# Patient Record
Sex: MALE | Race: WHITE | NOT HISPANIC OR LATINO | Employment: FULL TIME | URBAN - METROPOLITAN AREA
[De-identification: names, ages, dates, MRNs, and addresses within clinical notes are randomized per-mention and may not be internally consistent; named-entity substitution may affect disease eponyms.]

---

## 2019-05-22 ENCOUNTER — PATIENT MESSAGE (OUTPATIENT)
Dept: INTERNAL MEDICINE | Facility: CLINIC | Age: 59
End: 2019-05-22

## 2019-05-28 ENCOUNTER — HOSPITAL ENCOUNTER (INPATIENT)
Facility: OTHER | Age: 59
LOS: 6 days | Discharge: HOME | DRG: 603 | End: 2019-06-03
Attending: EMERGENCY MEDICINE | Admitting: HOSPITALIST
Payer: COMMERCIAL

## 2019-05-28 DIAGNOSIS — L03.90 CELLULITIS: ICD-10-CM

## 2019-05-28 DIAGNOSIS — R00.0 TACHYCARDIA: ICD-10-CM

## 2019-05-28 DIAGNOSIS — L03.312 CELLULITIS OF BACK EXCEPT BUTTOCK: Primary | ICD-10-CM

## 2019-05-28 LAB
ALBUMIN SERPL BCP-MCNC: 2.4 G/DL (ref 3.5–5.2)
ALP SERPL-CCNC: 222 U/L (ref 55–135)
ALT SERPL W/O P-5'-P-CCNC: 25 U/L (ref 10–44)
AMPHET+METHAMPHET UR QL: NEGATIVE
ANION GAP SERPL CALC-SCNC: 14 MMOL/L (ref 8–16)
AST SERPL-CCNC: 13 U/L (ref 10–40)
BACTERIA #/AREA URNS HPF: NORMAL /HPF
BARBITURATES UR QL SCN>200 NG/ML: NEGATIVE
BASOPHILS # BLD AUTO: ABNORMAL K/UL (ref 0–0.2)
BASOPHILS NFR BLD: 0 % (ref 0–1.9)
BENZODIAZ UR QL SCN>200 NG/ML: NEGATIVE
BILIRUB SERPL-MCNC: 0.6 MG/DL (ref 0.1–1)
BILIRUB UR QL STRIP: NEGATIVE
BUN SERPL-MCNC: 20 MG/DL (ref 6–20)
BZE UR QL SCN: NEGATIVE
CALCIUM SERPL-MCNC: 9.5 MG/DL (ref 8.7–10.5)
CANNABINOIDS UR QL SCN: NEGATIVE
CHLORIDE SERPL-SCNC: 96 MMOL/L (ref 95–110)
CLARITY UR: CLEAR
CO2 SERPL-SCNC: 20 MMOL/L (ref 23–29)
COLOR UR: YELLOW
CREAT SERPL-MCNC: 0.9 MG/DL (ref 0.5–1.4)
CREAT UR-MCNC: 26.3 MG/DL (ref 23–375)
DIFFERENTIAL METHOD: ABNORMAL
EOSINOPHIL # BLD AUTO: ABNORMAL K/UL (ref 0–0.5)
EOSINOPHIL NFR BLD: 0 % (ref 0–8)
ERYTHROCYTE [DISTWIDTH] IN BLOOD BY AUTOMATED COUNT: 11.9 % (ref 11.5–14.5)
EST. GFR  (AFRICAN AMERICAN): >60 ML/MIN/1.73 M^2
EST. GFR  (NON AFRICAN AMERICAN): >60 ML/MIN/1.73 M^2
ESTIMATED AVG GLUCOSE: 301 MG/DL (ref 68–131)
ETHANOL UR-MCNC: <10 MG/DL
GLUCOSE SERPL-MCNC: 381 MG/DL (ref 70–110)
GLUCOSE UR QL STRIP: ABNORMAL
HBA1C MFR BLD HPLC: 12.1 % (ref 4–5.6)
HCT VFR BLD AUTO: 37.4 % (ref 40–54)
HGB BLD-MCNC: 13.4 G/DL (ref 14–18)
HGB UR QL STRIP: ABNORMAL
KETONES UR QL STRIP: ABNORMAL
LACTATE SERPL-SCNC: 1.3 MMOL/L (ref 0.5–2.2)
LACTATE SERPL-SCNC: 1.4 MMOL/L (ref 0.5–2.2)
LEUKOCYTE ESTERASE UR QL STRIP: NEGATIVE
LYMPHOCYTES # BLD AUTO: ABNORMAL K/UL (ref 1–4.8)
LYMPHOCYTES NFR BLD: 5 % (ref 18–48)
MCH RBC QN AUTO: 32.2 PG (ref 27–31)
MCHC RBC AUTO-ENTMCNC: 35.8 G/DL (ref 32–36)
MCV RBC AUTO: 90 FL (ref 82–98)
METHADONE UR QL SCN>300 NG/ML: NEGATIVE
MICROSCOPIC COMMENT: NORMAL
MONOCYTES # BLD AUTO: ABNORMAL K/UL (ref 0.3–1)
MONOCYTES NFR BLD: 13 % (ref 4–15)
NEUTROPHILS # BLD AUTO: ABNORMAL K/UL (ref 1.8–7.7)
NEUTROPHILS NFR BLD: 81 % (ref 38–73)
NEUTS BAND NFR BLD MANUAL: 1 %
NITRITE UR QL STRIP: NEGATIVE
OPIATES UR QL SCN: NEGATIVE
PCP UR QL SCN>25 NG/ML: NEGATIVE
PH UR STRIP: 6 [PH] (ref 5–8)
PLATELET # BLD AUTO: 476 K/UL (ref 150–350)
PLATELET BLD QL SMEAR: ABNORMAL
PMV BLD AUTO: 9.4 FL (ref 9.2–12.9)
POCT GLUCOSE: 266 MG/DL (ref 70–110)
POCT GLUCOSE: 323 MG/DL (ref 70–110)
POCT GLUCOSE: 338 MG/DL (ref 70–110)
POCT GLUCOSE: 342 MG/DL (ref 70–110)
POCT GLUCOSE: 349 MG/DL (ref 70–110)
POTASSIUM SERPL-SCNC: 4.1 MMOL/L (ref 3.5–5.1)
PROT SERPL-MCNC: 7.2 G/DL (ref 6–8.4)
PROT UR QL STRIP: NEGATIVE
RBC # BLD AUTO: 4.16 M/UL (ref 4.6–6.2)
RBC #/AREA URNS HPF: 0 /HPF (ref 0–4)
SODIUM SERPL-SCNC: 130 MMOL/L (ref 136–145)
SP GR UR STRIP: <=1.005 (ref 1–1.03)
TOXICOLOGY INFORMATION: NORMAL
URN SPEC COLLECT METH UR: ABNORMAL
UROBILINOGEN UR STRIP-ACNC: NEGATIVE EU/DL
WBC # BLD AUTO: 19.13 K/UL (ref 3.9–12.7)
WBC #/AREA URNS HPF: 1 /HPF (ref 0–5)
YEAST URNS QL MICRO: NORMAL

## 2019-05-28 PROCEDURE — 25000003 PHARM REV CODE 250: Performed by: HOSPITALIST

## 2019-05-28 PROCEDURE — 85027 COMPLETE CBC AUTOMATED: CPT

## 2019-05-28 PROCEDURE — 87070 CULTURE OTHR SPECIMN AEROBIC: CPT

## 2019-05-28 PROCEDURE — 87077 CULTURE AEROBIC IDENTIFY: CPT

## 2019-05-28 PROCEDURE — 80307 DRUG TEST PRSMV CHEM ANLYZR: CPT

## 2019-05-28 PROCEDURE — 99285 EMERGENCY DEPT VISIT HI MDM: CPT | Mod: 25

## 2019-05-28 PROCEDURE — 25000003 PHARM REV CODE 250: Performed by: EMERGENCY MEDICINE

## 2019-05-28 PROCEDURE — 99223 PR INITIAL HOSPITAL CARE,LEVL III: ICD-10-PCS | Mod: ,,, | Performed by: HOSPITALIST

## 2019-05-28 PROCEDURE — 63600175 PHARM REV CODE 636 W HCPCS: Performed by: EMERGENCY MEDICINE

## 2019-05-28 PROCEDURE — 87186 SC STD MICRODIL/AGAR DIL: CPT

## 2019-05-28 PROCEDURE — 36415 COLL VENOUS BLD VENIPUNCTURE: CPT

## 2019-05-28 PROCEDURE — 93041 RHYTHM ECG TRACING: CPT

## 2019-05-28 PROCEDURE — 87075 CULTR BACTERIA EXCEPT BLOOD: CPT

## 2019-05-28 PROCEDURE — 99223 1ST HOSP IP/OBS HIGH 75: CPT | Mod: ,,, | Performed by: HOSPITALIST

## 2019-05-28 PROCEDURE — 83036 HEMOGLOBIN GLYCOSYLATED A1C: CPT

## 2019-05-28 PROCEDURE — 94660 CPAP INITIATION&MGMT: CPT

## 2019-05-28 PROCEDURE — 63600175 PHARM REV CODE 636 W HCPCS: Performed by: HOSPITALIST

## 2019-05-28 PROCEDURE — 94761 N-INVAS EAR/PLS OXIMETRY MLT: CPT

## 2019-05-28 PROCEDURE — 85007 BL SMEAR W/DIFF WBC COUNT: CPT

## 2019-05-28 PROCEDURE — 87040 BLOOD CULTURE FOR BACTERIA: CPT

## 2019-05-28 PROCEDURE — 80053 COMPREHEN METABOLIC PANEL: CPT

## 2019-05-28 PROCEDURE — 81000 URINALYSIS NONAUTO W/SCOPE: CPT

## 2019-05-28 PROCEDURE — 83605 ASSAY OF LACTIC ACID: CPT

## 2019-05-28 PROCEDURE — 96361 HYDRATE IV INFUSION ADD-ON: CPT

## 2019-05-28 PROCEDURE — 25500020 PHARM REV CODE 255: Performed by: EMERGENCY MEDICINE

## 2019-05-28 PROCEDURE — 11000001 HC ACUTE MED/SURG PRIVATE ROOM

## 2019-05-28 PROCEDURE — 96372 THER/PROPH/DIAG INJ SC/IM: CPT | Mod: 59

## 2019-05-28 PROCEDURE — 27000190 HC CPAP FULL FACE MASK W/VALVE

## 2019-05-28 PROCEDURE — 96360 HYDRATION IV INFUSION INIT: CPT

## 2019-05-28 PROCEDURE — 99900035 HC TECH TIME PER 15 MIN (STAT)

## 2019-05-28 PROCEDURE — S5571 INSULIN DISPOS PEN 3 ML: HCPCS | Performed by: HOSPITALIST

## 2019-05-28 RX ORDER — ENOXAPARIN SODIUM 100 MG/ML
40 INJECTION SUBCUTANEOUS EVERY 24 HOURS
Status: DISCONTINUED | OUTPATIENT
Start: 2019-05-28 | End: 2019-06-03 | Stop reason: HOSPADM

## 2019-05-28 RX ORDER — INSULIN ASPART 100 [IU]/ML
1-10 INJECTION, SOLUTION INTRAVENOUS; SUBCUTANEOUS
Status: DISCONTINUED | OUTPATIENT
Start: 2019-05-28 | End: 2019-06-03 | Stop reason: HOSPADM

## 2019-05-28 RX ORDER — ONDANSETRON 8 MG/1
8 TABLET, ORALLY DISINTEGRATING ORAL EVERY 8 HOURS PRN
Status: DISCONTINUED | OUTPATIENT
Start: 2019-05-28 | End: 2019-06-03 | Stop reason: HOSPADM

## 2019-05-28 RX ORDER — VANCOMYCIN HYDROCHLORIDE
15
Status: DISCONTINUED | OUTPATIENT
Start: 2019-05-28 | End: 2019-05-28

## 2019-05-28 RX ORDER — VALSARTAN 80 MG/1
80 TABLET ORAL DAILY
Status: DISCONTINUED | OUTPATIENT
Start: 2019-05-28 | End: 2019-05-31

## 2019-05-28 RX ORDER — IBUPROFEN 200 MG
24 TABLET ORAL
Status: DISCONTINUED | OUTPATIENT
Start: 2019-05-28 | End: 2019-06-03 | Stop reason: HOSPADM

## 2019-05-28 RX ORDER — GLUCAGON 1 MG
1 KIT INJECTION
Status: DISCONTINUED | OUTPATIENT
Start: 2019-05-28 | End: 2019-06-03 | Stop reason: HOSPADM

## 2019-05-28 RX ORDER — IBUPROFEN 200 MG
16 TABLET ORAL
Status: DISCONTINUED | OUTPATIENT
Start: 2019-05-28 | End: 2019-06-03 | Stop reason: HOSPADM

## 2019-05-28 RX ORDER — SODIUM CHLORIDE 9 MG/ML
INJECTION, SOLUTION INTRAVENOUS CONTINUOUS
Status: DISCONTINUED | OUTPATIENT
Start: 2019-05-28 | End: 2019-05-30

## 2019-05-28 RX ORDER — ACETAMINOPHEN 325 MG/1
650 TABLET ORAL EVERY 8 HOURS PRN
Status: DISCONTINUED | OUTPATIENT
Start: 2019-05-28 | End: 2019-06-03 | Stop reason: HOSPADM

## 2019-05-28 RX ORDER — SODIUM CHLORIDE 0.9 % (FLUSH) 0.9 %
10 SYRINGE (ML) INJECTION
Status: DISCONTINUED | OUTPATIENT
Start: 2019-05-28 | End: 2019-06-03 | Stop reason: HOSPADM

## 2019-05-28 RX ORDER — ACETAMINOPHEN 325 MG/1
650 TABLET ORAL EVERY 4 HOURS PRN
Status: DISCONTINUED | OUTPATIENT
Start: 2019-05-28 | End: 2019-06-03 | Stop reason: HOSPADM

## 2019-05-28 RX ORDER — LOSARTAN POTASSIUM 25 MG/1
25 TABLET ORAL DAILY
Status: DISCONTINUED | OUTPATIENT
Start: 2019-05-28 | End: 2019-05-28

## 2019-05-28 RX ORDER — HYDRALAZINE HYDROCHLORIDE 20 MG/ML
15 INJECTION INTRAMUSCULAR; INTRAVENOUS EVERY 4 HOURS PRN
Status: DISCONTINUED | OUTPATIENT
Start: 2019-05-28 | End: 2019-06-03 | Stop reason: HOSPADM

## 2019-05-28 RX ADMIN — VANCOMYCIN HYDROCHLORIDE 500 MG: 500 INJECTION, POWDER, LYOPHILIZED, FOR SOLUTION INTRAVENOUS at 10:05

## 2019-05-28 RX ADMIN — VALSARTAN 80 MG: 80 TABLET, FILM COATED ORAL at 09:05

## 2019-05-28 RX ADMIN — PIPERACILLIN AND TAZOBACTAM 4.5 G: 4; .5 INJECTION, POWDER, LYOPHILIZED, FOR SOLUTION INTRAVENOUS; PARENTERAL at 11:05

## 2019-05-28 RX ADMIN — VANCOMYCIN HYDROCHLORIDE 1750 MG: 100 INJECTION, POWDER, LYOPHILIZED, FOR SOLUTION INTRAVENOUS at 05:05

## 2019-05-28 RX ADMIN — SODIUM CHLORIDE 2871 ML: 0.9 INJECTION, SOLUTION INTRAVENOUS at 06:05

## 2019-05-28 RX ADMIN — INSULIN ASPART 8 UNITS: 100 INJECTION, SOLUTION INTRAVENOUS; SUBCUTANEOUS at 10:05

## 2019-05-28 RX ADMIN — INSULIN ASPART 6 UNITS: 100 INJECTION, SOLUTION INTRAVENOUS; SUBCUTANEOUS at 05:05

## 2019-05-28 RX ADMIN — ENOXAPARIN SODIUM 40 MG: 100 INJECTION SUBCUTANEOUS at 05:05

## 2019-05-28 RX ADMIN — INSULIN ASPART 4 UNITS: 100 INJECTION, SOLUTION INTRAVENOUS; SUBCUTANEOUS at 09:05

## 2019-05-28 RX ADMIN — IOHEXOL 100 ML: 350 INJECTION, SOLUTION INTRAVENOUS at 06:05

## 2019-05-28 RX ADMIN — SODIUM CHLORIDE: 0.9 INJECTION, SOLUTION INTRAVENOUS at 09:05

## 2019-05-28 RX ADMIN — PIPERACILLIN AND TAZOBACTAM 4.5 G: 4; .5 INJECTION, POWDER, LYOPHILIZED, FOR SOLUTION INTRAVENOUS; PARENTERAL at 09:05

## 2019-05-28 RX ADMIN — INSULIN DETEMIR 10 UNITS: 100 INJECTION, SOLUTION SUBCUTANEOUS at 09:05

## 2019-05-28 RX ADMIN — VANCOMYCIN HYDROCHLORIDE 2000 MG: 100 INJECTION, POWDER, LYOPHILIZED, FOR SOLUTION INTRAVENOUS at 06:05

## 2019-05-28 NOTE — CONSULTS
59yoWM with left upper back infection which started 5 days ago and spreading.  No h/o similar problems.  Red, fluctuant, draining process over the left upper back and lower neck.  Will need drainage in the OR tomorrow (on a reg diet).

## 2019-05-28 NOTE — ED PROVIDER NOTES
Encounter Date: 5/28/2019    SCRIBE #1 NOTE: ICass, am scribing for, and in the presence of, Dr. Park.   SCRIBE #2 NOTE: IVicki, am scribing for, and in the presence of, Dr. Spivey. the EKG reading.     History     Chief Complaint   Patient presents with    Abscess     to left upper back x5 day pta, redness with induration noted      Time seen by provider: 5:34 AM    This is a 59 y.o. male with hx of HTN and DM who presents with complaint of abscess to his left upper back. He initially felt the abscess six days ago, but is unsure how long he has had it. He reports drainage after someone accidentally hit his back with their hand five days ago. He reports racing heart rate. He denies fever, chills, or myalgias.    The history is provided by the patient.     Review of patient's allergies indicates:  No Known Allergies  Past Medical History:   Diagnosis Date    Diabetes mellitus     Elevated transaminase measurement     HLD (hyperlipidemia)     mixed    HTN (hypertension)     Obesity     Other abnormal glucose     Sleep apnea      History reviewed. No pertinent surgical history.  Family History   Problem Relation Age of Onset    COPD Mother     Hyperlipidemia Father     Hypertension Father     Heart disease Father     Dementia Father     Diabetes Father      Social History     Tobacco Use    Smoking status: Never Smoker    Smokeless tobacco: Never Used   Substance Use Topics    Alcohol use: Yes     Frequency: Monthly or less     Drinks per session: 3 or 4     Binge frequency: Less than monthly     Comment: social    Drug use: No     Review of Systems   Constitutional: Negative for chills and fever.   HENT: Negative for sore throat.    Respiratory: Negative for shortness of breath.    Cardiovascular: Negative for chest pain.        Positive for racing heart.   Gastrointestinal: Negative for nausea.   Genitourinary: Negative for dysuria.   Musculoskeletal: Negative for back  pain and myalgias.   Skin: Negative for rash.        Positive for abscess and drainage.   Neurological: Negative for weakness and numbness.   Hematological: Does not bruise/bleed easily.       Physical Exam     Initial Vitals [05/28/19 0526]   BP Pulse Resp Temp SpO2   (!) 190/89 (!) 124 20 99.3 °F (37.4 °C) 97 %      MAP       --         Physical Exam    Nursing note and vitals reviewed.  Constitutional: He appears well-developed and well-nourished.   HENT:   Head: Normocephalic and atraumatic.   Eyes: EOM are normal. Pupils are equal, round, and reactive to light.   Neck: Normal range of motion. Neck supple.   Cardiovascular: Regular rhythm and normal heart sounds. Tachycardia present.  Exam reveals no gallop and no friction rub.    No murmur heard.  Pulmonary/Chest: Breath sounds normal. No respiratory distress. He has no wheezes. He has no rhonchi. He has no rales.   Musculoskeletal: Normal range of motion. He exhibits no edema or tenderness.   Neurological: He is alert and oriented to person, place, and time.   Skin: Skin is warm.   7 inch to 8 inch area of erythema with purple discoloration and purulent drainage to the posterior aspect of the left shoulder and neck with fluctuance.   Psychiatric: He has a normal mood and affect.         ED Course   Procedures  Labs Reviewed   CBC W/ AUTO DIFFERENTIAL - Abnormal; Notable for the following components:       Result Value    WBC 19.13 (*)     RBC 4.16 (*)     Hemoglobin 13.4 (*)     Hematocrit 37.4 (*)     Mean Corpuscular Hemoglobin 32.2 (*)     Platelets 476 (*)     Gran% 81.0 (*)     Lymph% 5.0 (*)     All other components within normal limits   COMPREHENSIVE METABOLIC PANEL - Abnormal; Notable for the following components:    Sodium 130 (*)     CO2 20 (*)     Glucose 381 (*)     Albumin 2.4 (*)     Alkaline Phosphatase 222 (*)     All other components within normal limits   URINALYSIS, REFLEX TO URINE CULTURE - Abnormal; Notable for the following components:     Specific Gravity, UA <=1.005 (*)     Glucose, UA 3+ (*)     Ketones, UA 1+ (*)     Occult Blood UA Trace (*)     All other components within normal limits    Narrative:     Preferred Collection Type->Urine, Clean Catch   POCT GLUCOSE - Abnormal; Notable for the following components:    POCT Glucose 338 (*)     All other components within normal limits   POCT GLUCOSE - Abnormal; Notable for the following components:    POCT Glucose 342 (*)     All other components within normal limits   CULTURE, BLOOD   CULTURE, BLOOD   CULTURE, AEROBIC  (SPECIFY SOURCE)   CULTURE, ANAEROBIC   LACTIC ACID, PLASMA   POCT GLUCOSE MONITORING CONTINUOUS     EKG Readings: (Independently Interpreted)   Sinus tachycardia at a rate of 103. Normal intervals. Narrow QRS. LAD. No acute ST/T wave abnormalities. QR wave in lead III. Independently interpreted by Dr. Spivey.     ECG Results    None       Imaging Results          CT Chest With Contrast (Final result)  Result time 05/28/19 07:08:57    Final result by Shaquille Osullivan MD (05/28/19 07:08:57)                 Impression:      5.9 x 17.5 x 4.9 cm (cephalad extent of which, however, is not completely visualized) soft tissue/subcutaneous infiltration with edema fairly focal LEFT upper back.  Cellulitis suspect.  Early abscess not excluded.  No well-defined abscess collection at this time.      Electronically signed by: Shaquille Osullivan MD  Date:    05/28/2019  Time:    07:08             Narrative:    EXAMINATION:  CT CHEST WITH CONTRAST    CLINICAL HISTORY:  Sepsis;large abscess and cellulitis to left upper back. eval extent of abscess;    TECHNIQUE:  Low dose axial images, sagittal and coronal reformations were obtained from the thoracic inlet to the lung bases following the IV administration of 100 mL of Omnipaque 350.    COMPARISON:  None    FINDINGS:  -Lungs: No nodules or infiltrates.    -Pleura: No thickening or fluid.    -Mediastinum/Beverly:Negative    -Axilla: No  adenopathy.    -Thyroid: Normal lower gland.    -Heart/Aorta: No pericardial effusion. Aorta normal caliber.    -Bones/Chest Wall: Negative chest wall.  There is diffuse infiltration of subcutaneous soft tissues with skin thickening identified level of the LEFT back, extending for approximately 5.9 x 17.5 by 4.9 cm.  The cephalad extent of this is not incompletely visualized as it extends into the LEFT neck region.  Finding most suggestive of cellulitis without evidence for a discrete focal collection.  This appears more consistent with confluent inflammatory/infectious etiology.    -Upper Abdomen:                                 Medical Decision Making:   Clinical Tests:   Lab Tests: Ordered  Medical Tests: Ordered    Additional MDM:   Comments: 58 y/o diabetic male presents with a large area of redness and induration to the posterior aspect of his left shoulder that extends to the posterior neck x 7 days.  The area has oozing of purulent drainage.  It is unclear the extension of the area of induration based on exam. Triage VS significant for tachycardia but no fever.  Will initiate sepsis order set and give vancomycin.  Will also obtain imaging to visualize the extent of cellulitis/abscess prior to attempting any I and D.      6:00am  Patient's care was transferred to Dr. Spivey while awaiting all of the results of his w/u..          Scribe Attestation:   Scribe #1: I performed the above scribed service and the documentation accurately describes the services I performed. I attest to the accuracy of the note.  Scribe #2: I performed the above scribed service and the documentation accurately describes the services I performed. I attest to the accuracy of the note.    Attending Attestation:           Physician Attestation for Scribe:  Physician Attestation Statement for Scribe #1: I, Dr. Park, reviewed documentation, as scribed by Cass Hamilton in my presence, and it is both accurate and complete.   Physician  Attestation Statement for Scribe #2: I, Dr. Spivey, reviewed documentation, as scribed by Vicki Delgadillo in my presence, and it is both accurate and complete. I also acknowledge and confirm the content of the note done by Smooth #1.                 Clinical Impression:     1. Cellulitis    2. Tachycardia    3. Cellulitis of back except buttock                                 Sharon Park MD  05/28/19 1022

## 2019-05-28 NOTE — PROVIDER PROGRESS NOTES - EMERGENCY DEPT.
Encounter Date: 5/28/2019    ED Physician Progress Notes        Physician Note:   I was asked by Dr. Park to follow up on the results of the lab work as well as CT scan for the patient with extensive cellulitis to the upper back.  Blood work [place] 86[address].  Glucose is 380 with a bicarb of 20.  Anion gap is normal at 14.  Kidney function is normal. CT scan shows an extensive cellulitis approximately 6 and half by 17 by 6-1/2 cm.  No focus of large fluid collection.  Patient is otherwise stable at this time.  Spoke with Hospital Medicine who will hot admit the patient for further evaluation and management.    This is the extent to the patients complaints today here in the emergency department.

## 2019-05-28 NOTE — H&P
[facility] Medicine  History & Physical    Patient Name: Henry Zacarias  MRN: [id number]  Admission Date: 5/28/2019  Attending Physician: Felton Bush MD  Primary Care Provider: Yasmani Alonso MD         Patient information was obtained from patient and ER records.     Subjective:     Principal Problem:Cellulitis of back except buttock    Chief Complaint:   Chief Complaint   Patient presents with    Abscess     to left upper back x5 day pta, redness with induration noted         HPI: Mr. Zacarias is a 59 year old man with history of hypertension, diabetes, hyperlipidemia and obstructive sleep apnea who came in for evaluation of a painful draining infection on his left shoulder.  He states he started noticing some tenderness on his left shoulder about 6 days ago.  Since then, he has developed a large painful area of erythema at the postero-basilar area of his neck and and left shoulder.  It is very tender to touch and has been draining green/brown fluid for the last two days.  He denies any fever, chills, nausea, vomiting and diarrhea.  He states he had no abrasion, cut or insect bite to the area that he is aware of.  He has never had skin infections like this before.  He does note that he has been off all medications for the last 6 years.      Past Medical History:   Diagnosis Date    Diabetes mellitus     Elevated transaminase measurement     HLD (hyperlipidemia)     mixed    HTN (hypertension)     Obesity     Other abnormal glucose     Sleep apnea        History reviewed. No pertinent surgical history.    Review of patient's allergies indicates:  No Known Allergies    No current facility-administered medications on file prior to encounter.      Current Outpatient Medications on File Prior to Encounter   Medication Sig    atorvastatin (LIPITOR) 40 MG tablet Take 1 tablet (40 mg total) by mouth once daily.    blood sugar diagnostic (ACCU-CHEK SMARTVIEW) Strp 60 each by  "Misc.(Non-Drug; Combo Route) route 2 (two) times daily.    insulin needles, disposable, 32 x 1/5 " Ndle 1 Device by Misc.(Non-Drug; Combo Route) route once daily.    lancets (ACCU-CHEK FASTCLIX) Misc 60 each by Misc.(Non-Drug; Combo Route) route 2 (two) times daily.    liraglutide (VICTOZA 3-ZOYA) 0.6 mg/0.1 mL (18 mg/3 mL) PnIj Inject 1.8 mg into the skin once daily.    metformin (GLUCOPHAGE) 500 MG tablet Take 1 tablet (500 mg total) by mouth 2 (two) times daily with meals.    valsartan (DIOVAN) 160 mg capsule Take 1 capsule (160 mg total) by mouth once daily.     Family History     Problem Relation (Age of Onset)    COPD Mother    Dementia Father    Diabetes Father    Heart disease Father    Hyperlipidemia Father    Hypertension Father        Tobacco Use    Smoking status: Never Smoker    Smokeless tobacco: Never Used   Substance and Sexual Activity    Alcohol use: Yes     Frequency: Monthly or less     Drinks per session: 3 or 4     Binge frequency: Less than monthly     Comment: social    Drug use: No    Sexual activity: Not Currently     Partners: Female     Review of Systems   Constitutional: Negative for activity change and appetite change.   HENT: Negative for congestion, dental problem, ear discharge, ear pain and facial swelling.    Eyes: Negative for discharge and itching.   Respiratory: Negative for apnea and chest tightness.    Cardiovascular: Negative for chest pain and leg swelling.   Gastrointestinal: Negative for abdominal distention and abdominal pain.   Endocrine: Negative for cold intolerance and heat intolerance.   Genitourinary: Negative for difficulty urinating and dysuria.   Musculoskeletal: Positive for back pain. Negative for arthralgias, neck pain and neck stiffness.   Skin: Positive for color change and wound.   Neurological: Negative for dizziness and facial asymmetry.   Hematological: Negative for adenopathy. Does not bruise/bleed easily.   Psychiatric/Behavioral: Negative for " "agitation and behavioral problems.     Objective:     Vital Signs (Most Recent):  Temp: 98.7 °F (37.1 °C) (05/28/19 1543)  Pulse: 106 (05/28/19 1543)  Resp: 19 (05/28/19 1543)  BP: (!) 159/72 (05/28/19 1543)  SpO2: (!) 93 % (05/28/19 1543) Vital Signs (24h Range):  Temp:  [96.4 °F (35.8 °C)-99.9 °F (37.7 °C)] 98.7 °F (37.1 °C)  Pulse:  [] 106  Resp:  [11-20] 19  SpO2:  [93 %-99 %] 93 %  BP: (131-190)/(70-89) 159/72     Weight: 95.7 kg (211 lb)  Body mass index is 30.28 kg/m².    Physical Exam   Constitutional: He is oriented to person, place, and time. He appears well-developed and well-nourished.   HENT:   Head: Normocephalic and atraumatic.   Eyes: Pupils are equal, round, and reactive to light. EOM are normal.   Neck: Normal range of motion. Neck supple.   Cardiovascular: Normal rate, regular rhythm and normal heart sounds.   Pulmonary/Chest: Effort normal and breath sounds normal. No respiratory distress.   Abdominal: Soft. Bowel sounds are normal. He exhibits no distension. There is no tenderness.   Musculoskeletal: Normal range of motion. He exhibits no edema.   Neurological: He is oriented to person, place, and time. No cranial nerve deficit. Coordination normal.   Skin: Skin is warm and dry.   ~7x7" area on left shoulder at intersection of neck and back that is deep purple with multiple small fluctuant areas and margarita puss draining from these areas.  The area is hot and tender to touch.   Psychiatric: He has a normal mood and affect. His behavior is normal.   Vitals reviewed.        CRANIAL NERVES     CN III, IV, VI   Pupils are equal, round, and reactive to light.  Extraocular motions are normal.        Significant Labs: All pertinent labs within the past 24 hours have been reviewed.    Significant Imaging: I have reviewed and interpreted all pertinent imaging results/findings within the past 24 hours.    Assessment/Plan:     * Cellulitis of back except buttock  -Mr. Zacarias is admitted to inpatient " status  -On admit he is tachycardic with leukocytosis but afebrile with normal lactic acid.  -Blood and wound cultures have been obtained  -CT of chest does not show a large fluid collection, but there are frankly fluctuant areas and puss is draining.  Will consult general surgery to evaluate for incision and drainage.  -Will treat with vancomycin and zosyn for now.      Cellulitis  -Treatment as above      DM (diabetes mellitus)  -Off treatment x 6 years  -Will check A1c  -Start diabetic diet  -Will give detemir 10 units at night and sliding scale insulin qac and qhs      HTN (hypertension)  -Off treatment x 6 years  -BP is elevated  -Start valsartan daily  -Provide hydralazine PRN      Obesity  -Noted      Sleep apnea  -Order cpap with pressure setting 12 for when sleeping        VTE Risk Mitigation (From admission, onward)        Ordered     enoxaparin injection 40 mg  Daily      05/28/19 0944     IP VTE HIGH RISK PATIENT  Once      05/28/19 0944             Felton Bush MD  Department of Hospital Medicine   [facility]

## 2019-05-28 NOTE — PLAN OF CARE
CM met with pt for initial discharge planning assessment.    Pt is independent, works, drives himself.    Pt confirmed his new pcp is Dr. Aimee Coombs, at [facility]. He is supposed to see her Thurs, CM will call to reschedule apt.    Pt states he has not been taking his meds due to not seeing an MD in a long time. Pt states he will now be consistent with care and meds.    Pt to have surgery in the am, 5/29/19, CM to follow for plans and arrangemtns.         05/28/19 6518   Discharge Assessment   Assessment Type Discharge Planning Assessment   Confirmed/corrected address and phone number on facesheet? Yes   Assessment information obtained from? Patient;Medical Record   Expected Length of Stay (days) 3   Communicated expected length of stay with patient/caregiver yes   Prior to hospitilization cognitive status: Alert/Oriented   Prior to hospitalization functional status: Independent   Current cognitive status: Alert/Oriented   Current Functional Status: Independent   Lives With alone   Able to Return to Prior Arrangements yes   Is patient able to care for self after discharge? Yes   Patient's perception of discharge disposition home or selfcare   Readmission Within the Last 30 Days no previous admission in last 30 days   Patient currently being followed by outpatient case management? No   Equipment Currently Used at Home none   Do you have any problems affording any of your prescribed medications? No   Is the patient taking medications as prescribed? no   If no, which medications is patient not taking? metformin, diovan   Does the patient have transportation home? Yes   Transportation Anticipated family or friend will provide;other (see comments)  (may call [employer])   Does the patient receive services at the [facility]? No   Discharge Plan A Home   Discharge Plan B Home   DME Needed Upon Discharge  none   Patient/Family in Agreement with Plan yes

## 2019-05-28 NOTE — ED NOTES
Ordered IV NS and Vancomycin completed at this time. Line flushed with NS and saline locked at this time.

## 2019-05-28 NOTE — HPI
Mr. Zacarias is a 59 year old man with history of hypertension, diabetes, hyperlipidemia and obstructive sleep apnea who came in for evaluation of a painful draining infection on his left shoulder.  He states he started noticing some tenderness on his left shoulder about 6 days ago.  Since then, he has developed a large painful area of erythema at the postero-basilar area of his neck and and left shoulder.  It is very tender to touch and has been draining green/brown fluid for the last two days.  He denies any fever, chills, nausea, vomiting and diarrhea.  He states he had no abrasion, cut or insect bite to the area that he is aware of.  He has never had skin infections like this before.  He does note that he has been off all medications for the last 6 years.

## 2019-05-28 NOTE — CONSULTS
Consult to Wound Care for abscess site to left shoulder and back. Dr. Correa will take patient to the OR tomorrow to drain. Nursing has been doing wound care by placing gauze over area and covering with a large Tegaderm. Wound Care to follow after surgery.    Urszula Lance RN, Wound and Ostomy

## 2019-05-28 NOTE — ED NOTES
Pt returned from ordered CT scan via wheelchair with radiology tech. Pt ambulated unassisted from wheelchair into ED stretcher w/ no distress noted. Pt remains in hospital gown,  currently aaox4, speaking in clear full sentences appropriately. Skin warm and dry. Respirations even and non labored. Pt palced on cardiac monitor, continuous pulse oximetry and automatic blood pressure cuff cycling w/ alarms set. Bed placed in low locked position, side rails up x 2, call light is within reach of patient, alarms set and turned on for monitor and pulse ox. Urinal at bedside within patient reach, pt aware need for ordered urine sample. IV remains patent and NS fluids continue to infuse per order. Ordered IV Vancomycin continues to infuse at ordered set rate currently. Pt denies active pain or needs at this time. Will continue to monitor patient closely.     Patient identifiers verified and correct for Henry Zacarias.    LOC: The patient is awake, alert and aware of environment with an appropriate affect, the patient is oriented x 3 and speaking appropriately.  APPEARANCE: Patient resting comfortably and in no acute distress, patient is clean and well groomed, patient's clothing is properly fastened.  SKIN: The skin is warm and dry, color consistent with ethnicity, patient has normal skin turgor and moist mucus membranes, skin intact, no breakdown or bruising noted. Noted 7in to 8in area of erythema w/ purple discoloration, + purulent drainage to posterior aspect to left shoulder and neck. Pt denies pain to site.  MUSCULOSKELETAL: Patient moving all extremities spontaneously, no obvious swelling or deformities noted.  RESPIRATORY: Airway is open and patent, respirations are spontaneous, patient has a normal effort and rate, no accessory muscle use noted, bilateral breath sounds clear in all lobes.  CARDIAC: Patient has a heart rate of 110 on continuous cardiac monitor, no periphreal edema noted, capillary refill < 3 seconds.  No chest pains, SOB, dizziness, weakness of fatigue currently.   ABDOMEN: Soft and non tender to palpation, no distention noted.  NEUROLOGIC: PERRL, 3 mm bilaterally, eyes open spontaneously, behavior appropriate to situation, follows commands, facial expression symmetrical, bilateral hand grasp equal and even, purposeful motor response noted, normal sensation in all extremities when touched with a finger.  FALL RISK BAND APPLIED TO PATIENT.

## 2019-05-28 NOTE — ED NOTES
Note sent to pharmacy to please verify ordered mediations. Awaiting verification to administer ordered meds.

## 2019-05-28 NOTE — ED NOTES
I assume care of this patient at this time. Report received from Peyton CURRY RN. Pt remains in ordered CT scan currently.

## 2019-05-28 NOTE — ED NOTES
RN Barbie aware of administered medications prior to transport to floor and pt en route to admit room. Pt sitting up in stretcher, ambulated to wheelchair unassisted w/ steady gait witnessed by MARLENY Sofia, speaking in clear full sentences. IV remain patent, secured and saline locked w/ dry dressing. Respirations even and un-labored, skin warm and dry. Dressing remains dry and in tact to left upper back. Pt denies active pain or needs at this time. All belongings remain with patient prior to transport.

## 2019-05-28 NOTE — ASSESSMENT & PLAN NOTE
-Mr. Zacarias is admitted to inpatient status  -On admit he is tachycardic with leukocytosis but afebrile with normal lactic acid.  -Blood and wound cultures have been obtained  -CT of chest does not show a large fluid collection, but there are frankly fluctuant areas and puss is draining.  Will consult general surgery to evaluate for incision and drainage.  -Will treat with vancomycin and zosyn for now.

## 2019-05-28 NOTE — PROGRESS NOTES
Pharmacokinetic Initial Assessment: IV Vancomycin    Assessment/Plan:    Initiate intravenous vancomycin with loading dose of 2500 mg once followed by a maintenance dose of vancomycin 1750 mg IV every 12 hours  Desired empiric serum trough concentration is 10 to 20 mcg/mL.  Draw vancomycin trough level 30 min prior to fourth dose on 5/29/19 at approximately 1800  Pharmacy will continue to follow and monitor vancomycin.      Please contact pharmacy at [phone] with any questions regarding this assessment.      Thank you for the consult,   Lisa Salcido     Patient brief summary:  Henry Zacarias is a 59 y.o. male initiated on antimicrobial therapy with IV Vancomycin for treatment of suspected skin & soft tissue   Vancomycin IV 2000 mg dose given in ED, an additional 500 mg Vancomycin IV ordered to be given on floor to complete loading dose of 2500 mg. Maintenance dose of 1750 mg q12h starting 12 hours after 2000 mg dose in ED. Trough scheduled 30 minutes prior to fourth dose on 5/29/19 at 1800.      Drug Allergies:   Review of patient's allergies indicates:  No Known Allergies    Actual Body Weight:   95.7 kg    Renal Function:   Estimated Creatinine Clearance: 105.2 mL/min (based on SCr of 0.9 mg/dL).,     Dialysis Method (if applicable):  n/a    CBC (last 72 hours):  Recent Labs   Lab Result Units 05/28/19  0553   WBC K/uL 19.13*   Hemoglobin g/dL 13.4*   Hematocrit % 37.4*   Platelets K/uL 476*   Gran% % 81.0*   Lymph% % 5.0*   Mono% % 13.0   Eosinophil% % 0.0   Basophil% % 0.0   Differential Method  Manual       Metabolic Panel (last 72 hours):  Recent Labs   Lab Result Units 05/28/19  0553 05/28/19  0820   Sodium mmol/L 130*  --    Potassium mmol/L 4.1  --    Chloride mmol/L 96  --    CO2 mmol/L 20*  --    Glucose mg/dL 381*  --    Glucose, UA   --  3+*   BUN, Bld mg/dL 20  --    Creatinine mg/dL 0.9  --    Albumin g/dL 2.4*  --    Total Bilirubin mg/dL 0.6  --    Alkaline Phosphatase U/L 222*  --    AST U/L 13   --    ALT U/L 25  --        Drug levels (last 3 results):  No results for input(s): VANCOMYCINRA, VANCOMYCINPE, VANCOMYCINTR in the last 72 hours.    Microbiologic Results:  Microbiology Results (last 7 days)     Procedure Component Value Units Date/Time    Blood culture x two cultures. Draw prior to antibiotics. [[id number]] Collected:  05/28/19 0545    Order Status:  Sent Specimen:  Blood from Peripheral, Antecubital, Left Updated:  05/28/19 0822    Blood culture x two cultures. Draw prior to antibiotics. [[id number]] Collected:  05/28/19 0635    Order Status:  Sent Specimen:  Blood from Peripheral, Hand, Right Updated:  05/28/19 0822    Aerobic culture [[id number]] Collected:  05/28/19 0641    Order Status:  Sent Specimen:  Abscess Updated:  05/28/19 0822    Culture, Anaerobic [[id number]] Collected:  05/28/19 0641    Order Status:  Sent Specimen:  Abscess from Back Updated:  05/28/19 0822    Aerobic culture [[id number]] Collected:  05/28/19 0641    Order Status:  Canceled Specimen:  Abscess from Back Updated:  05/28/19 0642

## 2019-05-28 NOTE — ASSESSMENT & PLAN NOTE
-Off treatment x 6 years  -Will check A1c  -Start diabetic diet  -Will give detemir 10 units at night and sliding scale insulin qac and qhs

## 2019-05-28 NOTE — SUBJECTIVE & OBJECTIVE
"Past Medical History:   Diagnosis Date    Diabetes mellitus     Elevated transaminase measurement     HLD (hyperlipidemia)     mixed    HTN (hypertension)     Obesity     Other abnormal glucose     Sleep apnea        History reviewed. No pertinent surgical history.    Review of patient's allergies indicates:  No Known Allergies    No current facility-administered medications on file prior to encounter.      Current Outpatient Medications on File Prior to Encounter   Medication Sig    atorvastatin (LIPITOR) 40 MG tablet Take 1 tablet (40 mg total) by mouth once daily.    blood sugar diagnostic (ACCU-CHEK SMARTVIEW) Strp 60 each by Misc.(Non-Drug; Combo Route) route 2 (two) times daily.    insulin needles, disposable, 32 x 1/5 " Ndle 1 Device by Misc.(Non-Drug; Combo Route) route once daily.    lancets (ACCU-CHEK FASTCLIX) Misc 60 each by Misc.(Non-Drug; Combo Route) route 2 (two) times daily.    liraglutide (VICTOZA 3-ZOYA) 0.6 mg/0.1 mL (18 mg/3 mL) PnIj Inject 1.8 mg into the skin once daily.    metformin (GLUCOPHAGE) 500 MG tablet Take 1 tablet (500 mg total) by mouth 2 (two) times daily with meals.    valsartan (DIOVAN) 160 mg capsule Take 1 capsule (160 mg total) by mouth once daily.     Family History     Problem Relation (Age of Onset)    COPD Mother    Dementia Father    Diabetes Father    Heart disease Father    Hyperlipidemia Father    Hypertension Father        Tobacco Use    Smoking status: Never Smoker    Smokeless tobacco: Never Used   Substance and Sexual Activity    Alcohol use: Yes     Frequency: Monthly or less     Drinks per session: 3 or 4     Binge frequency: Less than monthly     Comment: social    Drug use: No    Sexual activity: Not Currently     Partners: Female     Review of Systems   Constitutional: Negative for activity change and appetite change.   HENT: Negative for congestion, dental problem, ear discharge, ear pain and facial swelling.    Eyes: Negative for discharge " "and itching.   Respiratory: Negative for apnea and chest tightness.    Cardiovascular: Negative for chest pain and leg swelling.   Gastrointestinal: Negative for abdominal distention and abdominal pain.   Endocrine: Negative for cold intolerance and heat intolerance.   Genitourinary: Negative for difficulty urinating and dysuria.   Musculoskeletal: Positive for back pain. Negative for arthralgias, neck pain and neck stiffness.   Skin: Positive for color change and wound.   Neurological: Negative for dizziness and facial asymmetry.   Hematological: Negative for adenopathy. Does not bruise/bleed easily.   Psychiatric/Behavioral: Negative for agitation and behavioral problems.     Objective:     Vital Signs (Most Recent):  Temp: 98.7 °F (37.1 °C) (05/28/19 1543)  Pulse: 106 (05/28/19 1543)  Resp: 19 (05/28/19 1543)  BP: (!) 159/72 (05/28/19 1543)  SpO2: (!) 93 % (05/28/19 1543) Vital Signs (24h Range):  Temp:  [96.4 °F (35.8 °C)-99.9 °F (37.7 °C)] 98.7 °F (37.1 °C)  Pulse:  [] 106  Resp:  [11-20] 19  SpO2:  [93 %-99 %] 93 %  BP: (131-190)/(70-89) 159/72     Weight: 95.7 kg (211 lb)  Body mass index is 30.28 kg/m².    Physical Exam   Constitutional: He is oriented to person, place, and time. He appears well-developed and well-nourished.   HENT:   Head: Normocephalic and atraumatic.   Eyes: Pupils are equal, round, and reactive to light. EOM are normal.   Neck: Normal range of motion. Neck supple.   Cardiovascular: Normal rate, regular rhythm and normal heart sounds.   Pulmonary/Chest: Effort normal and breath sounds normal. No respiratory distress.   Abdominal: Soft. Bowel sounds are normal. He exhibits no distension. There is no tenderness.   Musculoskeletal: Normal range of motion. He exhibits no edema.   Neurological: He is oriented to person, place, and time. No cranial nerve deficit. Coordination normal.   Skin: Skin is warm and dry.   ~7x7" area on left shoulder at intersection of neck and back that is deep " purple with multiple small fluctuant areas and margarita puss draining from these areas.  The area is hot and tender to touch.   Psychiatric: He has a normal mood and affect. His behavior is normal.   Vitals reviewed.        CRANIAL NERVES     CN III, IV, VI   Pupils are equal, round, and reactive to light.  Extraocular motions are normal.        Significant Labs: All pertinent labs within the past 24 hours have been reviewed.    Significant Imaging: I have reviewed and interpreted all pertinent imaging results/findings within the past 24 hours.

## 2019-05-28 NOTE — PLAN OF CARE
Problem: Adult Inpatient Plan of Care  Goal: Plan of Care Review  Outcome: Ongoing (interventions implemented as appropriate)  Plan of care reviewed with patient, VSS. IVF infusing. Iv abx given.  No complaints of pain this shift.  Patient ambulates independently, repositions self.  Patient NPO at midnight for surgery.  Purposeful rounding completed.  Bed lowered and locked, call bell within reach.  No needs at this time.  Will continue to monitor.

## 2019-05-29 ENCOUNTER — ANESTHESIA (OUTPATIENT)
Dept: SURGERY | Facility: OTHER | Age: 59
DRG: 603 | End: 2019-05-29
Payer: COMMERCIAL

## 2019-05-29 ENCOUNTER — ANESTHESIA EVENT (OUTPATIENT)
Dept: SURGERY | Facility: OTHER | Age: 59
DRG: 603 | End: 2019-05-29
Payer: COMMERCIAL

## 2019-05-29 LAB
ALBUMIN SERPL BCP-MCNC: 2.3 G/DL (ref 3.5–5.2)
ALP SERPL-CCNC: 264 U/L (ref 55–135)
ALT SERPL W/O P-5'-P-CCNC: 28 U/L (ref 10–44)
ANION GAP SERPL CALC-SCNC: 11 MMOL/L (ref 8–16)
AST SERPL-CCNC: 20 U/L (ref 10–40)
BASOPHILS # BLD AUTO: 0.03 K/UL (ref 0–0.2)
BASOPHILS NFR BLD: 0.2 % (ref 0–1.9)
BILIRUB SERPL-MCNC: 0.5 MG/DL (ref 0.1–1)
BUN SERPL-MCNC: 15 MG/DL (ref 6–20)
CALCIUM SERPL-MCNC: 9.4 MG/DL (ref 8.7–10.5)
CHLORIDE SERPL-SCNC: 101 MMOL/L (ref 95–110)
CO2 SERPL-SCNC: 23 MMOL/L (ref 23–29)
CREAT SERPL-MCNC: 1 MG/DL (ref 0.5–1.4)
DIFFERENTIAL METHOD: ABNORMAL
EOSINOPHIL # BLD AUTO: 0.2 K/UL (ref 0–0.5)
EOSINOPHIL NFR BLD: 0.8 % (ref 0–8)
ERYTHROCYTE [DISTWIDTH] IN BLOOD BY AUTOMATED COUNT: 12.1 % (ref 11.5–14.5)
EST. GFR  (AFRICAN AMERICAN): >60 ML/MIN/1.73 M^2
EST. GFR  (NON AFRICAN AMERICAN): >60 ML/MIN/1.73 M^2
GLUCOSE SERPL-MCNC: 301 MG/DL (ref 70–110)
GRAM STN SPEC: NORMAL
GRAM STN SPEC: NORMAL
HCT VFR BLD AUTO: 36.5 % (ref 40–54)
HGB BLD-MCNC: 12.8 G/DL (ref 14–18)
LYMPHOCYTES # BLD AUTO: 1.3 K/UL (ref 1–4.8)
LYMPHOCYTES NFR BLD: 7.3 % (ref 18–48)
MAGNESIUM SERPL-MCNC: 1.8 MG/DL (ref 1.6–2.6)
MCH RBC QN AUTO: 32.1 PG (ref 27–31)
MCHC RBC AUTO-ENTMCNC: 35.1 G/DL (ref 32–36)
MCV RBC AUTO: 92 FL (ref 82–98)
MONOCYTES # BLD AUTO: 1.9 K/UL (ref 0.3–1)
MONOCYTES NFR BLD: 10.6 % (ref 4–15)
NEUTROPHILS # BLD AUTO: 14.4 K/UL (ref 1.8–7.7)
NEUTROPHILS NFR BLD: 80.4 % (ref 38–73)
PLATELET # BLD AUTO: 493 K/UL (ref 150–350)
PMV BLD AUTO: 9.6 FL (ref 9.2–12.9)
POCT GLUCOSE: 258 MG/DL (ref 70–110)
POCT GLUCOSE: 317 MG/DL (ref 70–110)
POCT GLUCOSE: 322 MG/DL (ref 70–110)
POCT GLUCOSE: 326 MG/DL (ref 70–110)
POTASSIUM SERPL-SCNC: 4.2 MMOL/L (ref 3.5–5.1)
PROT SERPL-MCNC: 6.9 G/DL (ref 6–8.4)
RBC # BLD AUTO: 3.99 M/UL (ref 4.6–6.2)
SODIUM SERPL-SCNC: 135 MMOL/L (ref 136–145)
VANCOMYCIN TROUGH SERPL-MCNC: 9.6 UG/ML (ref 10–22)
WBC # BLD AUTO: 17.95 K/UL (ref 3.9–12.7)

## 2019-05-29 PROCEDURE — 87077 CULTURE AEROBIC IDENTIFY: CPT

## 2019-05-29 PROCEDURE — 37000009 HC ANESTHESIA EA ADD 15 MINS: Performed by: SPECIALIST

## 2019-05-29 PROCEDURE — 99233 SBSQ HOSP IP/OBS HIGH 50: CPT | Mod: ,,, | Performed by: HOSPITALIST

## 2019-05-29 PROCEDURE — 83735 ASSAY OF MAGNESIUM: CPT

## 2019-05-29 PROCEDURE — 80202 ASSAY OF VANCOMYCIN: CPT

## 2019-05-29 PROCEDURE — 99233 PR SUBSEQUENT HOSPITAL CARE,LEVL III: ICD-10-PCS | Mod: ,,, | Performed by: HOSPITALIST

## 2019-05-29 PROCEDURE — 87075 CULTR BACTERIA EXCEPT BLOOD: CPT

## 2019-05-29 PROCEDURE — 25000003 PHARM REV CODE 250: Performed by: ANESTHESIOLOGY

## 2019-05-29 PROCEDURE — 25000003 PHARM REV CODE 250: Performed by: HOSPITALIST

## 2019-05-29 PROCEDURE — 71000033 HC RECOVERY, INTIAL HOUR: Performed by: SPECIALIST

## 2019-05-29 PROCEDURE — 37000008 HC ANESTHESIA 1ST 15 MINUTES: Performed by: SPECIALIST

## 2019-05-29 PROCEDURE — 87102 FUNGUS ISOLATION CULTURE: CPT

## 2019-05-29 PROCEDURE — 87186 SC STD MICRODIL/AGAR DIL: CPT

## 2019-05-29 PROCEDURE — 63600175 PHARM REV CODE 636 W HCPCS: Performed by: HOSPITALIST

## 2019-05-29 PROCEDURE — 94761 N-INVAS EAR/PLS OXIMETRY MLT: CPT

## 2019-05-29 PROCEDURE — 87206 SMEAR FLUORESCENT/ACID STAI: CPT

## 2019-05-29 PROCEDURE — 85025 COMPLETE CBC W/AUTO DIFF WBC: CPT

## 2019-05-29 PROCEDURE — 63600175 PHARM REV CODE 636 W HCPCS: Performed by: NURSE ANESTHETIST, CERTIFIED REGISTERED

## 2019-05-29 PROCEDURE — 94660 CPAP INITIATION&MGMT: CPT

## 2019-05-29 PROCEDURE — 87070 CULTURE OTHR SPECIMN AEROBIC: CPT

## 2019-05-29 PROCEDURE — 25000003 PHARM REV CODE 250: Performed by: SPECIALIST

## 2019-05-29 PROCEDURE — 36415 COLL VENOUS BLD VENIPUNCTURE: CPT

## 2019-05-29 PROCEDURE — 87205 SMEAR GRAM STAIN: CPT

## 2019-05-29 PROCEDURE — 36000704 HC OR TIME LEV I 1ST 15 MIN: Performed by: SPECIALIST

## 2019-05-29 PROCEDURE — 87015 SPECIMEN INFECT AGNT CONCNTJ: CPT

## 2019-05-29 PROCEDURE — 99900035 HC TECH TIME PER 15 MIN (STAT)

## 2019-05-29 PROCEDURE — 36000705 HC OR TIME LEV I EA ADD 15 MIN: Performed by: SPECIALIST

## 2019-05-29 PROCEDURE — 11000001 HC ACUTE MED/SURG PRIVATE ROOM

## 2019-05-29 PROCEDURE — 63600175 PHARM REV CODE 636 W HCPCS: Performed by: SPECIALIST

## 2019-05-29 PROCEDURE — 87116 MYCOBACTERIA CULTURE: CPT

## 2019-05-29 PROCEDURE — 25000003 PHARM REV CODE 250: Performed by: NURSE ANESTHETIST, CERTIFIED REGISTERED

## 2019-05-29 PROCEDURE — 80053 COMPREHEN METABOLIC PANEL: CPT

## 2019-05-29 RX ORDER — ONDANSETRON 2 MG/ML
4 INJECTION INTRAMUSCULAR; INTRAVENOUS DAILY PRN
Status: DISCONTINUED | OUTPATIENT
Start: 2019-05-29 | End: 2019-05-29

## 2019-05-29 RX ORDER — SUCCINYLCHOLINE CHLORIDE 20 MG/ML
INJECTION INTRAMUSCULAR; INTRAVENOUS
Status: DISCONTINUED | OUTPATIENT
Start: 2019-05-29 | End: 2019-05-29

## 2019-05-29 RX ORDER — OXYCODONE HYDROCHLORIDE 5 MG/1
5 TABLET ORAL
Status: DISCONTINUED | OUTPATIENT
Start: 2019-05-29 | End: 2019-05-29

## 2019-05-29 RX ORDER — SODIUM CHLORIDE 0.9 % (FLUSH) 0.9 %
3 SYRINGE (ML) INJECTION
Status: DISCONTINUED | OUTPATIENT
Start: 2019-05-29 | End: 2019-06-03 | Stop reason: HOSPADM

## 2019-05-29 RX ORDER — PHENYLEPHRINE HYDROCHLORIDE 10 MG/ML
INJECTION INTRAVENOUS
Status: DISCONTINUED | OUTPATIENT
Start: 2019-05-29 | End: 2019-05-29

## 2019-05-29 RX ORDER — SODIUM CHLORIDE, SODIUM LACTATE, POTASSIUM CHLORIDE, CALCIUM CHLORIDE 600; 310; 30; 20 MG/100ML; MG/100ML; MG/100ML; MG/100ML
INJECTION, SOLUTION INTRAVENOUS CONTINUOUS PRN
Status: DISCONTINUED | OUTPATIENT
Start: 2019-05-29 | End: 2019-05-29

## 2019-05-29 RX ORDER — FENTANYL CITRATE 50 UG/ML
INJECTION, SOLUTION INTRAMUSCULAR; INTRAVENOUS
Status: DISCONTINUED | OUTPATIENT
Start: 2019-05-29 | End: 2019-05-29

## 2019-05-29 RX ORDER — MEPERIDINE HYDROCHLORIDE 25 MG/ML
12.5 INJECTION INTRAMUSCULAR; INTRAVENOUS; SUBCUTANEOUS ONCE AS NEEDED
Status: DISCONTINUED | OUTPATIENT
Start: 2019-05-29 | End: 2019-05-29

## 2019-05-29 RX ORDER — LIDOCAINE HCL/PF 100 MG/5ML
SYRINGE (ML) INTRAVENOUS
Status: DISCONTINUED | OUTPATIENT
Start: 2019-05-29 | End: 2019-05-29

## 2019-05-29 RX ORDER — ROCURONIUM BROMIDE 10 MG/ML
INJECTION, SOLUTION INTRAVENOUS
Status: DISCONTINUED | OUTPATIENT
Start: 2019-05-29 | End: 2019-05-29

## 2019-05-29 RX ORDER — ONDANSETRON 2 MG/ML
INJECTION INTRAMUSCULAR; INTRAVENOUS
Status: DISCONTINUED | OUTPATIENT
Start: 2019-05-29 | End: 2019-05-29

## 2019-05-29 RX ORDER — INSULIN ASPART 100 [IU]/ML
3 INJECTION, SOLUTION INTRAVENOUS; SUBCUTANEOUS
Status: DISCONTINUED | OUTPATIENT
Start: 2019-05-29 | End: 2019-05-30

## 2019-05-29 RX ORDER — HYDROMORPHONE HYDROCHLORIDE 2 MG/ML
0.4 INJECTION, SOLUTION INTRAMUSCULAR; INTRAVENOUS; SUBCUTANEOUS EVERY 5 MIN PRN
Status: DISCONTINUED | OUTPATIENT
Start: 2019-05-29 | End: 2019-05-29

## 2019-05-29 RX ORDER — PROPOFOL 10 MG/ML
VIAL (ML) INTRAVENOUS
Status: DISCONTINUED | OUTPATIENT
Start: 2019-05-29 | End: 2019-05-29

## 2019-05-29 RX ADMIN — PROPOFOL 200 MG: 10 INJECTION, EMULSION INTRAVENOUS at 07:05

## 2019-05-29 RX ADMIN — PIPERACILLIN AND TAZOBACTAM 4.5 G: 4; .5 INJECTION, POWDER, LYOPHILIZED, FOR SOLUTION INTRAVENOUS; PARENTERAL at 07:05

## 2019-05-29 RX ADMIN — INSULIN ASPART 6 UNITS: 100 INJECTION, SOLUTION INTRAVENOUS; SUBCUTANEOUS at 05:05

## 2019-05-29 RX ADMIN — FENTANYL CITRATE 100 MCG: 50 INJECTION, SOLUTION INTRAMUSCULAR; INTRAVENOUS at 07:05

## 2019-05-29 RX ADMIN — PHENYLEPHRINE HYDROCHLORIDE 200 MCG: 10 INJECTION INTRAVENOUS at 07:05

## 2019-05-29 RX ADMIN — OXYCODONE HYDROCHLORIDE 5 MG: 5 TABLET ORAL at 08:05

## 2019-05-29 RX ADMIN — PHENYLEPHRINE HYDROCHLORIDE 300 MCG: 10 INJECTION INTRAVENOUS at 07:05

## 2019-05-29 RX ADMIN — ROCURONIUM BROMIDE 10 MG: 10 INJECTION, SOLUTION INTRAVENOUS at 07:05

## 2019-05-29 RX ADMIN — FENTANYL CITRATE 25 MCG: 50 INJECTION, SOLUTION INTRAMUSCULAR; INTRAVENOUS at 08:05

## 2019-05-29 RX ADMIN — INSULIN ASPART 8 UNITS: 100 INJECTION, SOLUTION INTRAVENOUS; SUBCUTANEOUS at 10:05

## 2019-05-29 RX ADMIN — PIPERACILLIN AND TAZOBACTAM 4.5 G: 4; .5 INJECTION, POWDER, LYOPHILIZED, FOR SOLUTION INTRAVENOUS; PARENTERAL at 03:05

## 2019-05-29 RX ADMIN — FENTANYL CITRATE 25 MCG: 50 INJECTION, SOLUTION INTRAMUSCULAR; INTRAVENOUS at 07:05

## 2019-05-29 RX ADMIN — INSULIN ASPART 4 UNITS: 100 INJECTION, SOLUTION INTRAVENOUS; SUBCUTANEOUS at 09:05

## 2019-05-29 RX ADMIN — FENTANYL CITRATE 50 MCG: 50 INJECTION, SOLUTION INTRAMUSCULAR; INTRAVENOUS at 08:05

## 2019-05-29 RX ADMIN — PHENYLEPHRINE HYDROCHLORIDE 150 MCG: 10 INJECTION INTRAVENOUS at 07:05

## 2019-05-29 RX ADMIN — PHENYLEPHRINE HYDROCHLORIDE 100 MCG: 10 INJECTION INTRAVENOUS at 07:05

## 2019-05-29 RX ADMIN — ONDANSETRON 4 MG: 2 INJECTION INTRAMUSCULAR; INTRAVENOUS at 07:05

## 2019-05-29 RX ADMIN — ENOXAPARIN SODIUM 40 MG: 100 INJECTION SUBCUTANEOUS at 05:05

## 2019-05-29 RX ADMIN — INSULIN ASPART 3 UNITS: 100 INJECTION, SOLUTION INTRAVENOUS; SUBCUTANEOUS at 05:05

## 2019-05-29 RX ADMIN — SUCCINYLCHOLINE CHLORIDE 160 MG: 20 INJECTION, SOLUTION INTRAMUSCULAR; INTRAVENOUS at 07:05

## 2019-05-29 RX ADMIN — VANCOMYCIN HYDROCHLORIDE 1750 MG: 100 INJECTION, POWDER, LYOPHILIZED, FOR SOLUTION INTRAVENOUS at 10:05

## 2019-05-29 RX ADMIN — SODIUM CHLORIDE, SODIUM LACTATE, POTASSIUM CHLORIDE, AND CALCIUM CHLORIDE: 600; 310; 30; 20 INJECTION, SOLUTION INTRAVENOUS at 06:05

## 2019-05-29 RX ADMIN — OXYCODONE HYDROCHLORIDE 5 MG: 5 TABLET ORAL at 12:05

## 2019-05-29 RX ADMIN — VANCOMYCIN HYDROCHLORIDE 1750 MG: 100 INJECTION, POWDER, LYOPHILIZED, FOR SOLUTION INTRAVENOUS at 09:05

## 2019-05-29 RX ADMIN — SODIUM CHLORIDE: 0.9 INJECTION, SOLUTION INTRAVENOUS at 07:05

## 2019-05-29 RX ADMIN — PIPERACILLIN AND TAZOBACTAM 4.5 G: 4; .5 INJECTION, POWDER, LYOPHILIZED, FOR SOLUTION INTRAVENOUS; PARENTERAL at 11:05

## 2019-05-29 RX ADMIN — VALSARTAN 80 MG: 80 TABLET, FILM COATED ORAL at 09:05

## 2019-05-29 RX ADMIN — INSULIN ASPART 3 UNITS: 100 INJECTION, SOLUTION INTRAVENOUS; SUBCUTANEOUS at 10:05

## 2019-05-29 RX ADMIN — LIDOCAINE HYDROCHLORIDE 75 MG: 20 INJECTION, SOLUTION INTRAVENOUS at 07:05

## 2019-05-29 NOTE — PLAN OF CARE
Problem: Adult Inpatient Plan of Care  Goal: Plan of Care Review  Outcome: Ongoing (interventions implemented as appropriate)  Pt sleeping comfortably on CPAP, will continue to monitor.

## 2019-05-29 NOTE — PROGRESS NOTES
[facility] Hospital Medicine  Progress Note    Patient Name: Henry Zacarias  MRN: [id number]  Patient Class: IP- Inpatient   Admission Date: 5/28/2019  Length of Stay: 1 days  Attending Physician: Felton Bush MD  Primary Care Provider: Aimee Coombs MD        Subjective:     Principal Problem:Cellulitis of back except buttock    HPI:  Mr. Zacarias is a 59 year old man with history of hypertension, diabetes, hyperlipidemia and obstructive sleep apnea who came in for evaluation of a painful draining infection on his left shoulder.  He states he started noticing some tenderness on his left shoulder about 6 days ago.  Since then, he has developed a large painful area of erythema at the postero-basilar area of his neck and and left shoulder.  It is very tender to touch and has been draining green/brown fluid for the last two days.  He denies any fever, chills, nausea, vomiting and diarrhea.  He states he had no abrasion, cut or insect bite to the area that he is aware of.  He has never had skin infections like this before.  He does note that he has been off all medications for the last 6 years.      Hospital Course:  No notes on file    Interval History: No acute events overnight.  Taken to OR this morning for incision and drainage of hs shoulder abscess.      Review of Systems   Constitutional: Negative for activity change and appetite change.   HENT: Negative for congestion and dental problem.    Eyes: Negative for discharge and itching.   Respiratory: Negative for apnea and chest tightness.    Cardiovascular: Negative for chest pain and leg swelling.   Gastrointestinal: Negative for abdominal distention and abdominal pain.   Endocrine: Negative for cold intolerance and heat intolerance.   Genitourinary: Negative for difficulty urinating and dysuria.   Musculoskeletal: Negative for arthralgias and back pain.   Allergic/Immunologic: Negative for environmental allergies and food allergies.    Neurological: Negative for dizziness and facial asymmetry.   Hematological: Negative for adenopathy. Does not bruise/bleed easily.   Psychiatric/Behavioral: Negative for agitation and behavioral problems.     Objective:     Vital Signs (Most Recent):  Temp: 98.1 °F (36.7 °C) (05/29/19 1146)  Pulse: (!) 114 (05/29/19 1146)  Resp: 19 (05/29/19 1146)  BP: (!) 147/70 (05/29/19 1146)  SpO2: (!) 94 % (05/29/19 1146) Vital Signs (24h Range):  Temp:  [98.1 °F (36.7 °C)-100 °F (37.8 °C)] 98.1 °F (36.7 °C)  Pulse:  [] 114  Resp:  [16-19] 19  SpO2:  [93 %-98 %] 94 %  BP: ()/(59-80) 147/70     Weight: 95.7 kg (211 lb)  Body mass index is 30.28 kg/m².    Intake/Output Summary (Last 24 hours) at 5/29/2019 1406  Last data filed at 5/29/2019 1000  Gross per 24 hour   Intake 4076.25 ml   Output 250 ml   Net 3826.25 ml      Physical Exam   Constitutional: He is oriented to person, place, and time. He appears well-developed and well-nourished.   HENT:   Head: Normocephalic and atraumatic.   Eyes: Pupils are equal, round, and reactive to light. EOM are normal.   Neck: Normal range of motion. Neck supple.   Cardiovascular: Normal rate, regular rhythm and normal heart sounds.   Pulmonary/Chest: Effort normal and breath sounds normal. No respiratory distress.   Abdominal: Soft. Bowel sounds are normal. He exhibits no distension. There is no tenderness.   Musculoskeletal: Normal range of motion. He exhibits no edema.   Weakness with abduction at right shoulder which is chronic   Neurological: He is oriented to person, place, and time. No cranial nerve deficit. Coordination normal.   Skin: Skin is warm and dry.   Large bandage over left shoulder/back abscess area   Psychiatric: He has a normal mood and affect. His behavior is normal.   Vitals reviewed.      Significant Labs: All pertinent labs within the past 24 hours have been reviewed.    Significant Imaging: I have reviewed and interpreted all pertinent imaging  results/findings within the past 24 hours.    Assessment/Plan:      * Cellulitis of back except buttock  -Mr. Zacarias was admitted to inpatient status  -On admit he was tachycardic with leukocytosis but afebrile with normal lactic acid.  -Blood and wound cultures have been obtained and are negative so far  -CT of chest does not show a large fluid collection, but there are frankly fluctuant areas and puss is draining.  Will consult general surgery to evaluate for incision and drainage.  -Taken to OR by Dr. Correa for I/D of abscess.  Puss drained but no large focal abscess identified  -Will continue with vancomycin and zosyn for now.      Cellulitis  -Treatment as above      DM (diabetes mellitus)  -Off treatment x 6 years  -A1c is 12  -Continue diabetic diet  -Increase detemir to 20 units daily.  -Add aspart insulin 3 units TID w meals.  -Continue sliding scale insulin qac and qhs  -Order diabetes education.      HTN (hypertension)  -Off treatment x 6 years  -continue valsartan daily  -Provide hydralazine PRN      Obesity  -Noted      Sleep apnea  -Continue cpap with pressure setting 10 for when sleeping        VTE Risk Mitigation (From admission, onward)        Ordered     enoxaparin injection 40 mg  Daily      05/28/19 0944     IP VTE HIGH RISK PATIENT  Once      05/28/19 0977              Felton Bush MD  Department of Hospital Medicine   [facility]

## 2019-05-29 NOTE — PROGRESS NOTES
Pt off unit to surgery. Transported via stretcher with IV fluids and Abx currently infusing. No distress noted as pt left unit.

## 2019-05-29 NOTE — PLAN OF CARE
Problem: Adult Inpatient Plan of Care  Goal: Plan of Care Review  Outcome: Ongoing (interventions implemented as appropriate)  Plan of care reviewed with patient, VSS. IV abx given.  Patient ambulates independently, repositions self.  Patient had I&D this morning, tolerated well.  PRN medications given for moderate pain.  Large ABD pads in place over wound area.  Was oozing quite significantly - reinforced bandages.  Purposeful rounding completed.  Bed lowered and locked position, call bell within reach.  No needs at this time.  Will continue to monitor.

## 2019-05-29 NOTE — TRANSFER OF CARE
"Anesthesia Transfer of Care Note    Patient: Henry Zacarias    Procedure(s) Performed: Procedure(s) (LRB):  INCISION AND DRAINAGE, BACK UPPER (Left)    Patient location: PACU    Anesthesia Type: general    Transport from OR: Transported from OR on 2-3 L/min O2 by NC with adequate spontaneous ventilation    Post pain: adequate analgesia    Post assessment: no apparent anesthetic complications and tolerated procedure well    Post vital signs: stable    Level of consciousness: awake, alert and oriented    Nausea/Vomiting: no nausea/vomiting    Complications: none    Transfer of care protocol was followed      Last vitals:   Visit Vitals  BP (!) 168/80 (BP Location: Right arm, Patient Position: Lying)   Pulse 91   Temp 37.2 °C (98.9 °F) (Oral)   Resp 18   Ht 5' 10" (1.778 m)   Wt 95.7 kg (211 lb)   SpO2 95%   BMI 30.28 kg/m²     "

## 2019-05-29 NOTE — ANESTHESIA POSTPROCEDURE EVALUATION
Anesthesia Post Evaluation    Patient: Henry Zacarias    Procedure(s) Performed: Procedure(s) (LRB):  INCISION AND DRAINAGE, BACK UPPER (Left)    Final Anesthesia Type: general  Patient location during evaluation: PACU  Patient participation: Yes- Able to Participate  Level of consciousness: awake and alert and oriented  Post-procedure vital signs: reviewed and stable  Pain management: adequate  Airway patency: patent  PONV status at discharge: No PONV  Anesthetic complications: no      Cardiovascular status: blood pressure returned to baseline and hemodynamically stable  Respiratory status: unassisted, spontaneous ventilation and room air  Hydration status: euvolemic  Follow-up not needed.          Vitals Value Taken Time   /77 5/29/2019  9:07 AM   Temp 37.2 °C (99 °F) 5/29/2019  9:05 AM   Pulse 100 5/29/2019 10:00 AM   Resp 16 5/29/2019  9:05 AM   SpO2 97 % 5/29/2019  9:10 AM   Vitals shown include unvalidated device data.      Event Time     Out of Recovery 05/29/2019 09:11:40          Pain/Maryjo Score: Pain Rating Prior to Med Admin: 4 (5/29/2019  8:25 AM)  Pain Rating Post Med Admin: 4 (5/29/2019  8:40 AM)  Maryjo Score: 10 (5/29/2019  8:40 AM)

## 2019-05-29 NOTE — OP NOTE
[facility]  Surgery Department  Operative Note    SUMMARY     Patient: Henry Zacarias    Medical Record: [id number]    Date of Procedure: 5/29/2019     Surgeon: Surgeon(s) and Role:     * Sam Correa MD - Primary    Assisting Surgeon: None    Pre-Operative Diagnosis: Cellulitis of back [L03.312]    Post-Operative Diagnosis: Post-Op Diagnosis Codes:     * Cellulitis of back [L03.312]    Procedure: Procedure(s) (LRB):  INCISION AND DRAINAGE, BACK UPPER (Left)    Procedure in Detail:  The patient was taken to the operating room and placed on the table in the right lateral position after smooth induction with general anesthesia. There was a large area of induration and erythema on the right upper back measuring about 25 cm in length. An incision was made over what appeared to be the fluctuant area. This was carried down into the subcutaneous tissue.  There was purulent fluid expressed from the subcutaneous tissue.  There was no single large pocket of pus present. Digital disruption of the infected tissue was undertaken.  The wound was then Pulsavac with normal saline until clear fluid was returned.  Hemostasis was obtained using electric cautery. The wound was then packed with 2 0 Kerlix soaked in half strength Dakin solution. Sterile dressing was applied. Blood loss was 100 cc.  Patient tolerated procedure and left the operating room in stable condition.

## 2019-05-29 NOTE — SUBJECTIVE & OBJECTIVE
Interval History: No acute events overnight.  Taken to OR this morning for incision and drainage of hs shoulder abscess.      Review of Systems   Constitutional: Negative for activity change and appetite change.   HENT: Negative for congestion and dental problem.    Eyes: Negative for discharge and itching.   Respiratory: Negative for apnea and chest tightness.    Cardiovascular: Negative for chest pain and leg swelling.   Gastrointestinal: Negative for abdominal distention and abdominal pain.   Endocrine: Negative for cold intolerance and heat intolerance.   Genitourinary: Negative for difficulty urinating and dysuria.   Musculoskeletal: Negative for arthralgias and back pain.   Allergic/Immunologic: Negative for environmental allergies and food allergies.   Neurological: Negative for dizziness and facial asymmetry.   Hematological: Negative for adenopathy. Does not bruise/bleed easily.   Psychiatric/Behavioral: Negative for agitation and behavioral problems.     Objective:     Vital Signs (Most Recent):  Temp: 98.1 °F (36.7 °C) (05/29/19 1146)  Pulse: (!) 114 (05/29/19 1146)  Resp: 19 (05/29/19 1146)  BP: (!) 147/70 (05/29/19 1146)  SpO2: (!) 94 % (05/29/19 1146) Vital Signs (24h Range):  Temp:  [98.1 °F (36.7 °C)-100 °F (37.8 °C)] 98.1 °F (36.7 °C)  Pulse:  [] 114  Resp:  [16-19] 19  SpO2:  [93 %-98 %] 94 %  BP: ()/(59-80) 147/70     Weight: 95.7 kg (211 lb)  Body mass index is 30.28 kg/m².    Intake/Output Summary (Last 24 hours) at 5/29/2019 1406  Last data filed at 5/29/2019 1000  Gross per 24 hour   Intake 4076.25 ml   Output 250 ml   Net 3826.25 ml      Physical Exam   Constitutional: He is oriented to person, place, and time. He appears well-developed and well-nourished.   HENT:   Head: Normocephalic and atraumatic.   Eyes: Pupils are equal, round, and reactive to light. EOM are normal.   Neck: Normal range of motion. Neck supple.   Cardiovascular: Normal rate, regular rhythm and normal heart  sounds.   Pulmonary/Chest: Effort normal and breath sounds normal. No respiratory distress.   Abdominal: Soft. Bowel sounds are normal. He exhibits no distension. There is no tenderness.   Musculoskeletal: Normal range of motion. He exhibits no edema.   Weakness with abduction at right shoulder which is chronic   Neurological: He is oriented to person, place, and time. No cranial nerve deficit. Coordination normal.   Skin: Skin is warm and dry.   Large bandage over left shoulder/back abscess area   Psychiatric: He has a normal mood and affect. His behavior is normal.   Vitals reviewed.      Significant Labs: All pertinent labs within the past 24 hours have been reviewed.    Significant Imaging: I have reviewed and interpreted all pertinent imaging results/findings within the past 24 hours.

## 2019-05-29 NOTE — ASSESSMENT & PLAN NOTE
-Off treatment x 6 years  -A1c is 12  -Continue diabetic diet  -Increase detemir to 20 units daily.  -Add aspart insulin 3 units TID w meals.  -Continue sliding scale insulin qac and qhs  -Order diabetes education.

## 2019-05-29 NOTE — ANESTHESIA PREPROCEDURE EVALUATION
05/29/2019  Henry Zacarias is a 59 y.o., male.    Anesthesia Evaluation    I have reviewed the Patient Summary Reports.    I have reviewed the Nursing Notes.   I have reviewed the Medications.     Review of Systems  Anesthesia Hx:  No problems with previous Anesthesia  Denies Family Hx of Anesthesia complications.   Denies Personal Hx of Anesthesia complications.   Social:  Non-Smoker    Hematology/Oncology:  Hematology Normal   Oncology Normal     EENT/Dental:EENT/Dental Normal   Cardiovascular:   Exercise tolerance: good Hypertension, poorly controlled    Pulmonary:   Sleep Apnea    Renal/:  Renal/ Normal     Musculoskeletal:  Musculoskeletal Normal    Neurological:  Neurology Normal    Endocrine:   Diabetes, poorly controlled    Dermatological:  Skin Normal    Psych:  Psychiatric Normal           Physical Exam  General:  Obesity    Airway/Jaw/Neck:  Airway Findings: Mouth Opening: Normal Tongue: Normal  General Airway Assessment: Adult  Mallampati: I  TM Distance: Normal, at least 6 cm  Jaw/Neck Findings:  Neck ROM: Normal ROM      Dental:  Dental Findings: In tact             Anesthesia Plan  Type of Anesthesia, risks & benefits discussed:  Anesthesia Type:  general  Patient's Preference:   Intra-op Monitoring Plan:   Intra-op Monitoring Plan Comments:   Post Op Pain Control Plan:   Post Op Pain Control Plan Comments:   Induction:   IV  Beta Blocker:         Informed Consent: Patient understands risks and agrees with Anesthesia plan.  Questions answered. Anesthesia consent signed with patient.  ASA Score: 3     Day of Surgery Review of History & Physical:    H&P update referred to the surgeon.     Anesthesia Plan Notes: Patient has not been to MD in 6 years despite prior diagnoses of HTN and DM        Ready For Surgery From Anesthesia Perspective.

## 2019-05-29 NOTE — PLAN OF CARE
Problem: Adult Inpatient Plan of Care  Goal: Plan of Care Review  Outcome: Ongoing (interventions implemented as appropriate)     05/29/19 0564   Plan of Care Review   Plan of Care Reviewed With patient   Progress improving   POC reviewed with pt. Pt remains free of falls and injury this shift. Continues to receive NS @ 125ml/hr with IV abx. Tolerating well, no s/s of adverse reactions noted. Pt independent of ADL's. NPO after midnight. Denies any pains or discomfort this shift. Call light in reach, bed in lowest position. Will continue to monitor.

## 2019-05-30 PROBLEM — Z09 ENCOUNTER FOR RECHECK OF ABSCESS FOLLOWING INCISION AND DRAINAGE: Status: ACTIVE | Noted: 2019-05-30

## 2019-05-30 LAB
ALBUMIN SERPL BCP-MCNC: 2 G/DL (ref 3.5–5.2)
ALP SERPL-CCNC: 196 U/L (ref 55–135)
ALT SERPL W/O P-5'-P-CCNC: 26 U/L (ref 10–44)
ANION GAP SERPL CALC-SCNC: 10 MMOL/L (ref 8–16)
AST SERPL-CCNC: 16 U/L (ref 10–40)
BACTERIA SPEC AEROBE CULT: NORMAL
BASOPHILS # BLD AUTO: 0.03 K/UL (ref 0–0.2)
BASOPHILS NFR BLD: 0.2 % (ref 0–1.9)
BILIRUB SERPL-MCNC: 0.4 MG/DL (ref 0.1–1)
BUN SERPL-MCNC: 12 MG/DL (ref 6–20)
CALCIUM SERPL-MCNC: 9.1 MG/DL (ref 8.7–10.5)
CHLORIDE SERPL-SCNC: 102 MMOL/L (ref 95–110)
CO2 SERPL-SCNC: 22 MMOL/L (ref 23–29)
CREAT SERPL-MCNC: 0.8 MG/DL (ref 0.5–1.4)
DIFFERENTIAL METHOD: ABNORMAL
EOSINOPHIL # BLD AUTO: 0.2 K/UL (ref 0–0.5)
EOSINOPHIL NFR BLD: 1.7 % (ref 0–8)
ERYTHROCYTE [DISTWIDTH] IN BLOOD BY AUTOMATED COUNT: 12.2 % (ref 11.5–14.5)
EST. GFR  (AFRICAN AMERICAN): >60 ML/MIN/1.73 M^2
EST. GFR  (NON AFRICAN AMERICAN): >60 ML/MIN/1.73 M^2
GLUCOSE SERPL-MCNC: 280 MG/DL (ref 70–110)
HCT VFR BLD AUTO: 34.2 % (ref 40–54)
HGB BLD-MCNC: 12 G/DL (ref 14–18)
LYMPHOCYTES # BLD AUTO: 1.2 K/UL (ref 1–4.8)
LYMPHOCYTES NFR BLD: 8.8 % (ref 18–48)
MAGNESIUM SERPL-MCNC: 1.6 MG/DL (ref 1.6–2.6)
MCH RBC QN AUTO: 32 PG (ref 27–31)
MCHC RBC AUTO-ENTMCNC: 35.1 G/DL (ref 32–36)
MCV RBC AUTO: 91 FL (ref 82–98)
MONOCYTES # BLD AUTO: 1 K/UL (ref 0.3–1)
MONOCYTES NFR BLD: 7.5 % (ref 4–15)
NEUTROPHILS # BLD AUTO: 11.2 K/UL (ref 1.8–7.7)
NEUTROPHILS NFR BLD: 81.2 % (ref 38–73)
PLATELET # BLD AUTO: 459 K/UL (ref 150–350)
PMV BLD AUTO: 9.3 FL (ref 9.2–12.9)
POCT GLUCOSE: 163 MG/DL (ref 70–110)
POCT GLUCOSE: 188 MG/DL (ref 70–110)
POCT GLUCOSE: 265 MG/DL (ref 70–110)
POCT GLUCOSE: 310 MG/DL (ref 70–110)
POTASSIUM SERPL-SCNC: 3.5 MMOL/L (ref 3.5–5.1)
PROT SERPL-MCNC: 6.4 G/DL (ref 6–8.4)
RBC # BLD AUTO: 3.75 M/UL (ref 4.6–6.2)
SODIUM SERPL-SCNC: 134 MMOL/L (ref 136–145)
WBC # BLD AUTO: 13.82 K/UL (ref 3.9–12.7)

## 2019-05-30 PROCEDURE — 25000003 PHARM REV CODE 250: Performed by: SPECIALIST

## 2019-05-30 PROCEDURE — 99233 PR SUBSEQUENT HOSPITAL CARE,LEVL III: ICD-10-PCS | Mod: ,,, | Performed by: HOSPITALIST

## 2019-05-30 PROCEDURE — 80053 COMPREHEN METABOLIC PANEL: CPT

## 2019-05-30 PROCEDURE — 83735 ASSAY OF MAGNESIUM: CPT

## 2019-05-30 PROCEDURE — 11000001 HC ACUTE MED/SURG PRIVATE ROOM

## 2019-05-30 PROCEDURE — 85025 COMPLETE CBC W/AUTO DIFF WBC: CPT

## 2019-05-30 PROCEDURE — 99900035 HC TECH TIME PER 15 MIN (STAT)

## 2019-05-30 PROCEDURE — 36415 COLL VENOUS BLD VENIPUNCTURE: CPT

## 2019-05-30 PROCEDURE — 99233 SBSQ HOSP IP/OBS HIGH 50: CPT | Mod: ,,, | Performed by: HOSPITALIST

## 2019-05-30 PROCEDURE — 94761 N-INVAS EAR/PLS OXIMETRY MLT: CPT

## 2019-05-30 PROCEDURE — 63600175 PHARM REV CODE 636 W HCPCS: Performed by: HOSPITALIST

## 2019-05-30 PROCEDURE — S5571 INSULIN DISPOS PEN 3 ML: HCPCS | Performed by: HOSPITALIST

## 2019-05-30 PROCEDURE — 94660 CPAP INITIATION&MGMT: CPT

## 2019-05-30 PROCEDURE — 25000003 PHARM REV CODE 250: Performed by: HOSPITALIST

## 2019-05-30 PROCEDURE — 63600175 PHARM REV CODE 636 W HCPCS: Performed by: SPECIALIST

## 2019-05-30 RX ORDER — METOPROLOL TARTRATE 25 MG/1
25 TABLET, FILM COATED ORAL 2 TIMES DAILY
Status: DISCONTINUED | OUTPATIENT
Start: 2019-05-30 | End: 2019-06-01

## 2019-05-30 RX ORDER — INSULIN ASPART 100 [IU]/ML
5 INJECTION, SOLUTION INTRAVENOUS; SUBCUTANEOUS
Status: DISCONTINUED | OUTPATIENT
Start: 2019-05-30 | End: 2019-06-01

## 2019-05-30 RX ADMIN — ENOXAPARIN SODIUM 40 MG: 100 INJECTION SUBCUTANEOUS at 05:05

## 2019-05-30 RX ADMIN — METOPROLOL TARTRATE 25 MG: 25 TABLET ORAL at 09:05

## 2019-05-30 RX ADMIN — PIPERACILLIN AND TAZOBACTAM 4.5 G: 4; .5 INJECTION, POWDER, LYOPHILIZED, FOR SOLUTION INTRAVENOUS; PARENTERAL at 11:05

## 2019-05-30 RX ADMIN — INSULIN ASPART 2 UNITS: 100 INJECTION, SOLUTION INTRAVENOUS; SUBCUTANEOUS at 05:05

## 2019-05-30 RX ADMIN — VALSARTAN 80 MG: 80 TABLET, FILM COATED ORAL at 08:05

## 2019-05-30 RX ADMIN — INSULIN ASPART 8 UNITS: 100 INJECTION, SOLUTION INTRAVENOUS; SUBCUTANEOUS at 12:05

## 2019-05-30 RX ADMIN — PIPERACILLIN AND TAZOBACTAM 4.5 G: 4; .5 INJECTION, POWDER, LYOPHILIZED, FOR SOLUTION INTRAVENOUS; PARENTERAL at 02:05

## 2019-05-30 RX ADMIN — INSULIN ASPART 5 UNITS: 100 INJECTION, SOLUTION INTRAVENOUS; SUBCUTANEOUS at 05:05

## 2019-05-30 RX ADMIN — INSULIN DETEMIR 10 UNITS: 100 INJECTION, SOLUTION SUBCUTANEOUS at 09:05

## 2019-05-30 RX ADMIN — VANCOMYCIN HYDROCHLORIDE 1750 MG: 100 INJECTION, POWDER, LYOPHILIZED, FOR SOLUTION INTRAVENOUS at 09:05

## 2019-05-30 RX ADMIN — INSULIN ASPART 6 UNITS: 100 INJECTION, SOLUTION INTRAVENOUS; SUBCUTANEOUS at 08:05

## 2019-05-30 RX ADMIN — CEFAZOLIN 6 G: 10 INJECTION, POWDER, FOR SOLUTION INTRAVENOUS; PARENTERAL at 02:05

## 2019-05-30 RX ADMIN — INSULIN ASPART 5 UNITS: 100 INJECTION, SOLUTION INTRAVENOUS; SUBCUTANEOUS at 12:05

## 2019-05-30 RX ADMIN — INSULIN ASPART 3 UNITS: 100 INJECTION, SOLUTION INTRAVENOUS; SUBCUTANEOUS at 08:05

## 2019-05-30 RX ADMIN — METOPROLOL TARTRATE 25 MG: 25 TABLET ORAL at 08:05

## 2019-05-30 NOTE — PROGRESS NOTES
[facility] Hospital Medicine  Progress Note    Patient Name: Henry Zacarias  MRN: [id number]  Patient Class: IP- Inpatient   Admission Date: 5/28/2019  Length of Stay: 2 days  Attending Physician: Felton Bush MD  Primary Care Provider: Aimee Coombs MD        Subjective:     Principal Problem:Cellulitis of back except buttock    HPI:  Mr. Zacarias is a 59 year old man with history of hypertension, diabetes, hyperlipidemia and obstructive sleep apnea who came in for evaluation of a painful draining infection on his left shoulder.  He states he started noticing some tenderness on his left shoulder about 6 days ago.  Since then, he has developed a large painful area of erythema at the postero-basilar area of his neck and and left shoulder.  It is very tender to touch and has been draining green/brown fluid for the last two days.  He denies any fever, chills, nausea, vomiting and diarrhea.  He states he had no abrasion, cut or insect bite to the area that he is aware of.  He has never had skin infections like this before.  He does note that he has been off all medications for the last 6 years.      Hospital Course:  No notes on file    Interval History: No acute events overnight.  Feels much better.  Sugars still very elevated.    Review of Systems   Constitutional: Negative for activity change and appetite change.   HENT: Negative for congestion and dental problem.    Eyes: Negative for discharge and itching.   Respiratory: Negative for apnea and chest tightness.    Cardiovascular: Negative for chest pain and leg swelling.   Gastrointestinal: Negative for abdominal distention and abdominal pain.   Endocrine: Negative for cold intolerance and heat intolerance.   Genitourinary: Negative for difficulty urinating and dysuria.   Musculoskeletal: Negative for arthralgias and back pain.   Allergic/Immunologic: Negative for environmental allergies and food allergies.   Neurological: Negative for dizziness and  facial asymmetry.   Hematological: Negative for adenopathy. Does not bruise/bleed easily.   Psychiatric/Behavioral: Negative for agitation and behavioral problems.     Objective:     Vital Signs (Most Recent):  Temp: 97.6 °F (36.4 °C) (05/30/19 1156)  Pulse: 82 (05/30/19 1156)  Resp: 20 (05/30/19 1156)  BP: (!) 151/77 (05/30/19 1156)  SpO2: 97 % (05/30/19 1156) Vital Signs (24h Range):  Temp:  [97 °F (36.1 °C)-98.1 °F (36.7 °C)] 97.6 °F (36.4 °C)  Pulse:  [] 82  Resp:  [18-20] 20  SpO2:  [93 %-97 %] 97 %  BP: (132-185)/(71-88) 151/77     Weight: 95.7 kg (211 lb)  Body mass index is 30.28 kg/m².    Intake/Output Summary (Last 24 hours) at 5/30/2019 1253  Last data filed at 5/30/2019 0247  Gross per 24 hour   Intake 1090 ml   Output 1050 ml   Net 40 ml      Physical Exam   Constitutional: He is oriented to person, place, and time. He appears well-developed and well-nourished.   HENT:   Head: Normocephalic and atraumatic.   Eyes: Pupils are equal, round, and reactive to light. EOM are normal.   Neck: Normal range of motion. Neck supple.   Cardiovascular: Normal rate, regular rhythm and normal heart sounds.   Pulmonary/Chest: Effort normal and breath sounds normal. No respiratory distress.   Abdominal: Soft. Bowel sounds are normal. He exhibits no distension. There is no tenderness.   Musculoskeletal: Normal range of motion. He exhibits no edema.   Weakness with abduction at right shoulder which is chronic   Neurological: He is oriented to person, place, and time. No cranial nerve deficit. Coordination normal.   Skin: Skin is warm and dry.   Large bandage over left shoulder/back abscess area with a good amount of strikethrough   Psychiatric: He has a normal mood and affect. His behavior is normal.   Vitals reviewed.      Significant Labs: All pertinent labs within the past 24 hours have been reviewed.    Significant Imaging: I have reviewed and interpreted all pertinent imaging results/findings within the past 24  hours.    Assessment/Plan:      * Cellulitis of back except buttock  -Mr. Zacarias was admitted to inpatient status  -On admit he was tachycardic with leukocytosis but afebrile with normal lactic acid.  -Blood and wound cultures have been obtained.  Blood cultures negative.  Wound culture growing MSSA  -CT of chest does not show a large fluid collection, but there are frankly fluctuant areas and puss is draining.  Will consult general surgery to evaluate for incision and drainage.  -Taken to OR by Dr. Correa for I/D of abscess.  Puss drained but no large focal abscess identified  -Will stop vanc and zosyn and change to continuous infusion of ancef for now.  -Afebrile and wbc improving.  -Possibly discharge home in next day or two.  Will require outpatient wound care.    Cellulitis  -Treatment as above      DM (diabetes mellitus)  -Off treatment x 6 years  -A1c is 12.  Sugars still very elevated.  -Continue diabetic diet.    -Increase detemir to 30 units daily.  -Increase aspart insulin 5 units TID w meals.  -Continue sliding scale insulin qac and qhs  -Await diabetes education.      HTN (hypertension)  -Off treatment x 6 years  -BP still elevated.  -continue valsartan daily  -Add metoprolol  -Provide hydralazine PRN      Obesity  -Noted      Sleep apnea  -Continue cpap with pressure setting 10 for when sleeping        VTE Risk Mitigation (From admission, onward)        Ordered     enoxaparin injection 40 mg  Daily      05/28/19 0944     IP VTE HIGH RISK PATIENT  Once      05/28/19 0944              Felton Bush MD  Department of Hospital Medicine   [facility]

## 2019-05-30 NOTE — ASSESSMENT & PLAN NOTE
-Mr. Zacarias was admitted to inpatient status  -On admit he was tachycardic with leukocytosis but afebrile with normal lactic acid.  -Blood and wound cultures have been obtained.  Blood cultures negative.  Wound culture growing MSSA  -CT of chest does not show a large fluid collection, but there are frankly fluctuant areas and puss is draining.  Will consult general surgery to evaluate for incision and drainage.  -Taken to OR by Dr. Correa for I/D of abscess.  Puss drained but no large focal abscess identified  -Will stop vanc and zosyn and change to continuous infusion of ancef for now.  -Afebrile and wbc improving.  -Possibly discharge home in next day or two.  Will require outpatient wound care.

## 2019-05-30 NOTE — PROGRESS NOTES
Pharmacokinetic Assessment Follow Up: IV Vancomycin    Vancomycin serum concentration assessment(s):    The measurement is below the desired definitive target range of 10 to 15 mcg/mL.    Vancomycin Regimen Plan:    Continue regimen to Vancomycin 1750 mg IV every 12 hour with next serum trough concentration measured at 2200 prior to 3rd dose on 5/30/19     Pharmacy will continue to follow and monitor vancomycin.    Please contact pharmacy at extension [phone] for questions regarding this assessment.    Thank you for the consult,   Amari Self     Patient brief summary:  Henry Zacarias is a 59 y.o. male initiated on antimicrobial therapy with IV Vancomycin for treatment of suspected skin & soft tissue    The patient received a loading dose, followed by the current treatment regimen: vancomycin 1750 mg IV every 12 hours    Drug Allergies:   Review of patient's allergies indicates:  No Known Allergies    Actual Body Weight:   95.7kg    Renal Function:   Estimated Creatinine Clearance: 94.6 mL/min (based on SCr of 1 mg/dL).,     Dialysis Method (if applicable):  N/a     CBC (last 72 hours):  Recent Labs   Lab Result Units 05/28/19  0553 05/29/19  0517   WBC K/uL 19.13* 17.95*   Hemoglobin g/dL 13.4* 12.8*   Hemoglobin A1C % 12.1*  --    Hematocrit % 37.4* 36.5*   Platelets K/uL 476* 493*   Gran% % 81.0* 80.4*   Lymph% % 5.0* 7.3*   Mono% % 13.0 10.6   Eosinophil% % 0.0 0.8   Basophil% % 0.0 0.2   Differential Method  Manual Automated       Metabolic Panel (last 72 hours):  Recent Labs   Lab Result Units 05/28/19  0553 05/28/19  0820 05/28/19  0844 05/29/19  0517   Sodium mmol/L 130*  --   --  135*   Potassium mmol/L 4.1  --   --  4.2   Chloride mmol/L 96  --   --  101   CO2 mmol/L 20*  --   --  23   Glucose mg/dL 381*  --   --  301*   Glucose, UA   --  3+*  --   --    BUN, Bld mg/dL 20  --   --  15   Creatinine mg/dL 0.9  --   --  1.0   Creatinine, Random Ur mg/dL  --   --  26.3  --    Albumin g/dL 2.4*  --   --   2.3*   Total Bilirubin mg/dL 0.6  --   --  0.5   Alkaline Phosphatase U/L 222*  --   --  264*   AST U/L 13  --   --  20   ALT U/L 25  --   --  28   Magnesium mg/dL  --   --   --  1.8       Vancomycin Administrations:  vancomycin given in the last 96 hours                     vancomycin (VANCOCIN) 1,750 mg in dextrose 5 % 500 mL IVPB (mg) 1,750 mg New Bag 05/29/19 2243     1,750 mg New Bag  0943     1,750 mg New Bag 05/28/19 1735    vancomycin 500 mg in dextrose 5 % 100 mL IVPB (ready to mix system) (mg) 500 mg New Bag 05/28/19 1029    vancomycin 2 g in dextrose 5 % 500 mL IVPB (mg) 2,000 mg New Bag 05/28/19 0607                      Drug levels (last 3 results):  Recent Labs   Lab Result Units 05/29/19  2115   Vancomycin-Trough ug/mL 9.6*       Microbiologic Results:  Microbiology Results (last 7 days)       Procedure Component Value Units Date/Time    Gram stain [[id number]] Collected:  05/29/19 0758    Order Status:  Completed Specimen:  Abscess from Back Updated:  05/29/19 1817     Gram Stain Result Moderate WBC's      Many Gram positive cocci    Narrative:       LEFT UPPER BACK    Aerobic culture [[id number]] Collected:  05/28/19 0641    Order Status:  Completed Specimen:  Abscess Updated:  05/29/19 1533     Aerobic Bacterial Culture --     STAPHYLOCOCCUS AUREUS  Many  Susceptibility pending      Aerobic culture [[id number]] Collected:  05/29/19 0758    Order Status:  Sent Specimen:  Abscess from Back Updated:  05/29/19 1450    Culture, Anaerobic [[id number]] Collected:  05/29/19 0758    Order Status:  Sent Specimen:  Abscess from Back Updated:  05/29/19 1450    AFB Culture & Smear [[id number]] Collected:  05/29/19 0758    Order Status:  Sent Specimen:  Abscess from Back Updated:  05/29/19 1450    Fungus culture [[id number]] Collected:  05/29/19 0758    Order Status:  Sent Specimen:  Abscess from Back Updated:  05/29/19 1450    Blood culture x two cultures. Draw prior to antibiotics. [[id number]] Collected:  05/28/19  0635    Order Status:  Completed Specimen:  Blood from Peripheral, Hand, Right Updated:  05/29/19 1012     Blood Culture, Routine No Growth to date     Blood Culture, Routine No Growth to date    Narrative:       Aerobic and anaerobic    Blood culture x two cultures. Draw prior to antibiotics. [[id number]] Collected:  05/28/19 0545    Order Status:  Completed Specimen:  Blood from Peripheral, Antecubital, Left Updated:  05/29/19 1012     Blood Culture, Routine No Growth to date     Blood Culture, Routine No Growth to date    Narrative:       Aerobic and anaerobic    Culture, Anaerobic [[id number]] Collected:  05/28/19 0641    Order Status:  Completed Specimen:  Abscess from Back Updated:  05/29/19 0904     Anaerobic Culture Culture in progress    Aerobic culture [[id number]] Collected:  05/28/19 0641    Order Status:  Canceled Specimen:  Abscess from Back Updated:  05/28/19 0642

## 2019-05-30 NOTE — ASSESSMENT & PLAN NOTE
-Off treatment x 6 years  -A1c is 12.  Sugars still very elevated.  -Continue diabetic diet.    -Increase detemir to 30 units daily.  -Increase aspart insulin 5 units TID w meals.  -Continue sliding scale insulin qac and qhs  -Await diabetes education.

## 2019-05-30 NOTE — SUBJECTIVE & OBJECTIVE
Interval History: No acute events overnight.  Feels much better.  Sugars still very elevated.    Review of Systems   Constitutional: Negative for activity change and appetite change.   HENT: Negative for congestion and dental problem.    Eyes: Negative for discharge and itching.   Respiratory: Negative for apnea and chest tightness.    Cardiovascular: Negative for chest pain and leg swelling.   Gastrointestinal: Negative for abdominal distention and abdominal pain.   Endocrine: Negative for cold intolerance and heat intolerance.   Genitourinary: Negative for difficulty urinating and dysuria.   Musculoskeletal: Negative for arthralgias and back pain.   Allergic/Immunologic: Negative for environmental allergies and food allergies.   Neurological: Negative for dizziness and facial asymmetry.   Hematological: Negative for adenopathy. Does not bruise/bleed easily.   Psychiatric/Behavioral: Negative for agitation and behavioral problems.     Objective:     Vital Signs (Most Recent):  Temp: 97.6 °F (36.4 °C) (05/30/19 1156)  Pulse: 82 (05/30/19 1156)  Resp: 20 (05/30/19 1156)  BP: (!) 151/77 (05/30/19 1156)  SpO2: 97 % (05/30/19 1156) Vital Signs (24h Range):  Temp:  [97 °F (36.1 °C)-98.1 °F (36.7 °C)] 97.6 °F (36.4 °C)  Pulse:  [] 82  Resp:  [18-20] 20  SpO2:  [93 %-97 %] 97 %  BP: (132-185)/(71-88) 151/77     Weight: 95.7 kg (211 lb)  Body mass index is 30.28 kg/m².    Intake/Output Summary (Last 24 hours) at 5/30/2019 1253  Last data filed at 5/30/2019 0247  Gross per 24 hour   Intake 1090 ml   Output 1050 ml   Net 40 ml      Physical Exam   Constitutional: He is oriented to person, place, and time. He appears well-developed and well-nourished.   HENT:   Head: Normocephalic and atraumatic.   Eyes: Pupils are equal, round, and reactive to light. EOM are normal.   Neck: Normal range of motion. Neck supple.   Cardiovascular: Normal rate, regular rhythm and normal heart sounds.   Pulmonary/Chest: Effort normal and  breath sounds normal. No respiratory distress.   Abdominal: Soft. Bowel sounds are normal. He exhibits no distension. There is no tenderness.   Musculoskeletal: Normal range of motion. He exhibits no edema.   Weakness with abduction at right shoulder which is chronic   Neurological: He is oriented to person, place, and time. No cranial nerve deficit. Coordination normal.   Skin: Skin is warm and dry.   Large bandage over left shoulder/back abscess area with a good amount of strikethrough   Psychiatric: He has a normal mood and affect. His behavior is normal.   Vitals reviewed.      Significant Labs: All pertinent labs within the past 24 hours have been reviewed.    Significant Imaging: I have reviewed and interpreted all pertinent imaging results/findings within the past 24 hours.

## 2019-05-30 NOTE — PROGRESS NOTES
Attempted to see patient for wound care.  Patient reports Dr. Correa instructed him to shower later today and wound would be packed after shower.  Plan to follow up later today.

## 2019-05-30 NOTE — PROGRESS NOTES
"Patient showered with packing in wound.  Packing removed and wound irrigated with wound cleanser.  Wound packed with 2" Kerlix moistened with 1/4 strength dakins solution. Cover dressing applied. Wound is 1.4 x 7 x 2.5 cm.  Upper back:    Upper back:            "

## 2019-05-30 NOTE — ASSESSMENT & PLAN NOTE
-Off treatment x 6 years  -BP still elevated.  -continue valsartan daily  -Add metoprolol  -Provide hydralazine PRN

## 2019-05-30 NOTE — PLAN OF CARE
Problem: Noninvasive Ventilation Acute  Goal: Effective Unassisted Ventilation and Oxygenation  Outcome: Ongoing (interventions implemented as appropriate)  Patient on RA.  Cpap qhs settings as documented. Pt. in no distress, will continue to monitor.

## 2019-05-30 NOTE — PLAN OF CARE
Problem: Adult Inpatient Plan of Care  Goal: Plan of Care Review  Outcome: Ongoing (interventions implemented as appropriate)  Plan of Care reviewed with patient and questions answered. Pt up ad yulia in room. Voiding adequately clear yellow urine. Pt free from falls or injury. Pt up to shower today. DSg changed per wound care nurse. Denies any c/o discomfort. Continues on IV Antibiotics as ordered. Tolerating well. Blood sugars monitored and sliding scale insulin given as ordered. Safety maintained. Bed in lowest position and locked. Non-skid socks on. Purposeful rounding performed. Call light in reach.

## 2019-05-30 NOTE — CONSULTS
Pt seen by diabetes education. Pt aware of current elevated A1C, A1C goal and importance of getting BG under better control, both to prevent complications and improve healing.     Log sheets provided and encouraged to SMBG 4x/day and bring logs to all future appointments. Reviewed goal BG as well.     Pt has used injectable DM meds in past, ed on types of insulin that will be used to control BG and how they work together to mimic normal pancreas function. Reviewed insulin injection technique, site rotation, storage and needle disposal. Reviewed s/s of low BG and how to prevent and treat.     Pt will be establishing w/ new PCP next week, will also follow up w/ outpatient DM ed.     All questions answered at this time, contact information left with patient.

## 2019-05-31 PROBLEM — Z09 ENCOUNTER FOR RECHECK OF ABSCESS FOLLOWING INCISION AND DRAINAGE: Status: RESOLVED | Noted: 2019-05-30 | Resolved: 2019-05-31

## 2019-05-31 LAB
ALBUMIN SERPL BCP-MCNC: 2.1 G/DL (ref 3.5–5.2)
ALP SERPL-CCNC: 197 U/L (ref 55–135)
ALT SERPL W/O P-5'-P-CCNC: 27 U/L (ref 10–44)
ANION GAP SERPL CALC-SCNC: 10 MMOL/L (ref 8–16)
AST SERPL-CCNC: 18 U/L (ref 10–40)
BACTERIA SPEC AEROBE CULT: NORMAL
BASOPHILS # BLD AUTO: 0.04 K/UL (ref 0–0.2)
BASOPHILS NFR BLD: 0.3 % (ref 0–1.9)
BILIRUB SERPL-MCNC: 0.2 MG/DL (ref 0.1–1)
BUN SERPL-MCNC: 13 MG/DL (ref 6–20)
CALCIUM SERPL-MCNC: 9.4 MG/DL (ref 8.7–10.5)
CHLORIDE SERPL-SCNC: 104 MMOL/L (ref 95–110)
CO2 SERPL-SCNC: 24 MMOL/L (ref 23–29)
CREAT SERPL-MCNC: 0.8 MG/DL (ref 0.5–1.4)
DIFFERENTIAL METHOD: ABNORMAL
EOSINOPHIL # BLD AUTO: 0.3 K/UL (ref 0–0.5)
EOSINOPHIL NFR BLD: 2.9 % (ref 0–8)
ERYTHROCYTE [DISTWIDTH] IN BLOOD BY AUTOMATED COUNT: 12.2 % (ref 11.5–14.5)
EST. GFR  (AFRICAN AMERICAN): >60 ML/MIN/1.73 M^2
EST. GFR  (NON AFRICAN AMERICAN): >60 ML/MIN/1.73 M^2
GLUCOSE SERPL-MCNC: 159 MG/DL (ref 70–110)
HCT VFR BLD AUTO: 33.7 % (ref 40–54)
HGB BLD-MCNC: 12.1 G/DL (ref 14–18)
LYMPHOCYTES # BLD AUTO: 1.8 K/UL (ref 1–4.8)
LYMPHOCYTES NFR BLD: 15.5 % (ref 18–48)
MAGNESIUM SERPL-MCNC: 1.7 MG/DL (ref 1.6–2.6)
MCH RBC QN AUTO: 33 PG (ref 27–31)
MCHC RBC AUTO-ENTMCNC: 35.9 G/DL (ref 32–36)
MCV RBC AUTO: 92 FL (ref 82–98)
MONOCYTES # BLD AUTO: 1.1 K/UL (ref 0.3–1)
MONOCYTES NFR BLD: 9.6 % (ref 4–15)
NEUTROPHILS # BLD AUTO: 8.3 K/UL (ref 1.8–7.7)
NEUTROPHILS NFR BLD: 70.7 % (ref 38–73)
PLATELET # BLD AUTO: 493 K/UL (ref 150–350)
PMV BLD AUTO: 9.2 FL (ref 9.2–12.9)
POCT GLUCOSE: 163 MG/DL (ref 70–110)
POCT GLUCOSE: 177 MG/DL (ref 70–110)
POCT GLUCOSE: 237 MG/DL (ref 70–110)
POCT GLUCOSE: 239 MG/DL (ref 70–110)
POTASSIUM SERPL-SCNC: 4.2 MMOL/L (ref 3.5–5.1)
PROT SERPL-MCNC: 6.4 G/DL (ref 6–8.4)
RBC # BLD AUTO: 3.67 M/UL (ref 4.6–6.2)
SODIUM SERPL-SCNC: 138 MMOL/L (ref 136–145)
WBC # BLD AUTO: 11.77 K/UL (ref 3.9–12.7)

## 2019-05-31 PROCEDURE — 36415 COLL VENOUS BLD VENIPUNCTURE: CPT

## 2019-05-31 PROCEDURE — 82803 BLOOD GASES ANY COMBINATION: CPT

## 2019-05-31 PROCEDURE — 25000003 PHARM REV CODE 250: Performed by: HOSPITALIST

## 2019-05-31 PROCEDURE — 83735 ASSAY OF MAGNESIUM: CPT

## 2019-05-31 PROCEDURE — 85025 COMPLETE CBC W/AUTO DIFF WBC: CPT

## 2019-05-31 PROCEDURE — 25000003 PHARM REV CODE 250: Performed by: SPECIALIST

## 2019-05-31 PROCEDURE — 11000001 HC ACUTE MED/SURG PRIVATE ROOM

## 2019-05-31 PROCEDURE — 99900035 HC TECH TIME PER 15 MIN (STAT)

## 2019-05-31 PROCEDURE — 63600175 PHARM REV CODE 636 W HCPCS: Performed by: HOSPITALIST

## 2019-05-31 PROCEDURE — 63600175 PHARM REV CODE 636 W HCPCS: Performed by: SPECIALIST

## 2019-05-31 PROCEDURE — 94660 CPAP INITIATION&MGMT: CPT

## 2019-05-31 PROCEDURE — 80053 COMPREHEN METABOLIC PANEL: CPT

## 2019-05-31 PROCEDURE — 99233 PR SUBSEQUENT HOSPITAL CARE,LEVL III: ICD-10-PCS | Mod: ,,, | Performed by: HOSPITALIST

## 2019-05-31 PROCEDURE — S5571 INSULIN DISPOS PEN 3 ML: HCPCS | Performed by: HOSPITALIST

## 2019-05-31 PROCEDURE — 94761 N-INVAS EAR/PLS OXIMETRY MLT: CPT

## 2019-05-31 PROCEDURE — 99233 SBSQ HOSP IP/OBS HIGH 50: CPT | Mod: ,,, | Performed by: HOSPITALIST

## 2019-05-31 RX ADMIN — METOPROLOL TARTRATE 25 MG: 25 TABLET ORAL at 08:05

## 2019-05-31 RX ADMIN — ENOXAPARIN SODIUM 40 MG: 100 INJECTION SUBCUTANEOUS at 05:05

## 2019-05-31 RX ADMIN — INSULIN ASPART 5 UNITS: 100 INJECTION, SOLUTION INTRAVENOUS; SUBCUTANEOUS at 08:05

## 2019-05-31 RX ADMIN — INSULIN ASPART 5 UNITS: 100 INJECTION, SOLUTION INTRAVENOUS; SUBCUTANEOUS at 11:05

## 2019-05-31 RX ADMIN — CEFAZOLIN 6 G: 10 INJECTION, POWDER, FOR SOLUTION INTRAVENOUS; PARENTERAL at 03:05

## 2019-05-31 RX ADMIN — INSULIN ASPART 5 UNITS: 100 INJECTION, SOLUTION INTRAVENOUS; SUBCUTANEOUS at 05:05

## 2019-05-31 RX ADMIN — VALSARTAN 20 MG: 40 TABLET ORAL at 10:05

## 2019-05-31 RX ADMIN — INSULIN DETEMIR 5 UNITS: 100 INJECTION, SOLUTION SUBCUTANEOUS at 10:05

## 2019-05-31 RX ADMIN — INSULIN ASPART 4 UNITS: 100 INJECTION, SOLUTION INTRAVENOUS; SUBCUTANEOUS at 05:05

## 2019-05-31 RX ADMIN — VALSARTAN 80 MG: 80 TABLET, FILM COATED ORAL at 08:05

## 2019-05-31 NOTE — ASSESSMENT & PLAN NOTE
-Mr. Zacarias was admitted to inpatient status  -On admit he was tachycardic with leukocytosis but afebrile with normal lactic acid.    -He had a large swollen area that was purple and draining puss.  It almost seems like a tumor that has central infection.  -Blood and wound cultures have been obtained.  Blood cultures negative.  Wound culture growing MSSA  -CT of chest did not show a large fluid collection, but there were frankly fluctuant areas and puss is draining.  General surgery was consulted for incision and drainage.  -Taken to OR by Dr. Correa for I/D of abscess.  Puss drained but no large focal abscess identified.  -Based on cultures antibiotics were tailored to IV ancef on 5/30.  Continues to be afebrile and wbc improving, but the area still has profound swelling and erythema.    -Possibly discharge home in next day or two.  Will require outpatient wound care.  -This does not appear like a typical abscess.  Wound care nurse is in agreement.  It almost looks like a tumor, that has central areas of infection.  Have called and left message with Dr. Correa to discuss.  It might be worthwhile to biopsy and send a small section of this tissue to pathology.

## 2019-05-31 NOTE — NURSING
Dressing removed prior to patient shower.  Dressing was saturated with purulent drainage. Wound care provided after shower. Site cleansed with wound cleanser, patted dry.  Wound packed with dakins soaked gauze, covered incision with foam silver impregnated dressing. Covered site with non adherent bordered foam gauze.  Patient tolerated well. Will continue to monitor for excess drainage.

## 2019-05-31 NOTE — ASSESSMENT & PLAN NOTE
-Off treatment x 6 years  -A1c is 12.  Sugars still elevated.  -Continue diabetic diet.    -Increase detemir to 35 units daily.  -Continue aspart insulin 5 units TID w meals.  -Continue sliding scale insulin qa and qhs  -He has received diabetes education.

## 2019-05-31 NOTE — PROGRESS NOTES
[facility] Hospital Medicine  Progress Note    Patient Name: Henry Zacarias  MRN: [id number]  Patient Class: IP- Inpatient   Admission Date: 5/28/2019  Length of Stay: 3 days  Attending Physician: Felton Bush MD  Primary Care Provider: Aimee Coombs MD        Subjective:     Principal Problem:Cellulitis of back except buttock    HPI:  Mr. Zacarias is a 59 year old man with history of hypertension, diabetes, hyperlipidemia and obstructive sleep apnea who came in for evaluation of a painful draining infection on his left shoulder.  He states he started noticing some tenderness on his left shoulder about 6 days ago.  Since then, he has developed a large painful area of erythema at the postero-basilar area of his neck and and left shoulder.  It is very tender to touch and has been draining green/brown fluid for the last two days.  He denies any fever, chills, nausea, vomiting and diarrhea.  He states he had no abrasion, cut or insect bite to the area that he is aware of.  He has never had skin infections like this before.  He does note that he has been off all medications for the last 6 years.      Hospital Course:  No notes on file    Interval History: No acute events overnight.  Feels much better.  Sugars still improved but not ideally controlled. Still with drainage from his wound     Review of Systems   Constitutional: Negative for activity change and appetite change.   HENT: Negative for congestion and dental problem.    Eyes: Negative for discharge and itching.   Respiratory: Negative for apnea and chest tightness.    Cardiovascular: Negative for chest pain and leg swelling.   Gastrointestinal: Negative for abdominal distention and abdominal pain.   Endocrine: Negative for cold intolerance and heat intolerance.   Genitourinary: Negative for difficulty urinating and dysuria.   Musculoskeletal: Negative for arthralgias and back pain.   Allergic/Immunologic: Negative for environmental allergies and  food allergies.   Neurological: Negative for dizziness and facial asymmetry.   Hematological: Negative for adenopathy. Does not bruise/bleed easily.   Psychiatric/Behavioral: Negative for agitation and behavioral problems.     Objective:     Vital Signs (Most Recent):  Temp: 96.8 °F (36 °C) (05/31/19 1124)  Pulse: 77 (05/31/19 1124)  Resp: (!) 24 (05/31/19 1124)  BP: (!) 149/70 (05/31/19 1124)  SpO2: 97 % (05/31/19 1124) Vital Signs (24h Range):  Temp:  [96.8 °F (36 °C)-98.3 °F (36.8 °C)] 96.8 °F (36 °C)  Pulse:  [77-92] 77  Resp:  [16-24] 24  SpO2:  [92 %-99 %] 97 %  BP: (144-159)/(70-87) 149/70     Weight: 97.5 kg (214 lb 15.2 oz)  Body mass index is 30.84 kg/m².    Intake/Output Summary (Last 24 hours) at 5/31/2019 1301  Last data filed at 5/31/2019 0752  Gross per 24 hour   Intake 950 ml   Output 2400 ml   Net -1450 ml      Physical Exam   Constitutional: He is oriented to person, place, and time. He appears well-developed and well-nourished.   HENT:   Head: Normocephalic and atraumatic.   Eyes: Pupils are equal, round, and reactive to light. EOM are normal.   Neck: Normal range of motion. Neck supple.   Cardiovascular: Normal rate, regular rhythm and normal heart sounds.   Pulmonary/Chest: Effort normal and breath sounds normal. No respiratory distress.   Abdominal: Soft. Bowel sounds are normal. He exhibits no distension. There is no tenderness.   Musculoskeletal: Normal range of motion. He exhibits no edema.   Weakness with abduction at right shoulder which is chronic   Neurological: He is oriented to person, place, and time. No cranial nerve deficit. Coordination normal.   Skin: Skin is warm and dry.   Large left shoulder/back abscess with well demarcated border and severe purple hue to the interior affected area.  Drainage continues.  The swollen area almost has a tumor like appearance.   Psychiatric: He has a normal mood and affect. His behavior is normal.   Vitals reviewed.      Significant Labs: All  pertinent labs within the past 24 hours have been reviewed.    Significant Imaging: I have reviewed and interpreted all pertinent imaging results/findings within the past 24 hours.    Assessment/Plan:      * Cellulitis of back except buttock  -Mr. Zacarias was admitted to inpatient status  -On admit he was tachycardic with leukocytosis but afebrile with normal lactic acid.    -He had a large swollen area that was purple and draining puss.  It almost seems like a tumor that has central infection.  -Blood and wound cultures have been obtained.  Blood cultures negative.  Wound culture growing MSSA  -CT of chest did not show a large fluid collection, but there were frankly fluctuant areas and puss is draining.  General surgery was consulted for incision and drainage.  -Taken to OR by Dr. Correa for I/D of abscess.  Puss drained but no large focal abscess identified.  -Based on cultures antibiotics were tailored to IV ancef on 5/30.  Continues to be afebrile and wbc improving, but the area still has profound swelling and erythema.    -Possibly discharge home in next day or two.  Will require outpatient wound care.  -This does not appear like a typical abscess.  Wound care nurse is in agreement.  It almost looks like a tumor, that has central areas of infection.  Have called and left message with Dr. Correa to discuss.  It might be worthwhile to biopsy and send a small section of this tissue to pathology.    Cellulitis  -Treatment as above      DM (diabetes mellitus)  -Off treatment x 6 years  -A1c is 12.  Sugars still elevated.  -Continue diabetic diet.    -Increase detemir to 35 units daily.  -Continue aspart insulin 5 units TID w meals.  -Continue sliding scale insulin qac and qhs  -He has received diabetes education.      HTN (hypertension)  -Off treatment x 6 years  -BP improved but still not at goal.  -Increase dose of valsartan   -Continue metoprolol  -Provide hydralazine PRN      Obesity  -Noted      Sleep  apnea  -Continue cpap with pressure setting 10 for when sleeping        VTE Risk Mitigation (From admission, onward)        Ordered     enoxaparin injection 40 mg  Daily      05/28/19 0944     IP VTE HIGH RISK PATIENT  Once      05/28/19 0944              Felton Bush MD  Department of Hospital Medicine   [facility]

## 2019-05-31 NOTE — PHYSICIAN QUERY
PT Name: Henry Zacarias  MR #: [id number]     Physician Query Form - Documentation Clarification      CDS/: Juany Arellano               Contact information: [email or web address]    This form is a permanent document in the medical record.     Query Date: May 31, 2019    By submitting this query, we are merely seeking further clarification of documentation. Please utilize your independent clinical judgment when addressing the question(s) below.    The Medical record reflects the following:    Supporting Clinical Findings Location in Medical Record   DM (diabetes mellitus)  Off treatment x 6 years  A1c is 12.  Sugars still very elevated.  Continue diabetic diet.    Increase detemir to 30 units daily.  Increase aspart insulin 5 units TID w meals.  Continue sliding scale insulin [facility] PN 05/30   HgbA1C= 12.1  Estimated Avg Glucose= 301    Glucose= 381 - 301 - 280 - 159   Labs 05/28 0553      Labs 05/28 - 05/31     POCT Glucose= 349 - 163     Labs, Point of Care 05/28 - 05/31                                                                            Doctor, Please specify diagnosis or diagnoses associated with above clinical findings.    Provider Use Only    Please further specify the above diagnosis of Diabetes mellitus:    [ x ] Diabetes mellitus with hyperglycemia    [  ] Diabetes mellitus without complications    [  ] Other diagnosis, please specify:  _________________                                                                                                           [  ]  Clinically Undetermined

## 2019-05-31 NOTE — PLAN OF CARE
Problem: Diabetes Comorbidity  Goal: Blood Glucose Level Within Desired Range  Outcome: Ongoing (interventions implemented as appropriate)  CBG elevated at 188, hypertensive BPs 140's / 70's  abcess draining moderate amt serosanguinous fluid, no c/o pain, rested throughout night  Uop 1175, Blood glucose 159 EOS

## 2019-05-31 NOTE — ASSESSMENT & PLAN NOTE
-Off treatment x 6 years  -BP improved but still not at goal.  -Increase dose of valsartan   -Continue metoprolol  -Provide hydralazine PRN

## 2019-05-31 NOTE — SUBJECTIVE & OBJECTIVE
Interval History: No acute events overnight.  Feels much better.  Sugars still improved but not ideally controlled. Still with drainage from his wound     Review of Systems   Constitutional: Negative for activity change and appetite change.   HENT: Negative for congestion and dental problem.    Eyes: Negative for discharge and itching.   Respiratory: Negative for apnea and chest tightness.    Cardiovascular: Negative for chest pain and leg swelling.   Gastrointestinal: Negative for abdominal distention and abdominal pain.   Endocrine: Negative for cold intolerance and heat intolerance.   Genitourinary: Negative for difficulty urinating and dysuria.   Musculoskeletal: Negative for arthralgias and back pain.   Allergic/Immunologic: Negative for environmental allergies and food allergies.   Neurological: Negative for dizziness and facial asymmetry.   Hematological: Negative for adenopathy. Does not bruise/bleed easily.   Psychiatric/Behavioral: Negative for agitation and behavioral problems.     Objective:     Vital Signs (Most Recent):  Temp: 96.8 °F (36 °C) (05/31/19 1124)  Pulse: 77 (05/31/19 1124)  Resp: (!) 24 (05/31/19 1124)  BP: (!) 149/70 (05/31/19 1124)  SpO2: 97 % (05/31/19 1124) Vital Signs (24h Range):  Temp:  [96.8 °F (36 °C)-98.3 °F (36.8 °C)] 96.8 °F (36 °C)  Pulse:  [77-92] 77  Resp:  [16-24] 24  SpO2:  [92 %-99 %] 97 %  BP: (144-159)/(70-87) 149/70     Weight: 97.5 kg (214 lb 15.2 oz)  Body mass index is 30.84 kg/m².    Intake/Output Summary (Last 24 hours) at 5/31/2019 1301  Last data filed at 5/31/2019 0752  Gross per 24 hour   Intake 950 ml   Output 2400 ml   Net -1450 ml      Physical Exam   Constitutional: He is oriented to person, place, and time. He appears well-developed and well-nourished.   HENT:   Head: Normocephalic and atraumatic.   Eyes: Pupils are equal, round, and reactive to light. EOM are normal.   Neck: Normal range of motion. Neck supple.   Cardiovascular: Normal rate, regular  rhythm and normal heart sounds.   Pulmonary/Chest: Effort normal and breath sounds normal. No respiratory distress.   Abdominal: Soft. Bowel sounds are normal. He exhibits no distension. There is no tenderness.   Musculoskeletal: Normal range of motion. He exhibits no edema.   Weakness with abduction at right shoulder which is chronic   Neurological: He is oriented to person, place, and time. No cranial nerve deficit. Coordination normal.   Skin: Skin is warm and dry.   Large left shoulder/back abscess with well demarcated border and severe purple hue to the interior affected area.  Drainage continues.  The swollen area almost has a tumor like appearance.   Psychiatric: He has a normal mood and affect. His behavior is normal.   Vitals reviewed.      Significant Labs: All pertinent labs within the past 24 hours have been reviewed.    Significant Imaging: I have reviewed and interpreted all pertinent imaging results/findings within the past 24 hours.

## 2019-05-31 NOTE — PLAN OF CARE
Problem: Adult Inpatient Plan of Care  Goal: Plan of Care Review  Outcome: Ongoing (interventions implemented as appropriate)  Received patient on room air, patient on CPAP machine. Will continue to monitor.

## 2019-05-31 NOTE — PHYSICIAN QUERY
PT Name: Henry Zacarias  MR #: [id number]    Physician Query Form - Cause and Effect Relationship Clarification      CDS/: Juany Arellano               Contact information: [email or web address]    This form is a permanent document in the medical record.     Query Date: May 31, 2019    By submitting this query, we are merely seeking further clarification of documentation. Please utilize your independent clinical judgment when addressing the question(s) below.    The Medical record contains the following:  Supporting Clinical Findings   Location in record   Cellulitis of back except buttock           Wound culture growing MSSA                                                            Will stop vanc and zosyn and change to continuous infusion of ancef for now                                                                                                              Hospital Medicine PN 05/30   Staphylococcus aureus  Moderate                                                                                                                                                                                        Aerobic Culture, Back; Abscess 05/29         Provider, please clarify if there is any correlation between __Cellulitis of back___ and __MSSA___.           Are the conditions:      [ x ] Due to or associated with each other   [  ] Unrelated to each other   [  ] Other (Please Specify): _________________________   [  ] Clinically Undetermined

## 2019-06-01 PROBLEM — Z09 ENCOUNTER FOR RECHECK OF ABSCESS FOLLOWING INCISION AND DRAINAGE: Status: ACTIVE | Noted: 2019-06-01

## 2019-06-01 PROBLEM — Z09 ENCOUNTER FOR RECHECK OF ABSCESS FOLLOWING INCISION AND DRAINAGE: Status: RESOLVED | Noted: 2019-06-01 | Resolved: 2019-06-01

## 2019-06-01 LAB
ALBUMIN SERPL BCP-MCNC: 2.3 G/DL (ref 3.5–5.2)
ALP SERPL-CCNC: 193 U/L (ref 55–135)
ALT SERPL W/O P-5'-P-CCNC: 22 U/L (ref 10–44)
ANION GAP SERPL CALC-SCNC: 9 MMOL/L (ref 8–16)
AST SERPL-CCNC: 20 U/L (ref 10–40)
BASOPHILS # BLD AUTO: 0.04 K/UL (ref 0–0.2)
BASOPHILS NFR BLD: 0.4 % (ref 0–1.9)
BILIRUB SERPL-MCNC: 0.2 MG/DL (ref 0.1–1)
BUN SERPL-MCNC: 16 MG/DL (ref 6–20)
CALCIUM SERPL-MCNC: 9.3 MG/DL (ref 8.7–10.5)
CHLORIDE SERPL-SCNC: 104 MMOL/L (ref 95–110)
CO2 SERPL-SCNC: 24 MMOL/L (ref 23–29)
CREAT SERPL-MCNC: 0.8 MG/DL (ref 0.5–1.4)
DIFFERENTIAL METHOD: ABNORMAL
EOSINOPHIL # BLD AUTO: 0.3 K/UL (ref 0–0.5)
EOSINOPHIL NFR BLD: 3.7 % (ref 0–8)
ERYTHROCYTE [DISTWIDTH] IN BLOOD BY AUTOMATED COUNT: 12.2 % (ref 11.5–14.5)
EST. GFR  (AFRICAN AMERICAN): >60 ML/MIN/1.73 M^2
EST. GFR  (NON AFRICAN AMERICAN): >60 ML/MIN/1.73 M^2
GLUCOSE SERPL-MCNC: 161 MG/DL (ref 70–110)
HCT VFR BLD AUTO: 36.8 % (ref 40–54)
HGB BLD-MCNC: 12.8 G/DL (ref 14–18)
LYMPHOCYTES # BLD AUTO: 1.7 K/UL (ref 1–4.8)
LYMPHOCYTES NFR BLD: 18.6 % (ref 18–48)
MAGNESIUM SERPL-MCNC: 1.6 MG/DL (ref 1.6–2.6)
MCH RBC QN AUTO: 32 PG (ref 27–31)
MCHC RBC AUTO-ENTMCNC: 34.8 G/DL (ref 32–36)
MCV RBC AUTO: 92 FL (ref 82–98)
MONOCYTES # BLD AUTO: 0.8 K/UL (ref 0.3–1)
MONOCYTES NFR BLD: 8.6 % (ref 4–15)
NEUTROPHILS # BLD AUTO: 6.1 K/UL (ref 1.8–7.7)
NEUTROPHILS NFR BLD: 67 % (ref 38–73)
PLATELET # BLD AUTO: 557 K/UL (ref 150–350)
PMV BLD AUTO: 9.1 FL (ref 9.2–12.9)
POCT GLUCOSE: 119 MG/DL (ref 70–110)
POCT GLUCOSE: 192 MG/DL (ref 70–110)
POCT GLUCOSE: 205 MG/DL (ref 70–110)
POCT GLUCOSE: 251 MG/DL (ref 70–110)
POTASSIUM SERPL-SCNC: 4.1 MMOL/L (ref 3.5–5.1)
PROT SERPL-MCNC: 6.6 G/DL (ref 6–8.4)
RBC # BLD AUTO: 4 M/UL (ref 4.6–6.2)
SODIUM SERPL-SCNC: 137 MMOL/L (ref 136–145)
WBC # BLD AUTO: 9.04 K/UL (ref 3.9–12.7)

## 2019-06-01 PROCEDURE — 85025 COMPLETE CBC W/AUTO DIFF WBC: CPT

## 2019-06-01 PROCEDURE — 63600175 PHARM REV CODE 636 W HCPCS: Performed by: SPECIALIST

## 2019-06-01 PROCEDURE — 25000003 PHARM REV CODE 250: Performed by: SPECIALIST

## 2019-06-01 PROCEDURE — 63600175 PHARM REV CODE 636 W HCPCS: Performed by: HOSPITALIST

## 2019-06-01 PROCEDURE — 11000001 HC ACUTE MED/SURG PRIVATE ROOM

## 2019-06-01 PROCEDURE — 36415 COLL VENOUS BLD VENIPUNCTURE: CPT

## 2019-06-01 PROCEDURE — S5571 INSULIN DISPOS PEN 3 ML: HCPCS | Performed by: HOSPITALIST

## 2019-06-01 PROCEDURE — 94761 N-INVAS EAR/PLS OXIMETRY MLT: CPT

## 2019-06-01 PROCEDURE — 99232 SBSQ HOSP IP/OBS MODERATE 35: CPT | Mod: ,,, | Performed by: HOSPITALIST

## 2019-06-01 PROCEDURE — 25000003 PHARM REV CODE 250: Performed by: HOSPITALIST

## 2019-06-01 PROCEDURE — 94660 CPAP INITIATION&MGMT: CPT

## 2019-06-01 PROCEDURE — 80053 COMPREHEN METABOLIC PANEL: CPT

## 2019-06-01 PROCEDURE — 99900035 HC TECH TIME PER 15 MIN (STAT)

## 2019-06-01 PROCEDURE — 99232 PR SUBSEQUENT HOSPITAL CARE,LEVL II: ICD-10-PCS | Mod: ,,, | Performed by: HOSPITALIST

## 2019-06-01 PROCEDURE — 83735 ASSAY OF MAGNESIUM: CPT

## 2019-06-01 RX ORDER — INSULIN ASPART 100 [IU]/ML
7 INJECTION, SOLUTION INTRAVENOUS; SUBCUTANEOUS
Status: DISCONTINUED | OUTPATIENT
Start: 2019-06-01 | End: 2019-06-02

## 2019-06-01 RX ORDER — METOPROLOL TARTRATE 50 MG/1
50 TABLET ORAL 2 TIMES DAILY
Status: DISCONTINUED | OUTPATIENT
Start: 2019-06-01 | End: 2019-06-03 | Stop reason: HOSPADM

## 2019-06-01 RX ORDER — METOPROLOL TARTRATE 25 MG/1
25 TABLET, FILM COATED ORAL ONCE
Status: COMPLETED | OUTPATIENT
Start: 2019-06-01 | End: 2019-06-01

## 2019-06-01 RX ADMIN — METOPROLOL TARTRATE 25 MG: 25 TABLET ORAL at 11:06

## 2019-06-01 RX ADMIN — METOPROLOL TARTRATE 25 MG: 25 TABLET ORAL at 08:06

## 2019-06-01 RX ADMIN — INSULIN ASPART 5 UNITS: 100 INJECTION, SOLUTION INTRAVENOUS; SUBCUTANEOUS at 08:06

## 2019-06-01 RX ADMIN — INSULIN ASPART 4 UNITS: 100 INJECTION, SOLUTION INTRAVENOUS; SUBCUTANEOUS at 05:06

## 2019-06-01 RX ADMIN — INSULIN DETEMIR 5 UNITS: 100 INJECTION, SOLUTION SUBCUTANEOUS at 11:06

## 2019-06-01 RX ADMIN — ACETAMINOPHEN 650 MG: 325 TABLET, FILM COATED ORAL at 02:06

## 2019-06-01 RX ADMIN — ENOXAPARIN SODIUM 40 MG: 100 INJECTION SUBCUTANEOUS at 05:06

## 2019-06-01 RX ADMIN — VALSARTAN 100 MG: 40 TABLET, FILM COATED ORAL at 08:06

## 2019-06-01 RX ADMIN — VALSARTAN 100 MG: 40 TABLET, FILM COATED ORAL at 09:06

## 2019-06-01 RX ADMIN — INSULIN ASPART 7 UNITS: 100 INJECTION, SOLUTION INTRAVENOUS; SUBCUTANEOUS at 12:06

## 2019-06-01 RX ADMIN — METOPROLOL TARTRATE 50 MG: 50 TABLET ORAL at 09:06

## 2019-06-01 RX ADMIN — CEFAZOLIN 6 G: 10 INJECTION, POWDER, FOR SOLUTION INTRAVENOUS; PARENTERAL at 02:06

## 2019-06-01 RX ADMIN — INSULIN ASPART 7 UNITS: 100 INJECTION, SOLUTION INTRAVENOUS; SUBCUTANEOUS at 05:06

## 2019-06-01 RX ADMIN — INSULIN ASPART 2 UNITS: 100 INJECTION, SOLUTION INTRAVENOUS; SUBCUTANEOUS at 08:06

## 2019-06-01 NOTE — SUBJECTIVE & OBJECTIVE
Interval History: No acute events overnight.  Sugars remain rather elevated and Bp elevated at times as well.  Feels much better.  Still with drainage from his wound     Review of Systems   Constitutional: Negative for activity change and appetite change.   HENT: Negative for congestion and dental problem.    Eyes: Negative for discharge and itching.   Respiratory: Negative for apnea and chest tightness.    Cardiovascular: Negative for chest pain and leg swelling.   Gastrointestinal: Negative for abdominal distention and abdominal pain.   Endocrine: Negative for cold intolerance and heat intolerance.   Genitourinary: Negative for difficulty urinating and dysuria.   Musculoskeletal: Negative for arthralgias and back pain.   Allergic/Immunologic: Negative for environmental allergies and food allergies.   Neurological: Negative for dizziness and facial asymmetry.   Hematological: Negative for adenopathy. Does not bruise/bleed easily.   Psychiatric/Behavioral: Negative for agitation and behavioral problems.     Objective:     Vital Signs (Most Recent):  Temp: 97.8 °F (36.6 °C) (06/01/19 1130)  Pulse: 75 (06/01/19 1130)  Resp: 20 (06/01/19 1130)  BP: (!) 141/76 (06/01/19 1130)  SpO2: 97 % (06/01/19 1130) Vital Signs (24h Range):  Temp:  [96 °F (35.6 °C)-98 °F (36.7 °C)] 97.8 °F (36.6 °C)  Pulse:  [75-85] 75  Resp:  [18-22] 20  SpO2:  [94 %-99 %] 97 %  BP: (141-170)/(76-85) 141/76     Weight: 96.2 kg (212 lb 1.3 oz)  Body mass index is 30.43 kg/m².    Intake/Output Summary (Last 24 hours) at 6/1/2019 1419  Last data filed at 6/1/2019 0734  Gross per 24 hour   Intake 980 ml   Output 2300 ml   Net -1320 ml      Physical Exam   Constitutional: He is oriented to person, place, and time. He appears well-developed and well-nourished.   HENT:   Head: Normocephalic and atraumatic.   Eyes: Pupils are equal, round, and reactive to light. EOM are normal.   Neck: Normal range of motion. Neck supple.   Cardiovascular: Normal rate,  regular rhythm and normal heart sounds.   Pulmonary/Chest: Effort normal and breath sounds normal. No respiratory distress.   Abdominal: Soft. Bowel sounds are normal. He exhibits no distension. There is no tenderness.   Musculoskeletal: Normal range of motion. He exhibits no edema.   Weakness with abduction at right shoulder which is chronic   Neurological: He is oriented to person, place, and time. No cranial nerve deficit. Coordination normal.   Skin: Skin is warm and dry.   Large left shoulder/back abscess with well demarcated border and severe purple hue to the interior affected area.  Drainage seems increased today.  Swelling is definitely improving.  There are 2-3 small fluctuant areas within this.   Psychiatric: He has a normal mood and affect. His behavior is normal.   Vitals reviewed.      Significant Labs: All pertinent labs within the past 24 hours have been reviewed.    Significant Imaging: I have reviewed and interpreted all pertinent imaging results/findings within the past 24 hours.

## 2019-06-01 NOTE — PLAN OF CARE
Problem: Adult Inpatient Plan of Care  Goal: Plan of Care Review  Outcome: Ongoing (interventions implemented as appropriate)  No change in respiratory status.

## 2019-06-01 NOTE — ASSESSMENT & PLAN NOTE
-Off treatment x 6 years  -BP improved but still not at goal.  -Increased dose of valsartan on 5/32  -Will increase metoprolol today.  -Provide hydralazine PRN

## 2019-06-01 NOTE — ASSESSMENT & PLAN NOTE
-Off treatment x 6 years  -A1c is 12.  Sugars still rather elevated.  -Continue diabetic diet.    -Increase detemir to 40 units daily.  -Increase aspart to insulin 7 units TID w meals.  -Continue sliding scale insulin qac and qhs  -He has received diabetes education.

## 2019-06-01 NOTE — ASSESSMENT & PLAN NOTE
-Mr. Zacarias was admitted to inpatient status  -On admit he was tachycardic with leukocytosis but afebrile with normal lactic acid.    -He had a large swollen area that was purple and draining puss.  It almost seems like a tumor that has central infection.  -Blood and wound cultures have been obtained.  Blood cultures negative.  Wound culture growing MSSA  -CT of chest did not show a large fluid collection, but there were frankly fluctuant areas and puss is draining.  General surgery was consulted for incision and drainage.  -Taken to OR by Dr. Correa for I/D of abscess.  Puss drained but no large focal abscess identified.  -Based on cultures antibiotics were tailored to IV ancef on 5/30.  Continues to be afebrile and wbc now normal.  However, the area still has profound swelling and erythema.  Swelling is a bit improved today.  -Likely needs a few more days IV antibiotics and will require outpatient wound care.

## 2019-06-01 NOTE — PLAN OF CARE
Problem: Adult Inpatient Plan of Care  Goal: Plan of Care Review  Outcome: Ongoing (interventions implemented as appropriate)  Vital signs stable, no complaints of pain. Pt repositions frequently and independently, urinal at bedside. IV antibiotic infusing. Wound care performed per order. Will continue to monitor.

## 2019-06-01 NOTE — PROGRESS NOTES
No complaints.  Afeb  VSS  Wound slowly improving.  Ian drainage.  WBC 9.0  Hct 36.8  Shower BID

## 2019-06-02 LAB
ALBUMIN SERPL BCP-MCNC: 2.4 G/DL (ref 3.5–5.2)
ALP SERPL-CCNC: 175 U/L (ref 55–135)
ALT SERPL W/O P-5'-P-CCNC: 21 U/L (ref 10–44)
ANION GAP SERPL CALC-SCNC: 9 MMOL/L (ref 8–16)
AST SERPL-CCNC: 22 U/L (ref 10–40)
BACTERIA BLD CULT: NORMAL
BACTERIA BLD CULT: NORMAL
BASOPHILS # BLD AUTO: 0.04 K/UL (ref 0–0.2)
BASOPHILS NFR BLD: 0.4 % (ref 0–1.9)
BILIRUB SERPL-MCNC: 0.2 MG/DL (ref 0.1–1)
BUN SERPL-MCNC: 17 MG/DL (ref 6–20)
CALCIUM SERPL-MCNC: 9.4 MG/DL (ref 8.7–10.5)
CHLORIDE SERPL-SCNC: 105 MMOL/L (ref 95–110)
CO2 SERPL-SCNC: 23 MMOL/L (ref 23–29)
CREAT SERPL-MCNC: 0.8 MG/DL (ref 0.5–1.4)
DIFFERENTIAL METHOD: ABNORMAL
EOSINOPHIL # BLD AUTO: 0.4 K/UL (ref 0–0.5)
EOSINOPHIL NFR BLD: 3.7 % (ref 0–8)
ERYTHROCYTE [DISTWIDTH] IN BLOOD BY AUTOMATED COUNT: 12.3 % (ref 11.5–14.5)
EST. GFR  (AFRICAN AMERICAN): >60 ML/MIN/1.73 M^2
EST. GFR  (NON AFRICAN AMERICAN): >60 ML/MIN/1.73 M^2
GLUCOSE SERPL-MCNC: 146 MG/DL (ref 70–110)
HCT VFR BLD AUTO: 35.4 % (ref 40–54)
HGB BLD-MCNC: 12.1 G/DL (ref 14–18)
LYMPHOCYTES # BLD AUTO: 2.1 K/UL (ref 1–4.8)
LYMPHOCYTES NFR BLD: 19.6 % (ref 18–48)
MAGNESIUM SERPL-MCNC: 1.9 MG/DL (ref 1.6–2.6)
MCH RBC QN AUTO: 31.5 PG (ref 27–31)
MCHC RBC AUTO-ENTMCNC: 34.2 G/DL (ref 32–36)
MCV RBC AUTO: 92 FL (ref 82–98)
MONOCYTES # BLD AUTO: 0.8 K/UL (ref 0.3–1)
MONOCYTES NFR BLD: 7.4 % (ref 4–15)
NEUTROPHILS # BLD AUTO: 7.2 K/UL (ref 1.8–7.7)
NEUTROPHILS NFR BLD: 66.7 % (ref 38–73)
PLATELET # BLD AUTO: 586 K/UL (ref 150–350)
PMV BLD AUTO: 9.1 FL (ref 9.2–12.9)
POCT GLUCOSE: 104 MG/DL (ref 70–110)
POCT GLUCOSE: 141 MG/DL (ref 70–110)
POCT GLUCOSE: 142 MG/DL (ref 70–110)
POCT GLUCOSE: 248 MG/DL (ref 70–110)
POTASSIUM SERPL-SCNC: 4.4 MMOL/L (ref 3.5–5.1)
PROT SERPL-MCNC: 6.5 G/DL (ref 6–8.4)
RBC # BLD AUTO: 3.84 M/UL (ref 4.6–6.2)
SODIUM SERPL-SCNC: 137 MMOL/L (ref 136–145)
WBC # BLD AUTO: 10.79 K/UL (ref 3.9–12.7)

## 2019-06-02 PROCEDURE — 63600175 PHARM REV CODE 636 W HCPCS: Performed by: SPECIALIST

## 2019-06-02 PROCEDURE — 25000003 PHARM REV CODE 250: Performed by: HOSPITALIST

## 2019-06-02 PROCEDURE — 80053 COMPREHEN METABOLIC PANEL: CPT

## 2019-06-02 PROCEDURE — 99232 PR SUBSEQUENT HOSPITAL CARE,LEVL II: ICD-10-PCS | Mod: ,,, | Performed by: HOSPITALIST

## 2019-06-02 PROCEDURE — 85025 COMPLETE CBC W/AUTO DIFF WBC: CPT

## 2019-06-02 PROCEDURE — 11000001 HC ACUTE MED/SURG PRIVATE ROOM

## 2019-06-02 PROCEDURE — 83735 ASSAY OF MAGNESIUM: CPT

## 2019-06-02 PROCEDURE — 94761 N-INVAS EAR/PLS OXIMETRY MLT: CPT

## 2019-06-02 PROCEDURE — 63600175 PHARM REV CODE 636 W HCPCS: Performed by: HOSPITALIST

## 2019-06-02 PROCEDURE — 99900035 HC TECH TIME PER 15 MIN (STAT)

## 2019-06-02 PROCEDURE — 94660 CPAP INITIATION&MGMT: CPT

## 2019-06-02 PROCEDURE — 36415 COLL VENOUS BLD VENIPUNCTURE: CPT

## 2019-06-02 PROCEDURE — 99232 SBSQ HOSP IP/OBS MODERATE 35: CPT | Mod: ,,, | Performed by: HOSPITALIST

## 2019-06-02 RX ORDER — INSULIN ASPART 100 [IU]/ML
15 INJECTION, SOLUTION INTRAVENOUS; SUBCUTANEOUS
Status: DISCONTINUED | OUTPATIENT
Start: 2019-06-02 | End: 2019-06-03 | Stop reason: HOSPADM

## 2019-06-02 RX ORDER — INSULIN ASPART 100 [IU]/ML
10 INJECTION, SOLUTION INTRAVENOUS; SUBCUTANEOUS
Status: DISCONTINUED | OUTPATIENT
Start: 2019-06-02 | End: 2019-06-02

## 2019-06-02 RX ADMIN — METOPROLOL TARTRATE 50 MG: 50 TABLET ORAL at 08:06

## 2019-06-02 RX ADMIN — INSULIN ASPART 4 UNITS: 100 INJECTION, SOLUTION INTRAVENOUS; SUBCUTANEOUS at 11:06

## 2019-06-02 RX ADMIN — ENOXAPARIN SODIUM 40 MG: 100 INJECTION SUBCUTANEOUS at 04:06

## 2019-06-02 RX ADMIN — VALSARTAN 100 MG: 40 TABLET, FILM COATED ORAL at 08:06

## 2019-06-02 RX ADMIN — CEFAZOLIN 6 G: 10 INJECTION, POWDER, FOR SOLUTION INTRAVENOUS; PARENTERAL at 03:06

## 2019-06-02 RX ADMIN — INSULIN ASPART 15 UNITS: 100 INJECTION, SOLUTION INTRAVENOUS; SUBCUTANEOUS at 04:06

## 2019-06-02 RX ADMIN — INSULIN ASPART 10 UNITS: 100 INJECTION, SOLUTION INTRAVENOUS; SUBCUTANEOUS at 11:06

## 2019-06-02 NOTE — PLAN OF CARE
Problem: Adult Inpatient Plan of Care  Goal: Plan of Care Review  Outcome: Ongoing (interventions implemented as appropriate)  Patient in no noted acute distress.Wound care completed to left shoulder.ABTs in progress cont.No c/o pain even with wound care .safety maintained and call light in reach,hourly rounding done bed in low locked position.V/s stable.will cont to assess

## 2019-06-02 NOTE — PLAN OF CARE
Problem: Adult Inpatient Plan of Care  Goal: Plan of Care Review  Pt currently on CPAP at night . Pt in no distress at this time. Sats  96 %. Will continue to monitor.

## 2019-06-02 NOTE — ASSESSMENT & PLAN NOTE
-Off treatment x 6 years  -BP improved   -Increased dose of valsartan on 5/32  -Increased metoprolol 6/1.  -Provide hydralazine PRN

## 2019-06-02 NOTE — ASSESSMENT & PLAN NOTE
-Mr. Zacarias was admitted to inpatient status  -On admit he was tachycardic with leukocytosis but afebrile with normal lactic acid.    -He had a large swollen area that was purple and draining puss.  It almost seems like a tumor that has central infection.  -Blood and wound cultures have been obtained.  Blood cultures negative.  Wound culture growing MSSA  -CT of chest did not show a large fluid collection, but there were frankly fluctuant areas and puss is draining.  General surgery was consulted for incision and drainage.  -Taken to OR by Dr. Correa for I/D of abscess.  Puss drained but no large focal abscess identified.  -Based on cultures antibiotics were tailored to IV ancef on 5/30.  Continues to be afebrile and wbc impoved but a bit elevated today.  Swelling and erythema continue to improve.  -Likely needs a few more days IV antibiotics and will require outpatient wound care.

## 2019-06-02 NOTE — SUBJECTIVE & OBJECTIVE
Interval History: No acute events overnight.  Sugars are improving.  Need to make sure pre-prandial insulin is administered before meals start.  Feels much better.  Still with drainage from his wound.  Eager to go home.     Review of Systems   Constitutional: Negative for activity change and appetite change.   HENT: Negative for congestion and dental problem.    Eyes: Negative for discharge and itching.   Respiratory: Negative for apnea and chest tightness.    Cardiovascular: Negative for chest pain and leg swelling.   Gastrointestinal: Negative for abdominal distention and abdominal pain.   Endocrine: Negative for cold intolerance and heat intolerance.   Genitourinary: Negative for difficulty urinating and dysuria.   Musculoskeletal: Negative for arthralgias and back pain.   Allergic/Immunologic: Negative for environmental allergies and food allergies.   Neurological: Negative for dizziness and facial asymmetry.   Hematological: Negative for adenopathy. Does not bruise/bleed easily.   Psychiatric/Behavioral: Negative for agitation and behavioral problems.     Objective:     Vital Signs (Most Recent):  Temp: 97.7 °F (36.5 °C) (06/02/19 1127)  Pulse: 70 (06/02/19 1127)  Resp: 18 (06/02/19 1127)  BP: 132/70 (06/02/19 1127)  SpO2: 98 % (06/02/19 1127) Vital Signs (24h Range):  Temp:  [97.6 °F (36.4 °C)-98.3 °F (36.8 °C)] 97.7 °F (36.5 °C)  Pulse:  [70-78] 70  Resp:  [18-22] 18  SpO2:  [96 %-98 %] 98 %  BP: (129-158)/(63-89) 132/70     Weight: 96.2 kg (212 lb 1.3 oz)  Body mass index is 30.43 kg/m².    Intake/Output Summary (Last 24 hours) at 6/2/2019 1412  Last data filed at 6/2/2019 0459  Gross per 24 hour   Intake --   Output 500 ml   Net -500 ml      Physical Exam   Constitutional: He is oriented to person, place, and time. He appears well-developed and well-nourished.   HENT:   Head: Normocephalic and atraumatic.   Eyes: Pupils are equal, round, and reactive to light. EOM are normal.   Neck: Normal range of motion.  Neck supple.   Cardiovascular: Normal rate, regular rhythm and normal heart sounds.   Pulmonary/Chest: Effort normal and breath sounds normal. No respiratory distress.   Abdominal: Soft. Bowel sounds are normal. He exhibits no distension. There is no tenderness.   Musculoskeletal: Normal range of motion. He exhibits no edema.   Weakness with abduction at right shoulder which is chronic   Neurological: He is oriented to person, place, and time. No cranial nerve deficit. Coordination normal.   Skin: Skin is warm and dry.   Large left shoulder/back abscess with well demarcated border.  Swelling is clearly improving.  Still with some drainage but appears to be improving.  Still with marked purple hue to the interior affected area but this too is improving.  Swelling is definitely improving.     Psychiatric: He has a normal mood and affect. His behavior is normal.   Vitals reviewed.      Significant Labs: All pertinent labs within the past 24 hours have been reviewed.    Significant Imaging: I have reviewed and interpreted all pertinent imaging results/findings within the past 24 hours.

## 2019-06-02 NOTE — ASSESSMENT & PLAN NOTE
-Off treatment x 6 years  -A1c is 12.  Sugars are improving, but spiked up today when pre-prandial insulin was not administered prior to starting his meal.    -Continue diabetic diet.    -Increase detemir to 45 units daily.  -Increase aspart to insulin 15 units TID w meals.  -Continue sliding scale insulin qac and qhs  -He has received diabetes education.

## 2019-06-02 NOTE — PLAN OF CARE
Problem: Noninvasive Ventilation Acute  Goal: Effective Unassisted Ventilation and Oxygenation  Outcome: Ongoing (interventions implemented as appropriate)  Patient up on RA sat's 98%,CPAP on standby.

## 2019-06-02 NOTE — PROGRESS NOTES
[facility] Hospital Medicine  Progress Note    Patient Name: Henry Zacarias  MRN: [id number]  Patient Class: IP- Inpatient   Admission Date: 5/28/2019  Length of Stay: 5 days  Attending Physician: Felton Bush MD  Primary Care Provider: Aimee Coombs MD        Subjective:     Principal Problem:Cellulitis of back except buttock    HPI:  Mr. Zacarias is a 59 year old man with history of hypertension, diabetes, hyperlipidemia and obstructive sleep apnea who came in for evaluation of a painful draining infection on his left shoulder.  He states he started noticing some tenderness on his left shoulder about 6 days ago.  Since then, he has developed a large painful area of erythema at the postero-basilar area of his neck and and left shoulder.  It is very tender to touch and has been draining green/brown fluid for the last two days.  He denies any fever, chills, nausea, vomiting and diarrhea.  He states he had no abrasion, cut or insect bite to the area that he is aware of.  He has never had skin infections like this before.  He does note that he has been off all medications for the last 6 years.      Hospital Course:  No notes on file    Interval History: No acute events overnight.  Sugars are improving.  Need to make sure pre-prandial insulin is administered before meals start.  Feels much better.  Still with drainage from his wound.  Eager to go home.     Review of Systems   Constitutional: Negative for activity change and appetite change.   HENT: Negative for congestion and dental problem.    Eyes: Negative for discharge and itching.   Respiratory: Negative for apnea and chest tightness.    Cardiovascular: Negative for chest pain and leg swelling.   Gastrointestinal: Negative for abdominal distention and abdominal pain.   Endocrine: Negative for cold intolerance and heat intolerance.   Genitourinary: Negative for difficulty urinating and dysuria.   Musculoskeletal: Negative for arthralgias and back  pain.   Allergic/Immunologic: Negative for environmental allergies and food allergies.   Neurological: Negative for dizziness and facial asymmetry.   Hematological: Negative for adenopathy. Does not bruise/bleed easily.   Psychiatric/Behavioral: Negative for agitation and behavioral problems.     Objective:     Vital Signs (Most Recent):  Temp: 97.7 °F (36.5 °C) (06/02/19 1127)  Pulse: 70 (06/02/19 1127)  Resp: 18 (06/02/19 1127)  BP: 132/70 (06/02/19 1127)  SpO2: 98 % (06/02/19 1127) Vital Signs (24h Range):  Temp:  [97.6 °F (36.4 °C)-98.3 °F (36.8 °C)] 97.7 °F (36.5 °C)  Pulse:  [70-78] 70  Resp:  [18-22] 18  SpO2:  [96 %-98 %] 98 %  BP: (129-158)/(63-89) 132/70     Weight: 96.2 kg (212 lb 1.3 oz)  Body mass index is 30.43 kg/m².    Intake/Output Summary (Last 24 hours) at 6/2/2019 1412  Last data filed at 6/2/2019 0459  Gross per 24 hour   Intake --   Output 500 ml   Net -500 ml      Physical Exam   Constitutional: He is oriented to person, place, and time. He appears well-developed and well-nourished.   HENT:   Head: Normocephalic and atraumatic.   Eyes: Pupils are equal, round, and reactive to light. EOM are normal.   Neck: Normal range of motion. Neck supple.   Cardiovascular: Normal rate, regular rhythm and normal heart sounds.   Pulmonary/Chest: Effort normal and breath sounds normal. No respiratory distress.   Abdominal: Soft. Bowel sounds are normal. He exhibits no distension. There is no tenderness.   Musculoskeletal: Normal range of motion. He exhibits no edema.   Weakness with abduction at right shoulder which is chronic   Neurological: He is oriented to person, place, and time. No cranial nerve deficit. Coordination normal.   Skin: Skin is warm and dry.   Large left shoulder/back abscess with well demarcated border.  Swelling is clearly improving.  Still with some drainage but appears to be improving.  Still with marked purple hue to the interior affected area but this too is improving.  Swelling is  definitely improving.     Psychiatric: He has a normal mood and affect. His behavior is normal.   Vitals reviewed.      Significant Labs: All pertinent labs within the past 24 hours have been reviewed.    Significant Imaging: I have reviewed and interpreted all pertinent imaging results/findings within the past 24 hours.    Assessment/Plan:      * Cellulitis of back except buttock  -Mr. Zacarias was admitted to inpatient status  -On admit he was tachycardic with leukocytosis but afebrile with normal lactic acid.    -He had a large swollen area that was purple and draining puss.  It almost seems like a tumor that has central infection.  -Blood and wound cultures have been obtained.  Blood cultures negative.  Wound culture growing MSSA  -CT of chest did not show a large fluid collection, but there were frankly fluctuant areas and puss is draining.  General surgery was consulted for incision and drainage.  -Taken to OR by Dr. Corera for I/D of abscess.  Puss drained but no large focal abscess identified.  -Based on cultures antibiotics were tailored to IV ancef on 5/30.  Continues to be afebrile and wbc impoved but a bit elevated today.  Swelling and erythema continue to improve.  -Likely needs a few more days IV antibiotics and will require outpatient wound care.    Cellulitis  -Treatment as above      DM (diabetes mellitus)  -Off treatment x 6 years  -A1c is 12.  Sugars are improving, but spiked up today when pre-prandial insulin was not administered prior to starting his meal.    -Continue diabetic diet.    -Increase detemir to 45 units daily.  -Increase aspart to insulin 15 units TID w meals.  -Continue sliding scale insulin qac and qhs  -He has received diabetes education.      HTN (hypertension)  -Off treatment x 6 years  -BP improved   -Increased dose of valsartan on 5/32  -Increased metoprolol 6/1.  -Provide hydralazine PRN      Obesity  -Noted      Sleep apnea  -Continue cpap with pressure setting 10 for when  sleeping        VTE Risk Mitigation (From admission, onward)        Ordered     enoxaparin injection 40 mg  Daily      05/28/19 0944     IP VTE HIGH RISK PATIENT  Once      05/28/19 0944              Felton Bush MD  Department of Hospital Medicine   [facility]

## 2019-06-02 NOTE — PLAN OF CARE
Problem: Adult Inpatient Plan of Care  Goal: Plan of Care Review  Outcome: Ongoing (interventions implemented as appropriate)  Pt in bed. VS stable. No pain or acute distress noted. Wound care performed. CPAP in use. IV infusing. Call light in reach, bed in lowest position and locked. Safety rounds completed. Will continue to monitor.

## 2019-06-03 VITALS
OXYGEN SATURATION: 97 % | SYSTOLIC BLOOD PRESSURE: 134 MMHG | HEIGHT: 70 IN | DIASTOLIC BLOOD PRESSURE: 72 MMHG | RESPIRATION RATE: 18 BRPM | TEMPERATURE: 97 F | WEIGHT: 210.31 LBS | BODY MASS INDEX: 30.11 KG/M2 | HEART RATE: 72 BPM

## 2019-06-03 DIAGNOSIS — E11.8 TYPE 2 DIABETES MELLITUS WITH COMPLICATION, UNSPECIFIED WHETHER LONG TERM INSULIN USE: Primary | ICD-10-CM

## 2019-06-03 PROBLEM — Z09 ENCOUNTER FOR RECHECK OF ABSCESS FOLLOWING INCISION AND DRAINAGE: Status: RESOLVED | Noted: 2019-06-03 | Resolved: 2019-06-03

## 2019-06-03 PROBLEM — Z09 ENCOUNTER FOR RECHECK OF ABSCESS FOLLOWING INCISION AND DRAINAGE: Status: ACTIVE | Noted: 2019-06-03

## 2019-06-03 LAB
ALBUMIN SERPL BCP-MCNC: 2.5 G/DL (ref 3.5–5.2)
ALP SERPL-CCNC: 162 U/L (ref 55–135)
ALT SERPL W/O P-5'-P-CCNC: 19 U/L (ref 10–44)
ANION GAP SERPL CALC-SCNC: 9 MMOL/L (ref 8–16)
AST SERPL-CCNC: 17 U/L (ref 10–40)
BACTERIA SPEC ANAEROBE CULT: NORMAL
BACTERIA SPEC ANAEROBE CULT: NORMAL
BASOPHILS # BLD AUTO: 0.05 K/UL (ref 0–0.2)
BASOPHILS NFR BLD: 0.5 % (ref 0–1.9)
BILIRUB SERPL-MCNC: 0.4 MG/DL (ref 0.1–1)
BUN SERPL-MCNC: 17 MG/DL (ref 6–20)
CALCIUM SERPL-MCNC: 9.4 MG/DL (ref 8.7–10.5)
CHLORIDE SERPL-SCNC: 105 MMOL/L (ref 95–110)
CO2 SERPL-SCNC: 25 MMOL/L (ref 23–29)
CREAT SERPL-MCNC: 0.8 MG/DL (ref 0.5–1.4)
DIFFERENTIAL METHOD: ABNORMAL
EOSINOPHIL # BLD AUTO: 0.3 K/UL (ref 0–0.5)
EOSINOPHIL NFR BLD: 2.9 % (ref 0–8)
ERYTHROCYTE [DISTWIDTH] IN BLOOD BY AUTOMATED COUNT: 12.3 % (ref 11.5–14.5)
EST. GFR  (AFRICAN AMERICAN): >60 ML/MIN/1.73 M^2
EST. GFR  (NON AFRICAN AMERICAN): >60 ML/MIN/1.73 M^2
GLUCOSE SERPL-MCNC: 122 MG/DL (ref 70–110)
HCT VFR BLD AUTO: 37.4 % (ref 40–54)
HGB BLD-MCNC: 12.9 G/DL (ref 14–18)
LYMPHOCYTES # BLD AUTO: 2 K/UL (ref 1–4.8)
LYMPHOCYTES NFR BLD: 18.8 % (ref 18–48)
MAGNESIUM SERPL-MCNC: 1.8 MG/DL (ref 1.6–2.6)
MCH RBC QN AUTO: 31.7 PG (ref 27–31)
MCHC RBC AUTO-ENTMCNC: 34.5 G/DL (ref 32–36)
MCV RBC AUTO: 92 FL (ref 82–98)
MONOCYTES # BLD AUTO: 0.8 K/UL (ref 0.3–1)
MONOCYTES NFR BLD: 7 % (ref 4–15)
NEUTROPHILS # BLD AUTO: 7.3 K/UL (ref 1.8–7.7)
NEUTROPHILS NFR BLD: 68.1 % (ref 38–73)
PLATELET # BLD AUTO: 612 K/UL (ref 150–350)
PMV BLD AUTO: 9.2 FL (ref 9.2–12.9)
POCT GLUCOSE: 140 MG/DL (ref 70–110)
POCT GLUCOSE: 151 MG/DL (ref 70–110)
POTASSIUM SERPL-SCNC: 4.2 MMOL/L (ref 3.5–5.1)
PROT SERPL-MCNC: 6.7 G/DL (ref 6–8.4)
RBC # BLD AUTO: 4.07 M/UL (ref 4.6–6.2)
SODIUM SERPL-SCNC: 139 MMOL/L (ref 136–145)
WBC # BLD AUTO: 10.73 K/UL (ref 3.9–12.7)

## 2019-06-03 PROCEDURE — 99239 HOSP IP/OBS DSCHRG MGMT >30: CPT | Mod: ,,, | Performed by: HOSPITALIST

## 2019-06-03 PROCEDURE — 80053 COMPREHEN METABOLIC PANEL: CPT

## 2019-06-03 PROCEDURE — 99239 PR HOSPITAL DISCHARGE DAY,>30 MIN: ICD-10-PCS | Mod: ,,, | Performed by: HOSPITALIST

## 2019-06-03 PROCEDURE — 94660 CPAP INITIATION&MGMT: CPT

## 2019-06-03 PROCEDURE — 36415 COLL VENOUS BLD VENIPUNCTURE: CPT

## 2019-06-03 PROCEDURE — 85025 COMPLETE CBC W/AUTO DIFF WBC: CPT

## 2019-06-03 PROCEDURE — 83735 ASSAY OF MAGNESIUM: CPT

## 2019-06-03 PROCEDURE — 99900035 HC TECH TIME PER 15 MIN (STAT)

## 2019-06-03 PROCEDURE — 25000003 PHARM REV CODE 250: Performed by: HOSPITALIST

## 2019-06-03 PROCEDURE — 94761 N-INVAS EAR/PLS OXIMETRY MLT: CPT

## 2019-06-03 RX ORDER — DEXTROSE 4 G
TABLET,CHEWABLE ORAL
Qty: 1 EACH | Refills: 0 | Status: SHIPPED | OUTPATIENT
Start: 2019-06-03

## 2019-06-03 RX ORDER — VALSARTAN 160 MG/1
160 TABLET ORAL DAILY
Qty: 90 TABLET | Refills: 1 | Status: SHIPPED | OUTPATIENT
Start: 2019-06-03 | End: 2019-07-01 | Stop reason: SDUPTHER

## 2019-06-03 RX ORDER — PEN NEEDLE, DIABETIC 30 GX3/16"
1 NEEDLE, DISPOSABLE MISCELLANEOUS 4 TIMES DAILY
Qty: 100 EACH | Refills: 1 | Status: SHIPPED | OUTPATIENT
Start: 2019-06-03 | End: 2019-06-24 | Stop reason: SDUPTHER

## 2019-06-03 RX ORDER — LANCETS
EACH MISCELLANEOUS 4 TIMES DAILY
Qty: 100 EACH | Refills: 1 | Status: SHIPPED | OUTPATIENT
Start: 2019-06-03 | End: 2019-06-24 | Stop reason: SDUPTHER

## 2019-06-03 RX ORDER — METOPROLOL TARTRATE 50 MG/1
50 TABLET ORAL 2 TIMES DAILY
Qty: 60 TABLET | Refills: 1 | Status: SHIPPED | OUTPATIENT
Start: 2019-06-03 | End: 2019-06-05 | Stop reason: SDUPTHER

## 2019-06-03 RX ORDER — CEPHALEXIN 500 MG/1
1000 CAPSULE ORAL 3 TIMES DAILY
Qty: 48 CAPSULE | Refills: 0 | Status: SHIPPED | OUTPATIENT
Start: 2019-06-03 | End: 2019-06-11

## 2019-06-03 RX ORDER — INSULIN ASPART 100 [IU]/ML
15 INJECTION, SOLUTION INTRAVENOUS; SUBCUTANEOUS 3 TIMES DAILY
Qty: 15 ML | Refills: 1 | Status: SHIPPED | OUTPATIENT
Start: 2019-06-03 | End: 2019-06-24 | Stop reason: SDUPTHER

## 2019-06-03 RX ADMIN — INSULIN ASPART 15 UNITS: 100 INJECTION, SOLUTION INTRAVENOUS; SUBCUTANEOUS at 12:06

## 2019-06-03 RX ADMIN — METOPROLOL TARTRATE 50 MG: 50 TABLET ORAL at 08:06

## 2019-06-03 RX ADMIN — VALSARTAN 100 MG: 40 TABLET, FILM COATED ORAL at 08:06

## 2019-06-03 RX ADMIN — INSULIN ASPART 15 UNITS: 100 INJECTION, SOLUTION INTRAVENOUS; SUBCUTANEOUS at 08:06

## 2019-06-03 RX ADMIN — INSULIN ASPART 2 UNITS: 100 INJECTION, SOLUTION INTRAVENOUS; SUBCUTANEOUS at 08:06

## 2019-06-03 NOTE — DISCHARGE SUMMARY
[facility] Hospital Medicine  Discharge Summary      Patient Name: Henry Zacarias  MRN: [id number]  Admission Date: 5/28/2019  Hospital Length of Stay: 6 days  Discharge Date and Time:  06/03/2019 12:39 PM  Attending Physician: Nacho Bush MD   Discharging Provider: Nacho Bush MD  Primary Care Provider: Aimee Coombs MD      HPI:   Mr. Zacarias is a 59 year old man with history of hypertension, diabetes, hyperlipidemia and obstructive sleep apnea who came in for evaluation of a painful draining infection on his left shoulder.  He states he started noticing some tenderness on his left shoulder about 6 days ago.  Since then, he has developed a large painful area of erythema at the postero-basilar area of his neck and and left shoulder.  It is very tender to touch and has been draining green/brown fluid for the last two days.  He denies any fever, chills, nausea, vomiting and diarrhea.  He states he had no abrasion, cut or insect bite to the area that he is aware of.  He has never had skin infections like this before.  He does note that he has been off all medications for the last 6 years.      Procedure(s) (LRB):  INCISION AND DRAINAGE, BACK UPPER (Left)      Consults:   Consults (From admission, onward)        Status Ordering Provider     Inpatient consult to Diabetes educator  Once     Provider:  (Not yet assigned)    Completed NACHO BUSH     Inpatient consult to General Surgery  Once     Provider:  Sam Correa MD    Completed NACHO BUSH     Inpatient consult to Social Work  Once     Provider:  (Not yet assigned)    Acknowledged NACHO BUSH        Hospital Course By Problem:   * Cellulitis of back except buttock  -Mr. Zacarias was admitted to inpatient status  -On admit he was tachycardic with leukocytosis but afebrile with normal lactic acid.    -He had a large swollen area that was purple and draining puss.  It almost seems like a tumor that has central infection.  -Blood  and wound cultures have been obtained.  Blood cultures negative.  Wound culture growing MSSA  -CT of chest did not show a large fluid collection, but there were frankly fluctuant areas and puss is draining.  General surgery was consulted for incision and drainage.  -Taken to OR by Dr. Correa for I/D of abscess.  Puss drained but no large focal abscess identified.  -Based on cultures antibiotics were tailored to IV ancef on 5/30.  Continues to be afebrile and wbc remains normal.  Swelling and erythema continue to improve.  -Discussed with Dr. Correa and he feels it has improved sufficiently to change to oral antibiotics and outpatient wound care.  -He is instructed to shower twice daily and go to wound care three times weekly.  -He is instructed to follow up with Dr. Correa in clinic in two days.    Cellulitis  -Treatment as above      DM (diabetes mellitus)  -Off treatment x 6 years  -A1c is 12.  Sugars are improving, but spiked up today when pre-prandial insulin was not administered prior to starting his meal.    -Continue diabetic diet.    -Sugars much improved today.  -Continue detemir 45 units daily.  -Continue aspart insulin 15 units TID w meals.  -He has received diabetes education and is referred for ongoing outpatient diabetes education  -He will follow up with pcp this week.      HTN (hypertension)  -Off treatment x 6 years  -BP much improved   -Continue valsartan and metoprolol  -Follow up with pcp this week for additional blood pressure check    Sleep apnea  -Continue cpap at night.      Final Active Diagnoses:    Diagnosis Date Noted POA    PRINCIPAL PROBLEM:  Cellulitis of back except buttock [L03.312] 05/28/2019 Yes    Cellulitis [L03.90] 05/28/2019 Yes    DM (diabetes mellitus) [E11.9] 05/01/2013 Yes    HTN (hypertension) [I10]  Yes    Obesity [E66.9]  Yes    Sleep apnea [G47.30]  Yes      Problems Resolved During this Admission:    Diagnosis Date Noted Date Resolved POA     Encounter for recheck of abscess following incision and drainage [Z09] 06/03/2019 06/03/2019 Yes    Encounter for recheck of abscess following incision and drainage [Z09] 06/01/2019 06/01/2019 Yes    Encounter for recheck of abscess following incision and drainage [Z09] 05/30/2019 05/31/2019 Yes    HLD (hyperlipidemia) [E78.5]  05/28/2019 Yes       Discharged Condition: fair    Disposition: Home or Self Care    Follow Up:  Follow-up Information     Aimee Coombs MD In 1 week.    Specialty:  Internal Medicine  Contact information:  [address]  [address]  [place] [address]  [phone]             Wound care.           Sam Correa MD In 2 days.    Specialty:  General Surgery  Contact information:  [address] [address]  [phone]                 Patient Instructions:      Ambulatory Referral to Wound Clinic   Referral Priority: Routine Referral Type: Consultation   Referral Reason: Specialty Services Required   Requested Specialty: Wound Care   Number of Visits Requested: 1     Ambulatory consult to Diabetic Education   Referral Priority: Routine Referral Type: Consultation   Referral Reason: Specialty Services Required   Requested Specialty: Diabetes   Number of Visits Requested: 1 Expiration Date: 06/03/20     Ambulatory Referral to Wound Clinic   Referral Priority: Routine Referral Type: Consultation   Referral Reason: Specialty Services Required   Requested Specialty: Wound Care   Number of Visits Requested: 1     Diet diabetic     Diet Cardiac     Notify your health care provider if you experience any of the following:  increased confusion or weakness     Notify your health care provider if you experience any of the following:  persistent dizziness, light-headedness, or visual disturbances     Notify your health care provider if you experience any of the following:  worsening rash     Notify your health care provider if you experience any of the following:  severe  "persistent headache     Notify your health care provider if you experience any of the following:  difficulty breathing or increased cough     Notify your health care provider if you experience any of the following:  severe uncontrolled pain     Notify your health care provider if you experience any of the following:  persistent nausea and vomiting or diarrhea     Notify your health care provider if you experience any of the following:  temperature >100.4     Activity as tolerated       Significant Diagnostic Studies: Labs:   BMP:   Recent Labs   Lab 06/02/19  0414 06/03/19  0456   * 122*    139   K 4.4 4.2    105   CO2 23 25   BUN 17 17   CREATININE 0.8 0.8   CALCIUM 9.4 9.4   MG 1.9 1.8   , CMP   Recent Labs   Lab 06/02/19  0414 06/03/19  0456    139   K 4.4 4.2    105   CO2 23 25   * 122*   BUN 17 17   CREATININE 0.8 0.8   CALCIUM 9.4 9.4   PROT 6.5 6.7   ALBUMIN 2.4* 2.5*   BILITOT 0.2 0.4   ALKPHOS 175* 162*   AST 22 17   ALT 21 19   ANIONGAP 9 9   ESTGFRAFRICA >60 >60   EGFRNONAA >60 >60   , CBC   Recent Labs   Lab 06/02/19  0414 06/03/19  0456   WBC 10.79 10.73   HGB 12.1* 12.9*   HCT 35.4* 37.4*   * 612*    and A1C:   Recent Labs   Lab 05/28/19  0553   HGBA1C 12.1*       Pending Diagnostic Studies:     None         Medications:  Reconciled Home Medications:      Medication List      START taking these medications    BD ULTRA-FINE JEANNETTE PEN NEEDLE 32 gauge x 5/32" Ndle  Generic drug:  pen needle, diabetic  Test 4 (four) times daily.     cephALEXin 500 MG capsule  Commonly known as:  KEFLEX  Take 2 capsules (1,000 mg total) by mouth 3 (three) times daily. for 8 days     CONTOUR NEXT EZ METER Misc  Generic drug:  blood-glucose meter  Use as instructed     LEVEMIR FLEXTOUCH U-100 INSULN 100 unit/mL (3 mL) Inpn pen  Generic drug:  insulin detemir U-100  Inject 45 Units into the skin once daily.  Start taking on:  6/4/2019     metoprolol tartrate 50 MG tablet  Commonly " "known as:  LOPRESSOR  Take 1 tablet (50 mg total) by mouth 2 (two) times daily.     NovoLOG Flexpen U-100 Insulin 100 unit/mL (3 mL) Inpn pen  Generic drug:  insulin aspart U-100  Inject 15 Units into the skin 3 (three) times daily.     valsartan 160 MG tablet  Commonly known as:  DIOVAN  Take 1 tablet (160 mg total) by mouth once daily.  Replaces:  valsartan 160 mg capsule        CHANGE how you take these medications    MICROLET LANCET Misc  Generic drug:  lancets  Test 4 (four) times daily.  What changed:    · how much to take  · when to take this        STOP taking these medications    atorvastatin 40 MG tablet  Commonly known as:  LIPITOR     liraglutide 0.6 mg/0.1 mL (18 mg/3 mL) subq PNIJ 0.6 mg/0.1 mL (18 mg/3 mL) Pnij  Commonly known as:  VICTOZA     metFORMIN 500 MG tablet  Commonly known as:  GLUCOPHAGE     pen needle, diabetic 32 gauge x 1/5" Ndle  Commonly known as:  NOVOTWIST     valsartan 160 mg capsule  Commonly known as:  DIOVAN  Replaced by:  valsartan 160 MG tablet        ASK your doctor about these medications    CONTOUR NEXT TEST STRIPS Strp  Generic drug:  blood sugar diagnostic  Use to test blood sugar 4 times daily            Indwelling Lines/Drains at time of discharge:   Lines/Drains/Airways          None          Time spent on the discharge of patient: 35 minutes  Patient was seen and examined on the date of discharge and determined to be suitable for discharge.         Felton Bush MD  Department of Hospital Medicine  [facility]  "

## 2019-06-03 NOTE — ASSESSMENT & PLAN NOTE
-Mr. Zacarias was admitted to inpatient status  -On admit he was tachycardic with leukocytosis but afebrile with normal lactic acid.    -He had a large swollen area that was purple and draining puss.  It almost seems like a tumor that has central infection.  -Blood and wound cultures have been obtained.  Blood cultures negative.  Wound culture growing MSSA  -CT of chest did not show a large fluid collection, but there were frankly fluctuant areas and puss is draining.  General surgery was consulted for incision and drainage.  -Taken to OR by Dr. Correa for I/D of abscess.  Puss drained but no large focal abscess identified.  -Based on cultures antibiotics were tailored to IV ancef on 5/30.  Continues to be afebrile and wbc remains normal.  Swelling and erythema continue to improve.  -Discussed with Dr. Correa and he feels it has improved sufficiently to change to oral antibiotics and outpatient wound care.  -He is instructed to shower twice daily and go to wound care three times weekly.  -He is instructed to follow up with Dr. Correa in clinic in two days.

## 2019-06-03 NOTE — PROGRESS NOTES
Discussed patient in discharge rounds; anticipate out-patient wound care 3 x week.  Patient has no one to assist him at home with wound care and site is in difficult area to dress. 20% wound bed with slough.  Undermining resolved, though overall size has not improved significantly.  Wound is 1.4 x 2 x 7 cm.  Wound upper back between shoulder and neck:      Wound cleansed and packed with hydro-fiber with silver, with foam to cover.

## 2019-06-03 NOTE — PLAN OF CARE
Problem: Adult Inpatient Plan of Care  Goal: Plan of Care Review  Outcome: Outcome(s) achieved Date Met: 06/03/19  Patient discharge instructions reviewed verbalized understanding.IV removed ,all belonging with patient.patient ambulated off unit no c/o pain or noted difficulty .Wound care nurse and  set up out patient place to get wound done.

## 2019-06-03 NOTE — PLAN OF CARE
Problem: Adult Inpatient Plan of Care  Goal: Plan of Care Review  Outcome: Ongoing (interventions implemented as appropriate)  No respiratory distress noted, pt resting on CPAP machine. Will continue to monitor.

## 2019-06-03 NOTE — ASSESSMENT & PLAN NOTE
-Off treatment x 6 years  -A1c is 12.  Sugars are improving, but spiked up today when pre-prandial insulin was not administered prior to starting his meal.    -Continue diabetic diet.    -Sugars much improved today.  -Continue detemir 45 units daily.  -Continue aspart insulin 15 units TID w meals.  -He has received diabetes education and is referred for ongoing outpatient diabetes education  -He will follow up with pcp this week.

## 2019-06-03 NOTE — ASSESSMENT & PLAN NOTE
-Off treatment x 6 years  -BP much improved   -Continue valsartan and metoprolol  -Follow up with pcp this week for additional blood pressure check

## 2019-06-03 NOTE — PLAN OF CARE
CMRN met with pt for final discharge planning assessment.    Pt provided wound care supplies by wound care.  Pt instructed to take supplies with him to his PCP apt 6/5/19.      Pt informed not to leave until arrangements complete.     06/03/19 1204   Final Note   Assessment Type Final Discharge Note   Anticipated Discharge Disposition Home   What phone number can be called within the next 1-3 days to see how you are doing after discharge? [phone]   Hospital Follow Up  Appt(s) scheduled? Yes   Discharge plans and expectations educations in teach back method with documentation complete? Yes   Right [facility] Referral Info   Post Acute Recommendation Other  (out patient wound care)       Pt states he will have transportation home.

## 2019-06-03 NOTE — PLAN OF CARE
Problem: Adult Inpatient Plan of Care  Goal: Plan of Care Review  Outcome: Ongoing (interventions implemented as appropriate)  Pt remains on RA. Cpap at bedside, night shift reports pt is compliant with cpap of 9.

## 2019-06-04 PROBLEM — D64.9 NORMOCYTIC ANEMIA: Status: ACTIVE | Noted: 2019-06-04

## 2019-06-04 PROBLEM — M25.511 RIGHT SHOULDER PAIN: Status: ACTIVE | Noted: 2019-06-04

## 2019-06-04 NOTE — PROGRESS NOTES
Subjective:       Patient ID: Henry Zacarias is a 59 y.o. male who  has a past medical history of DM2 (diabetes mellitus, type 2), Essential hypertension, HLD (hyperlipidemia), Obesity (BMI 30.0-34.9), and Sleep apnea.    Chief Complaint: Wound Check (Left Side) and Diabetes     History was obtained from the patient and supplemented through chart review  Reviewed admission for abscess  Saw  in 2013 for DM.    Works as a     Diabetes   He has type 2 diabetes mellitus. No MedicAlert identification noted. The initial diagnosis of diabetes was made 6 years ago. Hypoglycemia symptoms include sleepiness. Pertinent negatives for hypoglycemia include no confusion, dizziness, headaches, hunger, mood changes, nervousness/anxiousness, pallor, seizures, speech difficulty, sweats or tremors. Pertinent negatives for diabetes include no blurred vision, no chest pain, no fatigue, no foot paresthesias, no foot ulcerations, no polydipsia, no polyphagia, no polyuria, no visual change, no weakness and no weight loss. Hypoglycemia complications include hospitalization. Pertinent negatives for hypoglycemia complications include no blackouts, no nocturnal hypoglycemia, no required assistance and no required glucagon injection. Symptoms are worsening. Diabetic complications include impotence. Pertinent negatives for diabetic complications include no autonomic neuropathy, CVA, heart disease, nephropathy, peripheral neuropathy, PVD or retinopathy. Risk factors for coronary artery disease include dyslipidemia, hypertension, sedentary lifestyle, stress and diabetes mellitus. Current diabetic treatment includes diet, insulin injections and oral agent (triple therapy). He is compliant with treatment none of the time. He is currently taking insulin pre-breakfast, pre-lunch, pre-dinner and at bedtime. Insulin injections are given by patient. Rotation sites for injection include the abdominal wall and arms. His weight is decreasing  steadily. He is following a generally unhealthy diet. When asked about meal planning, he reported none. He has had a previous visit with a dietitian. He never participates in exercise. He monitors blood glucose at home 3-4 x per day. He monitors urine at home <1 x per month. There is no compliance with monitoring of blood glucose. His home blood glucose trend is fluctuating minimally. He does not see a podiatrist.Eye exam is current.     Cellulitis:  On his back/L shoulder that was painful with green, brown discharge.  No fever, pain.  I&D 2019 without abscess but with pus.  MSSA, sensitive to Bactrim, resistant to clindamycin.  BCx NG.  Was discharged with Keflex 1000 t.i.d. for 8 days; compliant.  No abd pain, diarrhea.  Last changed bandage 2 days ago (hard to change on his own 2/2 location).  Has a small amount of blood.  No pus.  Plans to see General surgery right after our appt.  Does not have appt with Wound Care.    HTN:    Pt's BP is well controlled on valsartan 160 (has DM), Lopressor 50 b.i.d. Tolerating meds well. Pt denies CP, SOB.      FHx: yes  Tobacco: no  ETOH:   Exercise: not much  KEVIN:  On CPAP    DM2 is uncontrolled.  Diagnosed in .  Was not on any treatment for 6 years.  Currently pt is taking Levemir 45 daily, NovoLog 15 t.i.d. a.c..  Used to take Metformin.    Admits to eating poorly at times due to catered food at work.  Not much exercise at the moment due to back cellulitis and reduced ROM of R shoulder.    Denies low sugars or symptoms of hypoglycemia.   AM fasting B, 144  BG Before lunch: 148  BG Before dinner: 131  BG At bedtime: 95    On an ACE/ARB.   Retinal exams: NUTD; will make appt with OSH Optometry due to evaluation for cataracts  Foot exams:  DIONE  Is following with diabetes education.  Hemoglobin A1C   Date Value Ref Range Status   2019 12.1 (H) 4.0 - 5.6 % Final     Comment:     ADA Screening Guidelines:  5.7-6.4%  Consistent with prediabetes  >or=6.5%   Consistent with diabetes  High levels of fetal hemoglobin interfere with the HbA1C  assay. Heterozygous hemoglobin variants (HbS, HgC, etc)do  not significantly interfere with this assay.   However, presence of multiple variants may affect accuracy.     08/07/2013 6.4 (H) 4.0 - 6.2 % Final   05/29/2013 9.4 (H) 4.0 - 6.2 % Final     HLD:  Used to take Lipitor 40.  Currently not on statin.  Lab Results   Component Value Date    LDLCALC 83.0 08/07/2013     The ASCVD Risk score (Cristinared VICTOR Jr., et al., 2013) failed to calculate for the following reasons:    Cannot find a previous HDL lab    Cannot find a previous total cholesterol lab    Normocytic anemia:   No results found for: IRON, TIBC, FERRITIN, SATURATEDIRO  No results found for: [id number]    Right shoulder pain:  He is R handed.  Woke up one morning with difficulty reaching overhead for his phone.  Hard to initiate right arm abduction.  No inciting event, trauma.  No neck pain.  Some numbness when it's cold.    KEVIN:  On CPAP q.h.s. BMI 29.    Review of Systems   Constitutional: Negative for fatigue, fever and weight loss.   HENT: Negative for postnasal drip and rhinorrhea.    Eyes: Negative for blurred vision, discharge and redness.   Respiratory: Negative for shortness of breath and wheezing.    Cardiovascular: Negative for chest pain and palpitations.   Gastrointestinal: Negative for abdominal pain and diarrhea.   Endocrine: Negative for polydipsia, polyphagia and polyuria.   Genitourinary: Positive for impotence. Negative for dysuria and hematuria.   Musculoskeletal: Positive for arthralgias. Negative for gait problem and neck pain.   Skin: Positive for wound. Negative for pallor.   Neurological: Negative for dizziness, tremors, seizures, speech difficulty, weakness and headaches.   Hematological: Negative for adenopathy.   Psychiatric/Behavioral: Negative for confusion. The patient is not nervous/anxious.        Past Medical History:   Diagnosis Date    DM2  (diabetes mellitus, type 2)     Essential hypertension     HLD (hyperlipidemia)     mixed    Obesity (BMI 30.0-34.9)     Sleep apnea      Past Surgical History:   Procedure Laterality Date    INCISION AND DRAINAGE, BACK UPPER Left 5/29/2019    Performed by [facility]    TONSILLECTOMY       Family History   Problem Relation Age of Onset    COPD Mother     Hyperlipidemia Father     Hypertension Father     Heart disease Father     Dementia Father     Diabetes Father     Colon cancer Neg Hx     Prostate cancer Neg Hx      Social History     Socioeconomic History    Marital status: Single     Spouse name: Not on file    Number of children: Not on file    Years of education: Not on file    Highest education level: Not on file   Occupational History    Occupation: manager for ins comp     Employer: [employer]   Social Needs    Financial resource strain: Not hard at all    Food insecurity:     Worry: Never true     Inability: Never true    Transportation needs:     Medical: No     Non-medical: No   Tobacco Use    Smoking status: Never Smoker    Smokeless tobacco: Never Used   Substance and Sexual Activity    Alcohol use: Yes     Frequency: Monthly or less     Drinks per session: 3 or 4     Binge frequency: Less than monthly     Comment: social    Drug use: No    Sexual activity: Not Currently     Partners: Female   Lifestyle    Physical activity:     Days per week: 0 days     Minutes per session: 0 min    Stress: Rather much   Relationships    Social connections:     Talks on phone: Once a week     Gets together: Once a week     Attends [employer] service: Not on file     Active member of club or organization: No     Attends meetings of clubs or organizations: Never     Relationship status: Never    Other Topics Concern    Not on file   Social History Narrative    Not on file     Objective:      Vitals:    06/05/19 0844   BP: 133/83   Pulse: 73  "  Temp: 97.7 °F (36.5 °C)   Weight: 94.8 kg (208 lb 15.9 oz)   Height: 5' 10" (1.778 m)      Physical Exam   Constitutional: He appears well-developed and well-nourished. No distress.   HENT:   Head: Normocephalic and atraumatic.   Nose: Nose normal.   Mouth/Throat: Oropharynx is clear and moist. No oropharyngeal exudate.   Eyes: Pupils are equal, round, and reactive to light. EOM are normal. Right eye exhibits no discharge. Left eye exhibits no discharge. No scleral icterus.   Neck: Neck supple. No tracheal deviation present. No thyromegaly present.   Cardiovascular: Normal rate, regular rhythm, normal heart sounds and intact distal pulses.   No murmur heard.  Pulmonary/Chest: Effort normal and breath sounds normal. No respiratory distress. He has no wheezes.   Abdominal: Soft. Bowel sounds are normal. He exhibits no distension. There is no tenderness.   Musculoskeletal: He exhibits no edema or deformity.   Unable to abduct RUE at shoulder   Lymphadenopathy:     He has no cervical adenopathy.   Neurological: He is alert. No cranial nerve deficit. Gait normal.   Skin: Skin is warm and dry. Capillary refill takes less than 2 seconds. He is not diaphoretic. No erythema.   L upper back bandaged   Psychiatric: He has a normal mood and affect. His behavior is normal.         Lab Results   Component Value Date    WBC 10.73 06/03/2019    HGB 12.9 (L) 06/03/2019    HCT 37.4 (L) 06/03/2019     (H) 06/03/2019    CHOL 176 08/07/2013    TRIG 263 (H) 08/07/2013    HDL 40 08/07/2013    ALT 19 06/03/2019    AST 17 06/03/2019     06/03/2019    K 4.2 06/03/2019     06/03/2019    CREATININE 0.8 06/03/2019    BUN 17 06/03/2019    CO2 25 06/03/2019    TSH 2.465 04/12/2013    HGBA1C 12.1 (H) 05/28/2019       The ASCVD Risk score (Cristinared VICTOR Jr., et al., 2013) failed to calculate for the following reasons:    Cannot find a previous HDL lab    Cannot find a previous total cholesterol lab    (Imaging have been independently " reviewed)  CT chest with left upper back edema consistent with cellulitis    Assessment:       1. Cellulitis of back except buttock    2. Essential hypertension    3. Type 2 diabetes mellitus without complication, with long-term current use of insulin    4. Hyperlipidemia, unspecified hyperlipidemia type    5. Normocytic anemia    6. Right shoulder pain, unspecified chronicity    7. KEVIN (obstructive sleep apnea)    8. Need for hepatitis C screening test          Plan:       Henry was seen today for wound check and diabetes.    Diagnoses and all orders for this visit:    Cellulitis of back except buttock  Comments:  No systemic s/s. Compliant with Keflex. Will see Gen Surg today. Ordered Sx, wound care referrals just in case. Discussed DM control  Orders:  -     Ambulatory Referral to Wound Clinic  -     Ambulatory referral to General Surgery    Essential hypertension  Comments:  At goal.  Continue valsartan 160 (DM), Lopressor 50 b.i.d.  Orders:  -     metoprolol tartrate (LOPRESSOR) 50 MG tablet; Take 1 tablet (50 mg total) by mouth 2 (two) times daily.    Type 2 diabetes mellitus without complication, with long-term current use of insulin  Comments:  BG much improved.  Continue Levemir 45 daily, NovoLog 15 t.i.d. AC.  Add metformin 500 b.i.d.. F/u with DM edu. Repeat A1C 3 mo.  Orders:  -     Microalbumin/creatinine urine ratio; Future  -     Ambulatory referral to Podiatry  -     Hemoglobin A1c; Future  -     Microalbumin/creatinine urine ratio; Future  -     metFORMIN (GLUCOPHAGE) 500 MG tablet; Take 1 tablet (500 mg total) by mouth 2 (two) times daily with meals.    Hyperlipidemia, unspecified hyperlipidemia type  Comments:  Currently not on statin.  Repeat FLP.  Orders:  -     Lipid panel; Future    Normocytic anemia  Comments:  Check iron panel.  Suspect AOCD from cellulitis  Orders:  -     Ferritin; Future  -     Iron and TIBC; Future  -     Vitamin B12; Future    Right shoulder pain, unspecified  chronicity  Comments:  DDX rotator cuff, frozen shoulder.  No trauma.  Refer to Sports Medicine, Dr. Nagel, PT  Orders:  -     Ambulatory Referral to Physical/Occupational Therapy  -     Ambulatory Referral to Sports Medicine    KEVIN (obstructive sleep apnea)  Comments:  CPAP QHS.    Need for hepatitis C screening test  -     Hepatitis C antibody; Future    Other orders  -     Cancel: CBC auto differential; Future  -     Cancel: Comprehensive metabolic panel; Future  -     Cancel: Hemoglobin A1c; Future  -     Cancel: Diabetic Eye Screening Photo; Future  -     Cancel: Ambulatory Referral to Diabetes Education  -     Cancel: Ambulatory Referral to Orthopedics         Side effects of medication(s) were discussed in detail and patient voiced understanding.  Patient will call back for any issues or complications.     RTC in 3 month(s) or sooner PRN for diabetes with A1c prior.

## 2019-06-04 NOTE — PLAN OF CARE
CM spoke with Chelsea, at [facility], [phone], who informed CM that the order was in progress for wound care supplies. Chelsea added the order to rush status and next day air delivery.    CM to inform pt.of conversation.

## 2019-06-05 ENCOUNTER — TELEPHONE (OUTPATIENT)
Dept: SPORTS MEDICINE | Facility: CLINIC | Age: 59
End: 2019-06-05

## 2019-06-05 ENCOUNTER — OFFICE VISIT (OUTPATIENT)
Dept: INTERNAL MEDICINE | Facility: CLINIC | Age: 59
End: 2019-06-05
Payer: COMMERCIAL

## 2019-06-05 VITALS
SYSTOLIC BLOOD PRESSURE: 133 MMHG | WEIGHT: 209 LBS | DIASTOLIC BLOOD PRESSURE: 83 MMHG | TEMPERATURE: 98 F | BODY MASS INDEX: 29.92 KG/M2 | HEIGHT: 70 IN | HEART RATE: 73 BPM

## 2019-06-05 DIAGNOSIS — Z79.4 TYPE 2 DIABETES MELLITUS WITHOUT COMPLICATION, WITH LONG-TERM CURRENT USE OF INSULIN: ICD-10-CM

## 2019-06-05 DIAGNOSIS — D64.9 NORMOCYTIC ANEMIA: ICD-10-CM

## 2019-06-05 DIAGNOSIS — L03.312 CELLULITIS OF BACK EXCEPT BUTTOCK: Primary | ICD-10-CM

## 2019-06-05 DIAGNOSIS — I10 ESSENTIAL HYPERTENSION: ICD-10-CM

## 2019-06-05 DIAGNOSIS — E11.9 TYPE 2 DIABETES MELLITUS WITHOUT COMPLICATION, WITH LONG-TERM CURRENT USE OF INSULIN: ICD-10-CM

## 2019-06-05 DIAGNOSIS — G47.33 OSA (OBSTRUCTIVE SLEEP APNEA): ICD-10-CM

## 2019-06-05 DIAGNOSIS — E78.5 HYPERLIPIDEMIA, UNSPECIFIED HYPERLIPIDEMIA TYPE: ICD-10-CM

## 2019-06-05 DIAGNOSIS — M25.511 RIGHT SHOULDER PAIN, UNSPECIFIED CHRONICITY: Primary | ICD-10-CM

## 2019-06-05 DIAGNOSIS — Z11.59 NEED FOR HEPATITIS C SCREENING TEST: ICD-10-CM

## 2019-06-05 DIAGNOSIS — M25.511 RIGHT SHOULDER PAIN, UNSPECIFIED CHRONICITY: ICD-10-CM

## 2019-06-05 PROCEDURE — 3079F PR MOST RECENT DIASTOLIC BLOOD PRESSURE 80-89 MM HG: ICD-10-PCS | Mod: CPTII,S$GLB,, | Performed by: INTERNAL MEDICINE

## 2019-06-05 PROCEDURE — 3008F BODY MASS INDEX DOCD: CPT | Mod: CPTII,S$GLB,, | Performed by: INTERNAL MEDICINE

## 2019-06-05 PROCEDURE — 3008F PR BODY MASS INDEX (BMI) DOCUMENTED: ICD-10-PCS | Mod: CPTII,S$GLB,, | Performed by: INTERNAL MEDICINE

## 2019-06-05 PROCEDURE — 99205 OFFICE O/P NEW HI 60 MIN: CPT | Mod: S$GLB,,, | Performed by: INTERNAL MEDICINE

## 2019-06-05 PROCEDURE — 3046F HEMOGLOBIN A1C LEVEL >9.0%: CPT | Mod: CPTII,S$GLB,, | Performed by: INTERNAL MEDICINE

## 2019-06-05 PROCEDURE — 3046F PR MOST RECENT HEMOGLOBIN A1C LEVEL > 9.0%: ICD-10-PCS | Mod: CPTII,S$GLB,, | Performed by: INTERNAL MEDICINE

## 2019-06-05 PROCEDURE — 99999 PR PBB SHADOW E&M-EST. PATIENT-LVL V: CPT | Mod: PBBFAC,,, | Performed by: INTERNAL MEDICINE

## 2019-06-05 PROCEDURE — 3075F PR MOST RECENT SYSTOLIC BLOOD PRESS GE 130-139MM HG: ICD-10-PCS | Mod: CPTII,S$GLB,, | Performed by: INTERNAL MEDICINE

## 2019-06-05 PROCEDURE — 3075F SYST BP GE 130 - 139MM HG: CPT | Mod: CPTII,S$GLB,, | Performed by: INTERNAL MEDICINE

## 2019-06-05 PROCEDURE — 99205 PR OFFICE/OUTPT VISIT, NEW, LEVL V, 60-74 MIN: ICD-10-PCS | Mod: S$GLB,,, | Performed by: INTERNAL MEDICINE

## 2019-06-05 PROCEDURE — 99999 PR PBB SHADOW E&M-EST. PATIENT-LVL V: ICD-10-PCS | Mod: PBBFAC,,, | Performed by: INTERNAL MEDICINE

## 2019-06-05 PROCEDURE — 3079F DIAST BP 80-89 MM HG: CPT | Mod: CPTII,S$GLB,, | Performed by: INTERNAL MEDICINE

## 2019-06-05 RX ORDER — METOPROLOL TARTRATE 50 MG/1
50 TABLET ORAL 2 TIMES DAILY
Qty: 180 TABLET | Refills: 0 | Status: SHIPPED | OUTPATIENT
Start: 2019-06-05 | End: 2019-06-24 | Stop reason: SDUPTHER

## 2019-06-05 RX ORDER — METFORMIN HYDROCHLORIDE 500 MG/1
500 TABLET ORAL 2 TIMES DAILY WITH MEALS
Qty: 180 TABLET | Refills: 3 | Status: SHIPPED | OUTPATIENT
Start: 2019-06-05 | End: 2020-05-20

## 2019-06-05 NOTE — TELEPHONE ENCOUNTER
Spoke with patient to let him know to arrive at 8:00 for x-rays prior to appointment with Dr. Hernandez on 6/10.

## 2019-06-08 ENCOUNTER — OFFICE VISIT (OUTPATIENT)
Dept: URGENT CARE | Facility: CLINIC | Age: 59
End: 2019-06-08
Payer: COMMERCIAL

## 2019-06-08 VITALS
TEMPERATURE: 98 F | RESPIRATION RATE: 18 BRPM | SYSTOLIC BLOOD PRESSURE: 122 MMHG | HEIGHT: 70 IN | WEIGHT: 208 LBS | DIASTOLIC BLOOD PRESSURE: 78 MMHG | OXYGEN SATURATION: 97 % | HEART RATE: 73 BPM | BODY MASS INDEX: 29.78 KG/M2

## 2019-06-08 DIAGNOSIS — Z51.89 VISIT FOR WOUND CHECK: Primary | ICD-10-CM

## 2019-06-08 PROCEDURE — 99214 OFFICE O/P EST MOD 30 MIN: CPT | Mod: S$GLB,,, | Performed by: NURSE PRACTITIONER

## 2019-06-08 PROCEDURE — 3078F PR MOST RECENT DIASTOLIC BLOOD PRESSURE < 80 MM HG: ICD-10-PCS | Mod: CPTII,S$GLB,, | Performed by: NURSE PRACTITIONER

## 2019-06-08 PROCEDURE — 3074F SYST BP LT 130 MM HG: CPT | Mod: CPTII,S$GLB,, | Performed by: NURSE PRACTITIONER

## 2019-06-08 PROCEDURE — 3008F PR BODY MASS INDEX (BMI) DOCUMENTED: ICD-10-PCS | Mod: CPTII,S$GLB,, | Performed by: NURSE PRACTITIONER

## 2019-06-08 PROCEDURE — 99214 PR OFFICE/OUTPT VISIT, EST, LEVL IV, 30-39 MIN: ICD-10-PCS | Mod: S$GLB,,, | Performed by: NURSE PRACTITIONER

## 2019-06-08 PROCEDURE — 3008F BODY MASS INDEX DOCD: CPT | Mod: CPTII,S$GLB,, | Performed by: NURSE PRACTITIONER

## 2019-06-08 PROCEDURE — 3078F DIAST BP <80 MM HG: CPT | Mod: CPTII,S$GLB,, | Performed by: NURSE PRACTITIONER

## 2019-06-08 PROCEDURE — 3074F PR MOST RECENT SYSTOLIC BLOOD PRESSURE < 130 MM HG: ICD-10-PCS | Mod: CPTII,S$GLB,, | Performed by: NURSE PRACTITIONER

## 2019-06-08 NOTE — PROGRESS NOTES
"Subjective:       Patient ID: Henry Zacarias is a 59 y.o. male.    Vitals:  height is 5' 10" (1.778 m) and weight is 94.3 kg (208 lb). His temperature is 98.3 °F (36.8 °C). His blood pressure is 122/78 and his pulse is 73. His respiration is 18 and oxygen saturation is 97%.     Chief Complaint: Wound Check      This is a 59-year-old male presents today with need of wound care.  Patient was admitted on 5-28 for cellulitis of his left upper back for 7 days. He has been having close follow ups to make sure it is improving with dressing changes daily. Patient is a diabetic. Denies fever or chills.     Dressing Change   This is a new problem. The current episode started in the past 7 days. The problem has been unchanged. Pertinent negatives include no arthralgias, chest pain, chills, congestion, coughing, fatigue, fever, headaches, joint swelling, myalgias, nausea, rash, sore throat, vertigo or vomiting. Nothing aggravates the symptoms. He has tried nothing for the symptoms.       Constitution: Negative for chills, fatigue and fever.   HENT: Negative for congestion and sore throat.    Neck: Negative for painful lymph nodes.   Cardiovascular: Negative for chest pain and leg swelling.   Eyes: Negative for double vision and blurred vision.   Respiratory: Negative for cough and shortness of breath.    Gastrointestinal: Negative for nausea, vomiting and diarrhea.   Genitourinary: Negative for dysuria, frequency and urgency.   Musculoskeletal: Negative for joint pain, joint swelling, muscle cramps and muscle ache.   Skin: Positive for erythema. Negative for color change, pale and rash.   Allergic/Immunologic: Negative for seasonal allergies.   Neurological: Negative for dizziness, history of vertigo, light-headedness, passing out and headaches.   Hematologic/Lymphatic: Negative for swollen lymph nodes, easy bruising/bleeding and history of blood clots. Does not bruise/bleed easily.   Psychiatric/Behavioral: Negative for " nervous/anxious, sleep disturbance and depression. The patient is not nervous/anxious.        Objective:      Physical Exam   Constitutional: He is oriented to person, place, and time. He appears well-developed and well-nourished.   HENT:   Head: Normocephalic and atraumatic. Head is without abrasion, without contusion and without laceration.   Right Ear: External ear normal.   Left Ear: External ear normal.   Nose: Nose normal.   Mouth/Throat: Oropharynx is clear and moist.   Eyes: Pupils are equal, round, and reactive to light. Conjunctivae, EOM and lids are normal.   Neck: Trachea normal, full passive range of motion without pain and phonation normal. Neck supple.   Cardiovascular: Normal rate, regular rhythm and normal heart sounds.   Pulmonary/Chest: Effort normal and breath sounds normal. No stridor. No respiratory distress.   Musculoskeletal: Normal range of motion.   Neurological: He is alert and oriented to person, place, and time.   Skin: Skin is warm, dry and intact. Capillary refill takes less than 2 seconds. Bruising noted. No abrasion, no burn, no ecchymosis, no laceration, no lesion and no rash noted. There is erythema.        Gaping wound noted, no purulent drainage noted to gauze.   Wound with yellow streaks noted to base of wound  nontender to touch, no warmth  See attached photo  Cleansed with sterile saline  Dressed with non adhere gauze, ABD pad and tape.    Psychiatric: He has a normal mood and affect. His speech is normal and behavior is normal. Judgment and thought content normal. Cognition and memory are normal.   Nursing note and vitals reviewed.          Patient states he will return again tomorrow for assistance with wound care.   Assessment:       1. Visit for wound check        Plan:         Visit for wound check            Patient Instructions     RETURN AGAIN TOMORROW TO HAVE CLEANSED AND DRESSED AS RECOMMENDED BY YOUR DOCTOR      You must understand that you've received an Urgent  Care treatment only and that you may be released before all your medical problems are known or treated. You, the patient, will arrange for follow up care as instructed.  If your condition worsens we recommend that you receive another evaluation at the emergency room immediately or contact your primary medical clinics after hours call service to discuss your concerns.  Please return here or go to the Emergency Department for any concerns or worsening of condition.      Wound Care    You have a break in the skin. This wound may be because of an injury. Or it may be the result of surgery. Closing the wound helps stop bleeding, protects the wound from infection, and speeds healing. The type of closure that is used depends on the size and location of the wound. Choices include stitches (sutures), strips of surgical tape, skin glue, or staples.  Home care  Your healthcare provider may prescribe medicines for pain. Or he or she may suggest an over-the-counter (OTC) pain reliever, such as ibuprofen. If you have chronic kidney disease, talk with your provider before taking any OTC medicines. Also talk with your provider if you've had a stomach ulcer or gastrointestinal bleeding. In certain cases, antibiotics may be prescribed to help prevent infection. If antibiotics are prescribed, take them exactly as directed for as long as directed. Do not stop taking your antibiotics until they are all gone, even if you feel better.  General care  · Follow the healthcare providers instructions on how to care for the wound.  · Wash your hands with soap and warm water before and after caring for the wound. This helps prevent infection.  · If a bandage was applied, change it once a day or as directed. If at any time the bandage becomes wet or dirty, replace it with a new one.  · Unless told otherwise, avoid soaking the wound in water. Take showers or sponge baths instead of tub baths. Don't scrub or pick at the wound.  · Don't go  swimming.  · If you have a bandage and it gets wet, use a clean cloth to gently pat the wound dry. Then replace the bandage with a dry one.  · Don't scratch, rub, or pick at the area.  · Watch for the signs of infection listed below. Any wound can get infected, even if you are taking antibiotics. Seek care right away if you see any possible signs of infection.  Care for specific closures  · Sutures. You may want to clean the wound daily after the first 2 to 3 days. To do this, remove the bandage and gently wash the area with soap and warm water. After cleaning, apply a thin layer of antibiotic ointment if recommended. Then apply a new bandage. Sutures on the outside of the skin usually need to be removed by your healthcare provider.  · Surgical tape. Keep the area dry. If it gets wet, blot it dry with a towel. Surgical tape closures usually fall off within 7 to 10 days. If they have not fallen off after 10 days, you can remove them yourself. To remove the tape, use mineral oil or petroleum jelly on a cotton ball to gently rub the adhesive.  · Skin glue. You may shower or bathe as usual, but do not use soaps, lotions, or ointments on the wound area. Do not scrub the wound. After bathing, pat the wound dry with a soft towel. Do not apply liquids like peroxide, ointments, or creams to the wound while the strips or film is in place. Don't scratch, rub, or pick at the strips or film. Don't put tape directly over the strips or film. Skin adhesive film will fall off naturally in 5 to 10 days. If it does not peel off in 10 days, gently rub petroleum jelly or an ointment onto the film.  · Staples. Take showers or sponge baths. Don't take tub baths. Don't use lotions on the wound area. The area may be cleaned with soap and water 2 to 3 days after the wound was stapled. Don't scrub the wound. Pat it dry with a clean soft cloth or towel. You can use antibiotic ointment if your provider tells you to. Staples will need to be  removed by your healthcare provider in 10 to 14 days.  Follow-up care  Follow up with your healthcare provider, or as directed. If you have sutures or staples, return for their removal as directed.  When to seek medical advice  Call your healthcare provider right away if you have signs of infection:  · Fever of 100.4°F (38°C) or higher, or as directed by your healthcare provider  · Increasing pain in the wound  · Increasing redness or swelling  · Pus or bad-smelling drainage from the wound  Also call your provider right away if any of these occur:  · Wound bleeds more than a small amount or wont stop bleeding  · Wound edges come apart  · Numbness or weakness in the wound area that doesnt go away  Date Last Reviewed: 1/7/2016  © 4008-5198 [employer]. [address] [place] [address], [place] [address]. All rights reserved. This information is not intended as a substitute for professional medical care. Always follow your healthcare professional's instructions.        Wound Care  Taking proper care of your wound will help it heal. Your healthcare provider may show you how to clean and dress the wound. He or she will also explain how to tell if the wound is healing normally. If you are unsure of how to take care of the wound, be sure to clarify what dressing to use and how often you should change the bandages. Here are the basic steps.     A wound that's not healing normally may be dark in color or have white streaks.   Wash your hands  Tips for washing your hands include:  · Use liquid soap and lather for 2 minutes. Scrub between your fingers and under your nails.  · Rinse with warm water, keeping your fingers pointing down.  · Use a paper towel to dry your hands and to turn off the faucet.  Remove the used dressing  Here are suggestions for removing the dressing:  · If dressing changes cause you pain, be sure to take your pain medicine as prescribed by your healthcare provider 30 minutes before dressing  changes.  · Set up your supplies.  · Put on disposable gloves if youre dressing a wound for someone else or your wound is infected.  · Loosen the tape by pulling gently toward the wound.  · Gently take off the old dressing. If the dressing is stuck to the wound, moisten it with saline (if available) or clean water.  · If you have a drain or tube in the wound, be careful not to pull on it.  · Remove the dressing 1 layer at a time and put it in a plastic bag.  · Remove your gloves.  Inspect and dress the wound  Check the wound carefully:  · Each time you change the dressing, inspect the wound carefully to be sure its healing normally by making sure your wound appears to be pink and moist, and is free of infection.    · Wash your hands again. Put on a new pair of gloves.  · Clean and dress the wound as directed by your healthcare provider or nurse. Do not put anything in the wound that is not prescribed or directed by your healthcare provider. If you have a drain or tube, be careful not to pull on it. Make sure to secure the drain or tube as well.  · Put all supplies in a plastic bag. Seal the bag and put it in the trash.  · Be sure to wash your hands again.  Call your healthcare provider  Call your healthcare provider if you see any of the following signs of a problem:  · Bleeding that soaks the dressing  · Pink fluid weeping from the wound  · Increased drainage or drainage that is yellow, yellow-green, or foul-smelling  · Increased swelling or pain, or redness or swelling in the skin around the wound  · A change in the color of the wound, or if streaks develop in a direction away from the wound  · The area between any stitches opens up  · An increase in the size of the wound  · A fever of 100.4°F (38°C) or higher, or as directed by your healthcare provider  · Chills, increased fatigue, or a loss of appetite      Date Last Reviewed: 7/30/2015  © 1278-2400 [employer]. [address] [place] [address]  [place] [address]. All rights reserved. This information is not intended as a substitute for professional medical care. Always follow your healthcare professional's instructions.

## 2019-06-08 NOTE — PATIENT INSTRUCTIONS
RETURN AGAIN TOMORROW TO HAVE CLEANSED AND DRESSED AS RECOMMENDED BY YOUR DOCTOR      You must understand that you've received an Urgent Care treatment only and that you may be released before all your medical problems are known or treated. You, the patient, will arrange for follow up care as instructed.  If your condition worsens we recommend that you receive another evaluation at the emergency room immediately or contact your primary medical clinics after hours call service to discuss your concerns.  Please return here or go to the Emergency Department for any concerns or worsening of condition.      Wound Care    You have a break in the skin. This wound may be because of an injury. Or it may be the result of surgery. Closing the wound helps stop bleeding, protects the wound from infection, and speeds healing. The type of closure that is used depends on the size and location of the wound. Choices include stitches (sutures), strips of surgical tape, skin glue, or staples.  Home care  Your healthcare provider may prescribe medicines for pain. Or he or she may suggest an over-the-counter (OTC) pain reliever, such as ibuprofen. If you have chronic kidney disease, talk with your provider before taking any OTC medicines. Also talk with your provider if you've had a stomach ulcer or gastrointestinal bleeding. In certain cases, antibiotics may be prescribed to help prevent infection. If antibiotics are prescribed, take them exactly as directed for as long as directed. Do not stop taking your antibiotics until they are all gone, even if you feel better.  General care  · Follow the healthcare providers instructions on how to care for the wound.  · Wash your hands with soap and warm water before and after caring for the wound. This helps prevent infection.  · If a bandage was applied, change it once a day or as directed. If at any time the bandage becomes wet or dirty, replace it with a new one.  · Unless told otherwise,  avoid soaking the wound in water. Take showers or sponge baths instead of tub baths. Don't scrub or pick at the wound.  · Don't go swimming.  · If you have a bandage and it gets wet, use a clean cloth to gently pat the wound dry. Then replace the bandage with a dry one.  · Don't scratch, rub, or pick at the area.  · Watch for the signs of infection listed below. Any wound can get infected, even if you are taking antibiotics. Seek care right away if you see any possible signs of infection.  Care for specific closures  · Sutures. You may want to clean the wound daily after the first 2 to 3 days. To do this, remove the bandage and gently wash the area with soap and warm water. After cleaning, apply a thin layer of antibiotic ointment if recommended. Then apply a new bandage. Sutures on the outside of the skin usually need to be removed by your healthcare provider.  · Surgical tape. Keep the area dry. If it gets wet, blot it dry with a towel. Surgical tape closures usually fall off within 7 to 10 days. If they have not fallen off after 10 days, you can remove them yourself. To remove the tape, use mineral oil or petroleum jelly on a cotton ball to gently rub the adhesive.  · Skin glue. You may shower or bathe as usual, but do not use soaps, lotions, or ointments on the wound area. Do not scrub the wound. After bathing, pat the wound dry with a soft towel. Do not apply liquids like peroxide, ointments, or creams to the wound while the strips or film is in place. Don't scratch, rub, or pick at the strips or film. Don't put tape directly over the strips or film. Skin adhesive film will fall off naturally in 5 to 10 days. If it does not peel off in 10 days, gently rub petroleum jelly or an ointment onto the film.  · Staples. Take showers or sponge baths. Don't take tub baths. Don't use lotions on the wound area. The area may be cleaned with soap and water 2 to 3 days after the wound was stapled. Don't scrub the wound. Pat  it dry with a clean soft cloth or towel. You can use antibiotic ointment if your provider tells you to. Staples will need to be removed by your healthcare provider in 10 to 14 days.  Follow-up care  Follow up with your healthcare provider, or as directed. If you have sutures or staples, return for their removal as directed.  When to seek medical advice  Call your healthcare provider right away if you have signs of infection:  · Fever of 100.4°F (38°C) or higher, or as directed by your healthcare provider  · Increasing pain in the wound  · Increasing redness or swelling  · Pus or bad-smelling drainage from the wound  Also call your provider right away if any of these occur:  · Wound bleeds more than a small amount or wont stop bleeding  · Wound edges come apart  · Numbness or weakness in the wound area that doesnt go away  Date Last Reviewed: 1/7/2016  © 6170-5860 [employer]. [address] [place] [address]. All rights reserved. This information is not intended as a substitute for professional medical care. Always follow your healthcare professional's instructions.        Wound Care  Taking proper care of your wound will help it heal. Your healthcare provider may show you how to clean and dress the wound. He or she will also explain how to tell if the wound is healing normally. If you are unsure of how to take care of the wound, be sure to clarify what dressing to use and how often you should change the bandages. Here are the basic steps.     A wound that's not healing normally may be dark in color or have white streaks.   Wash your hands  Tips for washing your hands include:  · Use liquid soap and lather for 2 minutes. Scrub between your fingers and under your nails.  · Rinse with warm water, keeping your fingers pointing down.  · Use a paper towel to dry your hands and to turn off the faucet.  Remove the used dressing  Here are suggestions for removing the dressing:  · If dressing changes  cause you pain, be sure to take your pain medicine as prescribed by your healthcare provider 30 minutes before dressing changes.  · Set up your supplies.  · Put on disposable gloves if youre dressing a wound for someone else or your wound is infected.  · Loosen the tape by pulling gently toward the wound.  · Gently take off the old dressing. If the dressing is stuck to the wound, moisten it with saline (if available) or clean water.  · If you have a drain or tube in the wound, be careful not to pull on it.  · Remove the dressing 1 layer at a time and put it in a plastic bag.  · Remove your gloves.  Inspect and dress the wound  Check the wound carefully:  · Each time you change the dressing, inspect the wound carefully to be sure its healing normally by making sure your wound appears to be pink and moist, and is free of infection.    · Wash your hands again. Put on a new pair of gloves.  · Clean and dress the wound as directed by your healthcare provider or nurse. Do not put anything in the wound that is not prescribed or directed by your healthcare provider. If you have a drain or tube, be careful not to pull on it. Make sure to secure the drain or tube as well.  · Put all supplies in a plastic bag. Seal the bag and put it in the trash.  · Be sure to wash your hands again.  Call your healthcare provider  Call your healthcare provider if you see any of the following signs of a problem:  · Bleeding that soaks the dressing  · Pink fluid weeping from the wound  · Increased drainage or drainage that is yellow, yellow-green, or foul-smelling  · Increased swelling or pain, or redness or swelling in the skin around the wound  · A change in the color of the wound, or if streaks develop in a direction away from the wound  · The area between any stitches opens up  · An increase in the size of the wound  · A fever of 100.4°F (38°C) or higher, or as directed by your healthcare provider  · Chills, increased fatigue, or a loss  of appetite      Date Last Reviewed: 7/30/2015  © 0185-4411 [employer]. [address] [place] [address], [place] [address]. All rights reserved. This information is not intended as a substitute for professional medical care. Always follow your healthcare professional's instructions.

## 2019-06-09 ENCOUNTER — OFFICE VISIT (OUTPATIENT)
Dept: URGENT CARE | Facility: CLINIC | Age: 59
End: 2019-06-09
Payer: COMMERCIAL

## 2019-06-09 DIAGNOSIS — Z51.89 VISIT FOR WOUND CHECK: Primary | ICD-10-CM

## 2019-06-09 PROCEDURE — 99499 UNLISTED E&M SERVICE: CPT | Mod: S$GLB,,, | Performed by: NURSE PRACTITIONER

## 2019-06-09 PROCEDURE — 99499 NO LOS: ICD-10-PCS | Mod: S$GLB,,, | Performed by: NURSE PRACTITIONER

## 2019-06-09 NOTE — PROGRESS NOTES
Subjective:       Patient ID: Henry Zacarias is a 59 y.o. male.    Vitals:  vitals were not taken for this visit.   Declined vitals-would like wound care performed.   Chief Complaint: Follow-up    Pt here for wound care on the left shoulder region.       This is a 59-year-old male presents today with need of wound care.  Patient was admitted on 5-28 for cellulitis of his left upper back for 7 days. He has been having close follow ups to make sure it is improving with dressing changes daily. Patient is a diabetic. Denies fever or chills.       Skin: Positive for erythema.         Objective:      Physical Exam   Constitutional: He is oriented to person, place, and time. He appears well-developed and well-nourished.   HENT:   Head: Normocephalic and atraumatic. Head is without abrasion, without contusion and without laceration.   Right Ear: External ear normal.   Left Ear: External ear normal.   Nose: Nose normal.   Mouth/Throat: Oropharynx is clear and moist.   Eyes: Pupils are equal, round, and reactive to light. Conjunctivae, EOM and lids are normal.   Neck: Trachea normal, full passive range of motion without pain and phonation normal. Neck supple.   Cardiovascular: Normal rate, regular rhythm and normal heart sounds.   Pulmonary/Chest: Effort normal and breath sounds normal. No stridor. No respiratory distress.   Musculoskeletal: Normal range of motion.   Neurological: He is alert and oriented to person, place, and time.   Skin: Skin is warm, dry and intact. Capillary refill takes less than 2 seconds. Bruising noted. No abrasion, no burn, no ecchymosis, no laceration, no lesion and no rash noted. There is erythema.        Gaping wound noted, no purulent drainage noted to gauze.   Wound with yellow streaks noted to base of wound  nontender to touch, no warmth  See attached photo  Cleansed with sterile saline  Dressed with non adhere gauze, ABD pad and tape.    Psychiatric: He has a normal mood and affect. His speech  is normal and behavior is normal. Judgment and thought content normal. Cognition and memory are normal.   Nursing note and vitals reviewed.          Assessment:       1. Visit for wound check        Plan:         Visit for wound check

## 2019-06-09 NOTE — PROGRESS NOTES
This is a 59-year-old male presents today with need of wound care.  Patient was admitted on 5-28 for cellulitis of his left upper back for 7 days. He has been having close follow ups to make sure it is improving with dressing changes daily. Patient is a diabetic. Denies fever or chills.

## 2019-06-09 NOTE — PATIENT INSTRUCTIONS
Wound Care  Taking proper care of your wound will help it heal. Your healthcare provider may show you how to clean and dress the wound. He or she will also explain how to tell if the wound is healing normally. If you are unsure of how to take care of the wound, be sure to clarify what dressing to use and how often you should change the bandages. Here are the basic steps.     A wound that's not healing normally may be dark in color or have white streaks.   Wash your hands  Tips for washing your hands include:  · Use liquid soap and lather for 2 minutes. Scrub between your fingers and under your nails.  · Rinse with warm water, keeping your fingers pointing down.  · Use a paper towel to dry your hands and to turn off the faucet.  Remove the used dressing  Here are suggestions for removing the dressing:  · If dressing changes cause you pain, be sure to take your pain medicine as prescribed by your healthcare provider 30 minutes before dressing changes.  · Set up your supplies.  · Put on disposable gloves if youre dressing a wound for someone else or your wound is infected.  · Loosen the tape by pulling gently toward the wound.  · Gently take off the old dressing. If the dressing is stuck to the wound, moisten it with saline (if available) or clean water.  · If you have a drain or tube in the wound, be careful not to pull on it.  · Remove the dressing 1 layer at a time and put it in a plastic bag.  · Remove your gloves.  Inspect and dress the wound  Check the wound carefully:  · Each time you change the dressing, inspect the wound carefully to be sure its healing normally by making sure your wound appears to be pink and moist, and is free of infection.    · Wash your hands again. Put on a new pair of gloves.  · Clean and dress the wound as directed by your healthcare provider or nurse. Do not put anything in the wound that is not prescribed or directed by your healthcare provider. If you have a drain or tube, be  careful not to pull on it. Make sure to secure the drain or tube as well.  · Put all supplies in a plastic bag. Seal the bag and put it in the trash.  · Be sure to wash your hands again.  Call your healthcare provider  Call your healthcare provider if you see any of the following signs of a problem:  · Bleeding that soaks the dressing  · Pink fluid weeping from the wound  · Increased drainage or drainage that is yellow, yellow-green, or foul-smelling  · Increased swelling or pain, or redness or swelling in the skin around the wound  · A change in the color of the wound, or if streaks develop in a direction away from the wound  · The area between any stitches opens up  · An increase in the size of the wound  · A fever of 100.4°F (38°C) or higher, or as directed by your healthcare provider  · Chills, increased fatigue, or a loss of appetite      Date Last Reviewed: 7/30/2015  © 6446-4437 [employer]. [address] [place] [address], [place] [address]. All rights reserved. This information is not intended as a substitute for professional medical care. Always follow your healthcare professional's instructions.

## 2019-06-10 ENCOUNTER — OFFICE VISIT (OUTPATIENT)
Dept: PODIATRY | Facility: CLINIC | Age: 59
End: 2019-06-10
Payer: COMMERCIAL

## 2019-06-10 ENCOUNTER — OFFICE VISIT (OUTPATIENT)
Dept: ORTHOPEDICS | Facility: CLINIC | Age: 59
End: 2019-06-10
Payer: COMMERCIAL

## 2019-06-10 ENCOUNTER — LAB VISIT (OUTPATIENT)
Dept: LAB | Facility: HOSPITAL | Age: 59
End: 2019-06-10
Attending: INTERNAL MEDICINE
Payer: COMMERCIAL

## 2019-06-10 VITALS
BODY MASS INDEX: 29.78 KG/M2 | WEIGHT: 208 LBS | HEIGHT: 70 IN | BODY MASS INDEX: 29.78 KG/M2 | WEIGHT: 208 LBS | HEIGHT: 70 IN | SYSTOLIC BLOOD PRESSURE: 122 MMHG | DIASTOLIC BLOOD PRESSURE: 80 MMHG

## 2019-06-10 DIAGNOSIS — M67.911 DYSFUNCTION OF RIGHT ROTATOR CUFF: ICD-10-CM

## 2019-06-10 DIAGNOSIS — Z79.4 TYPE 2 DIABETES MELLITUS WITHOUT COMPLICATION, WITH LONG-TERM CURRENT USE OF INSULIN: ICD-10-CM

## 2019-06-10 DIAGNOSIS — E11.9 ENCOUNTER FOR DIABETIC FOOT EXAM: Primary | ICD-10-CM

## 2019-06-10 DIAGNOSIS — M25.511 ACUTE PAIN OF RIGHT SHOULDER: Primary | ICD-10-CM

## 2019-06-10 DIAGNOSIS — E11.8 TYPE 2 DIABETES MELLITUS WITH COMPLICATION, WITH LONG-TERM CURRENT USE OF INSULIN: ICD-10-CM

## 2019-06-10 DIAGNOSIS — Z11.59 NEED FOR HEPATITIS C SCREENING TEST: ICD-10-CM

## 2019-06-10 DIAGNOSIS — E11.9 TYPE 2 DIABETES MELLITUS WITHOUT COMPLICATION, WITH LONG-TERM CURRENT USE OF INSULIN: ICD-10-CM

## 2019-06-10 DIAGNOSIS — E78.5 HYPERLIPIDEMIA, UNSPECIFIED HYPERLIPIDEMIA TYPE: ICD-10-CM

## 2019-06-10 DIAGNOSIS — Z79.4 TYPE 2 DIABETES MELLITUS WITH COMPLICATION, WITH LONG-TERM CURRENT USE OF INSULIN: ICD-10-CM

## 2019-06-10 DIAGNOSIS — D64.9 NORMOCYTIC ANEMIA: ICD-10-CM

## 2019-06-10 DIAGNOSIS — E78.2 MIXED HYPERLIPIDEMIA: ICD-10-CM

## 2019-06-10 PROBLEM — D63.8 ANEMIA OF CHRONIC DISEASE: Status: ACTIVE | Noted: 2019-06-04

## 2019-06-10 LAB
ALBUMIN/CREAT UR: 14 UG/MG (ref 0–30)
CHOLEST SERPL-MCNC: 229 MG/DL (ref 120–199)
CHOLEST/HDLC SERPL: 6.2 {RATIO} (ref 2–5)
CREAT UR-MCNC: 121 MG/DL (ref 23–375)
ESTIMATED AVG GLUCOSE: 260 MG/DL (ref 68–131)
FERRITIN SERPL-MCNC: 920 NG/ML (ref 20–300)
HBA1C MFR BLD HPLC: 10.7 % (ref 4–5.6)
HCV AB SERPL QL IA: NEGATIVE
HDLC SERPL-MCNC: 37 MG/DL (ref 40–75)
HDLC SERPL: 16.2 % (ref 20–50)
IRON SERPL-MCNC: 105 UG/DL (ref 45–160)
LDLC SERPL CALC-MCNC: 145.2 MG/DL (ref 63–159)
MICROALBUMIN UR DL<=1MG/L-MCNC: 17 UG/ML
NONHDLC SERPL-MCNC: 192 MG/DL
SATURATED IRON: 26 % (ref 20–50)
TOTAL IRON BINDING CAPACITY: 408 UG/DL (ref 250–450)
TRANSFERRIN SERPL-MCNC: 276 MG/DL (ref 200–375)
TRIGL SERPL-MCNC: 234 MG/DL (ref 30–150)
VIT B12 SERPL-MCNC: 468 PG/ML (ref 210–950)

## 2019-06-10 PROCEDURE — 3074F SYST BP LT 130 MM HG: CPT | Mod: CPTII,S$GLB,, | Performed by: PODIATRIST

## 2019-06-10 PROCEDURE — 3078F PR MOST RECENT DIASTOLIC BLOOD PRESSURE < 80 MM HG: ICD-10-PCS | Mod: CPTII,S$GLB,, | Performed by: PODIATRIST

## 2019-06-10 PROCEDURE — 86803 HEPATITIS C AB TEST: CPT

## 2019-06-10 PROCEDURE — 3078F DIAST BP <80 MM HG: CPT | Mod: CPTII,S$GLB,, | Performed by: PODIATRIST

## 2019-06-10 PROCEDURE — 82043 UR ALBUMIN QUANTITATIVE: CPT

## 2019-06-10 PROCEDURE — 3008F BODY MASS INDEX DOCD: CPT | Mod: CPTII,S$GLB,, | Performed by: PODIATRIST

## 2019-06-10 PROCEDURE — 93922 UPR/L XTREMITY ART 2 LEVELS: CPT | Mod: S$GLB,,, | Performed by: PODIATRIST

## 2019-06-10 PROCEDURE — 3074F PR MOST RECENT SYSTOLIC BLOOD PRESSURE < 130 MM HG: ICD-10-PCS | Mod: CPTII,S$GLB,, | Performed by: PODIATRIST

## 2019-06-10 PROCEDURE — 3079F DIAST BP 80-89 MM HG: CPT | Mod: CPTII,S$GLB,, | Performed by: NEUROMUSCULOSKELETAL MEDICINE & OMM

## 2019-06-10 PROCEDURE — 99203 OFFICE O/P NEW LOW 30 MIN: CPT | Mod: 25,S$GLB,, | Performed by: PODIATRIST

## 2019-06-10 PROCEDURE — 3046F PR MOST RECENT HEMOGLOBIN A1C LEVEL > 9.0%: ICD-10-PCS | Mod: CPTII,S$GLB,, | Performed by: PODIATRIST

## 2019-06-10 PROCEDURE — 80061 LIPID PANEL: CPT

## 2019-06-10 PROCEDURE — 3074F PR MOST RECENT SYSTOLIC BLOOD PRESSURE < 130 MM HG: ICD-10-PCS | Mod: CPTII,S$GLB,, | Performed by: NEUROMUSCULOSKELETAL MEDICINE & OMM

## 2019-06-10 PROCEDURE — 99999 PR PBB SHADOW E&M-EST. PATIENT-LVL III: CPT | Mod: PBBFAC,,, | Performed by: NEUROMUSCULOSKELETAL MEDICINE & OMM

## 2019-06-10 PROCEDURE — 99204 OFFICE O/P NEW MOD 45 MIN: CPT | Mod: S$GLB,,, | Performed by: NEUROMUSCULOSKELETAL MEDICINE & OMM

## 2019-06-10 PROCEDURE — 3079F PR MOST RECENT DIASTOLIC BLOOD PRESSURE 80-89 MM HG: ICD-10-PCS | Mod: CPTII,S$GLB,, | Performed by: NEUROMUSCULOSKELETAL MEDICINE & OMM

## 2019-06-10 PROCEDURE — 99204 PR OFFICE/OUTPT VISIT, NEW, LEVL IV, 45-59 MIN: ICD-10-PCS | Mod: S$GLB,,, | Performed by: NEUROMUSCULOSKELETAL MEDICINE & OMM

## 2019-06-10 PROCEDURE — 3074F SYST BP LT 130 MM HG: CPT | Mod: CPTII,S$GLB,, | Performed by: NEUROMUSCULOSKELETAL MEDICINE & OMM

## 2019-06-10 PROCEDURE — 83540 ASSAY OF IRON: CPT

## 2019-06-10 PROCEDURE — 3008F PR BODY MASS INDEX (BMI) DOCUMENTED: ICD-10-PCS | Mod: CPTII,S$GLB,, | Performed by: NEUROMUSCULOSKELETAL MEDICINE & OMM

## 2019-06-10 PROCEDURE — 93922 PR NON-INVAS PHYSIOLOGIC STD EXTREMITY ART 1-2 LEVEL: ICD-10-PCS | Mod: S$GLB,,, | Performed by: PODIATRIST

## 2019-06-10 PROCEDURE — 99203 PR OFFICE/OUTPT VISIT, NEW, LEVL III, 30-44 MIN: ICD-10-PCS | Mod: 25,S$GLB,, | Performed by: PODIATRIST

## 2019-06-10 PROCEDURE — 3046F HEMOGLOBIN A1C LEVEL >9.0%: CPT | Mod: CPTII,S$GLB,, | Performed by: PODIATRIST

## 2019-06-10 PROCEDURE — 83036 HEMOGLOBIN GLYCOSYLATED A1C: CPT

## 2019-06-10 PROCEDURE — 3008F PR BODY MASS INDEX (BMI) DOCUMENTED: ICD-10-PCS | Mod: CPTII,S$GLB,, | Performed by: PODIATRIST

## 2019-06-10 PROCEDURE — 36415 COLL VENOUS BLD VENIPUNCTURE: CPT | Mod: PN

## 2019-06-10 PROCEDURE — 99999 PR PBB SHADOW E&M-EST. PATIENT-LVL III: ICD-10-PCS | Mod: PBBFAC,,, | Performed by: PODIATRIST

## 2019-06-10 PROCEDURE — 82728 ASSAY OF FERRITIN: CPT

## 2019-06-10 PROCEDURE — 3008F BODY MASS INDEX DOCD: CPT | Mod: CPTII,S$GLB,, | Performed by: NEUROMUSCULOSKELETAL MEDICINE & OMM

## 2019-06-10 PROCEDURE — 82607 VITAMIN B-12: CPT

## 2019-06-10 PROCEDURE — 99999 PR PBB SHADOW E&M-EST. PATIENT-LVL III: ICD-10-PCS | Mod: PBBFAC,,, | Performed by: NEUROMUSCULOSKELETAL MEDICINE & OMM

## 2019-06-10 PROCEDURE — 99999 PR PBB SHADOW E&M-EST. PATIENT-LVL III: CPT | Mod: PBBFAC,,, | Performed by: PODIATRIST

## 2019-06-10 RX ORDER — ATORVASTATIN CALCIUM 40 MG/1
40 TABLET, FILM COATED ORAL DAILY
Qty: 90 TABLET | Refills: 3 | Status: SHIPPED | OUTPATIENT
Start: 2019-06-10 | End: 2020-05-25

## 2019-06-10 RX ORDER — NAPROXEN SODIUM 220 MG/1
81 TABLET, FILM COATED ORAL DAILY
Qty: 90 TABLET | Refills: 3 | Status: SHIPPED | OUTPATIENT
Start: 2019-06-10 | End: 2020-05-25

## 2019-06-10 NOTE — PATIENT INSTRUCTIONS
How to Check Your Feet    Below are tips to help you look for foot problems. Try to check your feet at the same time each day, such as when you get out of bed in the morning.    · Check the top of each foot. The tops of toes, back of the heel, and outer edge of the foot can get a lot of rubbing from poor-fitting shoes.    · Check the bottom of each foot. Daily wear and tear often leads to problems at pressure spots.    · Check the toes and nails. Fungal infections often occur between toes. Toenail problems can also be a sign of fungal infections or lead to breaks in the skin.    · Check your shoes, too. Loose objects inside a shoe can injure the foot. Use your hand to feel inside your shoes for things like scot, loose stitching, or rough areas that could irritate your skin.        Diabetic Foot Care    Diabetes can lead to a number of different foot complications. Fortunately, most of these complications can be prevented with a little extra foot care. If diabetes is not well controlled, the high blood sugar can cause damage to blood vessels and result in poor circulation to the foot. When the skin does not get enough blood flow, it becomes prone to pressure sores and ulcers, which heal slowly.  High blood sugar can also damage nerves, interfering with the ability to feel pain and pressure. When you cant feel your foot normally, it is easy to injure your skin, bones and joints without knowing it. For these reasons diabetes increases the risk of fungal infections, bunions and ulcers. Deep ulcers can lead to bone infection. Gangrene is the most serious foot complication of diabetes. It usually occurs on the tips of the toes as blacked areas of skin. The black area is dead tissue. In severe cases, gangrene spreads to involve the entire toe, other toes and the entire foot. Foot or toe amputation may be required. Good foot care and blood sugar control can prevent this.    Home Care  1. Wear comfortable, proper fitting  shoes.  2. Wash your feet daily with warm water and mild soap.  3. After drying, apply a moisturizing cream or lotion.  4. Check your feet daily for skin breaks, blisters, swelling, or redness. Look between your toes also.  5. Wear cotton socks and change them every day.  6. Trim toe nails carefully and do not cut your cuticles.  7. Strive to keep your blood sugar under control with a combination of medicines, diet and activity.  8. If you smoke and have diabetes, it is very important that you stop. Smoking reduces blood flow to your foot.  9. Avoid activities that increase your risk of foot injury:  · Do not walk barefoot.  · Do not use heating pads or hot water bottles on your feet.  · Do not put your foot in a hot tub without first checking the temperature with your hand.  10) Schedule yearly foot exams.    Follow Up  with your doctor or as advised by our staff. Report any cut, puncture, scrape, other injury, blister, ingrown toenail or ulcer on your foot.    Get Prompt Medical Attention  if any of the following occur:  -- Open ulcer with pus draining from the wound  -- Increasing foot or leg pain  -- New areas of redness or swelling or tender areas of the foot    © 0155-2421 [employer]. [address] [place] [address]. All rights reserved. This information is not intended as a substitute for professional medical care. Always follow your healthcare professional's instructions.        Your A1c:  Hemoglobin A1C   Date Value Ref Range Status   05/28/2019 12.1 (H) 4.0 - 5.6 % Final     Comment:     ADA Screening Guidelines:  5.7-6.4%  Consistent with prediabetes  >or=6.5%  Consistent with diabetes  High levels of fetal hemoglobin interfere with the HbA1C  assay. Heterozygous hemoglobin variants (HbS, HgC, etc)do  not significantly interfere with this assay.   However, presence of multiple variants may affect accuracy.     08/07/2013 6.4 (H) 4.0 - 6.2 % Final   05/29/2013 9.4 (H) 4.0 - 6.2 %  Final

## 2019-06-10 NOTE — PROGRESS NOTES
Subjective:     Henry Zacarias    Chief Complaint   Patient presents with    Right Shoulder - Pain       HPI    Henry is a 60 y/o right hand dominant male coming in today for right shoulder pain that began 2-3 week(s) ago, referred by Dr. Coombs. Pt. describes the pain as a 1/10 achy pain that does not radiate. With certain movements he has sharp pain. There was not a fall/injury/ or trauma associated with the onset of symptoms. Pt was wearing a backpack and carrying an umbrella. When he went to put the umbrella in the car he turned and felt pain in his shoulder. Has noted pain in his shoulder intermittently since. The pain is better with rest and worse with raising arm overhead, reaching forward to shake hands, laying on right side. Pt. Denies any other musculoskeletal complaints at this time. Notes cellulitis of his left upper back that he is currently being treated for.     Joint instability? no  Mechanical locking/clicking? no  Affecting ADL's? yes  Affecting sleep? no    Occupation: insurance evaluator for fire departments, a lot of driving    Review of Systems   Constitutional: Negative for chills and fever.   HENT: Negative for hearing loss and tinnitus.    Eyes: Negative for blurred vision and photophobia.   Respiratory: Negative for cough and shortness of breath.    Cardiovascular: Negative for chest pain and leg swelling.   Gastrointestinal: Negative for abdominal pain, heartburn, nausea and vomiting.   Genitourinary: Negative for dysuria and hematuria.   Musculoskeletal: Positive for joint pain. Negative for back pain, falls, myalgias and neck pain.   Skin: Negative for rash.   Neurological: Negative for dizziness, tingling, focal weakness, weakness and headaches.   Endo/Heme/Allergies: Negative for environmental allergies. Does not bruise/bleed easily.   Psychiatric/Behavioral: Negative for depression. The patient is not nervous/anxious.        PAST MEDICAL HISTORY:   Past Medical History:   Diagnosis  "Date    DM2 (diabetes mellitus, type 2)     Essential hypertension     HLD (hyperlipidemia)     mixed    Obesity (BMI 30.0-34.9)     Sleep apnea      PAST SURGICAL HISTORY:   Past Surgical History:   Procedure Laterality Date    INCISION AND DRAINAGE, BACK UPPER Left 5/29/2019    Performed by Sam Correa MD at [facility] OR    TONSILLECTOMY         MEDICATIONS:   Current Outpatient Medications:     blood sugar diagnostic Strp, Use to test blood sugar 4 times daily, Disp: 100 each, Rfl: 1    blood-glucose meter Misc, Use as instructed, Disp: 1 each, Rfl: 0    cephALEXin (KEFLEX) 500 MG capsule, Take 2 capsules (1,000 mg total) by mouth 3 (three) times daily. for 8 days, Disp: 48 capsule, Rfl: 0    insulin aspart U-100 (NOVOLOG) 100 unit/mL (3 mL) InPn pen, Inject 15 Units into the skin 3 (three) times daily., Disp: 15 mL, Rfl: 1    insulin detemir U-100 (LEVEMIR FLEXTOUCH) 100 unit/mL (3 mL) SubQ InPn pen, Inject 45 Units into the skin once daily., Disp: 15 mL, Rfl: 1    lancets (ACCU-CHEK MULTICLIX LANCET) Misc, Test 4 (four) times daily., Disp: 100 each, Rfl: 1    metFORMIN (GLUCOPHAGE) 500 MG tablet, Take 1 tablet (500 mg total) by mouth 2 (two) times daily with meals., Disp: 180 tablet, Rfl: 3    metoprolol tartrate (LOPRESSOR) 50 MG tablet, Take 1 tablet (50 mg total) by mouth 2 (two) times daily., Disp: 180 tablet, Rfl: 0    pen needle, diabetic (BD ULTRA-FINE JEANNETTE PEN NEEDLE) 32 gauge x 5/32" Ndle, Test 4 (four) times daily., Disp: 100 each, Rfl: 1    valsartan (DIOVAN) 160 MG tablet, Take 1 tablet (160 mg total) by mouth once daily., Disp: 90 tablet, Rfl: 1  ALLERGIES: Review of patient's allergies indicates:  No Known Allergies    Reviewed office visit on 6/5/19 with Dr. Coombs: Refer to Sports Medicine, Physical/Occupational Therapy    Objective:     VITAL SIGNS: /80   Ht 5' 10" (1.778 m)   Wt 94.3 kg (208 lb)   BMI 29.84 kg/m²    General    Nursing note and vitals " reviewed.  Constitutional: He is oriented to person, place, and time. He appears well-developed and well-nourished.   HENT:   Head: Normocephalic and atraumatic.   no nasal discharge, no external ear redness or discharge   Eyes:   EOM is full and smooth  No eye redness or discharge   Neck: Neck supple. No tracheal deviation present.   Cardiovascular: Normal rate.    2+ Radial pulse bilaterally  2+ Dorsalis Pedis pulse bilaterally  No LE edema appreciated   Pulmonary/Chest: Effort normal. No respiratory distress.   Abdominal: He exhibits no distension.   No rigidity   Neurological: He is alert and oriented to person, place, and time. He exhibits normal muscle tone. Coordination normal.   See details below   Psychiatric: He has a normal mood and affect. His behavior is normal.               MUSCULOSKELETAL EXAM  Shoulder: right SHOULDER  The affected shoulder is compared to the contralateral shoulder.    Observation:    CERVICAL SPINE  Normal head carriage. Normal thoracic kyphosis.      SHOULDER  No ecchymosis, edema, or erythema throughout the shoulder girdle.  No sternal, clavicular, or acromial deformities bilaterally.  No atrophy of the pectorals, deltoids, supraspinatus, infraspinatus, or biceps bilaterally.  No asymmetry of shoulders bilaterally.    ROM (* = with pain):  CERVICAL SPINE  Full AROM in flexion, extension, sidebending, and rotation.    SHOULDER  Active flexion to 180° on left and 180° on right.  Active abduction to 180° on left and 180° on right*.  + hiking of right shoulder with upper trapezius compensatory firing  Active internal rotation to T7 on left and T7 on right.    Active external rotation to T3 on left and T3 on right*.    + scapular dyskinesia on right.    Tenderness:  No tenderness at the SC or AC joint  No tenderness over the clavicle   No tenderness over biceps tendon or bicipital groove  No tenderness over subacromial space    Strength Testing (* = with pain):  Deltoid - 5/5 on left  and 5/5 on right  Biceps - 5/5 on left and 5/5 on right  Triceps - 5/5 on left and 5/5 on right  Wrist extension - 5/5 on left and 5/5 on right  Wrist flexion - 5/5 on left and 5/5 on right   - 5/5 on left and 5/5 on right  Finger extension - 5/5 on left and 5/5 on right  Finger abduction - 5/5 on left and 5/5 on right    Special Tests:  Empty can test - positive for pain and weakness  Full can test - positive for pain and weakness  Bear hug test - negative  Belly press test - negative  Resisted internal rotation - negative  Resisted external rotation - positive for pain and weakness    Neer's test - negative  Hawkin's-Boone test - positive  Cross-arm test- negative    OEddies test - difficult to perform secondary to supraspinatus weakness    Sulcus sign - none  AP load and shift laxity - none    Speed's test - negative  Yergason's test - negative    Neurovascular Exam:  Spurlings test - negative bialterally  Lhermittes test - negative  2+ radial pulses bilaterally  Sensation intact to light touch in the distal median, radial, and ulnar nerve distributions bilaterally.  2+/4 reflexes at triceps, biceps, and brachioradialis  Capillary refill intact <2 seconds in all digits bilaterally      IMAGIN. X-ray ordered due to right shoulder pain. (AP, scapular Y, and axillary views) taken today.   2. X-ray images were reviewed personally by me and then directly with patient.  3. FINDINGS: X-ray images obtained demonstrate a slightly high riding humeral head, raising concern for RTC pathology. Mild glenohumeral and AC joint DJD changes. No acute fractures or dislocation appreciated.   4. IMPRESSION: slightly high riding humeral head, raising concern for RTC pathology. Mild glenohumeral and AC joint DJD changes noted.       Assessment:      Encounter Diagnoses   Name Primary?    Acute pain of right shoulder Yes    Dysfunction of right rotator cuff           Plan:     1. Right shoulder pain likely secondary to  degenerative RTC partial tear from physical exam findings. Right shoulder MRI ordered for further evaluation.   - Recommend OTC Naproxen BID with food and Ice up to 20 minutes at a time prn for pain control  - Maintain right shoulder ROM with passive ROM exercises until next visit  - X-ray images of right shoulder taken today (AP, scapular Y, and axillary views) showed slightly high riding humeral head, raising concern for RTC pathology. Mild glenohumeral and AC joint DJD changes noted.  Images were personally reviewed with patient.    2. Follow-up in 1 week for reevaluation and review of MRI results    3. Patient agreeable to today's plan and all questions were answered

## 2019-06-10 NOTE — LETTER
Maureen 10, 2019      Aimee Coombs MD  [address]  [address]  [facility] [address]           [place] - Podiatry  [address] [address]  Phone: [phone]  Fax: [phone]          Patient: Henry Zacarias   MR Number: [id number]   YOB: 1960   Date of Visit: 6/10/2019       Dear Dr. Aimee Coombs:    Thank you for referring Henry Zacarias to me for evaluation. Attached you will find relevant portions of my assessment and plan of care.    If you have questions, please do not hesitate to call me. I look forward to following Henry Zacarias along with you.    Sincerely,    Shazia Davis DPM    Enclosure  CC:  No Recipients    If you would like to receive this communication electronically, please contact [email or web address] or [phone] to request more information on My [facility] access.    For providers and/or their staff who would like to refer a patient to [facility], please contact us through our one-stop-shop provider referral line, [facility], at [phone].    If you feel you have received this communication in error or would no longer like to receive these types of communications, please e-mail [email or web address]

## 2019-06-10 NOTE — PROGRESS NOTES
"Chief Complaint   Patient presents with    Diabetic Foot Exam     A1C 5/28/19 12.1 PCP last seen on 6/5/19              HPI:   The patient is a 59 y.o.  male  who presents for a diabetic foot exam.     Patient reports occasional presence of abnormal sensation to the feet .    He reports "numbness" to his feet when he's lying down.  Otherwise, no tingling or burning sensation or pain to the feet when standing or walking.  No symptoms of claudication evident.   History of diabetic foot ulcers: none   History of foot surgery: none.     Shoes worn today:  Bass dress shoes.         Primary care doctor is: Aimee Coombs MD  Chief Complaint   Patient presents with    Diabetic Foot Exam     A1C 5/28/19 12.1 PCP last seen on 6/5/19          Patient Active Problem List   Diagnosis    KEVIN (obstructive sleep apnea)    Cellulitis of back except buttock    Obesity (BMI 30.0-34.9)    HLD (hyperlipidemia)    Essential hypertension    DM2 (diabetes mellitus, type 2)    Right shoulder pain    Normocytic anemia           Current Outpatient Medications on File Prior to Visit   Medication Sig Dispense Refill    blood sugar diagnostic Strp Use to test blood sugar 4 times daily 100 each 1    blood-glucose meter Misc Use as instructed 1 each 0    cephALEXin (KEFLEX) 500 MG capsule Take 2 capsules (1,000 mg total) by mouth 3 (three) times daily. for 8 days 48 capsule 0    insulin aspart U-100 (NOVOLOG) 100 unit/mL (3 mL) InPn pen Inject 15 Units into the skin 3 (three) times daily. 15 mL 1    insulin detemir U-100 (LEVEMIR FLEXTOUCH) 100 unit/mL (3 mL) SubQ InPn pen Inject 45 Units into the skin once daily. 15 mL 1    lancets (ACCU-CHEK MULTICLIX LANCET) Misc Test 4 (four) times daily. 100 each 1    metFORMIN (GLUCOPHAGE) 500 MG tablet Take 1 tablet (500 mg total) by mouth 2 (two) times daily with meals. 180 tablet 3    metoprolol tartrate (LOPRESSOR) 50 MG tablet Take 1 tablet (50 mg total) by mouth 2 (two) times daily. " "180 tablet 0    pen needle, diabetic (BD ULTRA-FINE JEANNETTE PEN NEEDLE) 32 gauge x 5/32" Ndle Test 4 (four) times daily. 100 each 1    valsartan (DIOVAN) 160 MG tablet Take 1 tablet (160 mg total) by mouth once daily. 90 tablet 1     No current facility-administered medications on file prior to visit.            Review of patient's allergies indicates:  No Known Allergies        Social History     Socioeconomic History    Marital status: Single     Spouse name: Not on file    Number of children: Not on file    Years of education: Not on file    Highest education level: Not on file   Occupational History    Occupation: manager for ins comp     Employer: [employer]   Social Needs    Financial resource strain: Not hard at all    Food insecurity:     Worry: Never true     Inability: Never true    Transportation needs:     Medical: No     Non-medical: No   Tobacco Use    Smoking status: Never Smoker    Smokeless tobacco: Never Used   Substance and Sexual Activity    Alcohol use: Yes     Frequency: Monthly or less     Drinks per session: 3 or 4     Binge frequency: Less than monthly     Comment: social    Drug use: No    Sexual activity: Not Currently     Partners: Female   Lifestyle    Physical activity:     Days per week: 0 days     Minutes per session: 0 min    Stress: Rather much   Relationships    Social connections:     Talks on phone: Once a week     Gets together: Once a week     Attends Anabaptism service: Not on file     Active member of club or organization: No     Attends meetings of clubs or organizations: Never     Relationship status: Never    Other Topics Concern    Not on file   Social History Narrative    Not on file           ROS:  General ROS: negative  Respiratory ROS: no cough, shortness of breath, or wheezing  Cardiovascular ROS: no chest pain or dyspnea on exertion  Musculoskeletal ROS: negative  Neurological ROS:   negative for - impaired coordination/balance " "or numbness/tingling  Dermatological ROS: negative      LAST HbA1c:   Lab Results   Component Value Date    HGBA1C 12.1 (H) 05/28/2019           EXAM:   Vitals:    06/10/19 0817   Weight: 94.3 kg (208 lb)   Height: 5' 10" (1.778 m)       General: alert, no distress, cooperative    Vascular:   Dorsalis Pedis:  2/4 and triphasic on the right with doppler.  Non palpable and not dopplerable on the left.    Posterior Tibial:  present  Capillary refill time:  5 seconds  Temperature of toes cool to touch  Edema: Absent bilaterally      Neurological:     Sharp touch:  normal  Light touch: normal  Tinels Sign:  Absent  Mulders Click:   Absent  [place]leton:  intact x 10      Dermatological:   Skin: dry and cool to touch.   Slightly atrophic.   Wounds/Ulcers:  Absent  Bruising:  Absent  Erythema:  Absent  Toenails:  Elongated by 1-2mm, thickened by 1mm and Yellowish in color,  without subungual debris.   Absent paronychia      Musculoskeletal:   Metatarsophalangeal range of motion:   full range of motion  Subtalar joint range of motion: full range of motion  Ankle joint range of motion:  full range of motion  Bunions:  Absent  Hammertoes: present 2-4 bilateral                ASSESSMENT/PLAN:          Problem List Items Addressed This Visit     DM2 (diabetes mellitus, type 2)      Other Visit Diagnoses     Encounter for diabetic foot exam    -  Primary            I counseled the patient on the patient's conditions, their implications and medical management.       Shoe inspection. Diabetic Foot Education. Patient reminded of the importance of good nutrition and blood sugar control to help prevent podiatric complications of diabetes. Patient instructed on proper foot hygiene. We discussed wearing proper shoe gear, daily foot inspections, never walking without protective shoe gear, never putting sharp instruments to feet.      Annual diabetes foot exam, or sooner if concerned.                Shazia Davis DPM  NPI: [id number] "       [facility]  [address] [address]  Dept: [phone]  Dept Fax: [phone]

## 2019-06-10 NOTE — LETTER
Maureen 10, 2019      Aimee Coombs MD  [address]  [address]  [facility] [address]           [place] [facility]  [address] [address]  Phone: [phone]  Fax: [phone]          Patient: Henry Zacarias   MR Number: [id number]   YOB: 1960   Date of Visit: 6/10/2019       Dear Dr. Aimee Coombs:    Thank you for referring Henry Zacarias to me for evaluation. Attached you will find relevant portions of my assessment and plan of care.    If you have questions, please do not hesitate to call me. I look forward to following Henry Zacarias along with you.    Sincerely,    DO Antonio Dobbins  CC:  No Recipients    If you would like to receive this communication electronically, please contact [email or web address] or [phone] to request more information on [employer] access.    For providers and/or their staff who would like to refer a patient to [facility], please contact us through our one-stop-shop provider referral line, [facility], at [phone].    If you feel you have received this communication in error or would no longer like to receive these types of communications, please e-mail [email or web address]

## 2019-06-12 ENCOUNTER — TELEPHONE (OUTPATIENT)
Dept: INTERNAL MEDICINE | Facility: CLINIC | Age: 59
End: 2019-06-12

## 2019-06-12 NOTE — TELEPHONE ENCOUNTER
lvm stating My apologies since it looks like your repeat A1C, which is a marker for diabetes control, was lumped with your labs rather than in 3 months.  However, even though it was drawn early, I am pleased to see that it is improving!  Please keep up the good work and I hope that we can continue this trend.  Let's repeat your A1C in 3 months before your next visit so that we can discuss the results in clinic.

## 2019-06-12 NOTE — TELEPHONE ENCOUNTER
----- Message from Aimee Coombs MD sent at 6/11/2019  5:42 PM CDT -----  Please schedule repeat labs in 3 months a few days before our next visit.  Thanks!    My apologies since it looks like your repeat A1C, which is a marker for diabetes control, was lumped with your labs rather than in 3 months.  However, even though it was drawn early, I am pleased to see that it is improving!  Please keep up the good work and I hope that we can continue this trend.  Let's repeat your A1C in 3 months before your next visit so that we can discuss the results in clinic.

## 2019-06-17 ENCOUNTER — TELEPHONE (OUTPATIENT)
Dept: SPORTS MEDICINE | Facility: CLINIC | Age: 59
End: 2019-06-17

## 2019-06-17 ENCOUNTER — HOSPITAL ENCOUNTER (OUTPATIENT)
Dept: RADIOLOGY | Facility: HOSPITAL | Age: 59
Discharge: HOME | End: 2019-06-17
Attending: NEUROMUSCULOSKELETAL MEDICINE & OMM
Payer: COMMERCIAL

## 2019-06-17 DIAGNOSIS — M67.911 DYSFUNCTION OF RIGHT ROTATOR CUFF: ICD-10-CM

## 2019-06-17 PROCEDURE — 73221 MRI JOINT UPR EXTREM W/O DYE: CPT | Mod: 26,RT,, | Performed by: RADIOLOGY

## 2019-06-17 PROCEDURE — 73221 MRI SHOULDER WITHOUT CONTRAST RIGHT: ICD-10-PCS | Mod: 26,RT,, | Performed by: RADIOLOGY

## 2019-06-17 PROCEDURE — 73221 MRI JOINT UPR EXTREM W/O DYE: CPT | Mod: TC,RT

## 2019-06-17 NOTE — TELEPHONE ENCOUNTER
Discussed with Henry over the phone his recent MRI results for his left shoulder pain, showing significant tearing of the infraspinatus and supraspinatus. Due to the extent of RTC tearing, I recommend he have a sports medicine orthopedic surgery evaluation prior to starting PT. He has an apt. Set up tomorrow with Dr. Mccoy at 8:45 am. Pt. Agreed with this plan and all questions were answered.       Right shoulder MRI from 6/17/19 images and report personally reviewed. Imaging demonstrated a Infraspinatus greater in extent than supraspinatus tear as above with concomitant relative muscular atrophy and Infraspinatus retraction.   Associated tear of the cranial fibers of the subscapularis with mild subluxation of the biceps.  Joint effusion with fluid in the subacromial subdeltoid bursa.

## 2019-06-18 ENCOUNTER — OFFICE VISIT (OUTPATIENT)
Dept: SPORTS MEDICINE | Facility: CLINIC | Age: 59
End: 2019-06-18
Payer: COMMERCIAL

## 2019-06-18 VITALS
SYSTOLIC BLOOD PRESSURE: 138 MMHG | DIASTOLIC BLOOD PRESSURE: 81 MMHG | WEIGHT: 208 LBS | BODY MASS INDEX: 29.78 KG/M2 | HEART RATE: 66 BPM | HEIGHT: 70 IN

## 2019-06-18 DIAGNOSIS — M75.121 COMPLETE TEAR OF RIGHT ROTATOR CUFF: Primary | ICD-10-CM

## 2019-06-18 PROCEDURE — 99999 PR PBB SHADOW E&M-EST. PATIENT-LVL III: CPT | Mod: PBBFAC,,, | Performed by: ORTHOPAEDIC SURGERY

## 2019-06-18 PROCEDURE — 3008F PR BODY MASS INDEX (BMI) DOCUMENTED: ICD-10-PCS | Mod: CPTII,S$GLB,, | Performed by: ORTHOPAEDIC SURGERY

## 2019-06-18 PROCEDURE — 3008F BODY MASS INDEX DOCD: CPT | Mod: CPTII,S$GLB,, | Performed by: ORTHOPAEDIC SURGERY

## 2019-06-18 PROCEDURE — 99214 PR OFFICE/OUTPT VISIT, EST, LEVL IV, 30-39 MIN: ICD-10-PCS | Mod: S$GLB,,, | Performed by: ORTHOPAEDIC SURGERY

## 2019-06-18 PROCEDURE — 3079F PR MOST RECENT DIASTOLIC BLOOD PRESSURE 80-89 MM HG: ICD-10-PCS | Mod: CPTII,S$GLB,, | Performed by: ORTHOPAEDIC SURGERY

## 2019-06-18 PROCEDURE — 3075F PR MOST RECENT SYSTOLIC BLOOD PRESS GE 130-139MM HG: ICD-10-PCS | Mod: CPTII,S$GLB,, | Performed by: ORTHOPAEDIC SURGERY

## 2019-06-18 PROCEDURE — 3075F SYST BP GE 130 - 139MM HG: CPT | Mod: CPTII,S$GLB,, | Performed by: ORTHOPAEDIC SURGERY

## 2019-06-18 PROCEDURE — 99999 PR PBB SHADOW E&M-EST. PATIENT-LVL III: ICD-10-PCS | Mod: PBBFAC,,, | Performed by: ORTHOPAEDIC SURGERY

## 2019-06-18 PROCEDURE — 99214 OFFICE O/P EST MOD 30 MIN: CPT | Mod: S$GLB,,, | Performed by: ORTHOPAEDIC SURGERY

## 2019-06-18 PROCEDURE — 3079F DIAST BP 80-89 MM HG: CPT | Mod: CPTII,S$GLB,, | Performed by: ORTHOPAEDIC SURGERY

## 2019-06-18 NOTE — PROGRESS NOTES
CC: RIGHT shoulder pain     59 y.o. RHD Male presents as a new patient to me. History of R shoulder pain x 2-3 weeks - acute onset when he went to put his umbrella in the car -  he turned, reached back behind himself and felt pain in his shoulder. Saw Dr. Diann Hernandez last week, MRI was ordered which showed a RC tear with some fatty infiltration. He was referred to me for surgical consideration. Complaint today is positional R shoulder pain which he describes as achy. States the pain is worse with raising arm overhead, reaching forward to shake hands, laying on right side. No prior R shoulder problems. Denies neck pain or radicular symptoms.     Of note, cellulitis of his left upper trap region that he is currently being treated for. +Open wound.    Negative for tobacco.   Positive for diabetes. Last A1C; 10.7 on 6/10/2019.     Pain Score: 0-No pain    PAST MEDICAL HISTORY:   Past Medical History:   Diagnosis Date    DM2 (diabetes mellitus, type 2)     Essential hypertension     HLD (hyperlipidemia)     mixed    Obesity (BMI 30.0-34.9)     Sleep apnea        PAST SURGICAL HISTORY:  Past Surgical History:   Procedure Laterality Date    INCISION AND DRAINAGE, BACK UPPER Left 5/29/2019    [facility]    TONSILLECTOMY         FAMILY HISTORY:  Family History   Problem Relation Age of Onset    COPD Mother     Hyperlipidemia Father     Hypertension Father     Heart disease Father     Dementia Father     Diabetes Father     Colon cancer Neg Hx     Prostate cancer Neg Hx        MEDICATIONS:    Current Outpatient Medications:     aspirin 81 MG Chew, Take 1 tablet (81 mg total) by mouth once daily., Disp: 90 tablet, Rfl: 3    atorvastatin (LIPITOR) 40 MG tablet, Take 1 tablet (40 mg total) by mouth once daily., Disp: 90 tablet, Rfl: 3    blood sugar diagnostic Strp, Use to test blood sugar 4 times daily, Disp: 100 each, Rfl: 1    blood-glucose meter Misc, Use as  "instructed, Disp: 1 each, Rfl: 0    insulin aspart U-100 (NOVOLOG) 100 unit/mL (3 mL) InPn pen, Inject 15 Units into the skin 3 (three) times daily., Disp: 15 mL, Rfl: 1    insulin detemir U-100 (LEVEMIR FLEXTOUCH) 100 unit/mL (3 mL) SubQ InPn pen, Inject 45 Units into the skin once daily., Disp: 15 mL, Rfl: 1    lancets (ACCU-CHEK MULTICLIX LANCET) Misc, Test 4 (four) times daily., Disp: 100 each, Rfl: 1    metFORMIN (GLUCOPHAGE) 500 MG tablet, Take 1 tablet (500 mg total) by mouth 2 (two) times daily with meals., Disp: 180 tablet, Rfl: 3    metoprolol tartrate (LOPRESSOR) 50 MG tablet, Take 1 tablet (50 mg total) by mouth 2 (two) times daily., Disp: 180 tablet, Rfl: 0    pen needle, diabetic (BD ULTRA-FINE JEANNETTE PEN NEEDLE) 32 gauge x 5/32" Ndle, Test 4 (four) times daily., Disp: 100 each, Rfl: 1    valsartan (DIOVAN) 160 MG tablet, Take 1 tablet (160 mg total) by mouth once daily., Disp: 90 tablet, Rfl: 1    ALLERGIES:  Review of patient's allergies indicates:  No Known Allergies       REVIEW OF SYSTEMS:  Constitution: Negative. Negative for chills, fever and night sweats.    Hematologic/Lymphatic: Negative for bleeding problem. Does not bruise/bleed easily.   Skin: Negative for dry skin, itching and rash.   Musculoskeletal: Negative for falls. Positive for right shoulder pain and  muscle weakness.     All other review of symptoms were reviewed and found to be noncontributory.     PHYSICAL EXAMINATION:  Vitals:  /81   Pulse 66   Ht 5' 10" (1.778 m)   Wt 94.3 kg (208 lb)   BMI 29.84 kg/m²    General: Well-developed well-nourished 59 y.o. malein no acute distress   Cardiovascular: Regular rhythm by palpation of distal pulse, normal color and temperature, no concerning varicosities on symptomatic side   Lungs: No labored breathing or wheezing appreciated   Neuro: Alert and oriented ×3   Psychiatric: well oriented to person, place and time, demonstrates normal mood and affect   Skin: No rashes, " lesions or ulcers, normal temperature, turgor, and texture on uninvolved extremity    Ortho/SPM Exam  Examination of the right shoulder demonstrates cellulitis on the left upper trap region. +Open wound with drainage. No palpable abscess. Negative biceps and AC joint. Active FE to 170, ER at side to 15, IR to T10. +ER lag to his belly. Negative belly press. Negative lift off.  4-/5 resisted supraspinatus strength testing. 2/5 ER. Stable shoulder. No midline neck tenderness. Neg [place].     IMAGING:  Xrays including AP, Outlet and Axillary Lateral of RIGHT shoulder are ordered / images reviewed by me:   No sig DJD. Normal AHI    MRI of RIGHT shoulder reviewed by me and discussed with patient. Study shows:    Massive RC tear involving primarily the SS and IS with sig tissue retraction to the level of the glenoid with evidence of intrasubstance degeneration. Goutallier 2 of IS. Neg [place]. FI of teres minor.     ASSESSMENT:      ICD-10-CM ICD-9-CM   1. Complete tear of right rotator cuff M75.121 727.61     Acute on chronic with sig degenerative changes and clinical signs of possible irreparability in setting of ER lag  Uncontrolled DM  Active cellulitis with wound infection    PLAN:     -Findings and treatment options were discussed with the patient to include a rotator cuff repair, possible SCR versus RTSA down the road  -Complicating factors include his ongoing infection as well as his uncontrolled diabetes which will increase his risk for tissue nonhealing or retear   -He is currently working with his PCP to these things under control   -RTC in mid September to reassess things. Reasonably well maintained overhead motion currently. Simple scope debridement with biceps tenotomy, if truly irreparable, will likely be the better option in this case over an SCR, particularly given his co-morbidities and IS dysfunction.   -All questions answered      Procedures

## 2019-06-18 NOTE — LETTER
July 1, 2019      Diann Hernandez DO  [address]  [facility] [address]           [facility]  [address]  [facility] [address]  Phone: [phone]          Patient: Henry Zacarias   MR Number: [id number]   YOB: 1960   Date of Visit: 6/18/2019       Dear Dr. Diann Hernandez:    Thank you for referring Henry Zacarias to me for evaluation. Attached you will find relevant portions of my assessment and plan of care.    If you have questions, please do not hesitate to call me. I look forward to following Henry Zacarias along with you.    Sincerely,    NUSRAT Mccoy MD    Enclosure  CC:  No Recipients    If you would like to receive this communication electronically, please contact [email or web address] or [phone] to request more information on [employer] access.    For providers and/or their staff who would like to refer a patient to [facility], please contact us through our one-stop-shop provider referral line, [facility] , at [phone].    If you feel you have received this communication in error or would no longer like to receive these types of communications, please e-mail [email or web address]

## 2019-06-24 ENCOUNTER — PATIENT MESSAGE (OUTPATIENT)
Dept: INTERNAL MEDICINE | Facility: CLINIC | Age: 59
End: 2019-06-24

## 2019-06-24 ENCOUNTER — HOSPITAL ENCOUNTER (OUTPATIENT)
Dept: DIABETES | Facility: OTHER | Age: 59
Discharge: HOME | End: 2019-06-24
Attending: INTERNAL MEDICINE
Payer: COMMERCIAL

## 2019-06-24 VITALS — WEIGHT: 217.13 LBS | BODY MASS INDEX: 31.09 KG/M2 | HEIGHT: 70 IN

## 2019-06-24 DIAGNOSIS — E11.8 TYPE 2 DIABETES MELLITUS WITH COMPLICATION, WITH LONG-TERM CURRENT USE OF INSULIN: Primary | ICD-10-CM

## 2019-06-24 DIAGNOSIS — Z79.4 TYPE 2 DIABETES MELLITUS WITH COMPLICATION, WITH LONG-TERM CURRENT USE OF INSULIN: Primary | ICD-10-CM

## 2019-06-24 DIAGNOSIS — E66.9 OBESITY (BMI 30.0-34.9): ICD-10-CM

## 2019-06-24 DIAGNOSIS — I10 ESSENTIAL HYPERTENSION: ICD-10-CM

## 2019-06-24 PROCEDURE — G0108 DIAB MANAGE TRN  PER INDIV: HCPCS

## 2019-06-24 RX ORDER — METOPROLOL TARTRATE 50 MG/1
50 TABLET ORAL 2 TIMES DAILY
Qty: 180 TABLET | Refills: 0 | Status: SHIPPED | OUTPATIENT
Start: 2019-06-24 | End: 2019-09-05 | Stop reason: SDUPTHER

## 2019-06-24 RX ORDER — PEN NEEDLE, DIABETIC 30 GX3/16"
1 NEEDLE, DISPOSABLE MISCELLANEOUS 4 TIMES DAILY
Qty: 100 EACH | Refills: 1 | Status: SHIPPED | OUTPATIENT
Start: 2019-06-24 | End: 2019-08-16 | Stop reason: SDUPTHER

## 2019-06-24 RX ORDER — LANCETS
1 EACH MISCELLANEOUS
Qty: 100 EACH | Refills: 3 | Status: SHIPPED | OUTPATIENT
Start: 2019-06-24 | End: 2019-06-25

## 2019-06-24 RX ORDER — INSULIN ASPART 100 [IU]/ML
15 INJECTION, SOLUTION INTRAVENOUS; SUBCUTANEOUS
Qty: 40.5 ML | Refills: 3 | Status: SHIPPED | OUTPATIENT
Start: 2019-06-24 | End: 2019-07-01 | Stop reason: SDUPTHER

## 2019-06-25 ENCOUNTER — PATIENT MESSAGE (OUTPATIENT)
Dept: INTERNAL MEDICINE | Facility: CLINIC | Age: 59
End: 2019-06-25

## 2019-06-25 DIAGNOSIS — E11.9 TYPE 2 DIABETES MELLITUS WITHOUT COMPLICATION, WITH LONG-TERM CURRENT USE OF INSULIN: Primary | ICD-10-CM

## 2019-06-25 DIAGNOSIS — Z79.4 TYPE 2 DIABETES MELLITUS WITHOUT COMPLICATION, WITH LONG-TERM CURRENT USE OF INSULIN: Primary | ICD-10-CM

## 2019-06-25 RX ORDER — LANCETS
100 EACH MISCELLANEOUS
Qty: 200 EACH | Refills: 3 | Status: SHIPPED | OUTPATIENT
Start: 2019-06-25 | End: 2019-11-27

## 2019-06-26 NOTE — PROGRESS NOTES
Diabetes Education  Author: Sophia Abernathy RN  Date: 6/26/2019    Diabetes Care Management Summary  Diabetes Education Record Assessment/Progress: Initial         Diabetes Type  Diabetes Type : Type II    Diabetes History  Diabetes Diagnosis: 5-10 years  Current Treatment: Oral Medication, Insulin  Reviewed Problem List with Patient: Yes    Health Maintenance was reviewed today with patient. Discussed with patient importance of routine eye exams, foot exams/foot care, blood work (i.e.: A1c, microalbumin, and lipid), dental visits, yearly flu vaccine, and pneumonia vaccine as indicated by PCP. Patient verbalized understanding.     Health Maintenance Topics with due status: Not Due       Topic Last Completion Date    TETANUS VACCINE 06/07/2013    Influenza Vaccine 09/21/2017    Low Dose Statin 06/10/2019    Foot Exam 06/10/2019    Lipid Panel 06/10/2019    Hemoglobin A1c 06/10/2019     Health Maintenance Due   Topic Date Due    Colonoscopy  02/02/2012    Eye Exam  03/10/2016       Nutrition  Meal Plan 24 Hour Recall - Breakfast: egg whites, turkey sausage, small portion plain cheerios w/ 2% milk, 8 bites fruits, dannon light yogurt, water, black coffee   Meal Plan 24 Hour Recall - Lunch: half sweet potato, 4 oz chicken breast, steamed carrots, fresh fruit, 4 vanilla wafers, water   Meal Plan 24 Hour Recall - Dinner: turkey sandwich on rye w/ 2 slices tomato, fresh fruit, yogurt, water   Meal Plan 24 Hour Recall - Snack: if snacks, cheese stick, kate cracker or a few vanilla wafers     Monitoring   Self Monitoring : pt will be emailing logs: 112-140 before meals, lower in PM after dinner/bedtime   Blood Glucose Logs: No  Do you use a personal continuous glucose monitor?: No  In the last month, how often have you had a low blood sugar reaction?: more than once a week    Exercise   Frequency: Never    Current Diabetes Treatment   Current Treatment: Oral Medication, Insulin    Social History  Preferred Learning Method:  Face to Face, Reading Materials, Web Based  Primary Support: Self, Friends  Educational Level: College Graduate  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   Smoking Status: Ex Smoker(cigar smoker, ex)  Alcohol Use: Monthly(2x/ month a few beers)                                Barriers to Change  Barriers to Change: None  Learning Challenges : None    Readiness to Learn   Readiness to Learn : Eager    Cultural Influences  Cultural Influences: No    Diabetes Education Assessment/Progress  Diabetes Disease Process (diabetes disease process and treatment options): Discussion, Requires Assistance Family/SO, Individual Session(pt working really hard on lifestyle changes to increase effectiveness of medications, understands role of weight management in BG control as well)  Nutrition (Incorporating nutritional management into one's lifestyle): Discussion, Comprehends Key Points, Individual Session, Requires Assistance Family/SO(pt has been cooking more/making healthier choices - increasing veggies, choosing adequate lean protein, reviewed portion sizes of fruit to continue helping w/ weight loss + BG control, some meals 60gm+ cho, restrict to 30-45gm)  Physical Activity (incorporating physical activity into one's lifestyle): Discussion, Requires Assistance Family/SO, Individual Session(reviewed ADA recs for physical activity and safety considerations)  Medications (states correct name, dose, onset, peak, duration, side effects & timing of meds): Discussion, Comprehends Key Points, Individual Session(pt doing very well w/ addition of novolog, is taking before majority of meals )  Monitoring (monitoring blood glucose/other parameters & using results): Discussion, Comprehends Key Points, Individual Session, Written Materials Provided(**pt did fax logs next day - largely complete and doing well w/ SMBG)  Acute Complications (preventing, detecting, and treating acute complications): Discussion, Requires Assistance Family/SO,  Individual Session(reviewed s/s of low BG and how to prevent and treat and when to call clinic for insulin titration to eliminate low BG)  Chronic Complications (preventing, detecting, and treating chronic complications): Discussion, Requires Assistance Family/SO, Individual Session(reviewed A1C and when next A1C will be due, reviewed other health maintenance r/t DM)  Clinical (diabetes, other pertinent medical history, and relevant comorbidities reviewed during visit): Discussion, Requires Assistance Family/SO, Individual Session(pt has been actively losing weight! has more energy and is feeling much better - reviewed ultimate goal weight loss )  Cognitive (knowledge of self-management skills, functional health literacy): Discussion, Requires Assistance Family/SO, Individual Session  Psychosocial (emotional response to diabetes): Discussion, Requires Assistance Family/SO, Individual Session  Diabetes Distress and Support Systems: Discussion, Requires Assistance Family/SO, Individual Session  Behavioral (readiness for change, lifestyle practices, self-care behaviors): Discussion, Requires Assistance Family/SO, Individual Session(pt very motivated to continue on with keeping BG well controlled )    Goals  Patient has selected/evaluated goals during today's session: Yes, selected  Physical Activity: Set(pt will work up to 150min/week of physical activity)  Start Date: 06/24/19         Diabetes Care Plan/Intervention  Education Plan/Intervention: Individual Follow-Up DSMT    Diabetes Meal Plan  Restrictions: Restricted Carbohydrate  Carbohydrate Per Meal: 30-45g    Today's Self-Management Care Plan was developed with the patient's input and is based on barriers identified during today's assessment.    The long and short-term goals in the care plan were written with the patient/caregiver's input. The patient has agreed to work toward these goals to improve his overall diabetes control.      The patient received a copy  of today's self-management plan and verbalized understanding of the care plan, goals, and all of today's instructions.      The patient was encouraged to communicate with his physician and care team regarding his condition(s) and treatment.  I provided the patient with my contact information today and encouraged him to contact me via phone or patient portal as needed.     Education Units of Time   Time Spent: 30 min

## 2019-06-30 ENCOUNTER — PATIENT MESSAGE (OUTPATIENT)
Dept: INTERNAL MEDICINE | Facility: CLINIC | Age: 59
End: 2019-06-30

## 2019-06-30 DIAGNOSIS — I10 ESSENTIAL HYPERTENSION: Primary | ICD-10-CM

## 2019-07-01 DIAGNOSIS — Z79.4 TYPE 2 DIABETES MELLITUS WITH COMPLICATION, WITH LONG-TERM CURRENT USE OF INSULIN: ICD-10-CM

## 2019-07-01 DIAGNOSIS — E11.8 TYPE 2 DIABETES MELLITUS WITH COMPLICATION, WITH LONG-TERM CURRENT USE OF INSULIN: ICD-10-CM

## 2019-07-01 RX ORDER — VALSARTAN 160 MG/1
160 TABLET ORAL DAILY
Qty: 90 TABLET | Refills: 1 | Status: SHIPPED | OUTPATIENT
Start: 2019-07-01 | End: 2019-12-19 | Stop reason: SDUPTHER

## 2019-07-01 RX ORDER — INSULIN ASPART 100 [IU]/ML
15 INJECTION, SOLUTION INTRAVENOUS; SUBCUTANEOUS
Qty: 40.5 ML | Refills: 3 | Status: SHIPPED | OUTPATIENT
Start: 2019-07-01 | End: 2019-08-19 | Stop reason: SDUPTHER

## 2019-07-03 LAB — FUNGUS SPEC CULT: NORMAL

## 2019-07-16 ENCOUNTER — PATIENT MESSAGE (OUTPATIENT)
Dept: INTERNAL MEDICINE | Facility: CLINIC | Age: 59
End: 2019-07-16

## 2019-07-26 DIAGNOSIS — Z12.11 COLON CANCER SCREENING: ICD-10-CM

## 2019-07-29 ENCOUNTER — PATIENT MESSAGE (OUTPATIENT)
Dept: DIABETES | Facility: OTHER | Age: 59
End: 2019-07-29

## 2019-07-31 LAB
ACID FAST MOD KINY STN SPEC: NORMAL
MYCOBACTERIUM SPEC QL CULT: NORMAL

## 2019-08-16 ENCOUNTER — TELEPHONE (OUTPATIENT)
Dept: INTERNAL MEDICINE | Facility: CLINIC | Age: 59
End: 2019-08-16

## 2019-08-16 DIAGNOSIS — E11.8 TYPE 2 DIABETES MELLITUS WITH COMPLICATION, WITH LONG-TERM CURRENT USE OF INSULIN: ICD-10-CM

## 2019-08-16 DIAGNOSIS — Z79.4 TYPE 2 DIABETES MELLITUS WITH COMPLICATION, WITH LONG-TERM CURRENT USE OF INSULIN: ICD-10-CM

## 2019-08-16 RX ORDER — PEN NEEDLE, DIABETIC 31 GX5/16"
NEEDLE, DISPOSABLE MISCELLANEOUS
Qty: 100 EACH | Refills: 11 | Status: SHIPPED | OUTPATIENT
Start: 2019-08-16 | End: 2019-08-19

## 2019-08-16 RX ORDER — PEN NEEDLE, DIABETIC 31 GX5/16"
NEEDLE, DISPOSABLE MISCELLANEOUS
Qty: 100 EACH | Refills: 11 | Status: SHIPPED | OUTPATIENT
Start: 2019-08-16 | End: 2019-08-16

## 2019-08-18 ENCOUNTER — PATIENT MESSAGE (OUTPATIENT)
Dept: INTERNAL MEDICINE | Facility: CLINIC | Age: 59
End: 2019-08-18

## 2019-08-18 DIAGNOSIS — Z79.4 TYPE 2 DIABETES MELLITUS WITH COMPLICATION, WITH LONG-TERM CURRENT USE OF INSULIN: ICD-10-CM

## 2019-08-18 DIAGNOSIS — E11.8 TYPE 2 DIABETES MELLITUS WITH COMPLICATION, WITH LONG-TERM CURRENT USE OF INSULIN: ICD-10-CM

## 2019-08-19 ENCOUNTER — PATIENT MESSAGE (OUTPATIENT)
Dept: INTERNAL MEDICINE | Facility: CLINIC | Age: 59
End: 2019-08-19

## 2019-08-19 DIAGNOSIS — Z79.4 TYPE 2 DIABETES MELLITUS WITH COMPLICATION, WITH LONG-TERM CURRENT USE OF INSULIN: ICD-10-CM

## 2019-08-19 DIAGNOSIS — Z79.4 TYPE 2 DIABETES MELLITUS WITH COMPLICATION, WITH LONG-TERM CURRENT USE OF INSULIN: Primary | ICD-10-CM

## 2019-08-19 DIAGNOSIS — E11.8 TYPE 2 DIABETES MELLITUS WITH COMPLICATION, WITH LONG-TERM CURRENT USE OF INSULIN: Primary | ICD-10-CM

## 2019-08-19 DIAGNOSIS — E11.8 TYPE 2 DIABETES MELLITUS WITH COMPLICATION, WITH LONG-TERM CURRENT USE OF INSULIN: ICD-10-CM

## 2019-08-19 RX ORDER — INSULIN ASPART 100 [IU]/ML
15 INJECTION, SOLUTION INTRAVENOUS; SUBCUTANEOUS
Qty: 40.5 ML | Refills: 3 | Status: SHIPPED | OUTPATIENT
Start: 2019-08-19 | End: 2020-06-22 | Stop reason: SDUPTHER

## 2019-08-19 RX ORDER — INSULIN ASPART 100 [IU]/ML
15 INJECTION, SOLUTION INTRAVENOUS; SUBCUTANEOUS
Qty: 13.5 ML | Refills: 0 | Status: SHIPPED | OUTPATIENT
Start: 2019-08-19 | End: 2020-06-22 | Stop reason: SDUPTHER

## 2019-08-19 RX ORDER — INSULIN GLARGINE 100 [IU]/ML
45 INJECTION, SOLUTION SUBCUTANEOUS DAILY
Qty: 13.5 ML | Refills: 0 | Status: SHIPPED | OUTPATIENT
Start: 2019-08-19 | End: 2020-06-22

## 2019-08-19 RX ORDER — PEN NEEDLE, DIABETIC 31 GX5/16"
NEEDLE, DISPOSABLE MISCELLANEOUS
Qty: 100 EACH | Refills: 11 | Status: SHIPPED | OUTPATIENT
Start: 2019-08-19 | End: 2020-09-09

## 2019-08-20 ENCOUNTER — PATIENT MESSAGE (OUTPATIENT)
Dept: INTERNAL MEDICINE | Facility: CLINIC | Age: 59
End: 2019-08-20

## 2019-08-20 DIAGNOSIS — E11.8 TYPE 2 DIABETES MELLITUS WITH COMPLICATION, WITH LONG-TERM CURRENT USE OF INSULIN: Primary | ICD-10-CM

## 2019-08-20 DIAGNOSIS — Z79.4 TYPE 2 DIABETES MELLITUS WITH COMPLICATION, WITH LONG-TERM CURRENT USE OF INSULIN: Primary | ICD-10-CM

## 2019-08-26 ENCOUNTER — HOSPITAL ENCOUNTER (OUTPATIENT)
Dept: DIABETES | Facility: OTHER | Age: 59
Discharge: HOME | End: 2019-08-26
Attending: INTERNAL MEDICINE
Payer: COMMERCIAL

## 2019-08-26 VITALS — WEIGHT: 214.75 LBS | BODY MASS INDEX: 30.74 KG/M2 | HEIGHT: 70 IN

## 2019-08-26 DIAGNOSIS — E11.8 TYPE 2 DIABETES MELLITUS WITH COMPLICATION, WITH LONG-TERM CURRENT USE OF INSULIN: Primary | ICD-10-CM

## 2019-08-26 DIAGNOSIS — Z79.4 TYPE 2 DIABETES MELLITUS WITH COMPLICATION, WITH LONG-TERM CURRENT USE OF INSULIN: Primary | ICD-10-CM

## 2019-08-26 DIAGNOSIS — E66.9 OBESITY (BMI 30.0-34.9): ICD-10-CM

## 2019-08-26 PROCEDURE — G0108 DIAB MANAGE TRN  PER INDIV: HCPCS

## 2019-08-29 NOTE — PROGRESS NOTES
Diabetes Education  Author: Sophia Abernathy RN  Date: 2019    Diabetes Care Management Summary  Diabetes Education Record Assessment/Progress: Comprehensive/Group         Diabetes Type  Diabetes Type : Type II    Diabetes History  Current Treatment: Oral Medication, Insulin  Reviewed Problem List with Patient: Yes    Health Maintenance was reviewed today with patient. Discussed with patient importance of routine eye exams, foot exams/foot care, blood work (i.e.: A1c, microalbumin, and lipid), dental visits, yearly flu vaccine, and pneumonia vaccine as indicated by PCP. Patient verbalized understanding.     Health Maintenance Topics with due status: Not Due       Topic Last Completion Date    TETANUS VACCINE 2013    Influenza Vaccine 2017    Low Dose Statin 06/10/2019    Foot Exam 06/10/2019    Lipid Panel 06/10/2019    Eye Exam 2019    Hemoglobin A1c 2019     Health Maintenance Due   Topic Date Due    Colonoscopy  2012       Nutrition  What type of beverages do you drink?: water, diet soda/tea  Meal Plan 24 Hour Recall - Breakfast: 1 scrambled egg, ham, veggies, small portion cheese (sometimes 1/2c cereal), yogurt, fresh fruit, black coffee   Meal Plan 24 Hour Recall - Lunch: rotisserrie chicken, small salad - water   Meal Plan 24 Hour Recall - Dinner: rotisserie chicken, salad - water   Meal Plan 24 Hour Recall - Snack: a few cheese sticks, a few pieces fruit, half kate cracker     Monitoring   Self Monitoring : AM fastin, 128, 103, 109, 136, 136, 140, 157, 141, 143, 133, 139, 127, 121 Before Lunch: 107, 92, 95, 93, 114, 115, 129, 160, 125, 121, 101, 128 Before Dinner: 136, 130, 87, 90, 156, 129, 151, 140, 128, 115, 106, 140, 102 Bedtime: 105, 114, 80, 106, 119, 121, 160, 121, 128, 101, 98, 91, 103  Blood Glucose Logs: Yes  Do you use a personal continuous glucose monitor?: No  In the last month, how often have you had a low blood sugar reaction?: once  What are your symptoms  of low blood sugar?: hasn't felt it when in the 70's   How do you treat low blood sugar?: eats a snack     Exercise   Exercise Type: walking(working on consistency during traveling season)    Current Diabetes Treatment   Current Treatment: Oral Medication, Insulin                                                    Diabetes Education Assessment/Progress  Diabetes Disease Process (diabetes disease process and treatment options): Not Covered/Deferred  Nutrition (Incorporating nutritional management into one's lifestyle): Discussion, Comprehends Key Points, Individual Session(pt doing fabulous with diet changes and portioning and managing how often he treats himself)  Physical Activity (incorporating physical activity into one's lifestyle): Discussion, Comprehends Key Points, Individual Session(pt working on increasing  activity levels, travelling a lot and working on travel schedule activity)  Medications (states correct name, dose, onset, peak, duration, side effects & timing of meds): Discussion, Comprehends Key Points, Individual Session(doing fantastic w/ insulin dosing)  Monitoring (monitoring blood glucose/other parameters & using results): Discussion, Comprehends Key Points, Individual Session(logs 100%, perfectly completed, discussed trying to see if dexcom CGM covered - unfortunately not a benefit of his insurance for type II yet)  Acute Complications (preventing, detecting, and treating acute complications): Not Covered/Deferred  Chronic Complications (preventing, detecting, and treating chronic complications): Discussion, Requires Assistance Family/SO, Individual Session(new A1C, expect will be fabulous)  Clinical (diabetes, other pertinent medical history, and relevant comorbidities reviewed during visit): Discussion, Requires Assistance Family/SO, Individual Session(continuing to lose weight, discussed weight loss and increased activity could lessen insulin requirements - discussed when to call clinic for  adjustments)  Cognitive (knowledge of self-management skills, functional health literacy): Not Covered/Deferred  Psychosocial (emotional response to diabetes): Not Covered/Deferred  Diabetes Distress and Support Systems: Not Covered/Deferred  Behavioral (readiness for change, lifestyle practices, self-care behaviors): Discussion, Requires Assistance Family/SO, Individual Session(pt doing wonderful with mangaging diabetes!)    Goals  Patient has selected/evaluated goals during today's session: Yes, evaluated  Physical Activity: In Progress         Diabetes Care Plan/Intervention  Education Plan/Intervention: Individual Follow-Up DSMT    Diabetes Meal Plan  Restrictions: Restricted Carbohydrate  Carbohydrate Per Meal: 30-45g    Today's Self-Management Care Plan was developed with the patient's input and is based on barriers identified during today's assessment.    The long and short-term goals in the care plan were written with the patient/caregiver's input. The patient has agreed to work toward these goals to improve his overall diabetes control.      The patient received a copy of today's self-management plan and verbalized understanding of the care plan, goals, and all of today's instructions.      The patient was encouraged to communicate with his physician and care team regarding his condition(s) and treatment.  I provided the patient with my contact information today and encouraged him to contact me via phone or patient portal as needed.     Education Units of Time   Time Spent: 30 min

## 2019-09-05 PROBLEM — M75.101 RIGHT ROTATOR CUFF TEAR: Status: ACTIVE | Noted: 2019-06-04

## 2019-09-05 NOTE — PROGRESS NOTES
Subjective:       Patient ID: Henry Zacarias is a 59 y.o. male who  has a past medical history of Cellulitis of back except buttock (5/28/2019), DM2 (diabetes mellitus, type 2), Essential hypertension, HLD (hyperlipidemia), Obesity (BMI 30.0-34.9), and Sleep apnea.    Chief Complaint: Follow-up (DM)     History was obtained from the patient and supplemented through chart review  -Saw Ortho for right shoulder pain likely 2/2 degenerative rotator cuff partial tear.  Naproxen b.i.d. passive ROM exercises.  -reviewed outside records; eye exam w/o retinopathy 07/16/2019.    Works as a  for the fire department.    Diabetes   He has type 2 diabetes mellitus. No MedicAlert identification noted. The initial diagnosis of diabetes was made 6 years ago. Hypoglycemia symptoms include sleepiness. Pertinent negatives for hypoglycemia include no confusion, dizziness, headaches, hunger, mood changes, nervousness/anxiousness, pallor, seizures, speech difficulty, sweats or tremors. Pertinent negatives for diabetes include no blurred vision, no chest pain, no fatigue, no foot paresthesias, no foot ulcerations, no polydipsia, no polyphagia, no polyuria, no visual change, no weakness and no weight loss. Hypoglycemia complications include hospitalization. Pertinent negatives for hypoglycemia complications include no blackouts, no nocturnal hypoglycemia, no required assistance and no required glucagon injection. Symptoms are worsening. Diabetic complications include impotence. Pertinent negatives for diabetic complications include no autonomic neuropathy, CVA, heart disease, nephropathy, peripheral neuropathy, PVD or retinopathy. Risk factors for coronary artery disease include dyslipidemia, hypertension, sedentary lifestyle, stress and diabetes mellitus. Current diabetic treatment includes diet, insulin injections and oral agent (triple therapy). He is compliant with treatment none of the time. He is currently taking  insulin pre-breakfast, pre-lunch, pre-dinner and at bedtime. Insulin injections are given by patient. Rotation sites for injection include the abdominal wall and arms. His weight is decreasing steadily. He is following a generally unhealthy diet. When asked about meal planning, he reported none. He has had a previous visit with a dietitian. He never participates in exercise. He monitors blood glucose at home 3-4 x per day. He monitors urine at home <1 x per month. There is no compliance with monitoring of blood glucose. His home blood glucose trend is fluctuating minimally. He does not see a podiatrist.Eye exam is current.      DM2 is now controlled.  Diagnosed in  but see a doctor in 6 years.  Currently pt is taking Levemir 45 qAM, NovoLog 14 BID, 12 qHS a.c..  Metformin 500 b.i.d.    Diet:  Has a hard time when he's out of town for lunch.  Eats fruit/veggies when he's in town.  Maybe 1-2 spoons of ice cream and puts it away.  Exercise:  Not much exercise at the moment due to reduced ROM of R shoulder and doesn't think a gym membership would be beneficial at this time.  Wants to start walking in the fall.    Denies low sugars or symptoms of hypoglycemia. Lowest BG 80 and asymptomatic.  AM fasting B-124  BG Before lunch:   BG Before dinner:   BG At bedtime:     On an ACE/ARB.   Retinal exams: UTD; [facility] Optometry 2019 without retinopathy.  Foot exams:  [facility]  Is following with diabetes education.  Hemoglobin A1C   Date Value Ref Range Status   2019 5.9 (H) 4.0 - 5.6 % Final     Comment:     ADA Screening Guidelines:  5.7-6.4%  Consistent with prediabetes  >or=6.5%  Consistent with diabetes  High levels of fetal hemoglobin interfere with the HbA1C  assay. Heterozygous hemoglobin variants (HbS, HgC, etc)do  not significantly interfere with this assay.   However, presence of multiple variants may affect accuracy.     06/10/2019 10.7 (H) 4.0 - 5.6 % Final     Comment:     ADA Screening  Guidelines:  5.7-6.4%  Consistent with prediabetes  >or=6.5%  Consistent with diabetes  High levels of fetal hemoglobin interfere with the HbA1C  assay. Heterozygous hemoglobin variants (HbS, HgC, etc)do  not significantly interfere with this assay.   However, presence of multiple variants may affect accuracy.     05/28/2019 12.1 (H) 4.0 - 5.6 % Final     Comment:     ADA Screening Guidelines:  5.7-6.4%  Consistent with prediabetes  >or=6.5%  Consistent with diabetes  High levels of fetal hemoglobin interfere with the HbA1C  assay. Heterozygous hemoglobin variants (HbS, HgC, etc)do  not significantly interfere with this assay.   However, presence of multiple variants may affect accuracy.       HTN:    Pt's BP is well controlled on valsartan 160 (has DM), Lopressor 50 b.i.d. Tolerating meds well. Pt denies CP, SOB.    Exercise:  Wants to start walking.  R arm limited d/t cuff tear.    HLD:  On Lipitor 40, ASA 81  Lab Results   Component Value Date    LDLCALC 145.2 06/10/2019     The 10-year ASCVD risk score ([place]ristina VALENTE Jr., et al., 2013) is: 19.8%    Values used to calculate the score:      Age: 59 years      Sex: Male      Is Non- : No      Diabetic: Yes      Tobacco smoker: No      Systolic Blood Pressure: 124 mmHg      Is BP treated: Yes      HDL Cholesterol: 37 mg/dL      Total Cholesterol: 229 mg/dL    AOCD: likely 2/2 cellulitis.  Lab Results   Component Value Date    IRON 105 06/10/2019    TIBC 408 06/10/2019    FERRITIN 920 (H) 06/10/2019     Lab Results   Component Value Date    [id number] 06/10/2019     Right rotator cuff tear:  He is R handed.  Difficulty reaching overhead.  Hard to initiate right arm abduction.  No inciting event, trauma.  No neck pain.  Confirmed on MRI.  Following with Sports Medicine.  Plan to reassess, considering scope debridement/replacement.     KEVIN:  On CPAP q.h.s.    Review of Systems   Constitutional: Negative for activity change, fatigue, fever,  "unexpected weight change and weight loss.   HENT: Negative for hearing loss, postnasal drip, rhinorrhea and trouble swallowing.    Eyes: Negative for blurred vision, discharge, redness and visual disturbance.   Respiratory: Negative for chest tightness, shortness of breath and wheezing.    Cardiovascular: Negative for chest pain and palpitations.   Gastrointestinal: Negative for abdominal pain, blood in stool, constipation, diarrhea and vomiting.   Endocrine: Negative for polydipsia, polyphagia and polyuria.   Genitourinary: Positive for impotence. Negative for difficulty urinating, dysuria, hematuria and urgency.   Musculoskeletal: Positive for arthralgias. Negative for gait problem, joint swelling and neck pain.   Skin: Negative for pallor and wound.        Left shoulder cellulitis resolved.   Neurological: Negative for dizziness, tremors, seizures, speech difficulty, weakness and headaches.   Hematological: Negative for adenopathy.   Psychiatric/Behavioral: Negative for confusion and dysphoric mood. The patient is not nervous/anxious.        I personally reviewed Past Medical History, Past Surgical History, Social History, and Family History.    Objective:      Vitals:    09/06/19 0753   BP: 124/80   Pulse: 72   SpO2: 97%   Weight: 97.2 kg (214 lb 4.6 oz)   Height: 5' 10" (1.778 m)      Physical Exam   Constitutional: He appears well-developed and well-nourished. No distress.   HENT:   Head: Normocephalic and atraumatic.   Nose: Nose normal.   Mouth/Throat: Oropharynx is clear and moist. No oropharyngeal exudate.   Eyes: Pupils are equal, round, and reactive to light. EOM are normal. Right eye exhibits no discharge. Left eye exhibits no discharge. No scleral icterus.   Neck: Neck supple. No tracheal deviation present. No thyromegaly present.   Cardiovascular: Normal rate, regular rhythm, normal heart sounds and intact distal pulses.   No murmur heard.  Pulmonary/Chest: Effort normal and breath sounds normal. No " respiratory distress. He has no wheezes.   Abdominal: Soft. Bowel sounds are normal. He exhibits no distension. There is no tenderness.   Musculoskeletal: He exhibits no edema or deformity.   Unable to abduct RUE at shoulder   Lymphadenopathy:     He has no cervical adenopathy.   Neurological: He is alert. No cranial nerve deficit. Gait normal.   Skin: Skin is warm and dry. Capillary refill takes less than 2 seconds. He is not diaphoretic. No erythema.   L upper back/shoulder cellulitis resolved.  Incision healed.  No erythema, drainage, fluctuance.   Psychiatric: He has a normal mood and affect. His behavior is normal.         Lab Results   Component Value Date    WBC 10.73 06/03/2019    HGB 12.9 (L) 06/03/2019    HCT 37.4 (L) 06/03/2019     (H) 06/03/2019    CHOL 229 (H) 06/10/2019    TRIG 234 (H) 06/10/2019    HDL 37 (L) 06/10/2019    ALT 19 06/03/2019    AST 17 06/03/2019     06/03/2019    K 4.2 06/03/2019     06/03/2019    CREATININE 0.8 06/03/2019    BUN 17 06/03/2019    CO2 25 06/03/2019    TSH 2.465 04/12/2013    HGBA1C 5.9 (H) 08/26/2019       The 10-year ASCVD risk score ([place] VALENTE Jr., et al., 2013) is: 19.8%    Values used to calculate the score:      Age: 59 years      Sex: Male      Is Non- : No      Diabetic: Yes      Tobacco smoker: No      Systolic Blood Pressure: 124 mmHg      Is BP treated: Yes      HDL Cholesterol: 37 mg/dL      Total Cholesterol: 229 mg/dL    (Imaging have been independently reviewed)  MRI R shoulder with infraspinatus, supraspinatus tear    Assessment:       1. Type 2 diabetes mellitus without complication, with long-term current use of insulin    2. Essential hypertension    3. Mixed hyperlipidemia    4. Anemia of chronic disease    5. Tear of right rotator cuff, unspecified tear extent    6. KEVIN on CPAP    7. Flu vaccine need    8. Need for zoster vaccine    9. Colon cancer screening          Plan:       Henry was seen today for  follow-up.    Diagnoses and all orders for this visit:    Type 2 diabetes mellitus without complication, with long-term current use of insulin  Comments:  Now at goal! Continue Levemir 45 daily, NovoLog 14 BID, 12 qHS, metformin 500 b.i.d.. F/u with DM edu. Encouraged exercise as tolerated. Repeat A1C 3 mo.  Orders:  -     Hemoglobin A1c; Future  -     Microalbumin/creatinine urine ratio; Future    Essential hypertension  Comments:  At goal.  Continue valsartan 160 (DM), Lopressor 50 b.i.d.; could consider stopping BB in the future.  Orders:  -     metoprolol tartrate (LOPRESSOR) 50 MG tablet; Take 1 tablet (50 mg total) by mouth 2 (two) times daily.    Mixed hyperlipidemia  Comments:  Improved diet.  Cont Lipitor 40, ASA 81.    Anemia of chronic disease  Comments:  likely 2/2 cellulitis, which has resolved.    Tear of right rotator cuff, unspecified tear extent  Comments:  Following with Sports med; considering sx. DM now at goal.    KEVIN on CPAP  Comments:  CPAP QHS.    Flu vaccine need  Comments:  Advised to obtain vaccine at Pharmacy.    Need for zoster vaccine  Comments:  Advised to obtain vaccine at Pharmacy.    Colon cancer screening  -     Ambulatory referral to Gastroenterology         Side effects of medication(s) were discussed in detail and patient voiced understanding.  Patient will call back for any issues or complications.     RTC in 3 month(s) or sooner PRN for diabetes with A1c prior.

## 2019-09-06 ENCOUNTER — IMMUNIZATION (OUTPATIENT)
Dept: PHARMACY | Facility: CLINIC | Age: 59
End: 2019-09-06

## 2019-09-06 ENCOUNTER — IMMUNIZATION (OUTPATIENT)
Dept: PHARMACY | Facility: CLINIC | Age: 59
End: 2019-09-06
Payer: COMMERCIAL

## 2019-09-06 ENCOUNTER — OFFICE VISIT (OUTPATIENT)
Dept: INTERNAL MEDICINE | Facility: CLINIC | Age: 59
End: 2019-09-06
Payer: COMMERCIAL

## 2019-09-06 VITALS
OXYGEN SATURATION: 97 % | DIASTOLIC BLOOD PRESSURE: 80 MMHG | HEIGHT: 70 IN | BODY MASS INDEX: 30.68 KG/M2 | WEIGHT: 214.31 LBS | SYSTOLIC BLOOD PRESSURE: 124 MMHG | HEART RATE: 72 BPM

## 2019-09-06 DIAGNOSIS — Z23 FLU VACCINE NEED: ICD-10-CM

## 2019-09-06 DIAGNOSIS — D63.8 ANEMIA OF CHRONIC DISEASE: ICD-10-CM

## 2019-09-06 DIAGNOSIS — I10 ESSENTIAL HYPERTENSION: ICD-10-CM

## 2019-09-06 DIAGNOSIS — M75.101 TEAR OF RIGHT ROTATOR CUFF, UNSPECIFIED TEAR EXTENT: ICD-10-CM

## 2019-09-06 DIAGNOSIS — Z79.4 TYPE 2 DIABETES MELLITUS WITHOUT COMPLICATION, WITH LONG-TERM CURRENT USE OF INSULIN: Primary | ICD-10-CM

## 2019-09-06 DIAGNOSIS — Z23 NEED FOR ZOSTER VACCINE: ICD-10-CM

## 2019-09-06 DIAGNOSIS — E11.9 TYPE 2 DIABETES MELLITUS WITHOUT COMPLICATION, WITH LONG-TERM CURRENT USE OF INSULIN: Primary | ICD-10-CM

## 2019-09-06 DIAGNOSIS — G47.33 OSA ON CPAP: ICD-10-CM

## 2019-09-06 DIAGNOSIS — E78.2 MIXED HYPERLIPIDEMIA: ICD-10-CM

## 2019-09-06 DIAGNOSIS — Z12.11 COLON CANCER SCREENING: ICD-10-CM

## 2019-09-06 PROBLEM — L03.312 CELLULITIS OF BACK EXCEPT BUTTOCK: Status: RESOLVED | Noted: 2019-05-28 | Resolved: 2019-09-06

## 2019-09-06 PROCEDURE — 3008F BODY MASS INDEX DOCD: CPT | Mod: CPTII,S$GLB,, | Performed by: INTERNAL MEDICINE

## 2019-09-06 PROCEDURE — 3074F PR MOST RECENT SYSTOLIC BLOOD PRESSURE < 130 MM HG: ICD-10-PCS | Mod: CPTII,S$GLB,, | Performed by: INTERNAL MEDICINE

## 2019-09-06 PROCEDURE — 3079F PR MOST RECENT DIASTOLIC BLOOD PRESSURE 80-89 MM HG: ICD-10-PCS | Mod: CPTII,S$GLB,, | Performed by: INTERNAL MEDICINE

## 2019-09-06 PROCEDURE — 3008F PR BODY MASS INDEX (BMI) DOCUMENTED: ICD-10-PCS | Mod: CPTII,S$GLB,, | Performed by: INTERNAL MEDICINE

## 2019-09-06 PROCEDURE — 3044F PR MOST RECENT HEMOGLOBIN A1C LEVEL <7.0%: ICD-10-PCS | Mod: CPTII,S$GLB,, | Performed by: INTERNAL MEDICINE

## 2019-09-06 PROCEDURE — 3079F DIAST BP 80-89 MM HG: CPT | Mod: CPTII,S$GLB,, | Performed by: INTERNAL MEDICINE

## 2019-09-06 PROCEDURE — 99999 PR PBB SHADOW E&M-EST. PATIENT-LVL IV: ICD-10-PCS | Mod: PBBFAC,,, | Performed by: INTERNAL MEDICINE

## 2019-09-06 PROCEDURE — 3074F SYST BP LT 130 MM HG: CPT | Mod: CPTII,S$GLB,, | Performed by: INTERNAL MEDICINE

## 2019-09-06 PROCEDURE — 3044F HG A1C LEVEL LT 7.0%: CPT | Mod: CPTII,S$GLB,, | Performed by: INTERNAL MEDICINE

## 2019-09-06 PROCEDURE — 99215 PR OFFICE/OUTPT VISIT, EST, LEVL V, 40-54 MIN: ICD-10-PCS | Mod: S$GLB,,, | Performed by: INTERNAL MEDICINE

## 2019-09-06 PROCEDURE — 99215 OFFICE O/P EST HI 40 MIN: CPT | Mod: S$GLB,,, | Performed by: INTERNAL MEDICINE

## 2019-09-06 PROCEDURE — 99999 PR PBB SHADOW E&M-EST. PATIENT-LVL IV: CPT | Mod: PBBFAC,,, | Performed by: INTERNAL MEDICINE

## 2019-09-06 RX ORDER — METOPROLOL TARTRATE 50 MG/1
50 TABLET ORAL 2 TIMES DAILY
Qty: 180 TABLET | Refills: 1 | Status: SHIPPED | OUTPATIENT
Start: 2019-09-06 | End: 2019-12-19 | Stop reason: SDUPTHER

## 2019-09-10 ENCOUNTER — OFFICE VISIT (OUTPATIENT)
Dept: SPORTS MEDICINE | Facility: CLINIC | Age: 59
End: 2019-09-10
Payer: COMMERCIAL

## 2019-09-10 VITALS
WEIGHT: 214 LBS | SYSTOLIC BLOOD PRESSURE: 143 MMHG | HEIGHT: 70 IN | HEART RATE: 69 BPM | DIASTOLIC BLOOD PRESSURE: 86 MMHG | BODY MASS INDEX: 30.64 KG/M2

## 2019-09-10 DIAGNOSIS — M75.121 COMPLETE TEAR OF RIGHT ROTATOR CUFF: Primary | ICD-10-CM

## 2019-09-10 PROCEDURE — 99999 PR PBB SHADOW E&M-EST. PATIENT-LVL III: CPT | Mod: PBBFAC,,, | Performed by: ORTHOPAEDIC SURGERY

## 2019-09-10 PROCEDURE — 3077F PR MOST RECENT SYSTOLIC BLOOD PRESSURE >= 140 MM HG: ICD-10-PCS | Mod: CPTII,S$GLB,, | Performed by: ORTHOPAEDIC SURGERY

## 2019-09-10 PROCEDURE — 3079F PR MOST RECENT DIASTOLIC BLOOD PRESSURE 80-89 MM HG: ICD-10-PCS | Mod: CPTII,S$GLB,, | Performed by: ORTHOPAEDIC SURGERY

## 2019-09-10 PROCEDURE — 3079F DIAST BP 80-89 MM HG: CPT | Mod: CPTII,S$GLB,, | Performed by: ORTHOPAEDIC SURGERY

## 2019-09-10 PROCEDURE — 3008F PR BODY MASS INDEX (BMI) DOCUMENTED: ICD-10-PCS | Mod: CPTII,S$GLB,, | Performed by: ORTHOPAEDIC SURGERY

## 2019-09-10 PROCEDURE — 99213 OFFICE O/P EST LOW 20 MIN: CPT | Mod: S$GLB,,, | Performed by: ORTHOPAEDIC SURGERY

## 2019-09-10 PROCEDURE — 3008F BODY MASS INDEX DOCD: CPT | Mod: CPTII,S$GLB,, | Performed by: ORTHOPAEDIC SURGERY

## 2019-09-10 PROCEDURE — 99999 PR PBB SHADOW E&M-EST. PATIENT-LVL III: ICD-10-PCS | Mod: PBBFAC,,, | Performed by: ORTHOPAEDIC SURGERY

## 2019-09-10 PROCEDURE — 99213 PR OFFICE/OUTPT VISIT, EST, LEVL III, 20-29 MIN: ICD-10-PCS | Mod: S$GLB,,, | Performed by: ORTHOPAEDIC SURGERY

## 2019-09-10 PROCEDURE — 3077F SYST BP >= 140 MM HG: CPT | Mod: CPTII,S$GLB,, | Performed by: ORTHOPAEDIC SURGERY

## 2019-09-10 NOTE — PROGRESS NOTES
"CC:  Follow up right shoulder     59 y.o. RHD Male who returns today for follow up. MRI was discussed last visit. Diagnosis is acute on chronic rotator cuff tear with significant degenerative changes and clinical signs of irreparability. He was seen back in June but was only 2 wk s/p an I&D of cellulitis on his left upper trap. This has since resolved. No longer taking antibiotics. Has also gotten his A1C under control as well, A1C in August was 5.9. Complaint today is some mild positional R shoulder pain which he describes as achy. Stiffness in the AM. He had some functional limitations with the shoulder but no significant pain on a daily basis. No night pain. Denies neck pain or radicular symptoms. Here today to discuss possible treatment options.     Negative for tobacco.   Positive for diabetes. Last A1C; 5.9 in 8/2019.     Pain Score: 0-No pain    PAST MEDICAL HISTORY:   Past Medical History:   Diagnosis Date    Cellulitis of back except buttock 5/28/2019    DM2 (diabetes mellitus, type 2)     Essential hypertension     HLD (hyperlipidemia)     mixed    Obesity (BMI 30.0-34.9)     Sleep apnea        PAST SURGICAL HISTORY:  Past Surgical History:   Procedure Laterality Date    INCISION AND DRAINAGE, BACK UPPER Left 5/29/2019    [facility]    TONSILLECTOMY         FAMILY HISTORY:  Family History   Problem Relation Age of Onset    COPD Mother     Hyperlipidemia Father     Hypertension Father     Heart disease Father     Dementia Father     Diabetes Father     Colon cancer Neg Hx     Prostate cancer Neg Hx        MEDICATIONS:    Current Outpatient Medications:     aspirin 81 MG Chew, Take 1 tablet (81 mg total) by mouth once daily., Disp: 90 tablet, Rfl: 3    atorvastatin (LIPITOR) 40 MG tablet, Take 1 tablet (40 mg total) by mouth once daily., Disp: 90 tablet, Rfl: 3    BD ULTRA-FINE JEANNETTE PEN NEEDLE 32 gauge x 5/32" Ndle, TEST 4 (FOUR) TIMES DAILY., Disp: " 100 each, Rfl: 11    blood sugar diagnostic Strp, 1 each by Misc.(Non-Drug; Combo Route) route 4 (four) times daily before meals and nightly., Disp: 100 each, Rfl: 3    blood-glucose meter Misc, Use as instructed, Disp: 1 each, Rfl: 0    insulin (LANTUS SOLOSTAR U-100 INSULIN) glargine 100 units/mL (3mL) SubQ pen, Inject 45 Units into the skin once daily., Disp: 13.5 mL, Rfl: 0    insulin admin supplies (NOVOPEN ECHO) InPn, Inject 15 Units into the skin 3 (three) times daily before meals., Disp: 13.5 mL, Rfl: 0    insulin admin supplies InPn, Inject into the skin., Disp: , Rfl:     insulin aspart U-100 (NOVOLOG PENFILL U-100 INSULIN) 100 unit/mL Crtg, Inject 15 Units into the skin 3 (three) times daily with meals., Disp: 13.5 mL, Rfl: 0    insulin aspart U-100 (NOVOLOG) 100 unit/mL (3 mL) InPn pen, Inject 15 Units into the skin 3 (three) times daily with meals., Disp: 40.5 mL, Rfl: 3    insulin detemir U-100 (LEVEMIR FLEXTOUCH) 100 unit/mL (3 mL) SubQ InPn pen, Inject 45 Units into the skin once daily., Disp: 40.5 mL, Rfl: 3    lancets (MICROLET LANCET) Misc, 100 lancets by Misc.(Non-Drug; Combo Route) route 4 (four) times daily before meals and nightly., Disp: 200 each, Rfl: 3    metFORMIN (GLUCOPHAGE) 500 MG tablet, Take 1 tablet (500 mg total) by mouth 2 (two) times daily with meals., Disp: 180 tablet, Rfl: 3    metoprolol tartrate (LOPRESSOR) 50 MG tablet, Take 1 tablet (50 mg total) by mouth 2 (two) times daily., Disp: 180 tablet, Rfl: 1    valsartan (DIOVAN) 160 MG tablet, Take 1 tablet (160 mg total) by mouth once daily., Disp: 90 tablet, Rfl: 1    ALLERGIES:  Review of patient's allergies indicates:  No Known Allergies       REVIEW OF SYSTEMS:  Constitution: Negative. Negative for chills, fever and night sweats.    Hematologic/Lymphatic: Negative for bleeding problem. Does not bruise/bleed easily.   Skin: Negative for dry skin, itching and rash.   Musculoskeletal: Negative for falls. Positive for  "right shoulder pain and muscle weakness.     All other review of symptoms were reviewed and found to be noncontributory.     PHYSICAL EXAMINATION:  Vitals:  BP (!) 143/86   Pulse 69   Ht 5' 10" (1.778 m)   Wt 97.1 kg (214 lb)   BMI 30.71 kg/m²    General: Well-developed well-nourished 59 y.o. malein no acute distress   Cardiovascular: Regular rhythm by palpation of distal pulse, normal color and temperature, no concerning varicosities on symptomatic side   Lungs: No labored breathing or wheezing appreciated   Neuro: Alert and oriented ×3   Psychiatric: well oriented to person, place and time, demonstrates normal mood and affect   Skin: No rashes, lesions or ulcers, normal temperature, turgor, and texture on uninvolved extremity    Ortho/SPM Exam  Examination of the right shoulder demonstrates negative biceps and AC joint. Active FE to 160 with shoulder hiking, ER at side to 10, IR to T10. +ER lag to his belly. Negative belly press. Negative lift off.  4-/5 resisted supraspinatus strength testing. 2/5 ER. Stable shoulder. No midline neck tenderness. Neg [place].     Healed incision on the left upper trap. No signs of infection.    IMAGING:  Xrays including AP, Outlet and Axillary Lateral of RIGHT shoulder are ordered / images reviewed by me:   No sig DJD. Normal AHI    MRI of RIGHT shoulder reviewed by me and discussed with patient. Study shows:    Massive RC tear involving primarily the SS and IS with sig tissue retraction to the level of the glenoid with evidence of intrasubstance degeneration. Goutallier 2 of IS. Neg [place]. FI of teres minor.     ASSESSMENT:      ICD-10-CM ICD-9-CM   1. Complete tear of right rotator cuff M75.121 727.61     Acute on chronic with sig degenerative changes and clinical signs of possible irreparability in setting of +ER lag    PLAN:      -Findings and treatment options were discussed with the patient to include both operative and non operative management   -I have some concern " with regards to the potential healing and functional outcome of a cuff repair +/- patch augmentation/SCR primarily due to his IS dysfunction. The outcome of a RTSA would be a bit more predictable but certainly he remains on the younger side for that surgery..   -He has no significant pain on a daily basis, pain is not disruptive of sleep at night. I have recommended continued non operative management for now to include some PT and NSAIDs as needed   -If pain or functional limitation becomes more significant, we will meet again to reconsider surgical tx options.   -All questions answered      Procedures

## 2019-09-11 DIAGNOSIS — Z79.4 TYPE 2 DIABETES MELLITUS WITH COMPLICATION, WITH LONG-TERM CURRENT USE OF INSULIN: ICD-10-CM

## 2019-09-11 DIAGNOSIS — E11.8 TYPE 2 DIABETES MELLITUS WITH COMPLICATION, WITH LONG-TERM CURRENT USE OF INSULIN: ICD-10-CM

## 2019-09-11 RX ORDER — INSULIN GLARGINE 100 [IU]/ML
INJECTION, SOLUTION SUBCUTANEOUS
Qty: 3 SYRINGE | Refills: 0 | OUTPATIENT
Start: 2019-09-11

## 2019-09-13 DIAGNOSIS — Z79.4 TYPE 2 DIABETES MELLITUS WITH COMPLICATION, WITH LONG-TERM CURRENT USE OF INSULIN: ICD-10-CM

## 2019-09-13 DIAGNOSIS — E11.8 TYPE 2 DIABETES MELLITUS WITH COMPLICATION, WITH LONG-TERM CURRENT USE OF INSULIN: ICD-10-CM

## 2019-09-13 RX ORDER — INSULIN ASPART 100 [IU]/ML
INJECTION, SOLUTION INTRAVENOUS; SUBCUTANEOUS
Refills: 0 | OUTPATIENT
Start: 2019-09-13

## 2019-09-16 DIAGNOSIS — E11.8 TYPE 2 DIABETES MELLITUS WITH COMPLICATION, WITH LONG-TERM CURRENT USE OF INSULIN: ICD-10-CM

## 2019-09-16 DIAGNOSIS — Z79.4 TYPE 2 DIABETES MELLITUS WITH COMPLICATION, WITH LONG-TERM CURRENT USE OF INSULIN: ICD-10-CM

## 2019-09-16 RX ORDER — INSULIN ADMIN. SUPPLIES
INSULIN PEN (EA) SUBCUTANEOUS
Refills: 0 | OUTPATIENT
Start: 2019-09-16

## 2019-09-21 DIAGNOSIS — Z79.4 TYPE 2 DIABETES MELLITUS WITH COMPLICATION, WITH LONG-TERM CURRENT USE OF INSULIN: ICD-10-CM

## 2019-09-21 DIAGNOSIS — E11.8 TYPE 2 DIABETES MELLITUS WITH COMPLICATION, WITH LONG-TERM CURRENT USE OF INSULIN: ICD-10-CM

## 2019-09-23 RX ORDER — BLOOD SUGAR DIAGNOSTIC
STRIP MISCELLANEOUS
Qty: 100 STRIP | Refills: 11 | Status: SHIPPED | OUTPATIENT
Start: 2019-09-23 | End: 2020-10-04

## 2019-10-07 ENCOUNTER — CLINICAL SUPPORT (OUTPATIENT)
Dept: REHABILITATION | Facility: OTHER | Age: 59
End: 2019-10-07
Attending: ORTHOPAEDIC SURGERY
Payer: COMMERCIAL

## 2019-10-07 DIAGNOSIS — R53.1 DECREASED RANGE OF MOTION WITH DECREASED STRENGTH: ICD-10-CM

## 2019-10-07 DIAGNOSIS — M25.511 CHRONIC RIGHT SHOULDER PAIN: ICD-10-CM

## 2019-10-07 DIAGNOSIS — M25.60 DECREASED RANGE OF MOTION WITH DECREASED STRENGTH: ICD-10-CM

## 2019-10-07 DIAGNOSIS — G89.29 CHRONIC RIGHT SHOULDER PAIN: ICD-10-CM

## 2019-10-07 PROCEDURE — 97163 PT EVAL HIGH COMPLEX 45 MIN: CPT | Mod: PN

## 2019-10-07 PROCEDURE — 97110 THERAPEUTIC EXERCISES: CPT | Mod: PN

## 2019-10-07 NOTE — PLAN OF CARE
[facility]  Physical Therapy Initial Evaluation    Name: Henry Zacarias  Clinic Number: [id number]    Therapy Diagnosis:   Encounter Diagnoses   Name Primary?    Chronic right shoulder pain     Decreased range of motion with decreased strength       Physician: NUSRAT Mccoy MD    Physician Orders: PT Eval and Treat   Medical Diagnosis from Referral: M75.121 (ICD-10-CM) - Complete tear of right rotator cuff  Evaluation Date: 10/7/2019  Authorization Period Expiration: 12/31/2019  Plan of Care Expiration: 01/05/2020  Visit # / Visits authorized: 1/ 30    Time In: 3:00  Time Out: 3:45  Total Billable Time: 45 minutes    Precautions: Standard, Diabetes and HTN    Subjective   Date of onset: June 2019  History of current condition - Henry reports: acute onset of R shoulder pain following pushing a car door closed forcefully with the right elbow with his arm wrapped behind him. Recent MRI noted RTC tears with fat infiltration. He is not eligible for RTC repair, but his alternatives are PT or a R TSA. he lives alone and has no one to help him if he is in a sling for several weeks at a time.     C/c today is general ache in posterior shoulder with increased difficulty elevating his arm with HHCs and work related activities. Notes increased pain that wraps posteriorly to anteriorly with raising arm overhead, reaching forward to shake hands, laying on right side.    Denies pain in the neck or down the R arm. He is R hand dominant.       Medical History:   Past Medical History:   Diagnosis Date    Cellulitis of back except buttock 5/28/2019    DM2 (diabetes mellitus, type 2)     Essential hypertension     HLD (hyperlipidemia)     mixed    Obesity (BMI 30.0-34.9)     Sleep apnea        Surgical History:   Henry Zacarias  has a past surgical history that includes Incision and drainage of back (Left, 5/29/2019) and Tonsillectomy.    Medications:   Henry has a current medication list which  "includes the following prescription(s): aspirin, atorvastatin, bd ultra-fine gallito pen needle, blood-glucose meter, contour next test strips, insulin, insulin admin supplies, insulin admin supplies, insulin aspart u-100, insulin aspart u-100, insulin detemir u-100, lancets, metformin, metoprolol tartrate, and valsartan.    Allergies:   Review of patient's allergies indicates:  No Known Allergies     Imaging, MRI studies, shoulder, 06/2019: Infraspinatus greater in extent than supraspinatus tear as above with concomitant relative muscular atrophy.  Associated tear of the cranial fibers of the subscapularis with mild subluxation of the biceps.  Joint effusion with fluid in the subacromial subdeltoid bursa.    Prior Therapy: none for c/c  Social History: lives alone  Occupation: property insurance for [employer]  Prior Level of Function: independent  Current Level of Function: pain, weakness, and difficulty with all functional activities    Pain:  Current 2/10, worst 6/10, best 1/10   Location: right shoulder (posterior > anterior)   Description: Aching, Dull and stiff, weak  Aggravating Factors: reaching OH, lifting OH, lift and carry, reaching forwards or out to the side, sleeping on the R side, dressing and grooming, driving, HHCs  Easing Factors: medication prn    Pts goals: improve strength and avoid surgery (TSA)    Objective     Observation: increased R shoulder protraction and anterior tilt  Palpation: muscle atrophy noted of RTC, deltoid    Shoulder  Right   Left  Pain/Dysfunction with Movement    AROM PROM MMT AROM PROM MMT    flexion 87 175 3- 175 -- 5 Crepitus with A/PROM   extension 55 -- NT 65 -- NT    abduction 165 175 4 170 -- 5 Slows arc of motion at 90 deg   adduction Elbow 4" to midline -- NT Elbow to midline -- NT    Internal rotation T8 -- 4 T10-11 -- 5    ER at 0° abd <10 40 2 75 -- 4+      Scapular Strength:   -Upper Trap 5/5  -Mid Trap 4/5  -Low Trap 4-/5  -Rhomboids " "4/5  -Serratus Anterior 4/5    Elbow Strength: Flexion = 4/5, Extension = 4/5    Flexibility:   - Pectoralis Major/Minor: poor  - Latissimus Dorsi: fair  -Subscapularis: fair    Joint Mobility: hypo with crepitus    Special Tests:    -Sulcus Sign: -  - Yergasons Test: -  - Apprehension Sign: -  - Supraspinatus (Empty Can Test) difficulty with GHJ IR, strong and painless  - Polina: -  - Neers: -  - Speeds: difficulty with GHJ ER, strong and painless      CMS Impairment/Limitation/Restriction for FOTO shoulder Survey    Therapist reviewed FOTO scores for Henry Zacarias on 10/7/2019.   FOTO documents entered into [employer] - see Media section.    Limitation Score: 37%  Category: Mobility    Current : CJ = at least 20% but < 40% impaired, limited or restricted  Goal: CJ = at least 20% but < 40% impaired, limited or restricted (goal <=27% disability)       TREATMENT   Treatment Time In: 3:30  Treatment Time Out: 3:45  Total Treatment time separate from Evaluation: 15 minutes    Henry received therapeutic exercises to develop strength, endurance, ROM, flexibility, posture and core stabilization for 15 minutes including:  Seated scap squeezes 10 x 10" holds  Prone Is, Ts, Ys, (unilat): 15 x 5"    Home Exercises and Patient Education Provided    Education provided:   - Patient educated regarding surgical procedure, pathogenesis, diagnosis, protocol, prognosis, POC, and HEP. Written Home Exercises Provided with written and verbal instructions for frequency and duration of the following exercises: see EMR. Pt educated on HEP and activity modifications to reduce c/o pain and improve overall function.   - Pt was educated in posture and body mechanics.    - Pt also educated on use of modalities prn to reduce c/o pain and dysfunction.     Written Home Exercises Provided: yes.  Exercises were reviewed and Henry was able to demonstrate them prior to the end of the session.  Henry demonstrated good  understanding of the " education provided.     See EMR under Patient Instructions for exercises provided 10/7/2019.    Assessment   Henry is a 59 y.o. male referred to outpatient Physical Therapy with a medical diagnosis of complete tear of R rotator cuff. Pt presents with marked limitations in ROM, flexibility, strength and postural endurance, that facilitates a rounded shoulder position and continued impingement. S/s associated with referring diagnosis of complete rotator cuff tear as seen with poor strength and compensatory maneuvers for OH elevation.    Pt prognosis is Good.   Pt will benefit from skilled outpatient Physical Therapy to address the deficits stated above and in the chart below, provide pt/family education, and to maximize pt's level of independence.     Plan of care discussed with patient: Yes  Pt's spiritual, cultural and educational needs considered and patient is agreeable to the plan of care and goals as stated below:     Anticipated Barriers for therapy: current RTC tear x2 - not eligible for RTC repair, dominant UE involved, travels for work    Medical Necessity is demonstrated by the following  History  Co-morbidities and personal factors that may impact the plan of care Co-morbidities:   coping style/mechanism, difficulty sleeping, excessive commute time/distance, financial considerations and level of undertstanding of current condition, RTC 2x tendon in R shoulder - non operable for RTC repair    Personal Factors:   coping style  social background  lifestyle   Lives alone     high   Examination  Body Structures and Functions, activity limitations and participation restrictions that may impact the plan of care Body Regions:   back  upper extremities  trunk    Body Systems:    gross symmetry  ROM  strength  gross coordinated movement  transfers  transitions  motor control  motor learning  joint mobility, flexibility, postural awareness and endurance    Participation Restrictions:   ADLs, HHCs, work related  activities, sleeping    Activity limitations:   Learning and applying knowledge  no deficits    General Tasks and Commands  no deficits    Communication  no deficits    Mobility  lifting and carrying objects  fine hand use (grasping/picking up)  driving (bike, car, motorcycle)    Self care  washing oneself (bathing, drying, washing hands)  caring for body parts (brushing teeth, shaving, grooming)  toileting  dressing  eating  drinking  looking after one's health    Domestic Life  shopping  cooking  doing house work (cleaning house, washing dishes, laundry)    Interactions/Relationships  no deficits    Life Areas  employment    Community and Social Life  community life         high   Clinical Presentation unstable clinical presentation with unpredictable characteristics high   Decision Making/ Complexity Score: high     Goals:  Short Term Goals (5 Weeks):   1. Pt to demonstrate improved ROM by 10% to allow pt to perform self care activities with increased ease.  2. Pt to demonstrate improved flexibility by a half grade to allow improved ADLs with increased ease.   3. Pt to demonstrate improved strength by a half grade to allow for increased ease with OH activities.  4. Pt will report being independent with her HEP for maintenance of improvements gained during therapy sessions  5. Pt to demonstrate improved functional ability with FOTO limitation <=32% disability.    Long Term Goals (10 Weeks):   1. Pt to demonstrate improved ROM by 50% to allow pt to perform self care with increased ease.  2. Pt to demonstrate improved flexibility by a full grade to allow improved postural alignment with increased ease.  3. Pt will demonstrate >=4+/5 strength within the right shoulder for ease with house hold chores  4. Pt to demonstrate improved functional ability with FOTO limitation <=27% disability.  5. Pt independent with HEP and demonstrates good return technique.      Plan   Plan of care Certification: 10/7/2019 to  01/05/2020.    Outpatient Physical Therapy 2 times weekly for 10 weeks to include the following interventions: Aquatic Therapy, Cervical/Lumbar Traction, Electrical Stimulation prn, Gait Training, Iontophoresis (with dexamethasone prn), Manual Therapy, Moist Heat/ Ice, Neuromuscular Re-ed, Patient Education, Self Care, Therapeutic Activites, Therapeutic Exercise and IASTYM, therapeutic taping, dry needling. Progress HEP towards D/C. Recommend F/U with MD if symptoms worsen or do not resolve. Patient may be seen by a PTA for treatment to carry out their plan of care.  Face-to-face conferences will be held.        Shannon Henry, PT

## 2019-10-10 NOTE — PROGRESS NOTES
"                            Physical Therapy Daily Treatment Note     Name: Henry Zacarias  Clinic Number: [id number]    Therapy Diagnosis:   Encounter Diagnoses   Name Primary?    Chronic right shoulder pain     Decreased range of motion with decreased strength      Physician: NUSRAT Mccoy MD    Visit Date: 10/11/2019  Physician Orders: PT Eval and Treat   Medical Diagnosis from Referral: M75.121 (ICD-10-CM) - Complete tear of right rotator cuff  Evaluation Date: 10/7/2019  Authorization Period Expiration: 12/31/2019  Plan of Care Expiration: 01/05/2020  Visit # / Visits authorized: 2/ 30    Time In: 9:00 AM  Time Out: 10:00 AM  Total Billable Time: 40 minutes    Precautions: Standard, Diabetes and HTN    Subjective   Pt reports: he has weakness raising his R arm and also externally rotating his R shoulder.  He was compliant with home exercise program.  Response to previous treatment: no adverse effects  Functional change: no change reported    Pain: 0/10  Location: shoulder  right    Objective     Henry received therapeutic exercises to develop strength, ROM, flexibility and posture for 40 minutes including:  pec stretch on 1/2 roll x 5'  No moneys on 1/2 roll with yellow band 20 x 3"  Side lying shoulder abd to 90 deg 20 x 3"  Side lying shoulder flex 20 x 3"  Side lying shoulder hor abd 20 x 3"  Prone Is, Ts, Ys, (unilat): 20 x 5"  Seated scap squeezes 10 x 10" holds      Henry received the following manual therapy techniques: Joint mobilizations were applied to the: R shoulder for 10 minutes, including:  Grades I and II inferior GH glides  Grades I and II posterior GH glides    Henry received cold pack for 10 minutes to R shoulder.      Home Exercises Provided and Patient Education Provided     Education provided:   - no new HEP today    Written Home Exercises Provided: Patient instructed to cont prior HEP.  Exercises were reviewed and Henry was able to demonstrate them prior to the end of the session.  " Henry demonstrated good  understanding of the education provided.     See EMR under Patient Instructions for exercises provided prior visit.    Assessment     Continues to present with R shoulder weakness, particularly with external rotation.    Henry is progressing well towards his goals.   Pt prognosis is Good.     Pt will continue to benefit from skilled outpatient physical therapy to address the deficits listed in the problem list box on initial evaluation, provide pt/family education and to maximize pt's level of independence in the home and community environment.     Pt's spiritual, cultural and educational needs considered and pt agreeable to plan of care and goals.    Anticipated barriers to physical therapy: current RTC tear x2 - not eligible for RTC repair, dominant UE involved, travels for work    Goals:   Short Term Goals (5 Weeks):   1. Pt to demonstrate improved ROM by 10% to allow pt to perform self care activities with increased ease. (in progress)  2. Pt to demonstrate improved flexibility by a half grade to allow improved ADLs with increased ease. (in progress)  3. Pt to demonstrate improved strength by a half grade to allow for increased ease with OH activities.(in progress)  4. Pt will report being independent with her HEP for maintenance of improvements gained during therapy sessions(in progress)  5. Pt to demonstrate improved functional ability with FOTO limitation <=32% disability.(in progress)     Long Term Goals (10 Weeks):   1. Pt to demonstrate improved ROM by 50% to allow pt to perform self care with increased ease.(in progress)  2. Pt to demonstrate improved flexibility by a full grade to allow improved postural alignment with increased ease.(in progress)  3. Pt will demonstrate >=4+/5 strength within the right shoulder for ease with house hold chores.(in progress)  4. Pt to demonstrate improved functional ability with FOTO limitation <=27% disability.(in progress)  5. Pt independent  with HEP and demonstrates good return technique.(in progress)    Plan     Continue with scapular stabilization and R shoulder strengthening.    Ponce Su, PT

## 2019-10-11 ENCOUNTER — CLINICAL SUPPORT (OUTPATIENT)
Dept: REHABILITATION | Facility: OTHER | Age: 59
End: 2019-10-11
Payer: COMMERCIAL

## 2019-10-11 DIAGNOSIS — M25.60 DECREASED RANGE OF MOTION WITH DECREASED STRENGTH: ICD-10-CM

## 2019-10-11 DIAGNOSIS — M25.511 CHRONIC RIGHT SHOULDER PAIN: ICD-10-CM

## 2019-10-11 DIAGNOSIS — G89.29 CHRONIC RIGHT SHOULDER PAIN: ICD-10-CM

## 2019-10-11 DIAGNOSIS — R53.1 DECREASED RANGE OF MOTION WITH DECREASED STRENGTH: ICD-10-CM

## 2019-10-11 PROCEDURE — 97140 MANUAL THERAPY 1/> REGIONS: CPT | Mod: PN

## 2019-10-11 PROCEDURE — 97110 THERAPEUTIC EXERCISES: CPT | Mod: PN

## 2019-10-14 LAB — CRC RECOMMENDATION EXT: NORMAL

## 2019-10-28 ENCOUNTER — CLINICAL SUPPORT (OUTPATIENT)
Dept: REHABILITATION | Facility: OTHER | Age: 59
End: 2019-10-28
Payer: COMMERCIAL

## 2019-10-28 DIAGNOSIS — M25.60 DECREASED RANGE OF MOTION WITH DECREASED STRENGTH: ICD-10-CM

## 2019-10-28 DIAGNOSIS — R53.1 DECREASED RANGE OF MOTION WITH DECREASED STRENGTH: ICD-10-CM

## 2019-10-28 DIAGNOSIS — M25.511 CHRONIC RIGHT SHOULDER PAIN: ICD-10-CM

## 2019-10-28 DIAGNOSIS — G89.29 CHRONIC RIGHT SHOULDER PAIN: ICD-10-CM

## 2019-10-28 PROCEDURE — 97110 THERAPEUTIC EXERCISES: CPT | Mod: PN

## 2019-10-28 NOTE — PROGRESS NOTES
"                            Physical Therapy Daily Treatment Note     Name: Henry Zacarias  Clinic Number: [id number]    Therapy Diagnosis:   Encounter Diagnoses   Name Primary?    Chronic right shoulder pain     Decreased range of motion with decreased strength      Physician: NUSRAT Mccoy MD    Visit Date: 10/28/2019  Physician Orders: PT Eval and Treat   Medical Diagnosis from Referral: M75.121 (ICD-10-CM) - Complete tear of right rotator cuff  Evaluation Date: 10/7/2019  Authorization Period Expiration: 12/31/2019  Plan of Care Expiration: 01/05/2020  Visit # / Visits authorized: 3/ 30    Time In: 3:00 PM  Time Out: 4:00 PM  Total Billable Time: 45 minutes    Precautions: Standard, Diabetes and HTN    Subjective   Pt reports: denies pain currently. "I have a high pain threshold."    He was compliant with home exercise program.  Response to previous treatment: no adverse effects  Functional change: no change reported    Pain: 0/10 ; 4/10 w/ elevation  Location: shoulder  right    Objective     Henry received therapeutic exercises to develop strength, ROM, flexibility and posture for 45 minutes including:  pec stretch on 1/2 roll x 5'  No moneys on 1/2 roll with yellow band 20 x 3"  Side lying shoulder abd to 90 deg 20 x 3"  Side lying shoulder flex 20 x 3"  Side lying shoulder hor abd 20 x 3"  Side lying ER 20 x 3"  Prone Is, Ts, Ys, (unilat): 20 x 5"  Seated scap squeezes 10 x 10" holds      Henry received the following manual therapy techniques: Joint mobilizations were applied to the: R shoulder for 10 minutes, including:  Grades I and II inferior GH glides  Grades I and II posterior GH glides    Henry received cold pack for 00 minutes to R shoulder.      Home Exercises Provided and Patient Education Provided     Education provided:   - no new HEP today    Written Home Exercises Provided: Patient instructed to cont prior HEP.  Exercises were reviewed and Henry was able to demonstrate them prior to the " end of the session.  Henry demonstrated good  understanding of the education provided.     See EMR under Patient Instructions for exercises provided prior visit.    Assessment     Pt tolerated therex without reports of increased pain. R shoulder hiking compensatory pattern noted with all side lying exercises and required VC for correction.     Henry is progressing well towards his goals.   Pt prognosis is Good.     Pt will continue to benefit from skilled outpatient physical therapy to address the deficits listed in the problem list box on initial evaluation, provide pt/family education and to maximize pt's level of independence in the home and community environment.     Pt's spiritual, cultural and educational needs considered and pt agreeable to plan of care and goals.    Anticipated barriers to physical therapy: current RTC tear x2 - not eligible for RTC repair, dominant UE involved, travels for work    Goals:   Short Term Goals (5 Weeks):   1. Pt to demonstrate improved ROM by 10% to allow pt to perform self care activities with increased ease. (in progress)  2. Pt to demonstrate improved flexibility by a half grade to allow improved ADLs with increased ease. (in progress)  3. Pt to demonstrate improved strength by a half grade to allow for increased ease with OH activities.(in progress)  4. Pt will report being independent with her HEP for maintenance of improvements gained during therapy sessions(in progress)  5. Pt to demonstrate improved functional ability with FOTO limitation <=32% disability.(in progress)     Long Term Goals (10 Weeks):   1. Pt to demonstrate improved ROM by 50% to allow pt to perform self care with increased ease.(in progress)  2. Pt to demonstrate improved flexibility by a full grade to allow improved postural alignment with increased ease.(in progress)  3. Pt will demonstrate >=4+/5 strength within the right shoulder for ease with house hold chores.(in progress)  4. Pt to demonstrate  improved functional ability with FOTO limitation <=27% disability.(in progress)  5. Pt independent with HEP and demonstrates good return technique.(in progress)    Plan     Continue with scapular stabilization and R shoulder strengthening.    Mateo Davis, PTA

## 2019-10-30 ENCOUNTER — CLINICAL SUPPORT (OUTPATIENT)
Dept: REHABILITATION | Facility: OTHER | Age: 59
End: 2019-10-30
Payer: COMMERCIAL

## 2019-10-30 DIAGNOSIS — G89.29 CHRONIC RIGHT SHOULDER PAIN: ICD-10-CM

## 2019-10-30 DIAGNOSIS — M25.60 DECREASED RANGE OF MOTION WITH DECREASED STRENGTH: ICD-10-CM

## 2019-10-30 DIAGNOSIS — M25.511 CHRONIC RIGHT SHOULDER PAIN: ICD-10-CM

## 2019-10-30 DIAGNOSIS — R53.1 DECREASED RANGE OF MOTION WITH DECREASED STRENGTH: ICD-10-CM

## 2019-10-30 PROCEDURE — 97110 THERAPEUTIC EXERCISES: CPT | Mod: PN

## 2019-10-30 PROCEDURE — 97140 MANUAL THERAPY 1/> REGIONS: CPT | Mod: PN

## 2019-10-30 NOTE — PROGRESS NOTES
"                            Physical Therapy Daily Treatment Note     Name: Henry Zacarias  Clinic Number: [id number]    Therapy Diagnosis:   Encounter Diagnoses   Name Primary?    Chronic right shoulder pain     Decreased range of motion with decreased strength      Physician: NUSRAT Mccoy MD    Visit Date: 10/30/2019  Physician Orders: PT Eval and Treat   Medical Diagnosis from Referral: M75.121 (ICD-10-CM) - Complete tear of right rotator cuff  Evaluation Date: 10/7/2019  Authorization Period Expiration: 12/31/2019  Plan of Care Expiration: 01/05/2020  Visit # / Visits authorized: 4/ 30    Time In: 3:00 PM  Time Out: 4:00 PM  Total Billable Time: 55 minutes    Precautions: Standard, Diabetes and HTN    Subjective   Pt reports: it feels the same since eval date. "I think I may need a reverse shoulder replacement."  He was compliant with home exercise program.  Response to previous treatment: no adverse effects  Functional change: no change reported    Pain: 0/10 ; 4/10 w/ elevation  Location: shoulder  right    Objective     Henry received therapeutic exercises to develop strength, ROM, flexibility and posture for 45 minutes including:  pec stretch on 1/2 roll x 5'  No moneys on 1/2 roll with yellow band 20 x 3"  Side lying shoulder abd to 90 deg 20 x 3"  Side lying shoulder flex 20 x 3"  Side lying shoulder hor abd 20 x 3"  Side lying ER 20 x 3"  Prone Is, Ts, Ys, (unilat): 20 x 5"  Seated scap squeezes 10 x 10" holds      Henry received the following manual therapy techniques: Joint mobilizations were applied to the: R shoulder for 10 minutes, including:  Grades I and II inferior GH glides  Grades I and II posterior GH glides    Henry received cold pack for 00 minutes to R shoulder.      Home Exercises Provided and Patient Education Provided     Education provided:   - no new HEP today    Written Home Exercises Provided: Patient instructed to cont prior HEP.  Exercises were reviewed and Henry was able to " demonstrate them prior to the end of the session.  Henry demonstrated good  understanding of the education provided.     See EMR under Patient Instructions for exercises provided prior visit.    Assessment     Pt continues to have difficulty with shoulder ER movements. Muscular guarding noted with manual therapy techniques.    Henry is progressing well towards his goals.   Pt prognosis is Good.     Pt will continue to benefit from skilled outpatient physical therapy to address the deficits listed in the problem list box on initial evaluation, provide pt/family education and to maximize pt's level of independence in the home and community environment.     Pt's spiritual, cultural and educational needs considered and pt agreeable to plan of care and goals.    Anticipated barriers to physical therapy: current RTC tear x2 - not eligible for RTC repair, dominant UE involved, travels for work    Goals:   Short Term Goals (5 Weeks):   1. Pt to demonstrate improved ROM by 10% to allow pt to perform self care activities with increased ease. (in progress)  2. Pt to demonstrate improved flexibility by a half grade to allow improved ADLs with increased ease. (in progress)  3. Pt to demonstrate improved strength by a half grade to allow for increased ease with OH activities.(in progress)  4. Pt will report being independent with her HEP for maintenance of improvements gained during therapy sessions(in progress)  5. Pt to demonstrate improved functional ability with FOTO limitation <=32% disability.(in progress)     Long Term Goals (10 Weeks):   1. Pt to demonstrate improved ROM by 50% to allow pt to perform self care with increased ease.(in progress)  2. Pt to demonstrate improved flexibility by a full grade to allow improved postural alignment with increased ease.(in progress)  3. Pt will demonstrate >=4+/5 strength within the right shoulder for ease with house hold chores.(in progress)  4. Pt to demonstrate improved  functional ability with FOTO limitation <=27% disability.(in progress)  5. Pt independent with HEP and demonstrates good return technique.(in progress)    Plan     Continue with scapular stabilization and R shoulder strengthening.    Mateo Davis, PTA

## 2019-11-04 ENCOUNTER — CLINICAL SUPPORT (OUTPATIENT)
Dept: REHABILITATION | Facility: OTHER | Age: 59
End: 2019-11-04
Payer: COMMERCIAL

## 2019-11-04 DIAGNOSIS — M25.511 CHRONIC RIGHT SHOULDER PAIN: ICD-10-CM

## 2019-11-04 DIAGNOSIS — M25.60 DECREASED RANGE OF MOTION WITH DECREASED STRENGTH: ICD-10-CM

## 2019-11-04 DIAGNOSIS — R53.1 DECREASED RANGE OF MOTION WITH DECREASED STRENGTH: ICD-10-CM

## 2019-11-04 DIAGNOSIS — G89.29 CHRONIC RIGHT SHOULDER PAIN: ICD-10-CM

## 2019-11-04 PROCEDURE — 97110 THERAPEUTIC EXERCISES: CPT | Mod: PN

## 2019-11-04 NOTE — PROGRESS NOTES
"                            Physical Therapy Daily Treatment Note     Name: Henry Zacarias  Clinic Number: [id number]    Therapy Diagnosis:   Encounter Diagnoses   Name Primary?    Chronic right shoulder pain     Decreased range of motion with decreased strength      Physician: NUSRAT Mccoy MD    Visit Date: 11/4/2019  Physician Orders: PT Eval and Treat   Medical Diagnosis from Referral: M75.121 (ICD-10-CM) - Complete tear of right rotator cuff  Evaluation Date: 10/7/2019  Authorization Period Expiration: 12/31/2019  Plan of Care Expiration: 01/05/2020  Visit # / Visits authorized: 5/ 30    Time In: 3:00 PM  Time Out: 4:00 PM  Total Billable Time: 60 minutes    Precautions: Standard, Diabetes and HTN    Subjective   Pt reports: the arm moves the same but he likes the exercises. Feels like he needs to focus on strengh. Has no F/U appt with Dr. Mccoy - "he told me not to come back unless I need something or had pain."    He was compliant with home exercise program.  Response to previous treatment: no adverse effects  Functional change: no change reported    Pain: 0/10 ; 4/10 w/ elevation  Location: shoulder  right    Objective     11/4/2019     Shoulder AROM:  Flexion = 88 deg (thumb up, improved with palm down)  Abduction = 165  Adduction = to midline  ER = <10 deg    Shoulder strength:  Flexion = 3+ (palm down, mild deviation laterally)  Abduction = 4  IR = 4  ER = 2    Scapular strength:  MT = 4  LT = 4-  Rhomboids = 4  Serratus = 4        Henry received therapeutic exercises to develop strength, ROM, flexibility and posture for 60 minutes including:    +UBE: 3'/3'   pec stretch on 1/2 roll x 3'  No moneys on 1/2 roll with yellow band 20 x 3" -- NP  Side lying shoulder abd to 90 deg 20 x 3"  Side lying shoulder flex 20 x 3"  Side lying shoulder hor abd 20 x 3"  Side lying ER 20 x 3" - NP  Prone Is, Ts, Ys, (unilat): 20 x 5"  Seated scap squeezes 10 x 10" holds    +AROM flex, scap, abd 12 x ea - visual assist " with mirror today  +SALPD 2 x 10 x OTB  +resisted rows 2 x 10 x BTB    Henry received the following manual therapy techniques: Joint mobilizations were applied to the: R shoulder for 00 minutes, including:  Grades I and II inferior GH glides  Grades I and II posterior GH glides    Henry received cold pack for 00 minutes to R shoulder.      Home Exercises Provided and Patient Education Provided     Education provided:   - no new HEP today    Written Home Exercises Provided: Patient instructed to cont prior HEP.  Exercises were reviewed and Henry was able to demonstrate them prior to the end of the session.  Henry demonstrated good  understanding of the education provided.     See EMR under Patient Instructions for exercises provided prior visit.    Assessment     Continues to present with limited shoulder ER and flexion without compensatory scapular elevation or shoulder abduction ROM. Improved flexion mobility with increased deltoid recruitment with palm down during motion. Slow improvements in scapular and shoulder girdle strength to allow for increased ease with OH elevation. Progressed scapular strengthening exercises to improve recruitment and stability.      Henry is progressing well towards his goals.   Pt prognosis is Good.     Pt will continue to benefit from skilled outpatient physical therapy to address the deficits listed in the problem list box on initial evaluation, provide pt/family education and to maximize pt's level of independence in the home and community environment.     Pt's spiritual, cultural and educational needs considered and pt agreeable to plan of care and goals.    Anticipated barriers to physical therapy: current RTC tear x2 - not eligible for RTC repair, dominant UE involved, travels for work    Goals:   Short Term Goals (5 Weeks):   1. Pt to demonstrate improved ROM by 10% to allow pt to perform self care activities with increased ease. (in progress)  2. Pt to demonstrate  improved flexibility by a half grade to allow improved ADLs with increased ease. (in progress)  3. Pt to demonstrate improved strength by a half grade to allow for increased ease with OH activities.(in progress)  4. Pt will report being independent with her HEP for maintenance of improvements gained during therapy sessions(in progress)  5. Pt to demonstrate improved functional ability with FOTO limitation <=32% disability.(in progress)     Long Term Goals (10 Weeks):   1. Pt to demonstrate improved ROM by 50% to allow pt to perform self care with increased ease.(in progress)  2. Pt to demonstrate improved flexibility by a full grade to allow improved postural alignment with increased ease.(in progress)  3. Pt will demonstrate >=4+/5 strength within the right shoulder for ease with house hold chores.(in progress)  4. Pt to demonstrate improved functional ability with FOTO limitation <=27% disability.(in progress)  5. Pt independent with HEP and demonstrates good return technique.(in progress)    Plan     Continue with scapular stabilization and R shoulder strengthening.    Shannon Henry, PT

## 2019-11-06 ENCOUNTER — CLINICAL SUPPORT (OUTPATIENT)
Dept: REHABILITATION | Facility: OTHER | Age: 59
End: 2019-11-06
Payer: COMMERCIAL

## 2019-11-06 DIAGNOSIS — M25.60 DECREASED RANGE OF MOTION WITH DECREASED STRENGTH: ICD-10-CM

## 2019-11-06 DIAGNOSIS — R53.1 DECREASED RANGE OF MOTION WITH DECREASED STRENGTH: ICD-10-CM

## 2019-11-06 DIAGNOSIS — M25.511 CHRONIC RIGHT SHOULDER PAIN: ICD-10-CM

## 2019-11-06 DIAGNOSIS — G89.29 CHRONIC RIGHT SHOULDER PAIN: ICD-10-CM

## 2019-11-06 PROCEDURE — 97110 THERAPEUTIC EXERCISES: CPT | Mod: PN

## 2019-11-06 NOTE — PROGRESS NOTES
"                            Physical Therapy Daily Treatment Note     Name: Henry Zacarias  Clinic Number: [id number]    Therapy Diagnosis:   Encounter Diagnoses   Name Primary?    Chronic right shoulder pain     Decreased range of motion with decreased strength      Physician: NUSRAT Mccoy MD    Visit Date: 11/6/2019  Physician Orders: PT Eval and Treat   Medical Diagnosis from Referral: M75.121 (ICD-10-CM) - Complete tear of right rotator cuff  Evaluation Date: 10/7/2019  Authorization Period Expiration: 12/31/2019  Plan of Care Expiration: 01/05/2020  Visit # / Visits authorized: 5/ 30    Time In: 3:00 PM  Time Out: 3:53 PM  Total Billable Time: 53 minutes    Precautions: Standard, Diabetes and HTN    Subjective   Pt reports: liked the new exercises, denies soreness.   He was compliant with home exercise program.  Response to previous treatment: no adverse effects  Functional change: no change reported    Pain: 0/10 ; 4/10 w/ elevation  Location: shoulder  right    Objective     11/6/2019     Shoulder AROM:  Flexion = 88 deg (thumb up, improved with palm down)  Abduction = 165  Adduction = to midline  ER = <10 deg    Shoulder strength:  Flexion = 3+ (palm down, mild deviation laterally)  Abduction = 4  IR = 4  ER = 2    Scapular strength:  MT = 4  LT = 4-  Rhomboids = 4  Serratus = 4        Henry received therapeutic exercises to develop strength, ROM, flexibility and posture for 53 minutes including:    UBE: 3'/3'   pec stretch on 1/2 roll x 3'  No moneys on 1/2 roll with yellow band 20 x 3" -- NP  Side lying shoulder abd to 90 deg 20 x 3"  Side lying shoulder flex 20 x 3"  Side lying shoulder hor abd 20 x 3"  Side lying ER 20 x 3" - NP  Prone Is, Ts, Ys, (unilat): 20 x 5"  Seated scap squeezes 10 x 10" holds    AROM flex, scap, abd 12 x ea - visual assist with mirror today  SALPD 2 x 10 x OTB - NP today, resume next visit  +trunk ext with unilat UE ext 2 x 10 x OTB - defer next visit  resisted rows 2 x 10 " "x BTB  +trunk ext with unilat row 2 x 10 x BTB  +modified plank on EOT 2 x 20"  +mod plank with weight shifts 15x  +mod plank with SA punch 1 x 15  +body blade (abd/add only) 3 x 30" x yellow    Henry received the following manual therapy techniques: Joint mobilizations were applied to the: R shoulder for 00 minutes, including:  Grades I and II inferior GH glides  Grades I and II posterior GH glides    Henry received cold pack for 00 minutes to R shoulder.      Home Exercises Provided and Patient Education Provided     Education provided:   - no new HEP today    Written Home Exercises Provided: Patient instructed to cont prior HEP.  Exercises were reviewed and Henry was able to demonstrate them prior to the end of the session.  Henry demonstrated good  understanding of the education provided.     See EMR under Patient Instructions for exercises provided prior visit.    Assessment     New exercises added to promote proprioception and motor control. Good tolerance without increased symptoms but demo'd poor motor control and coordination and difficulty maintaining scapular positioning without increased UE movement during body blade.    Henry is progressing well towards his goals.   Pt prognosis is Good.     Pt will continue to benefit from skilled outpatient physical therapy to address the deficits listed in the problem list box on initial evaluation, provide pt/family education and to maximize pt's level of independence in the home and community environment.     Pt's spiritual, cultural and educational needs considered and pt agreeable to plan of care and goals.    Anticipated barriers to physical therapy: current RTC tear x2 - not eligible for RTC repair, dominant UE involved, travels for work    Goals:   Short Term Goals (5 Weeks):   1. Pt to demonstrate improved ROM by 10% to allow pt to perform self care activities with increased ease. (in progress)  2. Pt to demonstrate improved flexibility by a half grade " to allow improved ADLs with increased ease. (in progress)  3. Pt to demonstrate improved strength by a half grade to allow for increased ease with OH activities.(in progress)  4. Pt will report being independent with her HEP for maintenance of improvements gained during therapy sessions(in progress)  5. Pt to demonstrate improved functional ability with FOTO limitation <=32% disability.(in progress)     Long Term Goals (10 Weeks):   1. Pt to demonstrate improved ROM by 50% to allow pt to perform self care with increased ease.(in progress)  2. Pt to demonstrate improved flexibility by a full grade to allow improved postural alignment with increased ease.(in progress)  3. Pt will demonstrate >=4+/5 strength within the right shoulder for ease with house hold chores.(in progress)  4. Pt to demonstrate improved functional ability with FOTO limitation <=27% disability.(in progress)  5. Pt independent with HEP and demonstrates good return technique.(in progress)    Plan     Continue with scapular stabilization and R shoulder strengthening.    Shannon Henry, PT

## 2019-11-14 ENCOUNTER — IMMUNIZATION (OUTPATIENT)
Dept: PHARMACY | Facility: CLINIC | Age: 59
End: 2019-11-14
Payer: COMMERCIAL

## 2019-11-27 DIAGNOSIS — E11.8 TYPE 2 DIABETES MELLITUS WITH COMPLICATION, WITH LONG-TERM CURRENT USE OF INSULIN: ICD-10-CM

## 2019-11-27 DIAGNOSIS — Z79.4 TYPE 2 DIABETES MELLITUS WITH COMPLICATION, WITH LONG-TERM CURRENT USE OF INSULIN: ICD-10-CM

## 2019-11-27 RX ORDER — LANCETS
EACH MISCELLANEOUS
Qty: 100 EACH | Refills: 11 | Status: SHIPPED | OUTPATIENT
Start: 2019-11-27 | End: 2020-12-27

## 2019-12-02 ENCOUNTER — LAB VISIT (OUTPATIENT)
Dept: LAB | Facility: OTHER | Age: 59
End: 2019-12-02
Attending: INTERNAL MEDICINE
Payer: COMMERCIAL

## 2019-12-02 DIAGNOSIS — Z79.4 TYPE 2 DIABETES MELLITUS WITHOUT COMPLICATION, WITH LONG-TERM CURRENT USE OF INSULIN: ICD-10-CM

## 2019-12-02 DIAGNOSIS — E11.9 TYPE 2 DIABETES MELLITUS WITHOUT COMPLICATION, WITH LONG-TERM CURRENT USE OF INSULIN: ICD-10-CM

## 2019-12-02 LAB
ESTIMATED AVG GLUCOSE: 114 MG/DL (ref 68–131)
HBA1C MFR BLD HPLC: 5.6 % (ref 4–5.6)

## 2019-12-02 PROCEDURE — 36415 COLL VENOUS BLD VENIPUNCTURE: CPT

## 2019-12-02 PROCEDURE — 83036 HEMOGLOBIN GLYCOSYLATED A1C: CPT

## 2019-12-06 ENCOUNTER — PATIENT MESSAGE (OUTPATIENT)
Dept: INTERNAL MEDICINE | Facility: CLINIC | Age: 59
End: 2019-12-06

## 2019-12-19 NOTE — PROGRESS NOTES
Subjective:       Patient ID: Henry Zacarias is a 59 y.o. male who  has a past medical history of Cellulitis of back except buttock (5/28/2019), DM2 (diabetes mellitus, type 2), Essential hypertension, HLD (hyperlipidemia), Obesity (BMI 30.0-34.9), and Sleep apnea.    Chief Complaint: Follow-up (DM)     History was obtained from the patient and supplemented through chart review  -Following with Sports Med, PT for right rotator cuff tear.  -Reviewed outside records from [facility].  Screening C scope 10/14/2019 with [facility] with a few diverticula, medium internal hemorrhoids.  Repeat C scope in 10 years.     Works as a  for the fire department.    HPI     DM2 is controlled.  Diagnosed in 2013.  Currently pt is taking Levemir 45 qAM, NovoLog 14 BID, 12 qHS a.c..  Metformin 500 b.i.d.     Diet:  Has a hard time when he's out of town for lunch.  Eats fruit/veggies when he's in town.  Maybe 1-2 spoons of ice cream and puts it away.   Exercise:  Doesn't think a gym membership would be beneficial at this time d/t R shoulder. Wants to start walking.     Lowest BG 55, symptomatic since he didn't snack as usual.  BG 93-130s     Normal microalbumin creatinine ratio.  On an ACE/ARB.   Retinal exams: UTD; OSH Optometry 07/16/2019 without retinopathy.  Foot exams:  UTD, .  Is following with diabetes education.  Hemoglobin A1C   Date Value Ref Range Status   12/02/2019 5.6 4.0 - 5.6 % Final     Comment:     ADA Screening Guidelines:  5.7-6.4%  Consistent with prediabetes  >or=6.5%  Consistent with diabetes  High levels of fetal hemoglobin interfere with the HbA1C  assay. Heterozygous hemoglobin variants (HbS, HgC, etc)do  not significantly interfere with this assay.   However, presence of multiple variants may affect accuracy.     08/26/2019 5.9 (H) 4.0 - 5.6 % Final     Comment:     ADA Screening Guidelines:  5.7-6.4%  Consistent with prediabetes  >or=6.5%  Consistent with diabetes  High levels of fetal  hemoglobin interfere with the HbA1C  assay. Heterozygous hemoglobin variants (HbS, HgC, etc)do  not significantly interfere with this assay.   However, presence of multiple variants may affect accuracy.     06/10/2019 10.7 (H) 4.0 - 5.6 % Final     Comment:     ADA Screening Guidelines:  5.7-6.4%  Consistent with prediabetes  >or=6.5%  Consistent with diabetes  High levels of fetal hemoglobin interfere with the HbA1C  assay. Heterozygous hemoglobin variants (HbS, HgC, etc)do  not significantly interfere with this assay.   However, presence of multiple variants may affect accuracy.       HTN:    Pt's BP is controlled on valsartan 160 (has DM), Lopressor 50 b.i.d. However, valsartan has been on back order at his pharmacy, so he is down to 7 pills.  Tolerating meds well. Pt denies CP, SOB.   Diet, exercise as above.    Insomnia:    The patient reports difficulty staying asleep.  The patient goes to bed at 8 PM and wakes up at 1 AM.  Will then check emails on his phone in bed.  The patient watches TV 1 hour before sleep.  The bedroom is quiet and dark.  The patient does not drink caffeine in the afternoon.  No regular nocturia.  He reports some racing thoughts at night.  He does not drink.  He does exercise; walking.     Chronic neck pain:  H/o ?  Herniated disc.  Uses extra pillows at night for comfort.  No arm paresthesias, weakness other than Rshoulder.    HLD:  On Lipitor 40, ASA 81  Lab Results   Component Value Date    LDLCALC 145.2 06/10/2019     The 10-year ASCVD risk score ([place] VALENTE Jr., et al., 2013) is: 24.8%    Values used to calculate the score:      Age: 59 years      Sex: Male      Is Non- : No      Diabetic: Yes      Tobacco smoker: No      Systolic Blood Pressure: 143 mmHg      Is BP treated: Yes      HDL Cholesterol: 37 mg/dL      Total Cholesterol: 229 mg/dL    AOCD: likely 2/2 cellulitis.  Lab Results   Component Value Date    IRON 105 06/10/2019    TIBC 408 06/10/2019     "FERRITIN 920 (H) 06/10/2019     Lab Results   Component Value Date    [id number] 468 06/10/2019     KEVIN:  BMI 31. On CPAP, but needs to get his equipment checked out.            Not addressed today.  Right rotator cuff tear:  He is R handed.  Difficulty reaching overhead.  Hard to initiate right arm abduction.  No inciting event, trauma.  Confirmed on MRI.  Following with Sports Medicine.  Considering surgery, but opted for non operative management unless pain recurs.  Following with PT.                KEVIN:  On CPAP q.h.s.    Review of Systems   Constitutional: Negative for activity change, fever and unexpected weight change.   HENT: Negative for hearing loss, postnasal drip, rhinorrhea and trouble swallowing.    Eyes: Negative for discharge, redness and visual disturbance.   Respiratory: Negative for chest tightness, shortness of breath and wheezing.    Cardiovascular: Negative for palpitations.   Gastrointestinal: Negative for abdominal pain, constipation, diarrhea and vomiting.   Genitourinary: Negative for difficulty urinating, dysuria, hematuria and urgency.   Musculoskeletal: Positive for neck pain. Negative for arthralgias, gait problem and joint swelling.   Skin: Negative for rash and wound.   Neurological: Negative for dizziness and light-headedness.   Hematological: Negative for adenopathy.   Psychiatric/Behavioral: Positive for sleep disturbance. Negative for confusion.       I personally reviewed Past Medical History, Past Surgical History, Social History, and Family History.    Objective:      Vitals:    12/20/19 0806 12/20/19 0833   BP: (!) 144/80 (!) 160/84   Pulse: 65    SpO2: 98%    Weight: 100.5 kg (221 lb 9 oz)    Height: 5' 10" (1.778 m)       Physical Exam   Constitutional: He appears well-developed and well-nourished. No distress.   HENT:   Head: Normocephalic and atraumatic.   Nose: Nose normal.   Mouth/Throat: Oropharynx is clear and moist. No oropharyngeal exudate.   Eyes: Pupils are equal, " round, and reactive to light. EOM are normal. Right eye exhibits no discharge. Left eye exhibits no discharge. No scleral icterus.   Neck: Neck supple. No tracheal deviation present. No thyromegaly present.   Cardiovascular: Normal rate, regular rhythm, normal heart sounds and intact distal pulses.   No murmur heard.  Pulmonary/Chest: Effort normal and breath sounds normal. No respiratory distress. He has no wheezes.   Abdominal: Soft. Bowel sounds are normal. He exhibits no distension. There is no tenderness.   Musculoskeletal: He exhibits no edema or deformity.   Decreased abduction at R shoulder   Lymphadenopathy:     He has no cervical adenopathy.   Neurological: He is alert. No cranial nerve deficit. Gait normal.   Skin: Skin is warm and dry. He is not diaphoretic. No erythema.   Psychiatric: He has a normal mood and affect. His behavior is normal.         Lab Results   Component Value Date    WBC 10.73 06/03/2019    HGB 12.9 (L) 06/03/2019    HCT 37.4 (L) 06/03/2019     (H) 06/03/2019    CHOL 229 (H) 06/10/2019    TRIG 234 (H) 06/10/2019    HDL 37 (L) 06/10/2019    ALT 19 06/03/2019    AST 17 06/03/2019     06/03/2019    K 4.2 06/03/2019     06/03/2019    CREATININE 0.8 06/03/2019    BUN 17 06/03/2019    CO2 25 06/03/2019    TSH 2.465 04/12/2013    HGBA1C 5.6 12/02/2019       The 10-year ASCVD risk score ([place] VALENTE Jr., et al., 2013) is: 24.8%    Values used to calculate the score:      Age: 59 years      Sex: Male      Is Non- : No      Diabetic: Yes      Tobacco smoker: No      Systolic Blood Pressure: 143 mmHg      Is BP treated: Yes      HDL Cholesterol: 37 mg/dL      Total Cholesterol: 229 mg/dL    (Imaging have been independently reviewed)  MRI R shoulder with infraspinatus, supraspinatus tear    Assessment:       1. Type 2 diabetes mellitus without complication, with long-term current use of insulin    2. Essential hypertension    3. Insomnia, unspecified type     4. Chronic neck pain    5. Mixed hyperlipidemia    6. Anemia of chronic disease    7. KEVIN on CPAP          Plan:       Henry was seen today for follow-up.    Diagnoses and all orders for this visit:    Type 2 diabetes mellitus without complication, with long-term current use of insulin  Comments:  At goal. Cont Levemir 45 daily, NovoLog 14 BID, 12 qHS, metformin 500 b.i.d.. F/u with DM edu. Encouraged exercise as tolerated.  Space out A1c to q.6 months.  Orders:  -     Hemoglobin A1c; Future  -     Microalbumin/creatinine urine ratio; Future  -     valsartan (DIOVAN) 160 MG tablet; Take 1 tablet (160 mg total) by mouth once daily.    Essential hypertension  Comments:  Elevated, but usually at goal. Cont valsartan 160 (DM), Lopressor 50 BID. Sent Valsartan to [facility] pharmacy d/t availability. Will eval CPAP equipment. CMP 6mo  Orders:  -     Comprehensive metabolic panel; Future  -     valsartan (DIOVAN) 160 MG tablet; Take 1 tablet (160 mg total) by mouth once daily.  -     metoprolol tartrate (LOPRESSOR) 50 MG tablet; Take 1 tablet (50 mg total) by mouth 2 (two) times daily.    Insomnia, unspecified type  Comments:  Discussed sleep hygiene, reducing phone time.    Chronic neck pain  Comments:  Refer to pain clinic to discuss treatment options.  Orders:  -     [facility]ulatory Ref[facility]    Mixed hyperlipidemia  Comments:  Improved diet.  Cont Lipitor 40, ASA 81.  Check annual FLP in 6 months.  Orders:  -     Lipid panel; Future    Anemia of chronic disease  Comments:  Check annual CBC in 6 months.  Orders:  -     CBC auto differential; Future    KEVIN on CPAP  Comments:  Will evaluate his equipment.    Other orders  -     Cancel: losartan (COZAAR) 50 MG tablet; Take 1 tablet (50 mg total) by mouth once daily.         Side effects of medication(s) were discussed in detail and patient voiced understanding.  Patient will call back for any issues or complications.     RTC in 6 month(s) or sooner PRN  for diabetes with labs prior.

## 2019-12-20 ENCOUNTER — OFFICE VISIT (OUTPATIENT)
Dept: SPINE | Facility: CLINIC | Age: 59
End: 2019-12-20
Attending: PHYSICAL MEDICINE & REHABILITATION
Payer: COMMERCIAL

## 2019-12-20 ENCOUNTER — OFFICE VISIT (OUTPATIENT)
Dept: INTERNAL MEDICINE | Facility: CLINIC | Age: 59
End: 2019-12-20
Payer: COMMERCIAL

## 2019-12-20 VITALS
SYSTOLIC BLOOD PRESSURE: 143 MMHG | WEIGHT: 221.56 LBS | HEIGHT: 70 IN | DIASTOLIC BLOOD PRESSURE: 84 MMHG | OXYGEN SATURATION: 98 % | BODY MASS INDEX: 31.64 KG/M2 | HEIGHT: 70 IN | BODY MASS INDEX: 31.72 KG/M2 | WEIGHT: 221 LBS | DIASTOLIC BLOOD PRESSURE: 77 MMHG | HEART RATE: 81 BPM | SYSTOLIC BLOOD PRESSURE: 160 MMHG | HEART RATE: 65 BPM

## 2019-12-20 DIAGNOSIS — M54.2 CHRONIC NECK PAIN: ICD-10-CM

## 2019-12-20 DIAGNOSIS — Z79.4 TYPE 2 DIABETES MELLITUS WITHOUT COMPLICATION, WITH LONG-TERM CURRENT USE OF INSULIN: Primary | ICD-10-CM

## 2019-12-20 DIAGNOSIS — E78.2 MIXED HYPERLIPIDEMIA: ICD-10-CM

## 2019-12-20 DIAGNOSIS — I10 ESSENTIAL HYPERTENSION: ICD-10-CM

## 2019-12-20 DIAGNOSIS — M50.30 DDD (DEGENERATIVE DISC DISEASE), CERVICAL: ICD-10-CM

## 2019-12-20 DIAGNOSIS — E11.9 TYPE 2 DIABETES MELLITUS WITHOUT COMPLICATION, WITH LONG-TERM CURRENT USE OF INSULIN: Primary | ICD-10-CM

## 2019-12-20 DIAGNOSIS — M54.2 NECK PAIN: Primary | ICD-10-CM

## 2019-12-20 DIAGNOSIS — D63.8 ANEMIA OF CHRONIC DISEASE: ICD-10-CM

## 2019-12-20 DIAGNOSIS — M47.812 SPONDYLOSIS OF CERVICAL REGION WITHOUT MYELOPATHY OR RADICULOPATHY: ICD-10-CM

## 2019-12-20 DIAGNOSIS — G89.29 CHRONIC NECK PAIN: ICD-10-CM

## 2019-12-20 DIAGNOSIS — G47.00 INSOMNIA, UNSPECIFIED TYPE: ICD-10-CM

## 2019-12-20 DIAGNOSIS — G47.33 OSA ON CPAP: ICD-10-CM

## 2019-12-20 PROCEDURE — 3044F PR MOST RECENT HEMOGLOBIN A1C LEVEL <7.0%: ICD-10-PCS | Mod: CPTII,S$GLB,, | Performed by: INTERNAL MEDICINE

## 2019-12-20 PROCEDURE — 99999 PR PBB SHADOW E&M-EST. PATIENT-LVL IV: ICD-10-PCS | Mod: PBBFAC,,, | Performed by: INTERNAL MEDICINE

## 2019-12-20 PROCEDURE — 99244 PR OFFICE CONSULTATION,LEVEL IV: ICD-10-PCS | Mod: S$GLB,,, | Performed by: PHYSICAL MEDICINE & REHABILITATION

## 2019-12-20 PROCEDURE — 3079F PR MOST RECENT DIASTOLIC BLOOD PRESSURE 80-89 MM HG: ICD-10-PCS | Mod: CPTII,S$GLB,, | Performed by: INTERNAL MEDICINE

## 2019-12-20 PROCEDURE — 3077F PR MOST RECENT SYSTOLIC BLOOD PRESSURE >= 140 MM HG: ICD-10-PCS | Mod: CPTII,S$GLB,, | Performed by: INTERNAL MEDICINE

## 2019-12-20 PROCEDURE — 99215 PR OFFICE/OUTPT VISIT, EST, LEVL V, 40-54 MIN: ICD-10-PCS | Mod: S$GLB,,, | Performed by: INTERNAL MEDICINE

## 2019-12-20 PROCEDURE — 99999 PR PBB SHADOW E&M-EST. PATIENT-LVL III: CPT | Mod: PBBFAC,,, | Performed by: PHYSICAL MEDICINE & REHABILITATION

## 2019-12-20 PROCEDURE — 99999 PR PBB SHADOW E&M-EST. PATIENT-LVL III: ICD-10-PCS | Mod: PBBFAC,,, | Performed by: PHYSICAL MEDICINE & REHABILITATION

## 2019-12-20 PROCEDURE — 99215 OFFICE O/P EST HI 40 MIN: CPT | Mod: S$GLB,,, | Performed by: INTERNAL MEDICINE

## 2019-12-20 PROCEDURE — 3008F PR BODY MASS INDEX (BMI) DOCUMENTED: ICD-10-PCS | Mod: CPTII,S$GLB,, | Performed by: INTERNAL MEDICINE

## 2019-12-20 PROCEDURE — 3008F BODY MASS INDEX DOCD: CPT | Mod: CPTII,S$GLB,, | Performed by: INTERNAL MEDICINE

## 2019-12-20 PROCEDURE — 99999 PR PBB SHADOW E&M-EST. PATIENT-LVL IV: CPT | Mod: PBBFAC,,, | Performed by: INTERNAL MEDICINE

## 2019-12-20 PROCEDURE — 3079F DIAST BP 80-89 MM HG: CPT | Mod: CPTII,S$GLB,, | Performed by: INTERNAL MEDICINE

## 2019-12-20 PROCEDURE — 3044F HG A1C LEVEL LT 7.0%: CPT | Mod: CPTII,S$GLB,, | Performed by: INTERNAL MEDICINE

## 2019-12-20 PROCEDURE — 3077F SYST BP >= 140 MM HG: CPT | Mod: CPTII,S$GLB,, | Performed by: INTERNAL MEDICINE

## 2019-12-20 PROCEDURE — 99244 OFF/OP CNSLTJ NEW/EST MOD 40: CPT | Mod: S$GLB,,, | Performed by: PHYSICAL MEDICINE & REHABILITATION

## 2019-12-20 RX ORDER — VALSARTAN 160 MG/1
160 TABLET ORAL DAILY
Qty: 90 TABLET | Refills: 3 | Status: SHIPPED | OUTPATIENT
Start: 2019-12-20 | End: 2020-03-05 | Stop reason: SDUPTHER

## 2019-12-20 RX ORDER — LOSARTAN POTASSIUM 50 MG/1
50 TABLET ORAL DAILY
Qty: 30 TABLET | Refills: 6 | Status: CANCELLED | OUTPATIENT
Start: 2019-12-20 | End: 2020-12-19

## 2019-12-20 RX ORDER — METOPROLOL TARTRATE 50 MG/1
50 TABLET ORAL 2 TIMES DAILY
Qty: 180 TABLET | Refills: 3 | Status: SHIPPED | OUTPATIENT
Start: 2019-12-20 | End: 2020-06-22

## 2019-12-20 NOTE — LETTER
December 20, 2019      Aimee Coombs MD  [address]  [address]  [facility] [address]           [facility] [address] [place] [address] [facility] [address]  [address], [address]  [facility] [address]  Phone: [phone]  Fax: [phone]          Patient: Henry Zacarias   MR Number: [id number]   YOB: 1960   Date of Visit: 12/20/2019       Dear Dr. Aimee Coombs:    Thank you for referring Henry Zacarias to me for evaluation. Attached you will find relevant portions of my assessment and plan of care.    If you have questions, please do not hesitate to call me. I look forward to following Henry Zacarias along with you.    Sincerely,    Hannah Candelario MD    Enclosure  CC:  No Recipients    If you would like to receive this communication electronically, please contact [email or web address] or [phone] to request more information on [employer] Link access.    For providers and/or their staff who would like to refer a patient to [facility], please contact us through our one-stop-shop provider referral line, [facility], at [phone].    If you feel you have received this communication in error or would no longer like to receive these types of communications, please e-mail [email or web address]

## 2019-12-20 NOTE — PROGRESS NOTES
Subjective:      Patient ID: Henry Zacarias is a 59 y.o. male.    Chief Complaint: Neck Pain    Mr Zacarias is a 58 yo male sent in consultation by Dr. Coombs for evaluation of neck pain.  He was in an accident 10 years ago.  He had imaging and was told he had stenosis.  He has neck pain off an on.  The pain is not constant.  The pain is worse with looking down, sleeping wrong.  He feels better in certain chairs.  He can move left arm and feel cracking and then the pain is better.  The pain is neck dominant.  He feels like right hand will get cold on cold days.  The pain is a sharp shooting pain.  The pain is 1/10 now, worst 2/10 looking down, best 0/10 walking around.  The pain is never worse than a 2, it is an aggravation that feel most of the time.  He will take aleve PM 1-2 times a week.  He does not take nsaids.  He has done PT at the  group years ago.  He has not been to chiropractor.  He does not have change in balance gait or dropping thing    MRI right shoulder 6/2019  ROTATOR CUFF:    Supraspinatus: Partial thickness full width tear of the supraspinatus with some remaining anterior fibers.    Infraspinatus:  To a greater extent, there is delaminating tear of the infraspinatus, full width near full-thickness with retraction of a portion of the fibers to the glenoid rim..    Subscapularis:  Tear of cranial fibers of the subscapularis with subluxation of the biceps tendon..    Teres Minor:  Intact.    There is moderate fluid within the subacromial/subdeltoid bursa.    LABRUM: Grossly unremarkable on this standard non arthrogram exam.Biceps-labral complex appears unremarkable.Anterior inferior labrum appears abnormal with attenuation of portion of the inferior glenohumeral ligament in its midportion.  Chronic injury at this level suspect..Posterior labrum is unremarkable.    LONG HEAD BICEPS TENDON:  Located and intact.    BONES:  No focal or diffuse abnormality. Moderate AC joint arthrosis.Glenoid fossa  demonstrates no significant abnormality.  Mild joint effusion.    CARTILAGE:  Preserved.No significant arthritic changes.  No synovial abnormality or intra-articular loose bodies.    MUSCLES:  Mild atrophy of infraspinatus to a greater extent than supraspinatus.    Impression      Infraspinatus greater in extent than supraspinatus tear as above with concomitant relative muscular atrophy.  Associated tear of the cranial fibers of the subscapularis with mild subluxation of the biceps.  Joint effusion with fluid in the subacromial subdeltoid bursa.        Past Medical History:  5/28/2019: Cellulitis of back except buttock  No date: DM2 (diabetes mellitus, type 2)  No date: Essential hypertension  No date: HLD (hyperlipidemia)      Comment:  mixed  No date: Obesity (BMI 30.0-34.9)  No date: Sleep apnea    Past Surgical History:  5/29/2019: INCISION AND DRAINAGE OF BACK; Left      Comment:  Procedure: INCISION AND DRAINAGE, BACK UPPER;  Surgeon:                Sam Correa MD;  Location: [facility];  Service:                General;  Laterality: Left;  No date: TONSILLECTOMY    Review of patient's family history indicates:  Problem: COPD      Relation: Mother          Age of Onset: (Not Specified)  Problem: Hyperlipidemia      Relation: Father          Age of Onset: (Not Specified)  Problem: Hypertension      Relation: Father          Age of Onset: (Not Specified)  Problem: Heart disease      Relation: Father          Age of Onset: (Not Specified)  Problem: Dementia      Relation: Father          Age of Onset: (Not Specified)  Problem: Diabetes      Relation: Father          Age of Onset: (Not Specified)  Problem: Colon cancer      Relation: Neg Hx          Age of Onset: (Not Specified)  Problem: Prostate cancer      Relation: Neg Hx          Age of Onset: (Not Specified)      Social History    Socioeconomic History      Marital status: Single      Spouse name: Not on file      Number of children: Not on file       "Years of education: Not on file      Highest education level: Not on file    Occupational History      Occupation: manager for ins comp        Employer: [employer].    Social Needs      Financial resource strain: Not hard at all      Food insecurity:        Worry: Never true        Inability: Never true      Transportation needs:        Medical: No        Non-medical: No    Tobacco Use      Smoking status: Never Smoker      Smokeless tobacco: Never Used    Substance and Sexual Activity      Alcohol use: Yes        Frequency: Monthly or less        Drinks per session: 3 or 4        Binge frequency: Less than monthly        Comment: social      Drug use: No      Sexual activity: Not Currently        Partners: Female    Lifestyle      Physical activity:        Days per week: 0 days        Minutes per session: 0 min      Stress: Rather much    Relationships      Social connections:        Talks on phone: Once a week        Gets together: Once a week        Attends [employer] service: Not on file        Active member of club or organization: No        Attends meetings of clubs or organizations: Never        Relationship status: Never     Other Topics      Concerns:        Not on file    Social History Narrative      Not on file      Current Outpatient Medications:  aspirin 81 MG Chew, Take 1 tablet (81 mg total) by mouth once daily., Disp: 90 tablet, Rfl: 3  atorvastatin (LIPITOR) 40 MG tablet, Take 1 tablet (40 mg total) by mouth once daily., Disp: 90 tablet, Rfl: 3  BD ULTRA-FINE JEANNETTE PEN NEEDLE 32 gauge x 5/32" Ndle, TEST 4 (FOUR) TIMES DAILY., Disp: 100 each, Rfl: 11  blood-glucose meter Misc, Use as instructed, Disp: 1 each, Rfl: 0  CONTOUR NEXT TEST STRIPS Strp, 1 EACH BY MISC.(NON-DRUG COMBO ROUTE) ROUTE 4 (FOUR) TIMES DAILY BEFORE MEALS AND NIGHTLY., Disp: 100 strip, Rfl: 11  insulin (LANTUS SOLOSTAR U-100 INSULIN) glargine 100 units/mL (3mL) SubQ pen, Inject 45 Units into the skin once " daily., Disp: 13.5 mL, Rfl: 0  insulin admin supplies (NOVOPEN ECHO) InPn, Inject 15 Units into the skin 3 (three) times daily before meals., Disp: 13.5 mL, Rfl: 0  insulin admin supplies InPn, Inject into the skin., Disp: , Rfl:   insulin aspart U-100 (NOVOLOG PENFILL U-100 INSULIN) 100 unit/mL Crtg, Inject 15 Units into the skin 3 (three) times daily with meals., Disp: 13.5 mL, Rfl: 0  insulin aspart U-100 (NOVOLOG) 100 unit/mL (3 mL) InPn pen, Inject 15 Units into the skin 3 (three) times daily with meals., Disp: 40.5 mL, Rfl: 3  insulin detemir U-100 (LEVEMIR FLEXTOUCH) 100 unit/mL (3 mL) SubQ InPn pen, Inject 45 Units into the skin once daily., Disp: 40.5 mL, Rfl: 3  metFORMIN (GLUCOPHAGE) 500 MG tablet, Take 1 tablet (500 mg total) by mouth 2 (two) times daily with meals., Disp: 180 tablet, Rfl: 3  metoprolol tartrate (LOPRESSOR) 50 MG tablet, Take 1 tablet (50 mg total) by mouth 2 (two) times daily., Disp: 180 tablet, Rfl: 3  MICROLET LANCET Misc, 1 EACH BY MISC.(NON-DRUG COMBO ROUTE) ROUTE 4 (FOUR) TIMES DAILY BEFORE MEALS AND NIGHTLY., Disp: 100 each, Rfl: 11  valsartan (DIOVAN) 160 MG tablet, Take 1 tablet (160 mg total) by mouth once daily., Disp: 90 tablet, Rfl: 3    No current facility-administered medications for this visit.       Review of patient's allergies indicates:  No Known Allergies        Review of Systems   Constitution: Negative for weight gain and weight loss.   Cardiovascular: Negative for chest pain.   Respiratory: Negative for shortness of breath.    Musculoskeletal: Positive for neck pain. Negative for back pain, joint pain and joint swelling.   Gastrointestinal: Negative for abdominal pain, bowel incontinence, nausea and vomiting.   Genitourinary: Negative for bladder incontinence.   Neurological: Negative for numbness and paresthesias.         Objective:        General: Henry is well-developed, well-nourished, appears stated age, in no acute distress, alert and oriented to time,  place and person.     General    Vitals reviewed.  Constitutional: He is oriented to person, place, and time. He appears well-developed and well-nourished.   HENT:   Head: Normocephalic and atraumatic.   Pulmonary/Chest: Effort normal.   Neurological: He is alert and oriented to person, place, and time.   Psychiatric: He has a normal mood and affect. His behavior is normal. Judgment and thought content normal.     General Musculoskeletal Exam   Gait: normal     Right Ankle/Foot Exam     Tests   Heel Walk: able to perform  Tiptoe Walk: able to perform    Left Ankle/Foot Exam     Tests   Heel Walk: able to perform  Tiptoe Walk: able to perform  Back (L-Spine & T-Spine) / Neck (C-Spine) Exam     Neck (C-Spine) Range of Motion   Flexion:     40  Extension: 40Right Lateral Bend: 20 Left Lateral Bend: 20 Right Rotation: 40 Left Rotation: 40     Spinal Sensation   Right Side Sensation  C-Spine Level: normal   L-Spine Level: normal  S-Spine Level: normal  Left Side Sensation  C-Spine Level: normal  L-Spine Level: normal  S-Spine Level: normal    Back (L-Spine & T-Spine) Tests   Right Side Tests  Straight leg raise:      Sitting SLR: > 70 degrees      Left Side Tests  Straight leg raise:     Sitting SLR: > 70 degrees          Other He has no scoliosis .  Spinal Kyphosis:  Absent      Muscle Strength   Right Upper Extremity   Biceps: 5/5/5   Deltoid:  5/5  Triceps:  5/5  Wrist extension: 5/5/5   Finger Flexors:  5/5  Left Upper Extremity  Biceps: 5/5/5   Deltoid:  5/5  Triceps:  5/5  Wrist extension: 5/5/5   Finger Flexors:  5/5  Right Lower Extremity   Hip Flexion: 5/5   Quadriceps:  5/5   Anterior tibial:  5/5/5  EHL:  5/5  Left Lower Extremity   Hip Flexion: 5/5   Quadriceps:  5/5   Anterior tibial:  5/5/5   EHL:  5/5    Reflexes     Left Side  Biceps:  2+  Triceps:  2+  Brachioradialis:  2+  Quadriceps:  2+  Achilles:  2+  Left Diego's Sign:  Absent  Babinski Sign:  absent    Right Side   Biceps:  2+  Triceps:   2+  Brachioradialis:  2+  Quadriceps:  2+  Achilles:  2+  Right Dieog's Sign:  absent  Babinski Sign:  absent    Vascular Exam     Right Pulses        Carotid:                  2+    Left Pulses        Carotid:                  2+              Assessment:       1. Neck pain    2. Spondylosis of cervical region without myelopathy or radiculopathy    3. DDD (degenerative disc disease), cervical           Plan:       Orders Placed This Encounter    X-Ray Cervical Spine Complete 5 view    Ambulatory consult to [facility]      1. We discussed neck and shoulder pain and the nature of neck pain.  We discussed that it is not one thing that causes the pain but an accumulation of multiple things that we do.  We discussed trying to be more mindful of day to day activities.  His MRI from 2008 was reviewed and does have DDD.  He does not have any evidence of myelopathy or radiculopathy.  We do not need to repeat MRI of the neck at this time  2. We discussed posture sitting and the importance of trying to sit better.  We discussed a curve in lower back to get the head over spine.  We discussed using phone for posture reminders  3. We discussed the benefits of therapy and exercise and continuing to move.  We discussed strengthening and getting a good exercise program to help pain and then continuing it.  His pain is mild but aggravating to him.    4. PT at [facility] for progressive resistance exercise pattern 1  5. X-ray cervical spine   6. Aleve as needed, we discussed may take 4 a day. Currently taking occasionally at night  7. RTC 4 months      More than 50% of the total time  of 45 minutes was spent face to face in counseling on diagnosis and treatment options. I also counseled patient  on common and most usual side effect of prescribed medications.  I reviewed Primary care , and other specialty's notes to better coordinate patient's care. All questions were answered, and patient voiced understanding.     A  consultation note will be sent to Dr. Coombs through epic.  Thank you for the consult    Follow-up: Follow up in about 4 months (around 4/20/2020). If there are any questions prior to this, the patient was instructed to contact the office.

## 2020-01-02 ENCOUNTER — CLINICAL SUPPORT (OUTPATIENT)
Dept: REHABILITATION | Facility: OTHER | Age: 60
End: 2020-01-02
Attending: PHYSICAL MEDICINE & REHABILITATION
Payer: COMMERCIAL

## 2020-01-02 DIAGNOSIS — M25.511 CHRONIC RIGHT SHOULDER PAIN: ICD-10-CM

## 2020-01-02 DIAGNOSIS — G89.29 CHRONIC RIGHT SHOULDER PAIN: ICD-10-CM

## 2020-01-02 DIAGNOSIS — R53.1 DECREASED RANGE OF MOTION WITH DECREASED STRENGTH: ICD-10-CM

## 2020-01-02 DIAGNOSIS — M53.82 DECREASED RANGE OF MOTION OF INTERVERTEBRAL DISCS OF CERVICAL SPINE: ICD-10-CM

## 2020-01-02 DIAGNOSIS — M25.60 DECREASED RANGE OF MOTION WITH DECREASED STRENGTH: ICD-10-CM

## 2020-01-02 PROCEDURE — 97161 PT EVAL LOW COMPLEX 20 MIN: CPT

## 2020-01-02 PROCEDURE — 97110 THERAPEUTIC EXERCISES: CPT

## 2020-01-03 PROBLEM — M53.82 DECREASED RANGE OF MOTION OF INTERVERTEBRAL DISCS OF CERVICAL SPINE: Status: ACTIVE | Noted: 2020-01-03

## 2020-01-03 NOTE — PLAN OF CARE
[facility]     Name: Henry Zacarias  Clinic Number: [id number]    Therapy Diagnosis:   Encounter Diagnoses   Name Primary?    Chronic right shoulder pain     Decreased range of motion with decreased strength     Decreased range of motion of intervertebral discs of cervical spine      Physician: Hannah Candelario, *    Physician Orders: PT Eval and Treat   Medical Diagnosis from Referral: M54.2 (ICD-10-CM) - Neck pain  Evaluation Date: 1/2/2020  Authorization Period Expiration: 12/31/2020   Plan of Care Expiration: 4/3/2020  Reassessment Due: 2/3/2020  Visit # / Visits authorized: 1/ 20    Time In: 230  Time Out: 400  Total Billable Time: 90 minutes    Precautions: Standard    Pattern of pain determined: 1 MARLENE       Subjective   Date of onset: Pain began approximately 10 years ago.   History of current condition - Henry reports: Pt reports he has had neck pain off and on for the past ten years. The pain is described as intermittent. The pain is neck dominant, but he does have some occasional distal symptoms in R hand. The pain ranges from 0-3/10. Pt reports he has had PT before with some benefit, has never been to chiropractor. States he is not experiencing head aches.      Per MD report:  Mr Zacarias is a 60 yo male sent in consultation by Dr. Coombs for evaluation of neck pain.  He was in an accident 10 years ago.  He had imaging and was told he had stenosis.  He has neck pain off an on.  The pain is not constant.  The pain is worse with looking down, sleeping wrong.  He feels better in certain chairs.  He can move left arm and feel cracking and then the pain is better.  The pain is neck dominant.  He feels like right hand will get cold on cold days.  The pain is a sharp shooting pain.  The pain is 1/10 now, worst 2/10 looking down, best 0/10 walking around.  The pain is never worse than a 2, it is an aggravation that feel most of the time.  He will take aleve PM 1-2 times a  week.  He does not take nsaids.  He has done PT at the  group years ago.  He has not been to chiropractor.  He does not have change in balance gait or dropping thing    Medical History:   Past Medical History:   Diagnosis Date    Cellulitis of back except buttock 5/28/2019    DM2 (diabetes mellitus, type 2)     Essential hypertension     HLD (hyperlipidemia)     mixed    Obesity (BMI 30.0-34.9)     Sleep apnea        Surgical History:   Henry Zacarias  has a past surgical history that includes Incision and drainage of back (Left, 5/29/2019) and Tonsillectomy.    Medications:   Henry has a current medication list which includes the following prescription(s): aspirin, atorvastatin, bd ultra-fine gallito pen needle, blood-glucose meter, contour next test strips, insulin, insulin admin supplies, insulin admin supplies, insulin aspart u-100, insulin aspart u-100, insulin detemir u-100, metformin, metoprolol tartrate, microlet lancet, and valsartan.    Allergies:   Review of patient's allergies indicates:  No Known Allergies     Imaging,   Xray scheduled April 21, 2020    Prior Therapy: PT for shoulder recently, Neck in the past   Prior Treatment: No chiropractor care reported. Pt reports two injections   Social History: Lives alone  Occupation: Sitting frequently in the office   Leisure: Golf       Prior Level of Function: Full   Current Level of Function: Unable to perform leisure activities, increased neck pain with prolonged looking down- paper work at work   DME owned/used: None at this time        Pain:  Current 0/10, worst 3/10, best 0/10   Location: B lower cervical spine   Description: tight, throbbing   Aggravating Factors: Looking down, sitting   Easing Factors: Popping, aleve   Disturbed Sleep: No         Pattern of pain questions:  1.  Where is your pain the worst? B lower cervical spine   2.  Is your pain constant or intermittent? Intermittent   3.  Does bending forward make your typical pain worse? Yes    4.  Since the start of your neck pain, has there been a change in your bowel or bladder? No   5.  What can't you do now that you use to be able to do? Leisure, looking down prolonged time       Pts goals: Get a little relief       Red Flag Screening:   Cough  Sneeze  Strain: (--)  Bladder/ bowel: (--)  Falls: (--)  Night pain: (--)  Unexplained weight loss: (--)  General health: Good     OBJECTIVE   Postural examination/scapula alignment: Rounded shoulder and Head forward   Palpation: Increased tone of B UT. Muscle guarding with PROM.   Joint integrity: Firm end feeling- Increased tone UT and LS   Skin integrity: No observable deficits   Edema: None   Sitting: Poor   Standing: Fair   Correction of posture: better with lumbar roll    Range of Motion - MOVEMENT LOSS    ROM Loss   Flexion minimal loss   Extension moderate loss   Side bending Right moderate loss   Side bending Left minimal loss   Rotation Right moderate loss   Rotation Left minimal loss   Protraction minimal loss   Retraction  minimal loss       Upper Extremity Strength  (R) UE  (L) UE    Shoulder flexion: 3/5 Shoulder flexion: 5/5   Shoulder Abduction: 3/5 Shoulder abduction: 5/5   Elbow flexion: 5/5 Elbow flexion: 5/5   Elbow extension: 5/5 Elbow extension: 5/5   Wrist flexion: 5/5 Wrist flexion: 4+/5   Wrist extension: 5/5 Wrist extension: 5/5    5/5 : 5/5     NEUROLOGICAL SCREEN    Sensory deficit: Intact B UE     Special Tests:   Test Name  Testing Result   Compression (--)   Distraction (--)   Neural Tension Test (--)   Saddle Sensation (--)     Reflexes:    Left Right   Biceps  2+ 2+   Brachioradialis  2+ 2+   Clonus (--) (--)   Babinski (--) (--)       REPEATED TEST MOVEMENTS:   Repeated Protraction in Sitting pain during motion  no worse   Repeated Flexion in Sitting pain during motion  no worse   Repeated Retraction in Sitting  end range pain  no worse   Repeated Retraction Extension in Sitting end range pain  no worse   Repeated  Protraction in lying end range pain  pain during motion  no worse   Repeated Flexion in lying end range pain  pain during motion  no worse   Repeated Retraction in lying end range pain  no worse   Repeated Retraction Extension in lying NT        Baseline Isometric Testing on Med X equipment:  Testing administered by PT  Date of testin2019  ROM 39-84 deg   Max Peak Torque 434    Min Peak Torque 322    Flex/Ext Ratio 1.35   % below normative data +9%        GAIT:  Assistive Device used: none  Level of Assistance: independent  Patient displays the following gait deviations:  no gait deviations observed.           CMS Impairment/Limitation/Restriction for FOTO Survey    Therapist reviewed FOTO scores for Henry Zacarias on 2020.   FOTO documents entered into [employer] - see Media section.    Limitation Score: 37%  Category: Mobility    Current : CJ = at least 20% but < 40% impaired, limited or restricted  Goal: CI = at least 1% but < 20% impaired, limited or restricted  Discharge:              Treatment   Treatment Time In: 400  Treatment Time Out: 430  Total Treatment time separate from Evaluation: 30 minutes      Henry received therapeutic exercises to develop/improve posture, lumbar/cervical ROM, strength and muscular endurance for 30 minutes including the following exercises:   Med x dynamic exercise and baseline IM testing  [facility] 2020   Visit Number 1   VAS Pain Rating 2   Cervical Stretches - Retraction In Lying 10   Retraction in Sitting 10   Retraction with Extension 10   Flexion 10   Sidebending 10   Rotation 10   Scapular Retraction 10   Cervical Extension Seat Pad 0   Seat Adjustment 305   Top Dead Center 66   Counterweight 1   Cervical Flexion 84   Cervical Extension 39   Cervical Peak Torque 434   Ice - Z Lie (in min.) 10   Cervical retractions 10x  Thoracic Ext 10x  UT stretch 10x     Henry received the following manual therapy techniques: Joint mobilizations were applied to the:  lower cervical spine for 3 minutes.         Written Home Exercises Provided: yes.  Exercises were reviewed and Henry was able to demonstrate them prior to the end of the session.  Henry demonstrated good  understanding of the education provided.     See EMR under Patient Instructions for exercises provided 1/2/2020.      Education provided:   - Patient received education regarding proper posture and body mechanics.  Patient was given top 10 tips handout which discusses posture seated, standing, lifting correctly, components of exercise, importance of nutrition and hydration, and importance of sleep.  - Zi roll tried, recommended, and purchase information was provided.    - Patient received a handout regarding anticipated muscular soreness following the isometric test and strategies for management were reviewed with patient including stretching, using ice and scheduled rest.   - Patient received education on the [facility] program, purpose of the isometric test, progression of neck strengthening as well as wellness approach and systemic strengthening.  Details of the program were discussed.  Reviewed that patient should feel support/pressure from med ex restraints but no pain or discomfort and patient expressed understanding.      Henry received cold pack for 10 minutes to lower cervical spine.    Assessment   Henry is a 59 y.o. male referred to [facility] with a medical diagnosis of M54.2 (ICD-10-CM) - Neck pain  . Pt presents with primary complaints of increased pain effecting activity and participation. Deficits observed include reduced cervical and thoracic ROM, poor posture, decreased R shoulder strength, and increased pain. Pt does not demonstrate any directional preference on this date, but will continue to reassess with progress and improved ROM.     Pain Pattern: 1 MARLENE        Pt prognosis is Good.   Pt will benefit from skilled outpatient Physical Therapy to address the deficits stated  above and in the chart below, provide pt/family education, and to maximize pt's level of independence.     Plan of care discussed with patient: Yes  Pt's spiritual, cultural and educational needs considered and patient is agreeable to the plan of care and goals as stated below:     Anticipated Barriers for therapy: None     PT Evaluation Completed? Yes    Medical necessity is demonstrated by the following problem list.    Pt presents with the following impairments:     History  Co-morbidities and personal factors that may impact the plan of care Co-morbidities:   Cellulitis of back except buttock   DM2 (diabetes mellitus, type 2)   Essential hypertension   HLD (hyperlipidemia)   mixed   Obesity (BMI 30.0-34.9)   Sleep apnea       Personal Factors:   coping style     low   Examination  Body Structures and Functions, activity limitations and participation restrictions that may impact the plan of care Body Regions:   head  neck  upper extremities    Body Systems:    gross symmetry  ROM  strength  gross coordinated movement  balance  gait  transfers  transitions  motor control  motor learning    Participation Restrictions:   Personal, professional, leisure     Activity limitations:   Learning and applying knowledge  no deficits    General Tasks and Commands  no deficits    Communication  no deficits    Mobility  driving (bike, car, motorcycle)    Self care  no deficits    Domestic Life  no deficits    Interactions/Relationships  no deficits    Life Areas  No deficits      Community and Social Life  recreation and leisure         low   Clinical Presentation stable and uncomplicated low   Decision Making/ Complexity Score: low       GOALS: Pt is in agreement with the following goals.    Short term goals: 6 weeks or 10 visits   1.  Pt will demonstratte increased cervical ROM as measured by med ex by 12 degrees from initial test which results in improved  ROM of neck for ease with ADLs and driving  2. Pt will demonstrate  "independence with reducing or controlling symptoms with ther ex, movement, or position independently, able to reduce pain 1-2 points on pain scale using strategies taught in therapy  3. Pt will demonstrate increased maximum isometric torque value by 15% when compared to the initial value resulting in improved ability to perform bending, lifting, and carrying activities safely, confidently.        Long term goals: 10 weeks or 20 visits  1. Pt will demonstratte increased cervical ROM as measured by med ex by 24 degrees from initial test which results in functional ROM of neck for ease with ADLs and driving  2. Pt will demonstrate increased isometric torque by 20% from initial test to improve ability to lift and carry, and sustain good posture while performing ADL's  3.Pt will demonstrate reduced pain and improved functional outcomes as reported on the FOTO by reaching a score of CJ = at least 20% but < 40% impaired, limited or restricted or less in order to demonstrate subjective improvement in pt's condition.    4. Pt will demonstrate independence with reducing or controlling symptoms with ther ex, movement, or position independently, able to reduce pain 2-4 points on pain scale using strategies taught in therapy  5. Pt will demonstrate independence with the HEP at discharge.   6.  Pt will present with subjective reports of decreased neck pain and improve self management of symptoms.    Plan   Outpatient physical therapy 2x week for 10 weeks or 20 visits to include the following:   - Patient education  - Therapeutic exercise  - Manual therapy  - Performance testing   - Neuromuscular Re-education  - Therapeutic activity   - Modalities    Pt may be seen by PTA as part of the rehabilitation team.     Therapist: Shayan Kenney, PT  1/3/2020      "I certify the need for these services furnished under this plan of treatment and while under my care."    ____________________________________  Physician/Referring " Practitioner    _______________  Date of Signature

## 2020-01-20 ENCOUNTER — CLINICAL SUPPORT (OUTPATIENT)
Dept: REHABILITATION | Facility: OTHER | Age: 60
End: 2020-01-20
Attending: PHYSICAL MEDICINE & REHABILITATION
Payer: COMMERCIAL

## 2020-01-20 DIAGNOSIS — M53.82 DECREASED RANGE OF MOTION OF INTERVERTEBRAL DISCS OF CERVICAL SPINE: ICD-10-CM

## 2020-01-20 PROCEDURE — 97110 THERAPEUTIC EXERCISES: CPT | Mod: CQ

## 2020-01-20 NOTE — PROGRESS NOTES
[facility] Physical [facility] Treatment      Name: Henry Zacarias  Clinic Number: [id number]    Therapy Diagnosis:   Encounter Diagnosis   Name Primary?    Decreased range of motion of intervertebral discs of cervical spine      Physician: Hannah Candelario, *    Visit Date: 2020    Physician: Hannah Candelario, *     Physician Orders: PT Eval and Treat   Medical Diagnosis from Referral: M54.2 (ICD-10-CM) - Neck pain  Evaluation Date: 2020  Authorization Period Expiration: 2020   Plan of Care Expiration: 4/3/2020  Reassessment Due: 2/3/2020  Visit # / Visits authorized:      Time In: 3:00  Time Out: 400  Total Billable Time: 90 minutes     Precautions: Standard     Pattern of pain determined: 1 MARLENE     Subjective   Henry reports no neck pain today. He usually gets pain with at night when sleeping.     Patient reports tolerating previous visit with no neck soreness.  Patient reports their pain to be 0/10 on a 0-10 scale with 0 being no pain and 10 being the worst pain imaginable.  Pain Location: R side of neck     Occupation: Sitting frequently in the office   Leisure: Golf       Pts goals: Get a little relief      Objective     Baseline Isometric Testing on Med X equipment:  Testing administered by PT  Date of testin2019  ROM 39-84 deg   Max Peak Torque 434    Min Peak Torque 322    Flex/Ext Ratio 1.35   % below normative data +9%           Treatment    Pt was instructed in and performed the following:     Henry received therapeutic exercises to develop/improved posture, cardiovascular endurance, muscular endurance, lumbar/cervical ROM, strength and muscular endurance for 60 minutes including the following exercises:       [facility] 2020   Visit Number 2   VAS Pain Rating 0   Recumbent Bike Seat Pos. 10   Cervical Stretches - Retraction In Lying -   Retraction in Sitting -   Retraction with Extension -   Flexion -   Sidebending -   Rotation -   Scapular  Retraction -   Cervical Extension Seat Pad -   Seat Adjustment -   Top Dead Center -   Counterweight -   Cervical Flexion -   Cervical Extension -   Cervical Peak Torque -   Lumbar Weight 282   Repetitions 15   Rating of Perceived Exertion 3   Ice - Z Lie (in min.) 10   Cervical retractions 10x  Thoracic Ext 10x  UT stretch 10x    Scapular Retractions 10x        Peripheral muscle strengthening which included 1 set of 15-20 repetitions at a slow, controlled 10-13 second per rep pace focused on strengthening supporting musculature for improved body mechanics and functional mobility.  Pt and therapist focused on proper form during treatment to ensure optimal strengthening of each targeted muscle group.  Machines were utilized including torso rotation, leg extension, leg curl, chest press, upright row. Tricep extension, bicep curl, leg press, and hip abduction added visit 3    Henry received the following manual therapy techniques: n/a were applied to the: n/a for n/a minutes.         Home Exercises Provided and Patient Education Provided     Education provided:   Educated pt on the importance of daily stretch to increase the benefit of program and positive results.     Written Home Exercises Provided: Patient instructed to cont prior HEP.  Exercises were reviewed and Henry was able to demonstrate them prior to the end of the session.  Henry demonstrated good  understanding of the education provided.     See EMR under Patient Instructions for exercises provided prior visit.          Assessment   Pt with no neck ain today with session . Reviewed HEP with mod vc for tech. Added scapular retractions and educated pt to stand every hr and do stretches. He understood. Pt tolerated neck medx machine with starting weight less than 75% of pts max peak torque due to weight to difficult. Pt tolerated starting well with no pain.  Pt tolerated the medx machines well to the upright row with no c/o increased neck  or any limb pain.  Pt with no  L shoulder pain. Monitor due to pain at times.         Patient is making good progress towards established goals.  Pt will continue to benefit from skilled outpatient physical therapy to address the deficits stated in the impairment chart, provide pt/family education and to maximize pt's level of independence in the home and community environment.     Anticipated Barriers for therapy: none  Pt's spiritual, cultural and educational needs considered and pt agreeable to plan of care and goals as stated below:           GOALS: Pt is in agreement with the following goals.     Short term goals: 6 weeks or 10 visits   1.  Pt will demonstratte increased cervical ROM as measured by med ex by 12 degrees from initial test which results in improved  ROM of neck for ease with ADLs and driving  2. Pt will demonstrate independence with reducing or controlling symptoms with ther ex, movement, or position independently, able to reduce pain 1-2 points on pain scale using strategies taught in therapy  3. Pt will demonstrate increased maximum isometric torque value by 15% when compared to the initial value resulting in improved ability to perform bending, lifting, and carrying activities safely, confidently.           Long term goals: 10 weeks or 20 visits  1. Pt will demonstratte increased cervical ROM as measured by med ex by 24 degrees from initial test which results in functional ROM of neck for ease with ADLs and driving  2. Pt will demonstrate increased isometric torque by 20% from initial test to improve ability to lift and carry, and sustain good posture while performing ADL's  3.Pt will demonstrate reduced pain and improved functional outcomes as reported on the FOTO by reaching a score of CJ = at least 20% but < 40% impaired, limited or restricted or less in order to demonstrate subjective improvement in pt's condition.    4. Pt will demonstrate independence with reducing or controlling symptoms with ther ex,  movement, or position independently, able to reduce pain 2-4 points on pain scale using strategies taught in therapy  5. Pt will demonstrate independence with the HEP at discharge.   6.  Pt will present with subjective reports of decreased neck pain and improve self management of symptoms.          Plan   Continue with established Plan of Care towards established PT goals.

## 2020-02-03 ENCOUNTER — CLINICAL SUPPORT (OUTPATIENT)
Dept: REHABILITATION | Facility: OTHER | Age: 60
End: 2020-02-03
Attending: PHYSICAL MEDICINE & REHABILITATION
Payer: COMMERCIAL

## 2020-02-03 DIAGNOSIS — M53.82 DECREASED RANGE OF MOTION OF INTERVERTEBRAL DISCS OF CERVICAL SPINE: ICD-10-CM

## 2020-02-03 PROCEDURE — 97110 THERAPEUTIC EXERCISES: CPT

## 2020-02-03 NOTE — PROGRESS NOTES
[facility]      Name: Henry Zacarias  Clinic Number: [id number]    Therapy Diagnosis:   Encounter Diagnosis   Name Primary?    Decreased range of motion of intervertebral discs of cervical spine      Physician: Hannah Candelario, *    Visit Date: 2/3/2020    Physician: Hannah Candelario, *     Physician Orders: PT Eval and Treat   Medical Diagnosis from Referral: M54.2 (ICD-10-CM) - Neck pain  Evaluation Date: 2020  Authorization Period Expiration: 2020   Plan of Care Expiration: 4/3/2020  Reassessment Due: 3/3/2020  Visit # / Visits authorized: 3/ 20     Time In: 3:00  Time Out: 400  Total Billable Time: 50 minutes     Precautions: Standard     Pattern of pain determined: 1 MARLENE     Subjective   Henry reports no neck pain today. He usually gets pain with at night when sleeping.     Patient reports tolerating previous visit with no neck soreness.  Patient reports their pain to be 0/10 on a 0-10 scale with 0 being no pain and 10 being the worst pain imaginable.  Pain Location: R side of neck     Occupation: Sitting frequently in the office   Leisure: Golf       Pts goals: Get a little relief      Objective     Baseline Isometric Testing on Med X equipment:  Testing administered by PT  Date of testin2019  ROM 39-84 deg   Max Peak Torque 434    Min Peak Torque 322    Flex/Ext Ratio 1.35   % below normative data +9%           Treatment    Pt was instructed in and performed the following:     Henry received therapeutic exercises to develop/improved posture, cardiovascular endurance, muscular endurance, lumbar/cervical ROM, strength and muscular endurance for 60 minutes including the following exercises:   [facility] 2/3/2020   Visit Number 3   VAS Pain Rating 0   Recumbent Bike Seat Pos. 10   Cervical Stretches - Retraction In Lying -   Retraction in Sitting -   Retraction with Extension -   Flexion -   Sidebending -   Rotation -   Scapular Retraction  -   Cervical Extension Seat Pad -   Seat Adjustment -   Top Dead Center -   Counterweight -   Cervical Flexion -   Cervical Extension -   Cervical Peak Torque -   Cervical Weight 282   Repetitions 18   Rating of Perceived Exertion 3   Lumbar Weight -   Repetitions -   Rating of Perceived Exertion -   Ice - Z Lie (in min.) 10     Exercises completed this session:  Cervical retractions 10x  Thoracic Ext 10x  UT stretch 10x    Scapular Retractions 10x  Open Book 10x      Peripheral muscle strengthening which included 1 set of 15-20 repetitions at a slow, controlled 10-13 second per rep pace focused on strengthening supporting musculature for improved body mechanics and functional mobility.  Pt and therapist focused on proper form during treatment to ensure optimal strengthening of each targeted muscle group.  Machines were utilized including torso rotation, leg extension, leg curl, chest press, upright row. Tricep extension, bicep curl, leg press, and hip abduction added visit 3    Henry received the following manual therapy techniques: n/a were applied to the: n/a for n/a minutes.         Home Exercises Provided and Patient Education Provided     Education provided:   Educated pt on the importance of daily stretch to increase the benefit of program and positive results.     Written Home Exercises Provided: Patient instructed to cont prior HEP.  Exercises were reviewed and Henry was able to demonstrate them prior to the end of the session.  Henry demonstrated good  understanding of the education provided.     See EMR under Patient Instructions for exercises provided prior visit.          Assessment   Pt able to complete all components of session with no adverse effects. Implemented open books to address reduced thoracic mobility. Able to complete with no increase in symptoms- no shoulder pain.   Patient is making good progress towards established goals.  Pt will continue to benefit from skilled outpatient physical  therapy to address the deficits stated in the impairment chart, provide pt/family education and to maximize pt's level of independence in the home and community environment.     Anticipated Barriers for therapy: none  Pt's spiritual, cultural and educational needs considered and pt agreeable to plan of care and goals as stated below:           GOALS: Pt is in agreement with the following goals.     Short term goals: 6 weeks or 10 visits   1.  Pt will demonstratte increased cervical ROM as measured by med ex by 12 degrees from initial test which results in improved  ROM of neck for ease with ADLs and driving  2. Pt will demonstrate independence with reducing or controlling symptoms with ther ex, movement, or position independently, able to reduce pain 1-2 points on pain scale using strategies taught in therapy  3. Pt will demonstrate increased maximum isometric torque value by 15% when compared to the initial value resulting in improved ability to perform bending, lifting, and carrying activities safely, confidently.           Long term goals: 10 weeks or 20 visits  1. Pt will demonstratte increased cervical ROM as measured by med ex by 24 degrees from initial test which results in functional ROM of neck for ease with ADLs and driving  2. Pt will demonstrate increased isometric torque by 20% from initial test to improve ability to lift and carry, and sustain good posture while performing ADL's  3.Pt will demonstrate reduced pain and improved functional outcomes as reported on the FOTO by reaching a score of CJ = at least 20% but < 40% impaired, limited or restricted or less in order to demonstrate subjective improvement in pt's condition.    4. Pt will demonstrate independence with reducing or controlling symptoms with ther ex, movement, or position independently, able to reduce pain 2-4 points on pain scale using strategies taught in therapy  5. Pt will demonstrate independence with the HEP at discharge.   6.  Pt  will present with subjective reports of decreased neck pain and improve self management of symptoms.          Plan   Continue with established Plan of Care towards established PT goals.

## 2020-02-10 ENCOUNTER — OFFICE VISIT (OUTPATIENT)
Dept: URGENT CARE | Facility: CLINIC | Age: 60
End: 2020-02-10
Payer: COMMERCIAL

## 2020-02-10 VITALS
DIASTOLIC BLOOD PRESSURE: 86 MMHG | SYSTOLIC BLOOD PRESSURE: 152 MMHG | HEART RATE: 76 BPM | BODY MASS INDEX: 30.92 KG/M2 | RESPIRATION RATE: 18 BRPM | HEIGHT: 70 IN | WEIGHT: 216 LBS | TEMPERATURE: 99 F | OXYGEN SATURATION: 94 %

## 2020-02-10 DIAGNOSIS — J10.1 INFLUENZA A: Primary | ICD-10-CM

## 2020-02-10 DIAGNOSIS — R68.89 FLU-LIKE SYMPTOMS: ICD-10-CM

## 2020-02-10 DIAGNOSIS — Z79.4 TYPE 2 DIABETES MELLITUS WITHOUT COMPLICATION, WITH LONG-TERM CURRENT USE OF INSULIN: ICD-10-CM

## 2020-02-10 DIAGNOSIS — E11.9 TYPE 2 DIABETES MELLITUS WITHOUT COMPLICATION, WITH LONG-TERM CURRENT USE OF INSULIN: ICD-10-CM

## 2020-02-10 LAB
CTP QC/QA: YES
FLUAV AG NPH QL: POSITIVE
FLUBV AG NPH QL: NEGATIVE

## 2020-02-10 PROCEDURE — 99213 OFFICE O/P EST LOW 20 MIN: CPT | Mod: 25,S$GLB,, | Performed by: FAMILY MEDICINE

## 2020-02-10 PROCEDURE — 87804 POCT INFLUENZA A/B: ICD-10-PCS | Mod: QW,S$GLB,, | Performed by: FAMILY MEDICINE

## 2020-02-10 PROCEDURE — 99213 PR OFFICE/OUTPT VISIT, EST, LEVL III, 20-29 MIN: ICD-10-PCS | Mod: 25,S$GLB,, | Performed by: FAMILY MEDICINE

## 2020-02-10 PROCEDURE — 87804 INFLUENZA ASSAY W/OPTIC: CPT | Mod: QW,S$GLB,, | Performed by: FAMILY MEDICINE

## 2020-02-10 RX ORDER — OSELTAMIVIR PHOSPHATE 75 MG/1
75 CAPSULE ORAL 2 TIMES DAILY
Qty: 10 CAPSULE | Refills: 0 | Status: SHIPPED | OUTPATIENT
Start: 2020-02-10 | End: 2020-02-15

## 2020-02-10 NOTE — PATIENT INSTRUCTIONS

## 2020-02-10 NOTE — PROGRESS NOTES
"Subjective:       Patient ID: Henry Zacarias is a 60 y.o. male.    Vitals:  height is 5' 10" (1.778 m) and weight is 98 kg (216 lb). His temperature is 99.3 °F (37.4 °C). His blood pressure is 152/86 (abnormal) and his pulse is 76. His respiration is 18 and oxygen saturation is 94% (abnormal).     Chief Complaint: Cough    Pt c/o congestion, productive cough that began yesterday.     Cough   This is a new problem. The current episode started yesterday. The problem has been unchanged. The problem occurs constantly. The cough is productive of sputum. Pertinent negatives include no chills, ear pain, eye redness, fever, hemoptysis, myalgias, rash, sore throat, shortness of breath or wheezing. He has tried nothing for the symptoms.       Constitution: Negative for chills, sweating, fatigue and fever.   HENT: Positive for congestion. Negative for ear pain, sinus pain, sinus pressure, sore throat and voice change.    Neck: Negative for painful lymph nodes.   Eyes: Negative for eye redness.   Respiratory: Positive for cough and sputum production. Negative for chest tightness, bloody sputum, COPD, shortness of breath, stridor, wheezing and asthma.    Gastrointestinal: Negative for nausea and vomiting.   Musculoskeletal: Negative for muscle ache.   Skin: Negative for rash.   Allergic/Immunologic: Negative for seasonal allergies and asthma.   Hematologic/Lymphatic: Negative for swollen lymph nodes.       Objective:      Physical Exam   Constitutional: He is oriented to person, place, and time. He appears well-developed and well-nourished. He is cooperative.  Non-toxic appearance. He does not have a sickly appearance. He does not appear ill. No distress.   HENT:   Head: Normocephalic and atraumatic.   Right Ear: Hearing, tympanic membrane, external ear and ear canal normal.   Left Ear: Hearing, tympanic membrane, external ear and ear canal normal.   Nose: No mucosal edema, rhinorrhea or nasal deformity. No epistaxis. Right sinus " exhibits no maxillary sinus tenderness and no frontal sinus tenderness. Left sinus exhibits no maxillary sinus tenderness and no frontal sinus tenderness.   Mouth/Throat: Uvula is midline and mucous membranes are normal. No trismus in the jaw. Normal dentition. No uvula swelling. No oropharyngeal exudate, posterior oropharyngeal edema or posterior oropharyngeal erythema.   Clear rhinorrhea  And mild red throat   Eyes: Conjunctivae and lids are normal. No scleral icterus.   Neck: Trachea normal, full passive range of motion without pain and phonation normal. Neck supple. No neck rigidity. No edema and no erythema present.   Cardiovascular: Normal rate, regular rhythm, normal heart sounds, intact distal pulses and normal pulses.   Pulmonary/Chest: Effort normal. No respiratory distress. He has no decreased breath sounds. He has no rhonchi.   Course BS   Abdominal: Normal appearance.   Musculoskeletal: Normal range of motion. He exhibits no edema or deformity.   Neurological: He is alert and oriented to person, place, and time. He exhibits normal muscle tone. Coordination normal.   Skin: Skin is warm, dry, intact, not diaphoretic and not pale.   Psychiatric: He has a normal mood and affect. His speech is normal and behavior is normal. Judgment and thought content normal. Cognition and memory are normal.   Nursing note and vitals reviewed.        Assessment:       1. Influenza A    2. Flu-like symptoms    3. Type 2 diabetes mellitus without complication, with long-term current use of insulin        Plan:         Influenza A    Flu-like symptoms  -     POCT Influenza A/B  -     oseltamivir (TAMIFLU) 75 MG capsule; Take 1 capsule (75 mg total) by mouth 2 (two) times daily. for 10 doses  Dispense: 10 capsule; Refill: 0    Type 2 diabetes mellitus without complication, with long-term current use of insulin      Patient Instructions     Influenza (Adult)    Influenza is also called the flu. It is a viral illness that affects  the air passages of your lungs. It is different from the common cold. The flu can easily be passed from one to person to another. It may be spread through the air by coughing and sneezing. Or it can be spread by touching the sick person and then touching your own eyes, nose, or mouth.  The flu starts 1 to 3 days after you are exposed to the flu virus. It may last for 1 to 2 weeks but many people feel tired or fatigued for many weeks afterward. You usually dont need to take antibiotics unless you have a complication. This might be an ear or sinus infection or pneumonia.  Symptoms of the flu may be mild or severe. They can include extreme tiredness (wanting to stay in bed all day), chills, fevers, muscle aches, soreness with eye movement, headache, and a dry, hacking cough.  Home care  Follow these guidelines when caring for yourself at home:  · Avoid being around cigarette smoke, whether yours or other peoples.  · Acetaminophen or ibuprofen will help ease your fever, muscle aches, and headache. Dont give aspirin to anyone younger than 18 who has the flu. Aspirin can harm the liver.  · Nausea and loss of appetite are common with the flu. Eat light meals. Drink 6 to 8 glasses of liquids every day. Good choices are water, sport drinks, soft drinks without caffeine, juices, tea, and soup. Extra fluids will also help loosen secretions in your nose and lungs.  · Over-the-counter cold medicines will not make the flu go away faster. But the medicines may help with coughing, sore throat, and congestion in your nose and sinuses. Dont use a decongestant if you have high blood pressure.  · Stay home until your fever has been gone for at least 24 hours without using medicine to reduce fever.  Follow-up care  Follow up with your healthcare provider, or as advised, if you are not getting better over the next week.  If you are age 65 or older, talk with your provider about getting a pneumococcal vaccine every 5 years. You should  also get this vaccine if you have chronic asthma or COPD. All adults should get a flu vaccine every fall. Ask your provider about this.  When to seek medical advice  Call your healthcare provider right away if any of these occur:  · Cough with lots of colored mucus (sputum) or blood in your mucus  · Chest pain, shortness of breath, wheezing, or trouble breathing  · Severe headache, or face, neck, or ear pain  · New rash with fever  · Fever of 100.4°F (38°C) or higher, or as directed by your healthcare provider  · Confusion, behavior change, or seizure  · Severe weakness or dizziness  · You get a new fever or cough after getting better for a few days  Date Last Reviewed: 1/1/2017  © 0225-3457 [employer]. [address] [place] [address], [place] [address]. All rights reserved. This information is not intended as a substitute for professional medical care. Always follow your healthcare professional's instructions.

## 2020-03-05 ENCOUNTER — PATIENT MESSAGE (OUTPATIENT)
Dept: INTERNAL MEDICINE | Facility: CLINIC | Age: 60
End: 2020-03-05

## 2020-03-05 DIAGNOSIS — Z79.4 TYPE 2 DIABETES MELLITUS WITHOUT COMPLICATION, WITH LONG-TERM CURRENT USE OF INSULIN: ICD-10-CM

## 2020-03-05 DIAGNOSIS — I10 ESSENTIAL HYPERTENSION: ICD-10-CM

## 2020-03-05 DIAGNOSIS — E11.9 TYPE 2 DIABETES MELLITUS WITHOUT COMPLICATION, WITH LONG-TERM CURRENT USE OF INSULIN: ICD-10-CM

## 2020-03-05 RX ORDER — VALSARTAN 160 MG/1
160 TABLET ORAL DAILY
Qty: 90 TABLET | Refills: 3 | Status: SHIPPED | OUTPATIENT
Start: 2020-03-05 | End: 2020-06-22 | Stop reason: SDUPTHER

## 2020-03-23 ENCOUNTER — DOCUMENTATION ONLY (OUTPATIENT)
Dept: REHABILITATION | Facility: OTHER | Age: 60
End: 2020-03-23

## 2020-03-23 NOTE — PROGRESS NOTES
Outpatient Physical Therapy Discharge Summary    Date: 03/23/2020      Date of PT eval: 1/2/2020  Number of PT visits: 3  Date of last visit: 2/3/2020    Assessment:  Unable to assess if goals met secondary to lack of attendance.       Plan: Discharge from outpatient physical therapy due to lack of attendance

## 2020-05-20 DIAGNOSIS — E11.9 TYPE 2 DIABETES MELLITUS WITHOUT COMPLICATION, WITH LONG-TERM CURRENT USE OF INSULIN: ICD-10-CM

## 2020-05-20 DIAGNOSIS — Z79.4 TYPE 2 DIABETES MELLITUS WITHOUT COMPLICATION, WITH LONG-TERM CURRENT USE OF INSULIN: ICD-10-CM

## 2020-05-20 RX ORDER — METFORMIN HYDROCHLORIDE 500 MG/1
TABLET ORAL
Qty: 180 TABLET | Refills: 3 | Status: SHIPPED | OUTPATIENT
Start: 2020-05-20 | End: 2021-04-23 | Stop reason: SDUPTHER

## 2020-05-25 DIAGNOSIS — E78.2 MIXED HYPERLIPIDEMIA: ICD-10-CM

## 2020-05-25 RX ORDER — ATORVASTATIN CALCIUM 40 MG/1
TABLET, FILM COATED ORAL
Qty: 90 TABLET | Refills: 3 | Status: SHIPPED | OUTPATIENT
Start: 2020-05-25 | End: 2021-04-23 | Stop reason: SDUPTHER

## 2020-05-25 RX ORDER — NAPROXEN SODIUM 220 MG/1
TABLET, FILM COATED ORAL
Qty: 90 TABLET | Refills: 3 | Status: SHIPPED | OUTPATIENT
Start: 2020-05-25 | End: 2021-05-12 | Stop reason: SDUPTHER

## 2020-06-08 ENCOUNTER — PATIENT OUTREACH (OUTPATIENT)
Dept: ADMINISTRATIVE | Facility: HOSPITAL | Age: 60
End: 2020-06-08

## 2020-06-11 ENCOUNTER — PATIENT OUTREACH (OUTPATIENT)
Dept: ADMINISTRATIVE | Facility: OTHER | Age: 60
End: 2020-06-11

## 2020-06-11 NOTE — PROGRESS NOTES
Chart reviewed.   Immunizations: Triggered Imm Registry     Orders placed: n/a  Upcoming appts to satisfy HO topics: n/a

## 2020-06-12 ENCOUNTER — OFFICE VISIT (OUTPATIENT)
Dept: PODIATRY | Facility: CLINIC | Age: 60
End: 2020-06-12
Payer: COMMERCIAL

## 2020-06-12 ENCOUNTER — LAB VISIT (OUTPATIENT)
Dept: LAB | Facility: OTHER | Age: 60
End: 2020-06-12
Attending: INTERNAL MEDICINE
Payer: COMMERCIAL

## 2020-06-12 VITALS
SYSTOLIC BLOOD PRESSURE: 148 MMHG | WEIGHT: 216 LBS | TEMPERATURE: 98 F | HEIGHT: 70 IN | BODY MASS INDEX: 30.92 KG/M2 | DIASTOLIC BLOOD PRESSURE: 72 MMHG | HEART RATE: 74 BPM

## 2020-06-12 DIAGNOSIS — E78.2 MIXED HYPERLIPIDEMIA: ICD-10-CM

## 2020-06-12 DIAGNOSIS — E11.9 TYPE 2 DIABETES MELLITUS WITHOUT COMPLICATION, WITH LONG-TERM CURRENT USE OF INSULIN: ICD-10-CM

## 2020-06-12 DIAGNOSIS — E11.9 ENCOUNTER FOR DIABETIC FOOT EXAM: ICD-10-CM

## 2020-06-12 DIAGNOSIS — B35.1 DERMATOPHYTOSIS, NAIL: Primary | ICD-10-CM

## 2020-06-12 DIAGNOSIS — D63.8 ANEMIA OF CHRONIC DISEASE: ICD-10-CM

## 2020-06-12 DIAGNOSIS — Z79.4 TYPE 2 DIABETES MELLITUS WITHOUT COMPLICATION, WITH LONG-TERM CURRENT USE OF INSULIN: ICD-10-CM

## 2020-06-12 DIAGNOSIS — I10 ESSENTIAL HYPERTENSION: ICD-10-CM

## 2020-06-12 LAB
ALBUMIN SERPL BCP-MCNC: 4.1 G/DL (ref 3.5–5.2)
ALP SERPL-CCNC: 96 U/L (ref 55–135)
ALT SERPL W/O P-5'-P-CCNC: 29 U/L (ref 10–44)
ANION GAP SERPL CALC-SCNC: 12 MMOL/L (ref 8–16)
AST SERPL-CCNC: 20 U/L (ref 10–40)
BASOPHILS # BLD AUTO: 0.08 K/UL (ref 0–0.2)
BASOPHILS NFR BLD: 0.9 % (ref 0–1.9)
BILIRUB SERPL-MCNC: 0.5 MG/DL (ref 0.1–1)
BUN SERPL-MCNC: 20 MG/DL (ref 6–20)
CALCIUM SERPL-MCNC: 9.4 MG/DL (ref 8.7–10.5)
CHLORIDE SERPL-SCNC: 104 MMOL/L (ref 95–110)
CHOLEST SERPL-MCNC: 163 MG/DL (ref 120–199)
CHOLEST/HDLC SERPL: 3.8 {RATIO} (ref 2–5)
CO2 SERPL-SCNC: 22 MMOL/L (ref 23–29)
CREAT SERPL-MCNC: 1 MG/DL (ref 0.5–1.4)
DIFFERENTIAL METHOD: ABNORMAL
EOSINOPHIL # BLD AUTO: 0.4 K/UL (ref 0–0.5)
EOSINOPHIL NFR BLD: 4.5 % (ref 0–8)
ERYTHROCYTE [DISTWIDTH] IN BLOOD BY AUTOMATED COUNT: 13 % (ref 11.5–14.5)
EST. GFR  (AFRICAN AMERICAN): >60 ML/MIN/1.73 M^2
EST. GFR  (NON AFRICAN AMERICAN): >60 ML/MIN/1.73 M^2
ESTIMATED AVG GLUCOSE: 114 MG/DL (ref 68–131)
GLUCOSE SERPL-MCNC: 129 MG/DL (ref 70–110)
HBA1C MFR BLD HPLC: 5.6 % (ref 4–5.6)
HCT VFR BLD AUTO: 39.9 % (ref 40–54)
HDLC SERPL-MCNC: 43 MG/DL (ref 40–75)
HDLC SERPL: 26.4 % (ref 20–50)
HGB BLD-MCNC: 13.5 G/DL (ref 14–18)
IMM GRANULOCYTES # BLD AUTO: 0.05 K/UL (ref 0–0.04)
IMM GRANULOCYTES NFR BLD AUTO: 0.6 % (ref 0–0.5)
LDLC SERPL CALC-MCNC: 77.2 MG/DL (ref 63–159)
LYMPHOCYTES # BLD AUTO: 1.4 K/UL (ref 1–4.8)
LYMPHOCYTES NFR BLD: 15.9 % (ref 18–48)
MCH RBC QN AUTO: 32 PG (ref 27–31)
MCHC RBC AUTO-ENTMCNC: 33.8 G/DL (ref 32–36)
MCV RBC AUTO: 95 FL (ref 82–98)
MONOCYTES # BLD AUTO: 0.9 K/UL (ref 0.3–1)
MONOCYTES NFR BLD: 10.1 % (ref 4–15)
NEUTROPHILS # BLD AUTO: 5.9 K/UL (ref 1.8–7.7)
NEUTROPHILS NFR BLD: 68 % (ref 38–73)
NONHDLC SERPL-MCNC: 120 MG/DL
NRBC BLD-RTO: 0 /100 WBC
PLATELET # BLD AUTO: 314 K/UL (ref 150–350)
PMV BLD AUTO: 10.8 FL (ref 9.2–12.9)
POTASSIUM SERPL-SCNC: 4.4 MMOL/L (ref 3.5–5.1)
PROT SERPL-MCNC: 7.9 G/DL (ref 6–8.4)
RBC # BLD AUTO: 4.22 M/UL (ref 4.6–6.2)
SODIUM SERPL-SCNC: 138 MMOL/L (ref 136–145)
TRIGL SERPL-MCNC: 214 MG/DL (ref 30–150)
WBC # BLD AUTO: 8.7 K/UL (ref 3.9–12.7)

## 2020-06-12 PROCEDURE — 3008F PR BODY MASS INDEX (BMI) DOCUMENTED: ICD-10-PCS | Mod: CPTII,S$GLB,, | Performed by: PODIATRIST

## 2020-06-12 PROCEDURE — 3008F BODY MASS INDEX DOCD: CPT | Mod: CPTII,S$GLB,, | Performed by: PODIATRIST

## 2020-06-12 PROCEDURE — 3078F DIAST BP <80 MM HG: CPT | Mod: CPTII,S$GLB,, | Performed by: PODIATRIST

## 2020-06-12 PROCEDURE — 36415 COLL VENOUS BLD VENIPUNCTURE: CPT

## 2020-06-12 PROCEDURE — 3044F HG A1C LEVEL LT 7.0%: CPT | Mod: CPTII,S$GLB,, | Performed by: PODIATRIST

## 2020-06-12 PROCEDURE — 85025 COMPLETE CBC W/AUTO DIFF WBC: CPT

## 2020-06-12 PROCEDURE — 3077F SYST BP >= 140 MM HG: CPT | Mod: CPTII,S$GLB,, | Performed by: PODIATRIST

## 2020-06-12 PROCEDURE — 3078F PR MOST RECENT DIASTOLIC BLOOD PRESSURE < 80 MM HG: ICD-10-PCS | Mod: CPTII,S$GLB,, | Performed by: PODIATRIST

## 2020-06-12 PROCEDURE — 99214 PR OFFICE/OUTPT VISIT, EST, LEVL IV, 30-39 MIN: ICD-10-PCS | Mod: S$GLB,,, | Performed by: PODIATRIST

## 2020-06-12 PROCEDURE — 80061 LIPID PANEL: CPT

## 2020-06-12 PROCEDURE — 3044F PR MOST RECENT HEMOGLOBIN A1C LEVEL <7.0%: ICD-10-PCS | Mod: CPTII,S$GLB,, | Performed by: PODIATRIST

## 2020-06-12 PROCEDURE — 80053 COMPREHEN METABOLIC PANEL: CPT

## 2020-06-12 PROCEDURE — 99999 PR PBB SHADOW E&M-EST. PATIENT-LVL III: CPT | Mod: PBBFAC,,, | Performed by: PODIATRIST

## 2020-06-12 PROCEDURE — 83036 HEMOGLOBIN GLYCOSYLATED A1C: CPT

## 2020-06-12 PROCEDURE — 99214 OFFICE O/P EST MOD 30 MIN: CPT | Mod: S$GLB,,, | Performed by: PODIATRIST

## 2020-06-12 PROCEDURE — 99999 PR PBB SHADOW E&M-EST. PATIENT-LVL III: ICD-10-PCS | Mod: PBBFAC,,, | Performed by: PODIATRIST

## 2020-06-12 PROCEDURE — 3077F PR MOST RECENT SYSTOLIC BLOOD PRESSURE >= 140 MM HG: ICD-10-PCS | Mod: CPTII,S$GLB,, | Performed by: PODIATRIST

## 2020-06-12 RX ORDER — CLOTRIMAZOLE 1 G/ML
SOLUTION TOPICAL DAILY
Qty: 30 ML | Refills: 6 | Status: SHIPPED | OUTPATIENT
Start: 2020-06-12 | End: 2022-06-23 | Stop reason: SDUPTHER

## 2020-06-12 NOTE — PROGRESS NOTES
"Chief Complaint   Patient presents with    Diabetes Mellitus     PCP: Aimee Coombs (Appt.06/22/2020)  A1C: 12/02/2019 / 5.6              HPI:   The patient is a 60 y.o.  male  who presents for a diabetic foot exam.     Patient reports occasional presence of abnormal sensation to the feet .  No tingling or burning sensation or pain to the feet when standing or walking.  No symptoms of claudication evident.   History of diabetic foot ulcers: none   History of foot surgery: none.     Shoes worn today:  Bass dress shoes.         Primary care doctor is: Aimee Coombs MD  Chief Complaint   Patient presents with    Diabetes Mellitus     PCP: Aimee Coombs (Appt.06/22/2020)  A1C: 12/02/2019 / 5.6              Patient Active Problem List   Diagnosis    KEVIN on CPAP    Obesity (BMI 30.0-34.9)    Mixed hyperlipidemia    Essential hypertension    Type 2 diabetes mellitus without complication, with long-term current use of insulin    Right rotator cuff tear    Anemia of chronic disease    Chronic right shoulder pain    Decreased range of motion with decreased strength    Insomnia    Neck pain    Decreased range of motion of intervertebral discs of cervical spine           Current Outpatient Medications on File Prior to Visit   Medication Sig Dispense Refill    aspirin 81 MG Chew TAKE 1 TABLET BY MOUTH EVERY DAY 90 tablet 3    atorvastatin (LIPITOR) 40 MG tablet TAKE 1 TABLET BY MOUTH EVERY DAY 90 tablet 3    BD ULTRA-FINE JEANNETTE PEN NEEDLE 32 gauge x 5/32" Ndle TEST 4 (FOUR) TIMES DAILY. 100 each 11    blood-glucose meter Misc Use as instructed 1 each 0    CONTOUR NEXT TEST STRIPS Strp 1 EACH BY MIS.(NON-DRUG COMBO ROUTE) ROUTE 4 (FOUR) TIMES DAILY BEFORE MEALS AND NIGHTLY. 100 strip 11    insulin (LANTUS SOLOSTAR U-100 INSULIN) glargine 100 units/mL (3mL) SubQ pen Inject 45 Units into the skin once daily. 13.5 mL 0    insulin admin supplies (NOVOPEN ECHO) InPn Inject 15 Units into the skin 3 (three) times " daily before meals. 13.5 mL 0    insulin admin supplies InPn Inject into the skin.      insulin aspart U-100 (NOVOLOG PENFILL U-100 INSULIN) 100 unit/mL Crtg Inject 15 Units into the skin 3 (three) times daily with meals. 13.5 mL 0    insulin aspart U-100 (NOVOLOG) 100 unit/mL (3 mL) InPn pen Inject 15 Units into the skin 3 (three) times daily with meals. 40.5 mL 3    insulin detemir U-100 (LEVEMIR FLEXTOUCH) 100 unit/mL (3 mL) SubQ InPn pen Inject 45 Units into the skin once daily. 40.5 mL 3    metFORMIN (GLUCOPHAGE) 500 MG tablet TAKE 1 TABLET BY MOUTH TWICE A DAY WITH MEALS 180 tablet 3    metoprolol tartrate (LOPRESSOR) 50 MG tablet Take 1 tablet (50 mg total) by mouth 2 (two) times daily. 180 tablet 3    MICROLET LANCET Misc 1 EACH BY MISC.(NON-DRUG COMBO ROUTE) ROUTE 4 (FOUR) TIMES DAILY BEFORE MEALS AND NIGHTLY. 100 each 11    valsartan (DIOVAN) 160 MG tablet Take 1 tablet (160 mg total) by mouth once daily. 90 tablet 3     No current facility-administered medications on file prior to visit.            Review of patient's allergies indicates:  No Known Allergies        Social History     Socioeconomic History    Marital status: Single     Spouse name: Not on file    Number of children: Not on file    Years of education: Not on file    Highest education level: Not on file   Occupational History    Occupation: manager for [employer]     Employer: [employer]   Social Needs    Financial resource strain: Not hard at all    Food insecurity:     Worry: Never true     Inability: Never true    Transportation needs:     Medical: No     Non-medical: No   Tobacco Use    Smoking status: Never Smoker    Smokeless tobacco: Never Used   Substance and Sexual Activity    Alcohol use: Yes     Frequency: Monthly or less     Drinks per session: 3 or 4     Binge frequency: Less than monthly     Comment: social    Drug use: No    Sexual activity: Not Currently     Partners: Female   Lifestyle     "Physical activity:     Days per week: 0 days     Minutes per session: 0 min    Stress: Rather much   Relationships    Social connections:     Talks on phone: Once a week     Gets together: Once a week     Attends Gnosticism service: Not on file     Active member of club or organization: No     Attends meetings of clubs or organizations: Never     Relationship status: Never    Other Topics Concern    Not on file   Social History Narrative    Not on file           ROS:  General ROS: negative  Respiratory ROS: no cough, shortness of breath, or wheezing  Cardiovascular ROS: no chest pain or dyspnea on exertion  Musculoskeletal ROS: negative  Neurological ROS:   negative for - impaired coordination/balance or numbness/tingling  Dermatological ROS: negative          LAST HbA1c:   Lab Results   Component Value Date    HGBA1C 5.6 12/02/2019           EXAM:   Vitals:    06/12/20 0848   BP: (!) 148/72   Pulse: 74   Temp: 98.1 °F (36.7 °C)   Weight: 98 kg (216 lb)   Height: 5' 10" (1.778 m)       General: alert, no distress, cooperative      Vascular:   Dorsalis Pedis:  palpable  Posterior Tibial:  present; palpable  Capillary refill time:  5 seconds  Temperature of toes cool to touch  Edema:   Minimal; pitting      Neurological:     Sharp touch:  normal  Light touch: normal  Tinels Sign:  Absent  Mulders Click:   Absent  Unity:  intact x 10      Dermatological:   Skin: dry and cool to touch.   Slightly atrophic.   Wounds/Ulcers:  Absent  Bruising:  Absent  Erythema:  Absent  Toenails:  Elongated by 1-2mm, thickened by 1mm and Yellowish in color,  With subungual debris.   Absent paronychia      Musculoskeletal:   Metatarsophalangeal range of motion:   full range of motion  Subtalar joint range of motion: full range of motion  Ankle joint range of motion:  full range of motion  Bunions:  Absent  Hammertoes: present 2-4 bilateral;  bilateral adductovarus                ASSESSMENT/PLAN:          Problem List Items " Addressed This Visit     Type 2 diabetes mellitus without complication, with long-term current use of insulin      Other Visit Diagnoses     Dermatophytosis, nail    -  Primary    Relevant Medications    clotrimazole (LOTRIMIN) 1 % Soln    Encounter for diabetic foot exam                I counseled the patient on the patient's conditions, their implications and medical management.       Shoe inspection. Diabetic Foot Education. Patient reminded of the importance of good nutrition and blood sugar control to help prevent podiatric complications of diabetes. Patient instructed on proper foot hygiene. We discussed wearing proper shoe gear, daily foot inspections, never walking without protective shoe gear, never putting sharp instruments to feet.    Shoe and activity modification as needed for relief.     Prescription for topical antifungal solution to try on toenails.     Annual diabetes foot exam, or sooner if concerned. Patient is amenable to plan.                  Shazia Davis DPM  NPI: [id number]       [facility] - [facility]  [address] [address]  Dept: [phone]  Dept Fax: [phone]

## 2020-06-12 NOTE — PATIENT INSTRUCTIONS
How to Check Your Feet    Below are tips to help you look for foot problems. Try to check your feet at the same time each day, such as when you get out of bed in the morning.    · Check the top of each foot. The tops of toes, back of the heel, and outer edge of the foot can get a lot of rubbing from poor-fitting shoes.    · Check the bottom of each foot. Daily wear and tear often leads to problems at pressure spots.    · Check the toes and nails. Fungal infections often occur between toes. Toenail problems can also be a sign of fungal infections or lead to breaks in the skin.    · Check your shoes, too. Loose objects inside a shoe can injure the foot. Use your hand to feel inside your shoes for things like scot, loose stitching, or rough areas that could irritate your skin.        Diabetic Foot Care    Diabetes can lead to a number of different foot complications. Fortunately, most of these complications can be prevented with a little extra foot care. If diabetes is not well controlled, the high blood sugar can cause damage to blood vessels and result in poor circulation to the foot. When the skin does not get enough blood flow, it becomes prone to pressure sores and ulcers, which heal slowly.  High blood sugar can also damage nerves, interfering with the ability to feel pain and pressure. When you cant feel your foot normally, it is easy to injure your skin, bones and joints without knowing it. For these reasons diabetes increases the risk of fungal infections, bunions and ulcers. Deep ulcers can lead to bone infection. Gangrene is the most serious foot complication of diabetes. It usually occurs on the tips of the toes as blacked areas of skin. The black area is dead tissue. In severe cases, gangrene spreads to involve the entire toe, other toes and the entire foot. Foot or toe amputation may be required. Good foot care and blood sugar control can prevent this.    Home Care  1. Wear comfortable, proper fitting  shoes.  2. Wash your feet daily with warm water and mild soap.  3. After drying, apply a moisturizing cream or lotion.  4. Check your feet daily for skin breaks, blisters, swelling, or redness. Look between your toes also.  5. Wear cotton socks and change them every day.  6. Trim toe nails carefully and do not cut your cuticles.  7. Strive to keep your blood sugar under control with a combination of medicines, diet and activity.  8. If you smoke and have diabetes, it is very important that you stop. Smoking reduces blood flow to your foot.  9. Avoid activities that increase your risk of foot injury:  · Do not walk barefoot.  · Do not use heating pads or hot water bottles on your feet.  · Do not put your foot in a hot tub without first checking the temperature with your hand.  10) Schedule yearly foot exams.    Follow Up  with your doctor or as advised by our staff. Report any cut, puncture, scrape, other injury, blister, ingrown toenail or ulcer on your foot.    Get Prompt Medical Attention  if any of the following occur:  -- Open ulcer with pus draining from the wound  -- Increasing foot or leg pain  -- New areas of redness or swelling or tender areas of the foot    © 6737-6092 [employer]. [address] [place] [address], [place] [address]. All rights reserved. This information is not intended as a substitute for professional medical care. Always follow your healthcare professional's instructions.      General nail care measures for abnormal nails include:    ? Keeping nails trimmed short    ? Avoiding trauma     ? Avoiding contact irritants     ? Keeping nails dry (avoiding wet work)    ? Avoiding all nail cosmetics

## 2020-06-22 ENCOUNTER — OFFICE VISIT (OUTPATIENT)
Dept: INTERNAL MEDICINE | Facility: CLINIC | Age: 60
End: 2020-06-22
Payer: COMMERCIAL

## 2020-06-22 VITALS
OXYGEN SATURATION: 98 % | HEIGHT: 70 IN | BODY MASS INDEX: 34.15 KG/M2 | WEIGHT: 238.56 LBS | SYSTOLIC BLOOD PRESSURE: 144 MMHG | DIASTOLIC BLOOD PRESSURE: 82 MMHG | HEART RATE: 76 BPM

## 2020-06-22 DIAGNOSIS — Z11.4 SCREENING FOR HIV (HUMAN IMMUNODEFICIENCY VIRUS): ICD-10-CM

## 2020-06-22 DIAGNOSIS — I10 ESSENTIAL HYPERTENSION: ICD-10-CM

## 2020-06-22 DIAGNOSIS — E66.9 OBESITY (BMI 30.0-34.9): ICD-10-CM

## 2020-06-22 DIAGNOSIS — Z79.4 TYPE 2 DIABETES MELLITUS WITH COMPLICATION, WITH LONG-TERM CURRENT USE OF INSULIN: Primary | ICD-10-CM

## 2020-06-22 DIAGNOSIS — E11.8 TYPE 2 DIABETES MELLITUS WITH COMPLICATION, WITH LONG-TERM CURRENT USE OF INSULIN: Primary | ICD-10-CM

## 2020-06-22 PROCEDURE — 99215 OFFICE O/P EST HI 40 MIN: CPT | Mod: S$GLB,,, | Performed by: INTERNAL MEDICINE

## 2020-06-22 PROCEDURE — 3008F BODY MASS INDEX DOCD: CPT | Mod: CPTII,S$GLB,, | Performed by: INTERNAL MEDICINE

## 2020-06-22 PROCEDURE — 99999 PR PBB SHADOW E&M-EST. PATIENT-LVL III: ICD-10-PCS | Mod: PBBFAC,,, | Performed by: INTERNAL MEDICINE

## 2020-06-22 PROCEDURE — 99999 PR PBB SHADOW E&M-EST. PATIENT-LVL III: CPT | Mod: PBBFAC,,, | Performed by: INTERNAL MEDICINE

## 2020-06-22 PROCEDURE — 99215 PR OFFICE/OUTPT VISIT, EST, LEVL V, 40-54 MIN: ICD-10-PCS | Mod: S$GLB,,, | Performed by: INTERNAL MEDICINE

## 2020-06-22 PROCEDURE — 3079F DIAST BP 80-89 MM HG: CPT | Mod: CPTII,S$GLB,, | Performed by: INTERNAL MEDICINE

## 2020-06-22 PROCEDURE — 3044F HG A1C LEVEL LT 7.0%: CPT | Mod: CPTII,S$GLB,, | Performed by: INTERNAL MEDICINE

## 2020-06-22 PROCEDURE — 3008F PR BODY MASS INDEX (BMI) DOCUMENTED: ICD-10-PCS | Mod: CPTII,S$GLB,, | Performed by: INTERNAL MEDICINE

## 2020-06-22 PROCEDURE — 3044F PR MOST RECENT HEMOGLOBIN A1C LEVEL <7.0%: ICD-10-PCS | Mod: CPTII,S$GLB,, | Performed by: INTERNAL MEDICINE

## 2020-06-22 PROCEDURE — 3077F SYST BP >= 140 MM HG: CPT | Mod: CPTII,S$GLB,, | Performed by: INTERNAL MEDICINE

## 2020-06-22 PROCEDURE — 3077F PR MOST RECENT SYSTOLIC BLOOD PRESSURE >= 140 MM HG: ICD-10-PCS | Mod: CPTII,S$GLB,, | Performed by: INTERNAL MEDICINE

## 2020-06-22 PROCEDURE — 3079F PR MOST RECENT DIASTOLIC BLOOD PRESSURE 80-89 MM HG: ICD-10-PCS | Mod: CPTII,S$GLB,, | Performed by: INTERNAL MEDICINE

## 2020-06-22 RX ORDER — VALSARTAN 320 MG/1
320 TABLET ORAL DAILY
Qty: 90 TABLET | Refills: 3 | Status: SHIPPED | OUTPATIENT
Start: 2020-06-22 | End: 2021-06-09

## 2020-06-22 RX ORDER — INSULIN ASPART 100 [IU]/ML
15 INJECTION, SOLUTION INTRAVENOUS; SUBCUTANEOUS
Qty: 40.5 ML | Refills: 3 | Status: SHIPPED | OUTPATIENT
Start: 2020-06-22 | End: 2021-07-19
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
## 2020-06-26 ENCOUNTER — PATIENT OUTREACH (OUTPATIENT)
Dept: ADMINISTRATIVE | Facility: HOSPITAL | Age: 60
End: 2020-06-26

## 2020-06-26 DIAGNOSIS — Z79.4 TYPE 2 DIABETES MELLITUS WITHOUT COMPLICATION, WITH LONG-TERM CURRENT USE OF INSULIN: Primary | ICD-10-CM

## 2020-06-26 DIAGNOSIS — E11.9 TYPE 2 DIABETES MELLITUS WITHOUT COMPLICATION, WITH LONG-TERM CURRENT USE OF INSULIN: Primary | ICD-10-CM

## 2020-07-01 ENCOUNTER — LAB VISIT (OUTPATIENT)
Dept: PRIMARY CARE CLINIC | Facility: OTHER | Age: 60
End: 2020-07-01
Attending: INTERNAL MEDICINE
Payer: COMMERCIAL

## 2020-07-01 DIAGNOSIS — Z03.818 ENCOUNTER FOR OBSERVATION FOR SUSPECTED EXPOSURE TO OTHER BIOLOGICAL AGENTS RULED OUT: ICD-10-CM

## 2020-07-01 PROCEDURE — U0003 INFECTIOUS AGENT DETECTION BY NUCLEIC ACID (DNA OR RNA); SEVERE ACUTE RESPIRATORY SYNDROME CORONAVIRUS 2 (SARS-COV-2) (CORONAVIRUS DISEASE [COVID-19]), AMPLIFIED PROBE TECHNIQUE, MAKING USE OF HIGH THROUGHPUT TECHNOLOGIES AS DESCRIBED BY CMS-2020-01-R: HCPCS

## 2020-07-02 ENCOUNTER — TELEPHONE (OUTPATIENT)
Dept: INTERNAL MEDICINE | Facility: CLINIC | Age: 60
End: 2020-07-02

## 2020-07-02 ENCOUNTER — CLINICAL SUPPORT (OUTPATIENT)
Dept: INTERNAL MEDICINE | Facility: CLINIC | Age: 60
End: 2020-07-02
Payer: COMMERCIAL

## 2020-07-02 VITALS — SYSTOLIC BLOOD PRESSURE: 148 MMHG | OXYGEN SATURATION: 98 % | DIASTOLIC BLOOD PRESSURE: 82 MMHG | HEART RATE: 86 BPM

## 2020-07-02 LAB — SARS-COV-2 RNA RESP QL NAA+PROBE: NOT DETECTED

## 2020-07-02 PROCEDURE — 99999 PR PBB SHADOW E&M-EST. PATIENT-LVL III: CPT | Mod: PBBFAC,,,

## 2020-07-02 PROCEDURE — 99999 PR PBB SHADOW E&M-EST. PATIENT-LVL III: ICD-10-PCS | Mod: PBBFAC,,,

## 2020-07-02 RX ORDER — AMLODIPINE BESYLATE 5 MG/1
5 TABLET ORAL DAILY
Qty: 90 TABLET | Refills: 3 | Status: SHIPPED | OUTPATIENT
Start: 2020-07-02 | End: 2020-12-23 | Stop reason: SDUPTHER

## 2020-07-02 NOTE — PROGRESS NOTES
"Henry Zacarias 60 y.o. male is here today for Blood Pressure check.   History of HTN yes.    Review of patient's allergies indicates:  No Known Allergies  Creatinine   Date Value Ref Range Status   06/12/2020 1.0 0.5 - 1.4 mg/dL Final     Sodium   Date Value Ref Range Status   06/12/2020 138 136 - 145 mmol/L Final     Potassium   Date Value Ref Range Status   06/12/2020 4.4 3.5 - 5.1 mmol/L Final   ]  Patient verifies taking blood pressure medications on a regular basis at the same time of the day.     Current Outpatient Medications:     aspirin 81 MG Chew, TAKE 1 TABLET BY MOUTH EVERY DAY, Disp: 90 tablet, Rfl: 3    atorvastatin (LIPITOR) 40 MG tablet, TAKE 1 TABLET BY MOUTH EVERY DAY, Disp: 90 tablet, Rfl: 3    BD ULTRA-FINE JEANNETTE PEN NEEDLE 32 gauge x 5/32" Ndle, TEST 4 (FOUR) TIMES DAILY., Disp: 100 each, Rfl: 11    blood-glucose meter Misc, Use as instructed, Disp: 1 each, Rfl: 0    clotrimazole (LOTRIMIN) 1 % Soln, Apply topically once daily. To affected toenails., Disp: 30 mL, Rfl: 6    CONTOUR NEXT TEST STRIPS Strp, 1 EACH BY MISC.(NON-DRUG COMBO ROUTE) ROUTE 4 (FOUR) TIMES DAILY BEFORE MEALS AND NIGHTLY., Disp: 100 strip, Rfl: 11    insulin admin supplies (NOVOPEN ECHO) InPn, Inject 15 Units into the skin 3 (three) times daily before meals., Disp: 13.5 mL, Rfl: 0    insulin admin supplies InPn, Inject into the skin., Disp: , Rfl:     insulin aspart U-100 (NOVOLOG) 100 unit/mL (3 mL) InPn pen, Inject 15 Units into the skin 3 (three) times daily with meals., Disp: 40.5 mL, Rfl: 3    insulin detemir U-100 (LEVEMIR FLEXTOUCH) 100 unit/mL (3 mL) SubQ InPn pen, Inject 45 Units into the skin once daily., Disp: 40.5 mL, Rfl: 3    metFORMIN (GLUCOPHAGE) 500 MG tablet, TAKE 1 TABLET BY MOUTH TWICE A DAY WITH MEALS, Disp: 180 tablet, Rfl: 3    MICROLET LANCET Misc, 1 EACH BY MISC.(NON-DRUG COMBO ROUTE) ROUTE 4 (FOUR) TIMES DAILY BEFORE MEALS AND NIGHTLY., Disp: 100 each, Rfl: 11    valsartan (DIOVAN) 320 MG " tablet, Take 1 tablet (320 mg total) by mouth once daily., Disp: 90 tablet, Rfl: 3  Does patient have record of home blood pressure readings no. Readings have been averaging unknown.   Last dose of blood pressure medication was taken at 0515am.  Patient is asymptomatic.   BP: (!) 162/82 , Pulse: 84 .  Blood pressure reading after 15 minutes was 148/82, Pulse 86.  Dr. Coombs notified.

## 2020-07-02 NOTE — TELEPHONE ENCOUNTER
Above goal rec low dose amlodipine 5mg PO QD. If pt amenable will send to pharmacy and then f/u in 3-4 weeks for nurse visit for BP check

## 2020-07-02 NOTE — TELEPHONE ENCOUNTER
Contacted patient and informed BP above goal and recommendation is to start amlodipine and follow up in one month. States this is okay. Scheduled nurse visit and message sent to provider to inform patient would like to start amlodipine.

## 2020-07-02 NOTE — Clinical Note
Does patient have record of home blood pressure readings no. Readings have been averaging unknown.   Last dose of blood pressure medication was taken at 0515am.  Patient is asymptomatic.   BP: (!) 162/82 , Pulse: 84 .  Blood pressure reading after 15 minutes was 148/82, Pulse 86.

## 2020-07-07 NOTE — TELEPHONE ENCOUNTER
Please schedule nurse visit for BP check in 1 week since his BP medication was just adjusted.  Thanks!

## 2020-07-15 ENCOUNTER — TELEPHONE (OUTPATIENT)
Dept: INTERNAL MEDICINE | Facility: CLINIC | Age: 60
End: 2020-07-15

## 2020-07-15 ENCOUNTER — CLINICAL SUPPORT (OUTPATIENT)
Dept: INTERNAL MEDICINE | Facility: CLINIC | Age: 60
End: 2020-07-15
Payer: COMMERCIAL

## 2020-07-15 VITALS — DIASTOLIC BLOOD PRESSURE: 76 MMHG | HEART RATE: 94 BPM | OXYGEN SATURATION: 98 % | SYSTOLIC BLOOD PRESSURE: 130 MMHG

## 2020-07-15 PROCEDURE — 99999 PR PBB SHADOW E&M-EST. PATIENT-LVL II: ICD-10-PCS | Mod: PBBFAC,,,

## 2020-07-15 PROCEDURE — 99999 PR PBB SHADOW E&M-EST. PATIENT-LVL II: CPT | Mod: PBBFAC,,,

## 2020-07-15 NOTE — TELEPHONE ENCOUNTER
Spoke to Mr. Zacarias and clarified with patient that he is suppose to be taking both blood pressure medications, amlodipine and Diovan.  Patient states understanding.

## 2020-07-15 NOTE — PROGRESS NOTES
"Henry Zacarias 60 y.o. male is here today for Blood Pressure check.   History of HTN yes.    Review of patient's allergies indicates:  No Known Allergies  Creatinine   Date Value Ref Range Status   06/12/2020 1.0 0.5 - 1.4 mg/dL Final     Sodium   Date Value Ref Range Status   06/12/2020 138 136 - 145 mmol/L Final     Potassium   Date Value Ref Range Status   06/12/2020 4.4 3.5 - 5.1 mmol/L Final   ]  Patient verifies taking blood pressure medications on a regular basis at the same time of the day.     Current Outpatient Medications:     amLODIPine (NORVASC) 5 MG tablet, Take 1 tablet (5 mg total) by mouth once daily., Disp: 90 tablet, Rfl: 3    aspirin 81 MG Chew, TAKE 1 TABLET BY MOUTH EVERY DAY, Disp: 90 tablet, Rfl: 3    atorvastatin (LIPITOR) 40 MG tablet, TAKE 1 TABLET BY MOUTH EVERY DAY, Disp: 90 tablet, Rfl: 3    BD ULTRA-FINE JEANNETTE PEN NEEDLE 32 gauge x 5/32" Ndle, TEST 4 (FOUR) TIMES DAILY., Disp: 100 each, Rfl: 11    blood-glucose meter Misc, Use as instructed, Disp: 1 each, Rfl: 0    clotrimazole (LOTRIMIN) 1 % Soln, Apply topically once daily. To affected toenails., Disp: 30 mL, Rfl: 6    CONTOUR NEXT TEST STRIPS Strp, 1 EACH BY [facility]NON-DRUG COMBO ROUTE) ROUTE 4 (FOUR) TIMES DAILY BEFORE MEALS AND NIGHTLY., Disp: 100 strip, Rfl: 11    insulin admin supplies (NOVOPEN ECHO) InPn, Inject 15 Units into the skin 3 (three) times daily before meals., Disp: 13.5 mL, Rfl: 0    insulin admin supplies InPn, Inject into the skin., Disp: , Rfl:     insulin aspart U-100 (NOVOLOG) 100 unit/mL (3 mL) InPn pen, Inject 15 Units into the skin 3 (three) times daily with meals., Disp: 40.5 mL, Rfl: 3    insulin detemir U-100 (LEVEMIR FLEXTOUCH) 100 unit/mL (3 mL) SubQ InPn pen, Inject 45 Units into the skin once daily., Disp: 40.5 mL, Rfl: 3    metFORMIN (GLUCOPHAGE) 500 MG tablet, TAKE 1 TABLET BY MOUTH TWICE A DAY WITH MEALS, Disp: 180 tablet, Rfl: 3    MICROLET LANCET Misc, 1 EACH BY MISC.(NON-DRUG COMBO " ROUTE) ROUTE 4 (FOUR) TIMES DAILY BEFORE MEALS AND NIGHTLY., Disp: 100 each, Rfl: 11    valsartan (DIOVAN) 320 MG tablet, Take 1 tablet (320 mg total) by mouth once daily., Disp: 90 tablet, Rfl: 3  Does patient have record of home blood pressure readings no. Readings have been averaging unknown.   Last dose of blood pressure medication was taken at 0550am.  Patient is asymptomatic.   BP: 130/76 , Pulse: 94 .    Dr. Coombs notified.

## 2020-07-15 NOTE — Clinical Note
Does patient have record of home blood pressure readings no. Readings have been averaging unknown.   Last dose of blood pressure medication was taken at 0550am.  Patient is asymptomatic.   BP: 130/76 , Pulse: 94 .

## 2020-08-06 ENCOUNTER — CLINICAL SUPPORT (OUTPATIENT)
Dept: INTERNAL MEDICINE | Facility: CLINIC | Age: 60
End: 2020-08-06
Payer: COMMERCIAL

## 2020-08-06 VITALS — HEART RATE: 82 BPM | SYSTOLIC BLOOD PRESSURE: 132 MMHG | OXYGEN SATURATION: 98 % | DIASTOLIC BLOOD PRESSURE: 84 MMHG

## 2020-08-06 PROCEDURE — 99999 PR PBB SHADOW E&M-EST. PATIENT-LVL II: CPT | Mod: PBBFAC,,,

## 2020-08-06 PROCEDURE — 99999 PR PBB SHADOW E&M-EST. PATIENT-LVL II: ICD-10-PCS | Mod: PBBFAC,,,

## 2020-08-06 NOTE — Clinical Note
Does patient have record of home blood pressure readings yes. Readings have been averaging 130's/80's.   Last dose of blood pressure medication was taken at 5 am.  Patient is asymptomatic.       BP: 132/84 , Pulse: 82.   notified.

## 2020-08-06 NOTE — PROGRESS NOTES
"Henry Zacarias 60 y.o. male is here today for Blood Pressure check.   History of HTN yes.    Review of patient's allergies indicates:  No Known Allergies  Creatinine   Date Value Ref Range Status   06/12/2020 1.0 0.5 - 1.4 mg/dL Final     Sodium   Date Value Ref Range Status   06/12/2020 138 136 - 145 mmol/L Final     Potassium   Date Value Ref Range Status   06/12/2020 4.4 3.5 - 5.1 mmol/L Final   ]  Patient verifies taking blood pressure medications on a regular bases at the same time of the day.     Current Outpatient Medications:     amLODIPine (NORVASC) 5 MG tablet, Take 1 tablet (5 mg total) by mouth once daily., Disp: 90 tablet, Rfl: 3    aspirin 81 MG Chew, TAKE 1 TABLET BY MOUTH EVERY DAY, Disp: 90 tablet, Rfl: 3    atorvastatin (LIPITOR) 40 MG tablet, TAKE 1 TABLET BY MOUTH EVERY DAY, Disp: 90 tablet, Rfl: 3    BD ULTRA-FINE JEANNETTE PEN NEEDLE 32 gauge x 5/32" Ndle, TEST 4 (FOUR) TIMES DAILY., Disp: 100 each, Rfl: 11    blood-glucose meter Misc, Use as instructed, Disp: 1 each, Rfl: 0    clotrimazole (LOTRIMIN) 1 % Soln, Apply topically once daily. To affected toenails., Disp: 30 mL, Rfl: 6    CONTOUR NEXT TEST STRIPS Strp, 1 EACH BY [facility]NON-DRUG COMBO ROUTE) ROUTE 4 (FOUR) TIMES DAILY BEFORE MEALS AND NIGHTLY., Disp: 100 strip, Rfl: 11    insulin admin supplies (NOVOPEN ECHO) InPn, Inject 15 Units into the skin 3 (three) times daily before meals., Disp: 13.5 mL, Rfl: 0    insulin admin supplies InPn, Inject into the skin., Disp: , Rfl:     insulin aspart U-100 (NOVOLOG) 100 unit/mL (3 mL) InPn pen, Inject 15 Units into the skin 3 (three) times daily with meals., Disp: 40.5 mL, Rfl: 3    insulin detemir U-100 (LEVEMIR FLEXTOUCH) 100 unit/mL (3 mL) SubQ InPn pen, Inject 45 Units into the skin once daily., Disp: 40.5 mL, Rfl: 3    metFORMIN (GLUCOPHAGE) 500 MG tablet, TAKE 1 TABLET BY MOUTH TWICE A DAY WITH MEALS, Disp: 180 tablet, Rfl: 3    MICROLET LANCET Misc, 1 EACH BY MISC.(NON-DRUG COMBO " ROUTE) ROUTE 4 (FOUR) TIMES DAILY BEFORE MEALS AND NIGHTLY., Disp: 100 each, Rfl: 11    valsartan (DIOVAN) 320 MG tablet, Take 1 tablet (320 mg total) by mouth once daily., Disp: 90 tablet, Rfl: 3  Does patient have record of home blood pressure readings yes. Readings have been averaging 130's/80's.   Last dose of blood pressure medication was taken at 5 am.  Patient is asymptomatic.       BP: 132/84 , Pulse: 82.   notified.

## 2020-08-18 ENCOUNTER — PATIENT MESSAGE (OUTPATIENT)
Dept: INTERNAL MEDICINE | Facility: CLINIC | Age: 60
End: 2020-08-18

## 2020-09-24 ENCOUNTER — PATIENT OUTREACH (OUTPATIENT)
Dept: INTERNAL MEDICINE | Facility: CLINIC | Age: 60
End: 2020-09-24

## 2020-12-16 ENCOUNTER — PATIENT MESSAGE (OUTPATIENT)
Dept: INTERNAL MEDICINE | Facility: CLINIC | Age: 60
End: 2020-12-16

## 2020-12-17 ENCOUNTER — TELEPHONE (OUTPATIENT)
Dept: PRIMARY CARE CLINIC | Facility: CLINIC | Age: 60
End: 2020-12-17

## 2020-12-22 ENCOUNTER — LAB VISIT (OUTPATIENT)
Dept: LAB | Facility: OTHER | Age: 60
End: 2020-12-22
Attending: INTERNAL MEDICINE
Payer: COMMERCIAL

## 2020-12-22 DIAGNOSIS — Z79.4 TYPE 2 DIABETES MELLITUS WITH COMPLICATION, WITH LONG-TERM CURRENT USE OF INSULIN: ICD-10-CM

## 2020-12-22 DIAGNOSIS — E11.8 TYPE 2 DIABETES MELLITUS WITH COMPLICATION, WITH LONG-TERM CURRENT USE OF INSULIN: ICD-10-CM

## 2020-12-22 DIAGNOSIS — Z11.4 SCREENING FOR HIV (HUMAN IMMUNODEFICIENCY VIRUS): ICD-10-CM

## 2020-12-22 LAB
ESTIMATED AVG GLUCOSE: 140 MG/DL (ref 68–131)
HBA1C MFR BLD HPLC: 6.5 % (ref 4–5.6)
HIV 1+2 AB+HIV1 P24 AG SERPL QL IA: NEGATIVE

## 2020-12-22 PROCEDURE — 36415 COLL VENOUS BLD VENIPUNCTURE: CPT

## 2020-12-22 PROCEDURE — 83036 HEMOGLOBIN GLYCOSYLATED A1C: CPT

## 2020-12-22 PROCEDURE — 86703 HIV-1/HIV-2 1 RESULT ANTBDY: CPT

## 2020-12-23 ENCOUNTER — PATIENT MESSAGE (OUTPATIENT)
Dept: INTERNAL MEDICINE | Facility: CLINIC | Age: 60
End: 2020-12-23

## 2020-12-23 ENCOUNTER — OFFICE VISIT (OUTPATIENT)
Dept: INTERNAL MEDICINE | Facility: CLINIC | Age: 60
End: 2020-12-23
Payer: COMMERCIAL

## 2020-12-23 VITALS
BODY MASS INDEX: 34.72 KG/M2 | RESPIRATION RATE: 20 BRPM | SYSTOLIC BLOOD PRESSURE: 154 MMHG | WEIGHT: 242.5 LBS | HEIGHT: 70 IN | HEART RATE: 93 BPM | DIASTOLIC BLOOD PRESSURE: 86 MMHG

## 2020-12-23 DIAGNOSIS — I10 ESSENTIAL HYPERTENSION: ICD-10-CM

## 2020-12-23 DIAGNOSIS — G47.33 OSA ON CPAP: ICD-10-CM

## 2020-12-23 DIAGNOSIS — E11.9 TYPE 2 DIABETES MELLITUS WITHOUT COMPLICATION, WITH LONG-TERM CURRENT USE OF INSULIN: Primary | ICD-10-CM

## 2020-12-23 DIAGNOSIS — Z79.4 TYPE 2 DIABETES MELLITUS WITHOUT COMPLICATION, WITH LONG-TERM CURRENT USE OF INSULIN: Primary | ICD-10-CM

## 2020-12-23 DIAGNOSIS — E66.9 OBESITY (BMI 30.0-34.9): ICD-10-CM

## 2020-12-23 PROCEDURE — 3079F PR MOST RECENT DIASTOLIC BLOOD PRESSURE 80-89 MM HG: ICD-10-PCS | Mod: CPTII,S$GLB,, | Performed by: INTERNAL MEDICINE

## 2020-12-23 PROCEDURE — 99215 PR OFFICE/OUTPT VISIT, EST, LEVL V, 40-54 MIN: ICD-10-PCS | Mod: S$GLB,,, | Performed by: INTERNAL MEDICINE

## 2020-12-23 PROCEDURE — 3008F BODY MASS INDEX DOCD: CPT | Mod: CPTII,S$GLB,, | Performed by: INTERNAL MEDICINE

## 2020-12-23 PROCEDURE — 1126F PR PAIN SEVERITY QUANTIFIED, NO PAIN PRESENT: ICD-10-PCS | Mod: S$GLB,,, | Performed by: INTERNAL MEDICINE

## 2020-12-23 PROCEDURE — 3008F PR BODY MASS INDEX (BMI) DOCUMENTED: ICD-10-PCS | Mod: CPTII,S$GLB,, | Performed by: INTERNAL MEDICINE

## 2020-12-23 PROCEDURE — 3077F PR MOST RECENT SYSTOLIC BLOOD PRESSURE >= 140 MM HG: ICD-10-PCS | Mod: CPTII,S$GLB,, | Performed by: INTERNAL MEDICINE

## 2020-12-23 PROCEDURE — 3044F HG A1C LEVEL LT 7.0%: CPT | Mod: CPTII,S$GLB,, | Performed by: INTERNAL MEDICINE

## 2020-12-23 PROCEDURE — 1126F AMNT PAIN NOTED NONE PRSNT: CPT | Mod: S$GLB,,, | Performed by: INTERNAL MEDICINE

## 2020-12-23 PROCEDURE — 3044F PR MOST RECENT HEMOGLOBIN A1C LEVEL <7.0%: ICD-10-PCS | Mod: CPTII,S$GLB,, | Performed by: INTERNAL MEDICINE

## 2020-12-23 PROCEDURE — 99999 PR PBB SHADOW E&M-EST. PATIENT-LVL IV: ICD-10-PCS | Mod: PBBFAC,,, | Performed by: INTERNAL MEDICINE

## 2020-12-23 PROCEDURE — 3079F DIAST BP 80-89 MM HG: CPT | Mod: CPTII,S$GLB,, | Performed by: INTERNAL MEDICINE

## 2020-12-23 PROCEDURE — 3077F SYST BP >= 140 MM HG: CPT | Mod: CPTII,S$GLB,, | Performed by: INTERNAL MEDICINE

## 2020-12-23 PROCEDURE — 99215 OFFICE O/P EST HI 40 MIN: CPT | Mod: S$GLB,,, | Performed by: INTERNAL MEDICINE

## 2020-12-23 PROCEDURE — 99999 PR PBB SHADOW E&M-EST. PATIENT-LVL IV: CPT | Mod: PBBFAC,,, | Performed by: INTERNAL MEDICINE

## 2020-12-23 RX ORDER — AMLODIPINE BESYLATE 10 MG/1
10 TABLET ORAL DAILY
Qty: 90 TABLET | Refills: 3 | Status: SHIPPED | OUTPATIENT
Start: 2020-12-23 | End: 2021-10-21 | Stop reason: SDUPTHER
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
## 2021-01-14 ENCOUNTER — PATIENT MESSAGE (OUTPATIENT)
Dept: INTERNAL MEDICINE | Facility: CLINIC | Age: 61
End: 2021-01-14

## 2021-01-14 DIAGNOSIS — G47.33 OSA ON CPAP: Primary | ICD-10-CM

## 2021-01-18 ENCOUNTER — PATIENT MESSAGE (OUTPATIENT)
Dept: INTERNAL MEDICINE | Facility: CLINIC | Age: 61
End: 2021-01-18

## 2021-01-18 DIAGNOSIS — I10 ESSENTIAL HYPERTENSION: Primary | ICD-10-CM

## 2021-01-19 ENCOUNTER — TELEPHONE (OUTPATIENT)
Dept: INTERNAL MEDICINE | Facility: CLINIC | Age: 61
End: 2021-01-19

## 2021-01-19 RX ORDER — CARVEDILOL 6.25 MG/1
6.25 TABLET ORAL 2 TIMES DAILY WITH MEALS
Qty: 60 TABLET | Refills: 3 | Status: SHIPPED | OUTPATIENT
Start: 2021-01-19 | End: 2021-03-18 | Stop reason: SDUPTHER

## 2021-01-21 ENCOUNTER — OFFICE VISIT (OUTPATIENT)
Dept: SLEEP MEDICINE | Facility: CLINIC | Age: 61
End: 2021-01-21
Payer: COMMERCIAL

## 2021-01-21 VITALS
BODY MASS INDEX: 34.75 KG/M2 | HEIGHT: 70 IN | HEART RATE: 96 BPM | DIASTOLIC BLOOD PRESSURE: 81 MMHG | WEIGHT: 242.75 LBS | SYSTOLIC BLOOD PRESSURE: 137 MMHG

## 2021-01-21 DIAGNOSIS — E66.9 OBESITY (BMI 30.0-34.9): Primary | ICD-10-CM

## 2021-01-21 DIAGNOSIS — E78.2 MIXED HYPERLIPIDEMIA: ICD-10-CM

## 2021-01-21 DIAGNOSIS — Z91.199 NONCOMPLIANCE WITH CPAP TREATMENT: ICD-10-CM

## 2021-01-21 DIAGNOSIS — I10 ESSENTIAL HYPERTENSION: ICD-10-CM

## 2021-01-21 DIAGNOSIS — Z79.4 TYPE 2 DIABETES MELLITUS WITHOUT COMPLICATION, WITH LONG-TERM CURRENT USE OF INSULIN: ICD-10-CM

## 2021-01-21 DIAGNOSIS — G47.33 OSA ON CPAP: ICD-10-CM

## 2021-01-21 DIAGNOSIS — E11.9 TYPE 2 DIABETES MELLITUS WITHOUT COMPLICATION, WITH LONG-TERM CURRENT USE OF INSULIN: ICD-10-CM

## 2021-01-21 PROCEDURE — 99999 PR PBB SHADOW E&M-EST. PATIENT-LVL IV: CPT | Mod: PBBFAC,,, | Performed by: INTERNAL MEDICINE

## 2021-01-21 PROCEDURE — 3079F PR MOST RECENT DIASTOLIC BLOOD PRESSURE 80-89 MM HG: ICD-10-PCS | Mod: CPTII,S$GLB,, | Performed by: INTERNAL MEDICINE

## 2021-01-21 PROCEDURE — 3008F PR BODY MASS INDEX (BMI) DOCUMENTED: ICD-10-PCS | Mod: CPTII,S$GLB,, | Performed by: INTERNAL MEDICINE

## 2021-01-21 PROCEDURE — 3079F DIAST BP 80-89 MM HG: CPT | Mod: CPTII,S$GLB,, | Performed by: INTERNAL MEDICINE

## 2021-01-21 PROCEDURE — 3008F BODY MASS INDEX DOCD: CPT | Mod: CPTII,S$GLB,, | Performed by: INTERNAL MEDICINE

## 2021-01-21 PROCEDURE — 99202 PR OFFICE/OUTPT VISIT, NEW, LEVL II, 15-29 MIN: ICD-10-PCS | Mod: S$GLB,,, | Performed by: INTERNAL MEDICINE

## 2021-01-21 PROCEDURE — 1126F AMNT PAIN NOTED NONE PRSNT: CPT | Mod: S$GLB,,, | Performed by: INTERNAL MEDICINE

## 2021-01-21 PROCEDURE — 99999 PR PBB SHADOW E&M-EST. PATIENT-LVL IV: ICD-10-PCS | Mod: PBBFAC,,, | Performed by: INTERNAL MEDICINE

## 2021-01-21 PROCEDURE — 3044F PR MOST RECENT HEMOGLOBIN A1C LEVEL <7.0%: ICD-10-PCS | Mod: CPTII,S$GLB,, | Performed by: INTERNAL MEDICINE

## 2021-01-21 PROCEDURE — 99202 OFFICE O/P NEW SF 15 MIN: CPT | Mod: S$GLB,,, | Performed by: INTERNAL MEDICINE

## 2021-01-21 PROCEDURE — 3075F SYST BP GE 130 - 139MM HG: CPT | Mod: CPTII,S$GLB,, | Performed by: INTERNAL MEDICINE

## 2021-01-21 PROCEDURE — 1126F PR PAIN SEVERITY QUANTIFIED, NO PAIN PRESENT: ICD-10-PCS | Mod: S$GLB,,, | Performed by: INTERNAL MEDICINE

## 2021-01-21 PROCEDURE — 3044F HG A1C LEVEL LT 7.0%: CPT | Mod: CPTII,S$GLB,, | Performed by: INTERNAL MEDICINE

## 2021-01-21 PROCEDURE — 3075F PR MOST RECENT SYSTOLIC BLOOD PRESS GE 130-139MM HG: ICD-10-PCS | Mod: CPTII,S$GLB,, | Performed by: INTERNAL MEDICINE

## 2021-01-26 ENCOUNTER — TELEPHONE (OUTPATIENT)
Dept: SLEEP MEDICINE | Facility: OTHER | Age: 61
End: 2021-01-26

## 2021-02-01 ENCOUNTER — PATIENT MESSAGE (OUTPATIENT)
Dept: INTERNAL MEDICINE | Facility: CLINIC | Age: 61
End: 2021-02-01

## 2021-02-03 ENCOUNTER — TELEPHONE (OUTPATIENT)
Dept: SLEEP MEDICINE | Facility: OTHER | Age: 61
End: 2021-02-03

## 2021-02-04 ENCOUNTER — HOSPITAL ENCOUNTER (OUTPATIENT)
Dept: SLEEP MEDICINE | Facility: OTHER | Age: 61
Discharge: HOME | End: 2021-02-04
Attending: INTERNAL MEDICINE
Payer: COMMERCIAL

## 2021-02-04 ENCOUNTER — TELEPHONE (OUTPATIENT)
Dept: INTERNAL MEDICINE | Facility: CLINIC | Age: 61
End: 2021-02-04

## 2021-02-04 DIAGNOSIS — I10 ESSENTIAL HYPERTENSION: ICD-10-CM

## 2021-02-04 DIAGNOSIS — E11.9 TYPE 2 DIABETES MELLITUS WITHOUT COMPLICATION, WITH LONG-TERM CURRENT USE OF INSULIN: ICD-10-CM

## 2021-02-04 DIAGNOSIS — Z79.4 TYPE 2 DIABETES MELLITUS WITHOUT COMPLICATION, WITH LONG-TERM CURRENT USE OF INSULIN: ICD-10-CM

## 2021-02-04 DIAGNOSIS — E78.2 MIXED HYPERLIPIDEMIA: ICD-10-CM

## 2021-02-04 DIAGNOSIS — Z91.199 NONCOMPLIANCE WITH CPAP TREATMENT: ICD-10-CM

## 2021-02-04 DIAGNOSIS — G47.33 OSA (OBSTRUCTIVE SLEEP APNEA): Primary | ICD-10-CM

## 2021-02-04 DIAGNOSIS — G47.33 OSA ON CPAP: ICD-10-CM

## 2021-02-04 DIAGNOSIS — E66.9 OBESITY (BMI 30.0-34.9): ICD-10-CM

## 2021-02-04 PROCEDURE — 95800 SLP STDY UNATTENDED: CPT

## 2021-02-04 PROCEDURE — 95800 PR SLEEP STUDY, UNATTENDED, RECORD HEART RATE/O2 SAT/RESP ANAL/SLEEP TIME: ICD-10-PCS | Mod: 26,,, | Performed by: INTERNAL MEDICINE

## 2021-02-04 PROCEDURE — 95800 SLP STDY UNATTENDED: CPT | Mod: 26,,, | Performed by: INTERNAL MEDICINE

## 2021-02-10 DIAGNOSIS — G47.33 OSA (OBSTRUCTIVE SLEEP APNEA): Primary | ICD-10-CM

## 2021-02-24 ENCOUNTER — IMMUNIZATION (OUTPATIENT)
Dept: INTERNAL MEDICINE | Facility: CLINIC | Age: 61
End: 2021-02-24
Payer: COMMERCIAL

## 2021-02-24 DIAGNOSIS — Z23 NEED FOR VACCINATION: Primary | ICD-10-CM

## 2021-02-24 PROCEDURE — 91300 COVID-19, MRNA, LNP-S, PF, 30 MCG/0.3 ML DOSE VACCINE: CPT | Mod: PBBFAC | Performed by: INTERNAL MEDICINE

## 2021-03-17 ENCOUNTER — TELEPHONE (OUTPATIENT)
Dept: INTERNAL MEDICINE | Facility: CLINIC | Age: 61
End: 2021-03-17

## 2021-03-17 ENCOUNTER — IMMUNIZATION (OUTPATIENT)
Dept: INTERNAL MEDICINE | Facility: CLINIC | Age: 61
End: 2021-03-17
Payer: COMMERCIAL

## 2021-03-17 ENCOUNTER — PATIENT MESSAGE (OUTPATIENT)
Dept: INTERNAL MEDICINE | Facility: CLINIC | Age: 61
End: 2021-03-17

## 2021-03-17 DIAGNOSIS — Z23 NEED FOR VACCINATION: Primary | ICD-10-CM

## 2021-03-17 DIAGNOSIS — I10 ESSENTIAL HYPERTENSION: ICD-10-CM

## 2021-03-17 PROCEDURE — 91300 COVID-19, MRNA, LNP-S, PF, 30 MCG/0.3 ML DOSE VACCINE: ICD-10-PCS | Mod: S$GLB,,, | Performed by: INTERNAL MEDICINE

## 2021-03-17 PROCEDURE — 0002A COVID-19, MRNA, LNP-S, PF, 30 MCG/0.3 ML DOSE VACCINE: CPT | Mod: CV19,S$GLB,, | Performed by: INTERNAL MEDICINE

## 2021-03-17 PROCEDURE — 0002A COVID-19, MRNA, LNP-S, PF, 30 MCG/0.3 ML DOSE VACCINE: ICD-10-PCS | Mod: CV19,S$GLB,, | Performed by: INTERNAL MEDICINE

## 2021-03-17 PROCEDURE — 91300 COVID-19, MRNA, LNP-S, PF, 30 MCG/0.3 ML DOSE VACCINE: CPT | Mod: S$GLB,,, | Performed by: INTERNAL MEDICINE

## 2021-03-18 RX ORDER — CARVEDILOL 12.5 MG/1
12.5 TABLET ORAL 2 TIMES DAILY WITH MEALS
Qty: 60 TABLET | Refills: 3 | Status: SHIPPED | OUTPATIENT
Start: 2021-03-18 | End: 2021-04-23 | Stop reason: SDUPTHER

## 2021-04-06 ENCOUNTER — PATIENT MESSAGE (OUTPATIENT)
Dept: INTERNAL MEDICINE | Facility: CLINIC | Age: 61
End: 2021-04-06

## 2021-04-06 ENCOUNTER — TELEPHONE (OUTPATIENT)
Dept: INTERNAL MEDICINE | Facility: CLINIC | Age: 61
End: 2021-04-06

## 2021-04-08 ENCOUNTER — PATIENT OUTREACH (OUTPATIENT)
Dept: ADMINISTRATIVE | Facility: HOSPITAL | Age: 61
End: 2021-04-08

## 2021-04-08 ENCOUNTER — TELEPHONE (OUTPATIENT)
Dept: SLEEP MEDICINE | Facility: CLINIC | Age: 61
End: 2021-04-08

## 2021-04-20 ENCOUNTER — LAB VISIT (OUTPATIENT)
Dept: LAB | Facility: OTHER | Age: 61
End: 2021-04-20
Attending: INTERNAL MEDICINE
Payer: COMMERCIAL

## 2021-04-20 DIAGNOSIS — E11.9 TYPE 2 DIABETES MELLITUS WITHOUT COMPLICATION, WITH LONG-TERM CURRENT USE OF INSULIN: ICD-10-CM

## 2021-04-20 DIAGNOSIS — Z79.4 TYPE 2 DIABETES MELLITUS WITHOUT COMPLICATION, WITH LONG-TERM CURRENT USE OF INSULIN: ICD-10-CM

## 2021-04-20 LAB
ALBUMIN/CREAT UR: 51.4 UG/MG (ref 0–30)
CREAT UR-MCNC: 120.7 MG/DL (ref 23–375)
MICROALBUMIN UR DL<=1MG/L-MCNC: 62 UG/ML

## 2021-04-20 PROCEDURE — 82570 ASSAY OF URINE CREATININE: CPT | Performed by: INTERNAL MEDICINE

## 2021-04-20 PROCEDURE — 82043 UR ALBUMIN QUANTITATIVE: CPT | Performed by: INTERNAL MEDICINE

## 2021-04-22 ENCOUNTER — PATIENT MESSAGE (OUTPATIENT)
Dept: INTERNAL MEDICINE | Facility: CLINIC | Age: 61
End: 2021-04-22

## 2021-04-22 ENCOUNTER — OFFICE VISIT (OUTPATIENT)
Dept: SLEEP MEDICINE | Facility: CLINIC | Age: 61
End: 2021-04-22
Payer: COMMERCIAL

## 2021-04-22 DIAGNOSIS — E66.9 OBESITY (BMI 30.0-34.9): ICD-10-CM

## 2021-04-22 DIAGNOSIS — E11.9 TYPE 2 DIABETES MELLITUS WITHOUT COMPLICATION, WITH LONG-TERM CURRENT USE OF INSULIN: ICD-10-CM

## 2021-04-22 DIAGNOSIS — Z79.4 TYPE 2 DIABETES MELLITUS WITHOUT COMPLICATION, WITH LONG-TERM CURRENT USE OF INSULIN: ICD-10-CM

## 2021-04-22 DIAGNOSIS — I10 ESSENTIAL HYPERTENSION: ICD-10-CM

## 2021-04-22 DIAGNOSIS — E78.2 MIXED HYPERLIPIDEMIA: ICD-10-CM

## 2021-04-22 DIAGNOSIS — G47.33 OSA (OBSTRUCTIVE SLEEP APNEA): Primary | ICD-10-CM

## 2021-04-22 PROCEDURE — 99212 PR OFFICE/OUTPT VISIT, EST, LEVL II, 10-19 MIN: ICD-10-PCS | Mod: 95,,, | Performed by: INTERNAL MEDICINE

## 2021-04-22 PROCEDURE — 99212 OFFICE O/P EST SF 10 MIN: CPT | Mod: 95,,, | Performed by: INTERNAL MEDICINE

## 2021-04-23 ENCOUNTER — OFFICE VISIT (OUTPATIENT)
Dept: INTERNAL MEDICINE | Facility: CLINIC | Age: 61
End: 2021-04-23
Payer: COMMERCIAL

## 2021-04-23 VITALS
SYSTOLIC BLOOD PRESSURE: 122 MMHG | HEART RATE: 86 BPM | HEIGHT: 70 IN | DIASTOLIC BLOOD PRESSURE: 80 MMHG | WEIGHT: 248.88 LBS | BODY MASS INDEX: 35.63 KG/M2 | OXYGEN SATURATION: 97 %

## 2021-04-23 DIAGNOSIS — Z79.4 TYPE 2 DIABETES MELLITUS WITH MICROALBUMINURIA, WITH LONG-TERM CURRENT USE OF INSULIN: Primary | ICD-10-CM

## 2021-04-23 DIAGNOSIS — D63.8 ANEMIA OF CHRONIC DISEASE: ICD-10-CM

## 2021-04-23 DIAGNOSIS — E66.9 OBESITY (BMI 30.0-34.9): ICD-10-CM

## 2021-04-23 DIAGNOSIS — R80.9 TYPE 2 DIABETES MELLITUS WITH MICROALBUMINURIA, WITH LONG-TERM CURRENT USE OF INSULIN: Primary | ICD-10-CM

## 2021-04-23 DIAGNOSIS — I10 ESSENTIAL HYPERTENSION: ICD-10-CM

## 2021-04-23 DIAGNOSIS — E11.29 TYPE 2 DIABETES MELLITUS WITH MICROALBUMINURIA, WITH LONG-TERM CURRENT USE OF INSULIN: Primary | ICD-10-CM

## 2021-04-23 DIAGNOSIS — E78.2 MIXED HYPERLIPIDEMIA: ICD-10-CM

## 2021-04-23 PROCEDURE — 99999 PR PBB SHADOW E&M-EST. PATIENT-LVL III: ICD-10-PCS | Mod: PBBFAC,,, | Performed by: INTERNAL MEDICINE

## 2021-04-23 PROCEDURE — 3008F PR BODY MASS INDEX (BMI) DOCUMENTED: ICD-10-PCS | Mod: CPTII,S$GLB,, | Performed by: INTERNAL MEDICINE

## 2021-04-23 PROCEDURE — 99999 PR PBB SHADOW E&M-EST. PATIENT-LVL III: CPT | Mod: PBBFAC,,, | Performed by: INTERNAL MEDICINE

## 2021-04-23 PROCEDURE — 3044F HG A1C LEVEL LT 7.0%: CPT | Mod: CPTII,S$GLB,, | Performed by: INTERNAL MEDICINE

## 2021-04-23 PROCEDURE — 3008F BODY MASS INDEX DOCD: CPT | Mod: CPTII,S$GLB,, | Performed by: INTERNAL MEDICINE

## 2021-04-23 PROCEDURE — 3044F PR MOST RECENT HEMOGLOBIN A1C LEVEL <7.0%: ICD-10-PCS | Mod: CPTII,S$GLB,, | Performed by: INTERNAL MEDICINE

## 2021-04-23 PROCEDURE — 99215 PR OFFICE/OUTPT VISIT, EST, LEVL V, 40-54 MIN: ICD-10-PCS | Mod: S$GLB,,, | Performed by: INTERNAL MEDICINE

## 2021-04-23 PROCEDURE — 1126F AMNT PAIN NOTED NONE PRSNT: CPT | Mod: S$GLB,,, | Performed by: INTERNAL MEDICINE

## 2021-04-23 PROCEDURE — 3074F PR MOST RECENT SYSTOLIC BLOOD PRESSURE < 130 MM HG: ICD-10-PCS | Mod: CPTII,S$GLB,, | Performed by: INTERNAL MEDICINE

## 2021-04-23 PROCEDURE — 3079F DIAST BP 80-89 MM HG: CPT | Mod: CPTII,S$GLB,, | Performed by: INTERNAL MEDICINE

## 2021-04-23 PROCEDURE — 3074F SYST BP LT 130 MM HG: CPT | Mod: CPTII,S$GLB,, | Performed by: INTERNAL MEDICINE

## 2021-04-23 PROCEDURE — 99215 OFFICE O/P EST HI 40 MIN: CPT | Mod: S$GLB,,, | Performed by: INTERNAL MEDICINE

## 2021-04-23 PROCEDURE — 1126F PR PAIN SEVERITY QUANTIFIED, NO PAIN PRESENT: ICD-10-PCS | Mod: S$GLB,,, | Performed by: INTERNAL MEDICINE

## 2021-04-23 PROCEDURE — 3079F PR MOST RECENT DIASTOLIC BLOOD PRESSURE 80-89 MM HG: ICD-10-PCS | Mod: CPTII,S$GLB,, | Performed by: INTERNAL MEDICINE

## 2021-04-23 RX ORDER — FLASH GLUCOSE SENSOR
KIT MISCELLANEOUS
Qty: 1 KIT | Refills: 11 | Status: SHIPPED | OUTPATIENT
Start: 2021-04-23 | End: 2023-04-20

## 2021-04-23 RX ORDER — FLASH GLUCOSE SCANNING READER
EACH MISCELLANEOUS
Qty: 1 EACH | Refills: 0 | Status: SHIPPED | OUTPATIENT
Start: 2021-04-23 | End: 2023-04-20

## 2021-04-23 RX ORDER — METFORMIN HYDROCHLORIDE 1000 MG/1
1000 TABLET ORAL 2 TIMES DAILY WITH MEALS
Qty: 180 TABLET | Refills: 3 | Status: SHIPPED | OUTPATIENT
Start: 2021-04-23 | End: 2022-04-21 | Stop reason: SDUPTHER

## 2021-04-23 RX ORDER — ATORVASTATIN CALCIUM 40 MG/1
40 TABLET, FILM COATED ORAL DAILY
Qty: 90 TABLET | Refills: 3 | Status: SHIPPED | OUTPATIENT
Start: 2021-04-23 | End: 2022-04-21 | Stop reason: SDUPTHER

## 2021-04-23 RX ORDER — CARVEDILOL 12.5 MG/1
12.5 TABLET ORAL 2 TIMES DAILY WITH MEALS
Qty: 180 TABLET | Refills: 3 | Status: SHIPPED | OUTPATIENT
Start: 2021-04-23 | End: 2022-04-21 | Stop reason: SDUPTHER

## 2021-04-27 ENCOUNTER — PATIENT MESSAGE (OUTPATIENT)
Dept: SLEEP MEDICINE | Facility: CLINIC | Age: 61
End: 2021-04-27

## 2021-05-04 ENCOUNTER — CLINICAL SUPPORT (OUTPATIENT)
Dept: DIABETES | Facility: CLINIC | Age: 61
End: 2021-05-04
Payer: COMMERCIAL

## 2021-05-04 VITALS — BODY MASS INDEX: 35.59 KG/M2 | WEIGHT: 248 LBS

## 2021-05-04 DIAGNOSIS — E11.29 TYPE 2 DIABETES MELLITUS WITH MICROALBUMINURIA, WITH LONG-TERM CURRENT USE OF INSULIN: ICD-10-CM

## 2021-05-04 DIAGNOSIS — R80.9 TYPE 2 DIABETES MELLITUS WITH MICROALBUMINURIA, WITH LONG-TERM CURRENT USE OF INSULIN: ICD-10-CM

## 2021-05-04 DIAGNOSIS — Z79.4 TYPE 2 DIABETES MELLITUS WITH MICROALBUMINURIA, WITH LONG-TERM CURRENT USE OF INSULIN: ICD-10-CM

## 2021-05-04 PROCEDURE — G0108 DIAB MANAGE TRN  PER INDIV: HCPCS | Mod: S$GLB,,, | Performed by: DIETITIAN, REGISTERED

## 2021-05-04 PROCEDURE — 99999 PR PBB SHADOW E&M-EST. PATIENT-LVL I: CPT | Mod: PBBFAC,,, | Performed by: DIETITIAN, REGISTERED

## 2021-05-04 PROCEDURE — 99999 PR PBB SHADOW E&M-EST. PATIENT-LVL I: ICD-10-PCS | Mod: PBBFAC,,, | Performed by: DIETITIAN, REGISTERED

## 2021-05-04 PROCEDURE — G0108 PR DIAB MANAGE TRN  PER INDIV: ICD-10-PCS | Mod: S$GLB,,, | Performed by: DIETITIAN, REGISTERED

## 2021-05-12 ENCOUNTER — PATIENT MESSAGE (OUTPATIENT)
Dept: INTERNAL MEDICINE | Facility: CLINIC | Age: 61
End: 2021-05-12

## 2021-05-12 DIAGNOSIS — E78.2 MIXED HYPERLIPIDEMIA: ICD-10-CM

## 2021-05-12 RX ORDER — NAPROXEN SODIUM 220 MG/1
81 TABLET, FILM COATED ORAL DAILY
Qty: 90 TABLET | Refills: 3 | Status: SHIPPED | OUTPATIENT
Start: 2021-05-12 | End: 2022-05-09

## 2021-06-10 ENCOUNTER — OFFICE VISIT (OUTPATIENT)
Dept: PODIATRY | Facility: CLINIC | Age: 61
End: 2021-06-10
Payer: COMMERCIAL

## 2021-06-10 VITALS
SYSTOLIC BLOOD PRESSURE: 150 MMHG | HEART RATE: 85 BPM | HEIGHT: 70 IN | WEIGHT: 248 LBS | BODY MASS INDEX: 35.5 KG/M2 | DIASTOLIC BLOOD PRESSURE: 67 MMHG

## 2021-06-10 DIAGNOSIS — E11.9 ENCOUNTER FOR DIABETIC FOOT EXAM: Primary | ICD-10-CM

## 2021-06-10 DIAGNOSIS — E11.9 TYPE 2 DIABETES MELLITUS WITHOUT COMPLICATION, WITH LONG-TERM CURRENT USE OF INSULIN: ICD-10-CM

## 2021-06-10 DIAGNOSIS — Z79.4 TYPE 2 DIABETES MELLITUS WITHOUT COMPLICATION, WITH LONG-TERM CURRENT USE OF INSULIN: ICD-10-CM

## 2021-06-10 PROCEDURE — 3078F PR MOST RECENT DIASTOLIC BLOOD PRESSURE < 80 MM HG: ICD-10-PCS | Mod: CPTII,S$GLB,, | Performed by: PODIATRIST

## 2021-06-10 PROCEDURE — 3044F PR MOST RECENT HEMOGLOBIN A1C LEVEL <7.0%: ICD-10-PCS | Mod: CPTII,S$GLB,, | Performed by: PODIATRIST

## 2021-06-10 PROCEDURE — 3008F BODY MASS INDEX DOCD: CPT | Mod: CPTII,S$GLB,, | Performed by: PODIATRIST

## 2021-06-10 PROCEDURE — 3077F SYST BP >= 140 MM HG: CPT | Mod: CPTII,S$GLB,, | Performed by: PODIATRIST

## 2021-06-10 PROCEDURE — 99999 PR PBB SHADOW E&M-EST. PATIENT-LVL III: CPT | Mod: PBBFAC,,, | Performed by: PODIATRIST

## 2021-06-10 PROCEDURE — 99212 OFFICE O/P EST SF 10 MIN: CPT | Mod: S$GLB,,, | Performed by: PODIATRIST

## 2021-06-10 PROCEDURE — 3008F PR BODY MASS INDEX (BMI) DOCUMENTED: ICD-10-PCS | Mod: CPTII,S$GLB,, | Performed by: PODIATRIST

## 2021-06-10 PROCEDURE — 3044F HG A1C LEVEL LT 7.0%: CPT | Mod: CPTII,S$GLB,, | Performed by: PODIATRIST

## 2021-06-10 PROCEDURE — 1126F AMNT PAIN NOTED NONE PRSNT: CPT | Mod: S$GLB,,, | Performed by: PODIATRIST

## 2021-06-10 PROCEDURE — 1126F PR PAIN SEVERITY QUANTIFIED, NO PAIN PRESENT: ICD-10-PCS | Mod: S$GLB,,, | Performed by: PODIATRIST

## 2021-06-10 PROCEDURE — 3078F DIAST BP <80 MM HG: CPT | Mod: CPTII,S$GLB,, | Performed by: PODIATRIST

## 2021-06-10 PROCEDURE — 99999 PR PBB SHADOW E&M-EST. PATIENT-LVL III: ICD-10-PCS | Mod: PBBFAC,,, | Performed by: PODIATRIST

## 2021-06-10 PROCEDURE — 3077F PR MOST RECENT SYSTOLIC BLOOD PRESSURE >= 140 MM HG: ICD-10-PCS | Mod: CPTII,S$GLB,, | Performed by: PODIATRIST

## 2021-06-10 PROCEDURE — 99212 PR OFFICE/OUTPT VISIT, EST, LEVL II, 10-19 MIN: ICD-10-PCS | Mod: S$GLB,,, | Performed by: PODIATRIST

## 2021-07-03 ENCOUNTER — PATIENT MESSAGE (OUTPATIENT)
Dept: INTERNAL MEDICINE | Facility: CLINIC | Age: 61
End: 2021-07-03

## 2021-07-07 ENCOUNTER — PATIENT MESSAGE (OUTPATIENT)
Dept: ADMINISTRATIVE | Facility: HOSPITAL | Age: 61
End: 2021-07-07

## 2021-07-08 ENCOUNTER — PATIENT MESSAGE (OUTPATIENT)
Dept: INTERNAL MEDICINE | Facility: CLINIC | Age: 61
End: 2021-07-08

## 2021-07-09 ENCOUNTER — PATIENT OUTREACH (OUTPATIENT)
Dept: ADMINISTRATIVE | Facility: HOSPITAL | Age: 61
End: 2021-07-09

## 2021-08-05 ENCOUNTER — PATIENT MESSAGE (OUTPATIENT)
Dept: INTERNAL MEDICINE | Facility: CLINIC | Age: 61
End: 2021-08-05

## 2021-08-09 ENCOUNTER — PATIENT MESSAGE (OUTPATIENT)
Dept: INTERNAL MEDICINE | Facility: CLINIC | Age: 61
End: 2021-08-09

## 2021-08-09 DIAGNOSIS — E11.8 TYPE 2 DIABETES MELLITUS WITH COMPLICATION, WITH LONG-TERM CURRENT USE OF INSULIN: ICD-10-CM

## 2021-08-09 DIAGNOSIS — Z79.4 TYPE 2 DIABETES MELLITUS WITH COMPLICATION, WITH LONG-TERM CURRENT USE OF INSULIN: ICD-10-CM

## 2021-08-09 RX ORDER — PEN NEEDLE, DIABETIC 30 GX3/16"
NEEDLE, DISPOSABLE MISCELLANEOUS
Qty: 100 EACH | Refills: 11 | Status: SHIPPED | OUTPATIENT
Start: 2021-08-09 | End: 2022-07-10

## 2021-08-28 ENCOUNTER — TELEPHONE (OUTPATIENT)
Dept: INTERNAL MEDICINE | Facility: CLINIC | Age: 61
End: 2021-08-28

## 2021-08-28 ENCOUNTER — PATIENT MESSAGE (OUTPATIENT)
Dept: INTERNAL MEDICINE | Facility: CLINIC | Age: 61
End: 2021-08-28

## 2021-09-09 ENCOUNTER — PATIENT MESSAGE (OUTPATIENT)
Dept: INTERNAL MEDICINE | Facility: CLINIC | Age: 61
End: 2021-09-09

## 2021-09-18 ENCOUNTER — PATIENT MESSAGE (OUTPATIENT)
Dept: INTERNAL MEDICINE | Facility: CLINIC | Age: 61
End: 2021-09-18

## 2021-09-18 DIAGNOSIS — E11.8 TYPE 2 DIABETES MELLITUS WITH COMPLICATION, WITH LONG-TERM CURRENT USE OF INSULIN: ICD-10-CM

## 2021-09-18 DIAGNOSIS — Z79.4 TYPE 2 DIABETES MELLITUS WITH COMPLICATION, WITH LONG-TERM CURRENT USE OF INSULIN: ICD-10-CM

## 2021-10-04 ENCOUNTER — PATIENT MESSAGE (OUTPATIENT)
Dept: ADMINISTRATIVE | Facility: HOSPITAL | Age: 61
End: 2021-10-04

## 2021-10-07 ENCOUNTER — PATIENT OUTREACH (OUTPATIENT)
Dept: ADMINISTRATIVE | Facility: HOSPITAL | Age: 61
End: 2021-10-07

## 2021-10-09 ENCOUNTER — IMMUNIZATION (OUTPATIENT)
Dept: PRIMARY CARE CLINIC | Facility: CLINIC | Age: 61
End: 2021-10-09
Payer: COMMERCIAL

## 2021-10-09 PROCEDURE — 90686 FLU VACCINE (QUAD) GREATER THAN OR EQUAL TO 3YO PRESERVATIVE FREE IM: ICD-10-PCS | Mod: S$GLB,,, | Performed by: INTERNAL MEDICINE

## 2021-10-09 PROCEDURE — 90686 IIV4 VACC NO PRSV 0.5 ML IM: CPT | Mod: S$GLB,,, | Performed by: INTERNAL MEDICINE

## 2021-10-09 PROCEDURE — 90471 FLU VACCINE (QUAD) GREATER THAN OR EQUAL TO 3YO PRESERVATIVE FREE IM: ICD-10-PCS | Mod: S$GLB,,, | Performed by: INTERNAL MEDICINE

## 2021-10-09 PROCEDURE — 90471 IMMUNIZATION ADMIN: CPT | Mod: S$GLB,,, | Performed by: INTERNAL MEDICINE

## 2021-10-13 ENCOUNTER — IMMUNIZATION (OUTPATIENT)
Dept: INTERNAL MEDICINE | Facility: CLINIC | Age: 61
End: 2021-10-13
Payer: COMMERCIAL

## 2021-10-13 DIAGNOSIS — Z23 NEED FOR VACCINATION: Primary | ICD-10-CM

## 2021-10-13 PROCEDURE — 0003A COVID-19, MRNA, LNP-S, PF, 30 MCG/0.3 ML DOSE VACCINE: CPT | Mod: PBBFAC | Performed by: INTERNAL MEDICINE

## 2021-10-13 PROCEDURE — 91300 COVID-19, MRNA, LNP-S, PF, 30 MCG/0.3 ML DOSE VACCINE: CPT | Mod: PBBFAC | Performed by: INTERNAL MEDICINE

## 2021-10-14 ENCOUNTER — PATIENT MESSAGE (OUTPATIENT)
Dept: INTERNAL MEDICINE | Facility: CLINIC | Age: 61
End: 2021-10-14
Payer: COMMERCIAL

## 2021-10-18 ENCOUNTER — TELEPHONE (OUTPATIENT)
Dept: INTERNAL MEDICINE | Facility: CLINIC | Age: 61
End: 2021-10-18

## 2021-10-20 ENCOUNTER — LAB VISIT (OUTPATIENT)
Dept: LAB | Facility: OTHER | Age: 61
End: 2021-10-20
Attending: INTERNAL MEDICINE
Payer: COMMERCIAL

## 2021-10-20 DIAGNOSIS — D63.8 ANEMIA OF CHRONIC DISEASE: ICD-10-CM

## 2021-10-20 DIAGNOSIS — E11.29 TYPE 2 DIABETES MELLITUS WITH MICROALBUMINURIA, WITH LONG-TERM CURRENT USE OF INSULIN: ICD-10-CM

## 2021-10-20 DIAGNOSIS — E78.2 MIXED HYPERLIPIDEMIA: ICD-10-CM

## 2021-10-20 DIAGNOSIS — R80.9 TYPE 2 DIABETES MELLITUS WITH MICROALBUMINURIA, WITH LONG-TERM CURRENT USE OF INSULIN: ICD-10-CM

## 2021-10-20 DIAGNOSIS — Z79.4 TYPE 2 DIABETES MELLITUS WITH MICROALBUMINURIA, WITH LONG-TERM CURRENT USE OF INSULIN: ICD-10-CM

## 2021-10-20 DIAGNOSIS — I10 ESSENTIAL HYPERTENSION: ICD-10-CM

## 2021-10-20 LAB
ALBUMIN SERPL BCP-MCNC: 4.1 G/DL (ref 3.5–5.2)
ALP SERPL-CCNC: 100 U/L (ref 55–135)
ALT SERPL W/O P-5'-P-CCNC: 39 U/L (ref 10–44)
ANION GAP SERPL CALC-SCNC: 13 MMOL/L (ref 8–16)
AST SERPL-CCNC: 21 U/L (ref 10–40)
BASOPHILS # BLD AUTO: 0.09 K/UL (ref 0–0.2)
BASOPHILS NFR BLD: 1.2 % (ref 0–1.9)
BILIRUB SERPL-MCNC: 0.4 MG/DL (ref 0.1–1)
BUN SERPL-MCNC: 17 MG/DL (ref 6–20)
CALCIUM SERPL-MCNC: 9.5 MG/DL (ref 8.7–10.5)
CHLORIDE SERPL-SCNC: 104 MMOL/L (ref 95–110)
CHOLEST SERPL-MCNC: 162 MG/DL (ref 120–199)
CHOLEST/HDLC SERPL: 3.9 {RATIO} (ref 2–5)
CO2 SERPL-SCNC: 20 MMOL/L (ref 23–29)
CREAT SERPL-MCNC: 1 MG/DL (ref 0.5–1.4)
DIFFERENTIAL METHOD: ABNORMAL
EOSINOPHIL # BLD AUTO: 0.4 K/UL (ref 0–0.5)
EOSINOPHIL NFR BLD: 5.2 % (ref 0–8)
ERYTHROCYTE [DISTWIDTH] IN BLOOD BY AUTOMATED COUNT: 13.2 % (ref 11.5–14.5)
EST. GFR  (AFRICAN AMERICAN): >60 ML/MIN/1.73 M^2
EST. GFR  (NON AFRICAN AMERICAN): >60 ML/MIN/1.73 M^2
ESTIMATED AVG GLUCOSE: 140 MG/DL (ref 68–131)
GLUCOSE SERPL-MCNC: 138 MG/DL (ref 70–110)
HBA1C MFR BLD: 6.5 % (ref 4–5.6)
HCT VFR BLD AUTO: 36.8 % (ref 40–54)
HDLC SERPL-MCNC: 42 MG/DL (ref 40–75)
HDLC SERPL: 25.9 % (ref 20–50)
HGB BLD-MCNC: 13.1 G/DL (ref 14–18)
IMM GRANULOCYTES # BLD AUTO: 0.06 K/UL (ref 0–0.04)
IMM GRANULOCYTES NFR BLD AUTO: 0.8 % (ref 0–0.5)
LDLC SERPL CALC-MCNC: 87.4 MG/DL (ref 63–159)
LYMPHOCYTES # BLD AUTO: 1.3 K/UL (ref 1–4.8)
LYMPHOCYTES NFR BLD: 17.8 % (ref 18–48)
MCH RBC QN AUTO: 34.5 PG (ref 27–31)
MCHC RBC AUTO-ENTMCNC: 35.6 G/DL (ref 32–36)
MCV RBC AUTO: 97 FL (ref 82–98)
MONOCYTES # BLD AUTO: 0.8 K/UL (ref 0.3–1)
MONOCYTES NFR BLD: 10.4 % (ref 4–15)
NEUTROPHILS # BLD AUTO: 4.8 K/UL (ref 1.8–7.7)
NEUTROPHILS NFR BLD: 64.6 % (ref 38–73)
NONHDLC SERPL-MCNC: 120 MG/DL
NRBC BLD-RTO: 0 /100 WBC
PLATELET # BLD AUTO: 327 K/UL (ref 150–450)
PMV BLD AUTO: 10.3 FL (ref 9.2–12.9)
POTASSIUM SERPL-SCNC: 4.8 MMOL/L (ref 3.5–5.1)
PROT SERPL-MCNC: 7.7 G/DL (ref 6–8.4)
RBC # BLD AUTO: 3.8 M/UL (ref 4.6–6.2)
SODIUM SERPL-SCNC: 137 MMOL/L (ref 136–145)
TRIGL SERPL-MCNC: 163 MG/DL (ref 30–150)
WBC # BLD AUTO: 7.48 K/UL (ref 3.9–12.7)

## 2021-10-20 PROCEDURE — 36415 COLL VENOUS BLD VENIPUNCTURE: CPT | Performed by: INTERNAL MEDICINE

## 2021-10-20 PROCEDURE — 83036 HEMOGLOBIN GLYCOSYLATED A1C: CPT | Performed by: INTERNAL MEDICINE

## 2021-10-20 PROCEDURE — 80053 COMPREHEN METABOLIC PANEL: CPT | Performed by: INTERNAL MEDICINE

## 2021-10-20 PROCEDURE — 85025 COMPLETE CBC W/AUTO DIFF WBC: CPT | Performed by: INTERNAL MEDICINE

## 2021-10-20 PROCEDURE — 80061 LIPID PANEL: CPT | Performed by: INTERNAL MEDICINE

## 2021-10-21 ENCOUNTER — OFFICE VISIT (OUTPATIENT)
Dept: INTERNAL MEDICINE | Facility: CLINIC | Age: 61
End: 2021-10-21
Payer: COMMERCIAL

## 2021-10-21 VITALS
WEIGHT: 253.5 LBS | DIASTOLIC BLOOD PRESSURE: 62 MMHG | BODY MASS INDEX: 36.29 KG/M2 | HEIGHT: 70 IN | SYSTOLIC BLOOD PRESSURE: 130 MMHG | OXYGEN SATURATION: 94 % | HEART RATE: 82 BPM

## 2021-10-21 DIAGNOSIS — I10 ESSENTIAL HYPERTENSION: ICD-10-CM

## 2021-10-21 DIAGNOSIS — E11.29 TYPE 2 DIABETES MELLITUS WITH MICROALBUMINURIA, WITH LONG-TERM CURRENT USE OF INSULIN: Primary | ICD-10-CM

## 2021-10-21 DIAGNOSIS — R80.9 TYPE 2 DIABETES MELLITUS WITH MICROALBUMINURIA, WITH LONG-TERM CURRENT USE OF INSULIN: Primary | ICD-10-CM

## 2021-10-21 DIAGNOSIS — D63.8 ANEMIA OF CHRONIC DISEASE: ICD-10-CM

## 2021-10-21 DIAGNOSIS — E66.9 OBESITY (BMI 35.0-39.9 WITHOUT COMORBIDITY): ICD-10-CM

## 2021-10-21 DIAGNOSIS — R20.0 NUMBNESS OF FOOT: ICD-10-CM

## 2021-10-21 DIAGNOSIS — Z79.4 TYPE 2 DIABETES MELLITUS WITH MICROALBUMINURIA, WITH LONG-TERM CURRENT USE OF INSULIN: Primary | ICD-10-CM

## 2021-10-21 DIAGNOSIS — R60.0 BILATERAL LEG EDEMA: ICD-10-CM

## 2021-10-21 DIAGNOSIS — E78.2 MIXED HYPERLIPIDEMIA: ICD-10-CM

## 2021-10-21 PROCEDURE — 4010F ACE/ARB THERAPY RXD/TAKEN: CPT | Mod: CPTII,S$GLB,, | Performed by: INTERNAL MEDICINE

## 2021-10-21 PROCEDURE — 3008F PR BODY MASS INDEX (BMI) DOCUMENTED: ICD-10-PCS | Mod: CPTII,S$GLB,, | Performed by: INTERNAL MEDICINE

## 2021-10-21 PROCEDURE — 3044F PR MOST RECENT HEMOGLOBIN A1C LEVEL <7.0%: ICD-10-PCS | Mod: CPTII,S$GLB,, | Performed by: INTERNAL MEDICINE

## 2021-10-21 PROCEDURE — 1159F MED LIST DOCD IN RCRD: CPT | Mod: CPTII,S$GLB,, | Performed by: INTERNAL MEDICINE

## 2021-10-21 PROCEDURE — 3008F BODY MASS INDEX DOCD: CPT | Mod: CPTII,S$GLB,, | Performed by: INTERNAL MEDICINE

## 2021-10-21 PROCEDURE — 1159F PR MEDICATION LIST DOCUMENTED IN MEDICAL RECORD: ICD-10-PCS | Mod: CPTII,S$GLB,, | Performed by: INTERNAL MEDICINE

## 2021-10-21 PROCEDURE — 4010F PR ACE/ARB THEARPY RXD/TAKEN: ICD-10-PCS | Mod: CPTII,S$GLB,, | Performed by: INTERNAL MEDICINE

## 2021-10-21 PROCEDURE — 3078F DIAST BP <80 MM HG: CPT | Mod: CPTII,S$GLB,, | Performed by: INTERNAL MEDICINE

## 2021-10-21 PROCEDURE — 99999 PR PBB SHADOW E&M-EST. PATIENT-LVL IV: CPT | Mod: PBBFAC,,, | Performed by: INTERNAL MEDICINE

## 2021-10-21 PROCEDURE — 3075F PR MOST RECENT SYSTOLIC BLOOD PRESS GE 130-139MM HG: ICD-10-PCS | Mod: CPTII,S$GLB,, | Performed by: INTERNAL MEDICINE

## 2021-10-21 PROCEDURE — 3061F NEG MICROALBUMINURIA REV: CPT | Mod: CPTII,S$GLB,, | Performed by: INTERNAL MEDICINE

## 2021-10-21 PROCEDURE — 99999 PR PBB SHADOW E&M-EST. PATIENT-LVL IV: ICD-10-PCS | Mod: PBBFAC,,, | Performed by: INTERNAL MEDICINE

## 2021-10-21 PROCEDURE — 3075F SYST BP GE 130 - 139MM HG: CPT | Mod: CPTII,S$GLB,, | Performed by: INTERNAL MEDICINE

## 2021-10-21 PROCEDURE — 99215 PR OFFICE/OUTPT VISIT, EST, LEVL V, 40-54 MIN: ICD-10-PCS | Mod: S$GLB,,, | Performed by: INTERNAL MEDICINE

## 2021-10-21 PROCEDURE — 3078F PR MOST RECENT DIASTOLIC BLOOD PRESSURE < 80 MM HG: ICD-10-PCS | Mod: CPTII,S$GLB,, | Performed by: INTERNAL MEDICINE

## 2021-10-21 PROCEDURE — 99215 OFFICE O/P EST HI 40 MIN: CPT | Mod: S$GLB,,, | Performed by: INTERNAL MEDICINE

## 2021-10-21 PROCEDURE — 3066F PR DOCUMENTATION OF TREATMENT FOR NEPHROPATHY: ICD-10-PCS | Mod: CPTII,S$GLB,, | Performed by: INTERNAL MEDICINE

## 2021-10-21 PROCEDURE — 1160F RVW MEDS BY RX/DR IN RCRD: CPT | Mod: CPTII,S$GLB,, | Performed by: INTERNAL MEDICINE

## 2021-10-21 PROCEDURE — 3066F NEPHROPATHY DOC TX: CPT | Mod: CPTII,S$GLB,, | Performed by: INTERNAL MEDICINE

## 2021-10-21 PROCEDURE — 1160F PR REVIEW ALL MEDS BY PRESCRIBER/CLIN PHARMACIST DOCUMENTED: ICD-10-PCS | Mod: CPTII,S$GLB,, | Performed by: INTERNAL MEDICINE

## 2021-10-21 PROCEDURE — 3061F PR NEG MICROALBUMINURIA RESULT DOCUMENTED/REVIEW: ICD-10-PCS | Mod: CPTII,S$GLB,, | Performed by: INTERNAL MEDICINE

## 2021-10-21 PROCEDURE — 3044F HG A1C LEVEL LT 7.0%: CPT | Mod: CPTII,S$GLB,, | Performed by: INTERNAL MEDICINE

## 2021-10-21 RX ORDER — AMLODIPINE BESYLATE 10 MG/1
10 TABLET ORAL DAILY
Qty: 90 TABLET | Refills: 3 | Status: SHIPPED | OUTPATIENT
Start: 2021-10-21 | End: 2022-10-21 | Stop reason: SDUPTHER

## 2022-01-21 ENCOUNTER — PATIENT MESSAGE (OUTPATIENT)
Dept: INTERNAL MEDICINE | Facility: CLINIC | Age: 62
End: 2022-01-21
Payer: COMMERCIAL

## 2022-01-21 DIAGNOSIS — E11.8 TYPE 2 DIABETES MELLITUS WITH COMPLICATION, WITH LONG-TERM CURRENT USE OF INSULIN: ICD-10-CM

## 2022-01-21 DIAGNOSIS — Z79.4 TYPE 2 DIABETES MELLITUS WITH COMPLICATION, WITH LONG-TERM CURRENT USE OF INSULIN: ICD-10-CM

## 2022-01-21 RX ORDER — LANCETS
EACH MISCELLANEOUS
Qty: 100 EACH | Refills: 12 | Status: SHIPPED | OUTPATIENT
Start: 2022-01-21 | End: 2023-03-18 | Stop reason: SDUPTHER

## 2022-01-21 NOTE — TELEPHONE ENCOUNTER
Care Due:                  Date            Visit Type   Department     Provider  --------------------------------------------------------------------------------                                             [facility] INTERNAL  Last Visit: 10-      None         KRISHNA Coombs                                           [facility] INTERNAL  Next Visit: 04-      None         KRISHNA Coombs                                                            Last  Test          Frequency    Reason                     Performed    Due Date  --------------------------------------------------------------------------------    HBA1C.......  6 months...  NOVOLOG, insulin,          10-   04-                             metFORMIN................    Powered by [employer]. Reference number: [id number].   1/21/2022 3:02:18 PM CST

## 2022-04-15 ENCOUNTER — PATIENT MESSAGE (OUTPATIENT)
Dept: INTERNAL MEDICINE | Facility: CLINIC | Age: 62
End: 2022-04-15
Payer: COMMERCIAL

## 2022-04-18 ENCOUNTER — LAB VISIT (OUTPATIENT)
Dept: LAB | Facility: OTHER | Age: 62
End: 2022-04-18
Attending: INTERNAL MEDICINE
Payer: COMMERCIAL

## 2022-04-18 DIAGNOSIS — E11.29 TYPE 2 DIABETES MELLITUS WITH MICROALBUMINURIA, WITH LONG-TERM CURRENT USE OF INSULIN: ICD-10-CM

## 2022-04-18 DIAGNOSIS — R80.9 TYPE 2 DIABETES MELLITUS WITH MICROALBUMINURIA, WITH LONG-TERM CURRENT USE OF INSULIN: ICD-10-CM

## 2022-04-18 DIAGNOSIS — Z79.4 TYPE 2 DIABETES MELLITUS WITH MICROALBUMINURIA, WITH LONG-TERM CURRENT USE OF INSULIN: ICD-10-CM

## 2022-04-18 LAB
ESTIMATED AVG GLUCOSE: 146 MG/DL (ref 68–131)
HBA1C MFR BLD: 6.7 % (ref 4–5.6)

## 2022-04-18 PROCEDURE — 83036 HEMOGLOBIN GLYCOSYLATED A1C: CPT | Performed by: INTERNAL MEDICINE

## 2022-04-18 PROCEDURE — 36415 COLL VENOUS BLD VENIPUNCTURE: CPT | Performed by: INTERNAL MEDICINE

## 2022-04-19 ENCOUNTER — PATIENT OUTREACH (OUTPATIENT)
Dept: ADMINISTRATIVE | Facility: HOSPITAL | Age: 62
End: 2022-04-19
Payer: COMMERCIAL

## 2022-04-21 ENCOUNTER — OFFICE VISIT (OUTPATIENT)
Dept: INTERNAL MEDICINE | Facility: CLINIC | Age: 62
End: 2022-04-21
Payer: COMMERCIAL

## 2022-04-21 VITALS
SYSTOLIC BLOOD PRESSURE: 124 MMHG | BODY MASS INDEX: 36.61 KG/M2 | DIASTOLIC BLOOD PRESSURE: 68 MMHG | WEIGHT: 255.75 LBS | HEIGHT: 70 IN | OXYGEN SATURATION: 98 % | HEART RATE: 71 BPM

## 2022-04-21 DIAGNOSIS — D63.8 ANEMIA OF CHRONIC DISEASE: ICD-10-CM

## 2022-04-21 DIAGNOSIS — E78.2 MIXED HYPERLIPIDEMIA: ICD-10-CM

## 2022-04-21 DIAGNOSIS — Z00.00 ANNUAL PHYSICAL EXAM: ICD-10-CM

## 2022-04-21 DIAGNOSIS — Z23 HIGH PRIORITY FOR COVID-19 VACCINATION: ICD-10-CM

## 2022-04-21 DIAGNOSIS — E66.9 OBESITY (BMI 30.0-34.9): ICD-10-CM

## 2022-04-21 DIAGNOSIS — Z79.4 TYPE 2 DIABETES MELLITUS WITH MICROALBUMINURIA, WITH LONG-TERM CURRENT USE OF INSULIN: Primary | ICD-10-CM

## 2022-04-21 DIAGNOSIS — R80.9 TYPE 2 DIABETES MELLITUS WITH MICROALBUMINURIA, WITH LONG-TERM CURRENT USE OF INSULIN: Primary | ICD-10-CM

## 2022-04-21 DIAGNOSIS — R60.0 BILATERAL LEG EDEMA: ICD-10-CM

## 2022-04-21 DIAGNOSIS — E11.29 TYPE 2 DIABETES MELLITUS WITH MICROALBUMINURIA, WITH LONG-TERM CURRENT USE OF INSULIN: Primary | ICD-10-CM

## 2022-04-21 DIAGNOSIS — I10 ESSENTIAL HYPERTENSION: ICD-10-CM

## 2022-04-21 PROCEDURE — 1160F RVW MEDS BY RX/DR IN RCRD: CPT | Mod: CPTII,S$GLB,, | Performed by: INTERNAL MEDICINE

## 2022-04-21 PROCEDURE — 4010F ACE/ARB THERAPY RXD/TAKEN: CPT | Mod: CPTII,S$GLB,, | Performed by: INTERNAL MEDICINE

## 2022-04-21 PROCEDURE — 3008F BODY MASS INDEX DOCD: CPT | Mod: CPTII,S$GLB,, | Performed by: INTERNAL MEDICINE

## 2022-04-21 PROCEDURE — 1159F PR MEDICATION LIST DOCUMENTED IN MEDICAL RECORD: ICD-10-PCS | Mod: CPTII,S$GLB,, | Performed by: INTERNAL MEDICINE

## 2022-04-21 PROCEDURE — 4010F PR ACE/ARB THEARPY RXD/TAKEN: ICD-10-PCS | Mod: CPTII,S$GLB,, | Performed by: INTERNAL MEDICINE

## 2022-04-21 PROCEDURE — 3044F HG A1C LEVEL LT 7.0%: CPT | Mod: CPTII,S$GLB,, | Performed by: INTERNAL MEDICINE

## 2022-04-21 PROCEDURE — 99999 PR PBB SHADOW E&M-EST. PATIENT-LVL IV: CPT | Mod: PBBFAC,,, | Performed by: INTERNAL MEDICINE

## 2022-04-21 PROCEDURE — 1159F MED LIST DOCD IN RCRD: CPT | Mod: CPTII,S$GLB,, | Performed by: INTERNAL MEDICINE

## 2022-04-21 PROCEDURE — 3066F PR DOCUMENTATION OF TREATMENT FOR NEPHROPATHY: ICD-10-PCS | Mod: CPTII,S$GLB,, | Performed by: INTERNAL MEDICINE

## 2022-04-21 PROCEDURE — 3008F PR BODY MASS INDEX (BMI) DOCUMENTED: ICD-10-PCS | Mod: CPTII,S$GLB,, | Performed by: INTERNAL MEDICINE

## 2022-04-21 PROCEDURE — 99215 OFFICE O/P EST HI 40 MIN: CPT | Mod: S$GLB,,, | Performed by: INTERNAL MEDICINE

## 2022-04-21 PROCEDURE — 99215 PR OFFICE/OUTPT VISIT, EST, LEVL V, 40-54 MIN: ICD-10-PCS | Mod: S$GLB,,, | Performed by: INTERNAL MEDICINE

## 2022-04-21 PROCEDURE — 3074F PR MOST RECENT SYSTOLIC BLOOD PRESSURE < 130 MM HG: ICD-10-PCS | Mod: CPTII,S$GLB,, | Performed by: INTERNAL MEDICINE

## 2022-04-21 PROCEDURE — 3074F SYST BP LT 130 MM HG: CPT | Mod: CPTII,S$GLB,, | Performed by: INTERNAL MEDICINE

## 2022-04-21 PROCEDURE — 3066F NEPHROPATHY DOC TX: CPT | Mod: CPTII,S$GLB,, | Performed by: INTERNAL MEDICINE

## 2022-04-21 PROCEDURE — 3078F PR MOST RECENT DIASTOLIC BLOOD PRESSURE < 80 MM HG: ICD-10-PCS | Mod: CPTII,S$GLB,, | Performed by: INTERNAL MEDICINE

## 2022-04-21 PROCEDURE — 1160F PR REVIEW ALL MEDS BY PRESCRIBER/CLIN PHARMACIST DOCUMENTED: ICD-10-PCS | Mod: CPTII,S$GLB,, | Performed by: INTERNAL MEDICINE

## 2022-04-21 PROCEDURE — 3061F PR NEG MICROALBUMINURIA RESULT DOCUMENTED/REVIEW: ICD-10-PCS | Mod: CPTII,S$GLB,, | Performed by: INTERNAL MEDICINE

## 2022-04-21 PROCEDURE — 3061F NEG MICROALBUMINURIA REV: CPT | Mod: CPTII,S$GLB,, | Performed by: INTERNAL MEDICINE

## 2022-04-21 PROCEDURE — 3044F PR MOST RECENT HEMOGLOBIN A1C LEVEL <7.0%: ICD-10-PCS | Mod: CPTII,S$GLB,, | Performed by: INTERNAL MEDICINE

## 2022-04-21 PROCEDURE — 99999 PR PBB SHADOW E&M-EST. PATIENT-LVL IV: ICD-10-PCS | Mod: PBBFAC,,, | Performed by: INTERNAL MEDICINE

## 2022-04-21 PROCEDURE — 3078F DIAST BP <80 MM HG: CPT | Mod: CPTII,S$GLB,, | Performed by: INTERNAL MEDICINE

## 2022-04-21 RX ORDER — DULAGLUTIDE 0.75 MG/.5ML
0.75 INJECTION, SOLUTION SUBCUTANEOUS WEEKLY
Qty: 4 PEN | Refills: 11 | Status: SHIPPED | OUTPATIENT
Start: 2022-04-21 | End: 2023-04-07 | Stop reason: SDUPTHER

## 2022-04-21 RX ORDER — ATORVASTATIN CALCIUM 40 MG/1
40 TABLET, FILM COATED ORAL DAILY
Qty: 90 TABLET | Refills: 3 | Status: SHIPPED | OUTPATIENT
Start: 2022-04-21 | End: 2023-04-20 | Stop reason: SDUPTHER

## 2022-04-21 RX ORDER — VALSARTAN 320 MG/1
320 TABLET ORAL DAILY
Qty: 90 TABLET | Refills: 3 | Status: SHIPPED | OUTPATIENT
Start: 2022-04-21 | End: 2023-05-25 | Stop reason: SDUPTHER

## 2022-04-21 RX ORDER — CARVEDILOL 12.5 MG/1
12.5 TABLET ORAL 2 TIMES DAILY WITH MEALS
Qty: 180 TABLET | Refills: 3 | Status: SHIPPED | OUTPATIENT
Start: 2022-04-21 | End: 2023-04-20 | Stop reason: SDUPTHER

## 2022-04-21 RX ORDER — METFORMIN HYDROCHLORIDE 1000 MG/1
1000 TABLET ORAL 2 TIMES DAILY WITH MEALS
Qty: 180 TABLET | Refills: 3 | Status: SHIPPED | OUTPATIENT
Start: 2022-04-21 | End: 2023-04-20 | Stop reason: SDUPTHER
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
## 2022-04-26 ENCOUNTER — PATIENT MESSAGE (OUTPATIENT)
Dept: INTERNAL MEDICINE | Facility: CLINIC | Age: 62
End: 2022-04-26
Payer: COMMERCIAL

## 2022-05-04 ENCOUNTER — IMMUNIZATION (OUTPATIENT)
Dept: INTERNAL MEDICINE | Facility: CLINIC | Age: 62
End: 2022-05-04
Payer: COMMERCIAL

## 2022-05-04 DIAGNOSIS — Z23 NEED FOR VACCINATION: Primary | ICD-10-CM

## 2022-05-04 PROCEDURE — 91305 COVID-19, MRNA, LNP-S, PF, 30 MCG/0.3 ML DOSE VACCINE (PFIZER): CPT | Mod: PBBFAC | Performed by: INTERNAL MEDICINE

## 2022-06-23 ENCOUNTER — OFFICE VISIT (OUTPATIENT)
Dept: PODIATRY | Facility: CLINIC | Age: 62
End: 2022-06-23
Payer: COMMERCIAL

## 2022-06-23 VITALS
HEIGHT: 70 IN | DIASTOLIC BLOOD PRESSURE: 71 MMHG | BODY MASS INDEX: 36.51 KG/M2 | SYSTOLIC BLOOD PRESSURE: 123 MMHG | WEIGHT: 255 LBS | HEART RATE: 90 BPM

## 2022-06-23 DIAGNOSIS — E11.9 TYPE 2 DIABETES MELLITUS WITHOUT COMPLICATION, WITH LONG-TERM CURRENT USE OF INSULIN: ICD-10-CM

## 2022-06-23 DIAGNOSIS — B35.1 DERMATOPHYTOSIS, NAIL: ICD-10-CM

## 2022-06-23 DIAGNOSIS — E11.9 ENCOUNTER FOR DIABETIC FOOT EXAM: Primary | ICD-10-CM

## 2022-06-23 DIAGNOSIS — Z79.4 TYPE 2 DIABETES MELLITUS WITHOUT COMPLICATION, WITH LONG-TERM CURRENT USE OF INSULIN: ICD-10-CM

## 2022-06-23 PROCEDURE — 99999 PR PBB SHADOW E&M-EST. PATIENT-LVL IV: CPT | Mod: PBBFAC,,, | Performed by: PODIATRIST

## 2022-06-23 PROCEDURE — 1159F MED LIST DOCD IN RCRD: CPT | Mod: CPTII,S$GLB,, | Performed by: PODIATRIST

## 2022-06-23 PROCEDURE — 3061F NEG MICROALBUMINURIA REV: CPT | Mod: CPTII,S$GLB,, | Performed by: PODIATRIST

## 2022-06-23 PROCEDURE — 3008F BODY MASS INDEX DOCD: CPT | Mod: CPTII,S$GLB,, | Performed by: PODIATRIST

## 2022-06-23 PROCEDURE — 4010F ACE/ARB THERAPY RXD/TAKEN: CPT | Mod: CPTII,S$GLB,, | Performed by: PODIATRIST

## 2022-06-23 PROCEDURE — 3066F NEPHROPATHY DOC TX: CPT | Mod: CPTII,S$GLB,, | Performed by: PODIATRIST

## 2022-06-23 PROCEDURE — 3074F SYST BP LT 130 MM HG: CPT | Mod: CPTII,S$GLB,, | Performed by: PODIATRIST

## 2022-06-23 PROCEDURE — 3044F PR MOST RECENT HEMOGLOBIN A1C LEVEL <7.0%: ICD-10-PCS | Mod: CPTII,S$GLB,, | Performed by: PODIATRIST

## 2022-06-23 PROCEDURE — 3066F PR DOCUMENTATION OF TREATMENT FOR NEPHROPATHY: ICD-10-PCS | Mod: CPTII,S$GLB,, | Performed by: PODIATRIST

## 2022-06-23 PROCEDURE — 3078F DIAST BP <80 MM HG: CPT | Mod: CPTII,S$GLB,, | Performed by: PODIATRIST

## 2022-06-23 PROCEDURE — 99999 PR PBB SHADOW E&M-EST. PATIENT-LVL IV: ICD-10-PCS | Mod: PBBFAC,,, | Performed by: PODIATRIST

## 2022-06-23 PROCEDURE — 1159F PR MEDICATION LIST DOCUMENTED IN MEDICAL RECORD: ICD-10-PCS | Mod: CPTII,S$GLB,, | Performed by: PODIATRIST

## 2022-06-23 PROCEDURE — 3074F PR MOST RECENT SYSTOLIC BLOOD PRESSURE < 130 MM HG: ICD-10-PCS | Mod: CPTII,S$GLB,, | Performed by: PODIATRIST

## 2022-06-23 PROCEDURE — 99214 OFFICE O/P EST MOD 30 MIN: CPT | Mod: S$GLB,,, | Performed by: PODIATRIST

## 2022-06-23 PROCEDURE — 99214 PR OFFICE/OUTPT VISIT, EST, LEVL IV, 30-39 MIN: ICD-10-PCS | Mod: S$GLB,,, | Performed by: PODIATRIST

## 2022-06-23 PROCEDURE — 3078F PR MOST RECENT DIASTOLIC BLOOD PRESSURE < 80 MM HG: ICD-10-PCS | Mod: CPTII,S$GLB,, | Performed by: PODIATRIST

## 2022-06-23 PROCEDURE — 3044F HG A1C LEVEL LT 7.0%: CPT | Mod: CPTII,S$GLB,, | Performed by: PODIATRIST

## 2022-06-23 PROCEDURE — 3061F PR NEG MICROALBUMINURIA RESULT DOCUMENTED/REVIEW: ICD-10-PCS | Mod: CPTII,S$GLB,, | Performed by: PODIATRIST

## 2022-06-23 PROCEDURE — 3008F PR BODY MASS INDEX (BMI) DOCUMENTED: ICD-10-PCS | Mod: CPTII,S$GLB,, | Performed by: PODIATRIST

## 2022-06-23 PROCEDURE — 4010F PR ACE/ARB THEARPY RXD/TAKEN: ICD-10-PCS | Mod: CPTII,S$GLB,, | Performed by: PODIATRIST

## 2022-06-23 RX ORDER — CLOTRIMAZOLE 1 G/ML
SOLUTION TOPICAL DAILY
Qty: 30 ML | Refills: 6 | Status: SHIPPED | OUTPATIENT
Start: 2022-06-23 | End: 2023-10-26 | Stop reason: ALTCHOICE

## 2022-06-23 NOTE — PATIENT INSTRUCTIONS
How to Check Your Feet    Below are tips to help you look for foot problems. Try to check your feet at the same time each day, such as when you get out of bed in the morning.    Check the top of each foot. The tops of toes, back of the heel, and outer edge of the foot can get a lot of rubbing from poor-fitting shoes.    Check the bottom of each foot. Daily wear and tear often leads to problems at pressure spots.    Check the toes and nails. Fungal infections often occur between toes. Toenail problems can also be a sign of fungal infections or lead to breaks in the skin.    Check your shoes, too. Loose objects inside a shoe can injure the foot. Use your hand to feel inside your shoes for things like scot, loose stitching, or rough areas that could irritate your skin.        Diabetic Foot Care    Diabetes can lead to a number of different foot complications. Fortunately, most of these complications can be prevented with a little extra foot care. If diabetes is not well controlled, the high blood sugar can cause damage to blood vessels and result in poor circulation to the foot. When the skin does not get enough blood flow, it becomes prone to pressure sores and ulcers, which heal slowly.  High blood sugar can also damage nerves, interfering with the ability to feel pain and pressure. When you cant feel your foot normally, it is easy to injure your skin, bones and joints without knowing it. For these reasons diabetes increases the risk of fungal infections, bunions and ulcers. Deep ulcers can lead to bone infection. Gangrene is the most serious foot complication of diabetes. It usually occurs on the tips of the toes as blacked areas of skin. The black area is dead tissue. In severe cases, gangrene spreads to involve the entire toe, other toes and the entire foot. Foot or toe amputation may be required. Good foot care and blood sugar control can prevent this.    Home Care  Wear comfortable, proper fitting  shoes.  Wash your feet daily with warm water and mild soap.  After drying, apply a moisturizing cream or lotion.  Check your feet daily for skin breaks, blisters, swelling, or redness. Look between your toes also.  Wear cotton socks and change them every day.  Trim toe nails carefully and do not cut your cuticles.  Strive to keep your blood sugar under control with a combination of medicines, diet and activity.  If you smoke and have diabetes, it is very important that you stop. Smoking reduces blood flow to your foot.  Avoid activities that increase your risk of foot injury:  Do not walk barefoot.  Do not use heating pads or hot water bottles on your feet.  Do not put your foot in a hot tub without first checking the temperature with your hand.  10) Schedule yearly foot exams.    Follow Up  with your doctor or as advised by our staff. Report any cut, puncture, scrape, other injury, blister, ingrown toenail or ulcer on your foot.    Get Prompt Medical Attention  if any of the following occur:  -- Open ulcer with pus draining from the wound  -- Increasing foot or leg pain  -- New areas of redness or swelling or tender areas of the foot    © 8591-1231 [employer]. [address] [place] [address], [place] [address]. All rights reserved. This information is not intended as a substitute for professional medical care. Always follow your healthcare professional's instructions.

## 2022-06-23 NOTE — PROGRESS NOTES
Chief Complaint   Patient presents with    Diabetic Foot Exam       Aimee Coombs MD 4-21-22                    HPI:   The patient is a 62 y.o.  male  who presents for a diabetic foot exam.     Patient reports occasional presence of abnormal sensation to the feet .    No tingling or burning sensation or pain to the feet when standing or walking.  No symptoms of claudication evident.   History of diabetic foot ulcers: none   History of foot surgery: none.     Shoes worn today: [employer] athletic lace up shoes.         Primary care doctor is: Aimee Coombs MD  Chief Complaint   Patient presents with    Diabetic Foot Exam       Aimee Coombs MD 4-21-22                    Patient Active Problem List   Diagnosis    KEVIN (obstructive sleep apnea)    Obesity (BMI 35.0-39.9 without comorbidity)    Mixed hyperlipidemia    Essential hypertension    Type 2 diabetes mellitus with microalbuminuria, with long-term current use of insulin    Right rotator cuff tear    Anemia of chronic disease    Chronic right shoulder pain    Decreased range of motion with decreased strength    Insomnia    Neck pain    Decreased range of motion of intervertebral discs of cervical spine    Bilateral leg edema           Current Outpatient Medications on File Prior to Visit   Medication Sig Dispense Refill    amLODIPine (NORVASC) 10 MG tablet Take 1 tablet (10 mg total) by mouth once daily. 90 tablet 3    aspirin 81 MG Chew TAKE 1 TABLET BY MOUTH EVERY DAY 90 tablet 0    atorvastatin (LIPITOR) 40 MG tablet Take 1 tablet (40 mg total) by mouth once daily. 90 tablet 3    blood-glucose meter Misc Use as instructed 1 each 0    carvediloL (COREG) 12.5 MG tablet Take 1 tablet (12.5 mg total) by mouth 2 (two) times daily with meals. 180 tablet 3    CONTOUR NEXT TEST STRIPS Strp 1 EACH BY [facility]NON-DRUG COMBO ROUTE) ROUTE 4 (FOUR) TIMES DAILY BEFORE MEALS AND NIGHTLY. 100 strip 11    dulaglutide (TRULICITY) 0.75 mg/0.5 mL pen injector  "Inject 0.75 mg into the skin once a week. 4 pen 11    flash glucose scanning reader (FREESTYLE SABINE 14 DAY READER) Misc Check blood glucose before meals and nightly. 1 each 0    flash glucose sensor (FREESTYLE SABINE 14 DAY SENSOR) Kit Apply to skin for 14 days.  Check blood glucose before meals and nightly. 1 kit 11    insulin detemir U-100 (LEVEMIR FLEXTOUCH) 100 unit/mL (3 mL) SubQ InPn pen Inject 45 Units into the skin once daily. 40.5 mL 3    lancets (MICROLET LANCET) Misc 1 EACH BY MISC.(NON-DRUG COMBO ROUTE) ROUTE 4 (FOUR) TIMES DAILY BEFORE MEALS AND NIGHTLY. 100 each 12    metFORMIN (GLUCOPHAGE) 1000 MG tablet Take 1 tablet (1,000 mg total) by mouth 2 (two) times daily with meals. 180 tablet 3    NOVOLOG FLEXPEN U-100 INSULIN 100 unit/mL (3 mL) InPn pen INJECT 15 UNITS INTO THE SKIN 3 (THREE) TIMES DAILY WITH MEALS. 45 Syringe 3    pen needle, diabetic (BD ULTRA-FINE JEANNETTE PEN NEEDLE) 32 gauge x 5/32" Ndle USE 4 TIMES DAILY 100 each 11    valsartan (DIOVAN) 320 MG tablet Take 1 tablet (320 mg total) by mouth once daily. 90 tablet 3    [DISCONTINUED] clotrimazole (LOTRIMIN) 1 % Soln Apply topically once daily. To affected toenails. 30 mL 6     No current facility-administered medications on file prior to visit.           Review of patient's allergies indicates:  No Known Allergies                ROS:  General ROS: negative  Respiratory ROS: no cough, shortness of breath, or wheezing  Cardiovascular ROS: no chest pain or dyspnea on exertion  Musculoskeletal ROS: negative  Neurological ROS:   negative for - impaired coordination/balance or numbness/tingling  Dermatological ROS: negative          LAST HbA1c:   Lab Results   Component Value Date    HGBA1C 6.7 (H) 04/18/2022           EXAM:   Vitals:    06/23/22 0916   BP: 123/71   Pulse: 90   Weight: 115.7 kg (255 lb)   Height: 5' 10" (1.778 m)       General: alert, no distress, cooperative      Vascular:   Dorsalis Pedis:  Palpable and dopplerable. "   Posterior Tibial:  present; palpable  Capillary refill time:  5 seconds  Temperature of toes cool to touch  Edema:   Mild; pitting      Neurological:     Sharp touch:  normal  Light touch: normal  Tinels Sign:  Absent  Mulders Click:   Absent  Faulkner:  intact x 10      Dermatological:   Skin: Warm and dry. no hyperpigmentation, vitiligo, or suspicious lesions    Wounds/Ulcers:  Absent  Bruising:  Absent  Erythema:  Absent  Toenails:  Elongated by 1-2mm, thickened by 1mm and Yellowish in color,  With subungual debris.   Absent paronychia      Musculoskeletal:   Metatarsophalangeal range of motion:   full range of motion  Subtalar joint range of motion: full range of motion  Ankle joint range of motion:  full range of motion  Bunions:  Absent  Hammertoes: present 2-4 bilateral;  bilateral adductovarus                ASSESSMENT/PLAN:          Problem List Items Addressed This Visit    None     Visit Diagnoses     Encounter for diabetic foot exam    -  Primary    Type 2 diabetes mellitus without complication, with long-term current use of insulin        Dermatophytosis, nail        Relevant Medications    clotrimazole (LOTRIMIN) 1 % Soln            · I counseled the patient on the patient's conditions, their implications and medical management.    Shoe inspection.      Continue good nutrition and blood sugar control to help prevent podiatric complications of diabetes.    Maintain proper foot hygiene.   · Continue wearing proper shoe gear, daily foot inspections, never walking without protective shoe gear, never putting sharp instruments to feet.  · Annual diabetes foot exam, or sooner if concerned. Patient is amenable to plan.                  Shazia Davis DPM

## 2022-07-14 ENCOUNTER — PATIENT MESSAGE (OUTPATIENT)
Dept: INTERNAL MEDICINE | Facility: CLINIC | Age: 62
End: 2022-07-14
Payer: COMMERCIAL

## 2022-07-14 LAB
LEFT EYE DM RETINOPATHY: NEGATIVE
RIGHT EYE DM RETINOPATHY: NEGATIVE

## 2022-07-21 DIAGNOSIS — Z79.4 TYPE 2 DIABETES MELLITUS WITH COMPLICATION, WITH LONG-TERM CURRENT USE OF INSULIN: ICD-10-CM

## 2022-07-21 DIAGNOSIS — E11.8 TYPE 2 DIABETES MELLITUS WITH COMPLICATION, WITH LONG-TERM CURRENT USE OF INSULIN: ICD-10-CM

## 2022-07-21 RX ORDER — PEN NEEDLE, DIABETIC 30 GX3/16"
NEEDLE, DISPOSABLE MISCELLANEOUS
Qty: 400 EACH | Refills: 11 | Status: SHIPPED | OUTPATIENT
Start: 2022-07-21 | End: 2023-08-21 | Stop reason: SDUPTHER

## 2022-07-21 NOTE — TELEPHONE ENCOUNTER
Care Due:                  Date            Visit Type   Department     Provider  --------------------------------------------------------------------------------                                EP -                              PRIMARY      [facility] INTERNAL  Last Visit: 04-      CARE ([facility])   KRISHNA Coombs                               -                              PRIMARY      [facility] INTERNAL  Next Visit: 10-      CARE ([facility])   KRISHNA Coombs                                                            Last  Test          Frequency    Reason                     Performed    Due Date  --------------------------------------------------------------------------------    CMP.........  12 months..  NOVOLOG, atorvastatin,     10-   10-                             insulin, metFORMIN,                             valsartan................    HBA1C.......  6 months...  NOVOLOG, dulaglutide,      04-   10-                             insulin, metFORMIN.......    Lipid Panel.  12 months..  atorvastatin.............  10-   10-    [facility] Embedded Care Gaps. Reference number: [id number]. 7/21/2022   8:01:59 AM CDT

## 2022-08-05 DIAGNOSIS — E78.2 MIXED HYPERLIPIDEMIA: ICD-10-CM

## 2022-08-05 RX ORDER — NAPROXEN SODIUM 220 MG/1
TABLET, FILM COATED ORAL
Qty: 90 TABLET | Refills: 3 | Status: SHIPPED | OUTPATIENT
Start: 2022-08-05 | End: 2023-08-28 | Stop reason: SDUPTHER

## 2022-08-05 NOTE — TELEPHONE ENCOUNTER
81 mg aspirin chewable to [facility] on [address], pended    LOV April 21, 2022  Next appointment October 21, 2022  Allergies reviewed

## 2022-09-29 ENCOUNTER — IMMUNIZATION (OUTPATIENT)
Dept: INTERNAL MEDICINE | Facility: CLINIC | Age: 62
End: 2022-09-29
Payer: COMMERCIAL

## 2022-09-29 DIAGNOSIS — Z23 NEED FOR VACCINATION: Primary | ICD-10-CM

## 2022-09-29 PROCEDURE — 0124A COVID-19, MRNA, LNP-S, BIVALENT BOOSTER, PF, 30 MCG/0.3 ML DOSE: CPT | Mod: PBBFAC | Performed by: INTERNAL MEDICINE

## 2022-09-29 PROCEDURE — 91312 COVID-19, MRNA, LNP-S, BIVALENT BOOSTER, PF, 30 MCG/0.3 ML DOSE: CPT | Mod: S$GLB,,, | Performed by: INTERNAL MEDICINE

## 2022-09-29 PROCEDURE — 91312 COVID-19, MRNA, LNP-S, BIVALENT BOOSTER, PF, 30 MCG/0.3 ML DOSE: ICD-10-PCS | Mod: S$GLB,,, | Performed by: INTERNAL MEDICINE

## 2022-10-13 ENCOUNTER — PATIENT MESSAGE (OUTPATIENT)
Dept: INTERNAL MEDICINE | Facility: CLINIC | Age: 62
End: 2022-10-13
Payer: COMMERCIAL

## 2022-10-17 ENCOUNTER — LAB VISIT (OUTPATIENT)
Dept: LAB | Facility: OTHER | Age: 62
End: 2022-10-17
Attending: INTERNAL MEDICINE
Payer: COMMERCIAL

## 2022-10-17 DIAGNOSIS — Z00.00 ANNUAL PHYSICAL EXAM: ICD-10-CM

## 2022-10-17 DIAGNOSIS — D63.8 ANEMIA OF CHRONIC DISEASE: ICD-10-CM

## 2022-10-17 DIAGNOSIS — E11.29 TYPE 2 DIABETES MELLITUS WITH MICROALBUMINURIA, WITH LONG-TERM CURRENT USE OF INSULIN: ICD-10-CM

## 2022-10-17 DIAGNOSIS — R80.9 TYPE 2 DIABETES MELLITUS WITH MICROALBUMINURIA, WITH LONG-TERM CURRENT USE OF INSULIN: ICD-10-CM

## 2022-10-17 DIAGNOSIS — Z79.4 TYPE 2 DIABETES MELLITUS WITH MICROALBUMINURIA, WITH LONG-TERM CURRENT USE OF INSULIN: ICD-10-CM

## 2022-10-17 DIAGNOSIS — E78.2 MIXED HYPERLIPIDEMIA: ICD-10-CM

## 2022-10-17 DIAGNOSIS — I10 ESSENTIAL HYPERTENSION: ICD-10-CM

## 2022-10-17 LAB
ALBUMIN SERPL BCP-MCNC: 4.1 G/DL (ref 3.5–5.2)
ALP SERPL-CCNC: 99 U/L (ref 55–135)
ALT SERPL W/O P-5'-P-CCNC: 35 U/L (ref 10–44)
ANION GAP SERPL CALC-SCNC: 7 MMOL/L (ref 8–16)
AST SERPL-CCNC: 18 U/L (ref 10–40)
BASOPHILS # BLD AUTO: 0.07 K/UL (ref 0–0.2)
BASOPHILS NFR BLD: 0.9 % (ref 0–1.9)
BILIRUB SERPL-MCNC: 0.3 MG/DL (ref 0.1–1)
BUN SERPL-MCNC: 16 MG/DL (ref 6–20)
CALCIUM SERPL-MCNC: 9.3 MG/DL (ref 8.7–10.5)
CHLORIDE SERPL-SCNC: 104 MMOL/L (ref 95–110)
CHOLEST SERPL-MCNC: 146 MG/DL (ref 120–199)
CHOLEST/HDLC SERPL: 3.7 {RATIO} (ref 2–5)
CO2 SERPL-SCNC: 24 MMOL/L (ref 23–29)
CREAT SERPL-MCNC: 1.1 MG/DL (ref 0.5–1.4)
DIFFERENTIAL METHOD: ABNORMAL
EOSINOPHIL # BLD AUTO: 0.4 K/UL (ref 0–0.5)
EOSINOPHIL NFR BLD: 5.6 % (ref 0–8)
ERYTHROCYTE [DISTWIDTH] IN BLOOD BY AUTOMATED COUNT: 13.2 % (ref 11.5–14.5)
EST. GFR  (NO RACE VARIABLE): >60 ML/MIN/1.73 M^2
ESTIMATED AVG GLUCOSE: 128 MG/DL (ref 68–131)
GLUCOSE SERPL-MCNC: 131 MG/DL (ref 70–110)
HBA1C MFR BLD: 6.1 % (ref 4–5.6)
HCT VFR BLD AUTO: 37.9 % (ref 40–54)
HDLC SERPL-MCNC: 39 MG/DL (ref 40–75)
HDLC SERPL: 26.7 % (ref 20–50)
HGB BLD-MCNC: 13.2 G/DL (ref 14–18)
IMM GRANULOCYTES # BLD AUTO: 0.03 K/UL (ref 0–0.04)
IMM GRANULOCYTES NFR BLD AUTO: 0.4 % (ref 0–0.5)
LDLC SERPL CALC-MCNC: 69 MG/DL (ref 63–159)
LYMPHOCYTES # BLD AUTO: 1.1 K/UL (ref 1–4.8)
LYMPHOCYTES NFR BLD: 14 % (ref 18–48)
MCH RBC QN AUTO: 33.1 PG (ref 27–31)
MCHC RBC AUTO-ENTMCNC: 34.8 G/DL (ref 32–36)
MCV RBC AUTO: 95 FL (ref 82–98)
MONOCYTES # BLD AUTO: 0.7 K/UL (ref 0.3–1)
MONOCYTES NFR BLD: 9.4 % (ref 4–15)
NEUTROPHILS # BLD AUTO: 5.4 K/UL (ref 1.8–7.7)
NEUTROPHILS NFR BLD: 69.7 % (ref 38–73)
NONHDLC SERPL-MCNC: 107 MG/DL
NRBC BLD-RTO: 0 /100 WBC
PLATELET # BLD AUTO: 362 K/UL (ref 150–450)
PMV BLD AUTO: 10.5 FL (ref 9.2–12.9)
POTASSIUM SERPL-SCNC: 4.4 MMOL/L (ref 3.5–5.1)
PROT SERPL-MCNC: 7.6 G/DL (ref 6–8.4)
RBC # BLD AUTO: 3.99 M/UL (ref 4.6–6.2)
SODIUM SERPL-SCNC: 135 MMOL/L (ref 136–145)
TRIGL SERPL-MCNC: 190 MG/DL (ref 30–150)
VIT B12 SERPL-MCNC: 296 PG/ML (ref 210–950)
WBC # BLD AUTO: 7.74 K/UL (ref 3.9–12.7)

## 2022-10-17 PROCEDURE — 80053 COMPREHEN METABOLIC PANEL: CPT | Performed by: INTERNAL MEDICINE

## 2022-10-17 PROCEDURE — 83036 HEMOGLOBIN GLYCOSYLATED A1C: CPT | Performed by: INTERNAL MEDICINE

## 2022-10-17 PROCEDURE — 85025 COMPLETE CBC W/AUTO DIFF WBC: CPT | Performed by: INTERNAL MEDICINE

## 2022-10-17 PROCEDURE — 80061 LIPID PANEL: CPT | Performed by: INTERNAL MEDICINE

## 2022-10-17 PROCEDURE — 82607 VITAMIN B-12: CPT | Performed by: INTERNAL MEDICINE

## 2022-10-17 PROCEDURE — 36415 COLL VENOUS BLD VENIPUNCTURE: CPT | Performed by: INTERNAL MEDICINE

## 2022-10-21 ENCOUNTER — OFFICE VISIT (OUTPATIENT)
Dept: INTERNAL MEDICINE | Facility: CLINIC | Age: 62
End: 2022-10-21
Payer: COMMERCIAL

## 2022-10-21 VITALS
OXYGEN SATURATION: 96 % | BODY MASS INDEX: 36.33 KG/M2 | HEART RATE: 62 BPM | HEIGHT: 70 IN | SYSTOLIC BLOOD PRESSURE: 124 MMHG | WEIGHT: 253.75 LBS | DIASTOLIC BLOOD PRESSURE: 64 MMHG

## 2022-10-21 DIAGNOSIS — R60.0 BILATERAL LEG EDEMA: ICD-10-CM

## 2022-10-21 DIAGNOSIS — Z00.00 ANNUAL PHYSICAL EXAM: Primary | ICD-10-CM

## 2022-10-21 DIAGNOSIS — Z79.4 TYPE 2 DIABETES MELLITUS WITH COMPLICATION, WITH LONG-TERM CURRENT USE OF INSULIN: ICD-10-CM

## 2022-10-21 DIAGNOSIS — D64.9 NORMOCYTIC ANEMIA: ICD-10-CM

## 2022-10-21 DIAGNOSIS — Z23 NEED FOR PNEUMOCOCCAL VACCINE: ICD-10-CM

## 2022-10-21 DIAGNOSIS — I10 ESSENTIAL HYPERTENSION: ICD-10-CM

## 2022-10-21 DIAGNOSIS — E11.8 TYPE 2 DIABETES MELLITUS WITH COMPLICATION, WITH LONG-TERM CURRENT USE OF INSULIN: ICD-10-CM

## 2022-10-21 DIAGNOSIS — E66.9 OBESITY (BMI 35.0-39.9 WITHOUT COMORBIDITY): ICD-10-CM

## 2022-10-21 DIAGNOSIS — E78.2 MIXED HYPERLIPIDEMIA: ICD-10-CM

## 2022-10-21 PROBLEM — D63.8 ANEMIA OF CHRONIC DISEASE: Status: RESOLVED | Noted: 2019-06-04 | Resolved: 2022-10-21

## 2022-10-21 PROCEDURE — 4010F PR ACE/ARB THEARPY RXD/TAKEN: ICD-10-PCS | Mod: CPTII,S$GLB,, | Performed by: INTERNAL MEDICINE

## 2022-10-21 PROCEDURE — 1159F PR MEDICATION LIST DOCUMENTED IN MEDICAL RECORD: ICD-10-PCS | Mod: CPTII,S$GLB,, | Performed by: INTERNAL MEDICINE

## 2022-10-21 PROCEDURE — 3078F PR MOST RECENT DIASTOLIC BLOOD PRESSURE < 80 MM HG: ICD-10-PCS | Mod: CPTII,S$GLB,, | Performed by: INTERNAL MEDICINE

## 2022-10-21 PROCEDURE — 3044F PR MOST RECENT HEMOGLOBIN A1C LEVEL <7.0%: ICD-10-PCS | Mod: CPTII,S$GLB,, | Performed by: INTERNAL MEDICINE

## 2022-10-21 PROCEDURE — 3044F HG A1C LEVEL LT 7.0%: CPT | Mod: CPTII,S$GLB,, | Performed by: INTERNAL MEDICINE

## 2022-10-21 PROCEDURE — 3074F SYST BP LT 130 MM HG: CPT | Mod: CPTII,S$GLB,, | Performed by: INTERNAL MEDICINE

## 2022-10-21 PROCEDURE — 4010F ACE/ARB THERAPY RXD/TAKEN: CPT | Mod: CPTII,S$GLB,, | Performed by: INTERNAL MEDICINE

## 2022-10-21 PROCEDURE — 1159F MED LIST DOCD IN RCRD: CPT | Mod: CPTII,S$GLB,, | Performed by: INTERNAL MEDICINE

## 2022-10-21 PROCEDURE — 3066F NEPHROPATHY DOC TX: CPT | Mod: CPTII,S$GLB,, | Performed by: INTERNAL MEDICINE

## 2022-10-21 PROCEDURE — 3066F PR DOCUMENTATION OF TREATMENT FOR NEPHROPATHY: ICD-10-PCS | Mod: CPTII,S$GLB,, | Performed by: INTERNAL MEDICINE

## 2022-10-21 PROCEDURE — 99999 PR PBB SHADOW E&M-EST. PATIENT-LVL V: ICD-10-PCS | Mod: PBBFAC,,, | Performed by: INTERNAL MEDICINE

## 2022-10-21 PROCEDURE — 99999 PR PBB SHADOW E&M-EST. PATIENT-LVL V: CPT | Mod: PBBFAC,,, | Performed by: INTERNAL MEDICINE

## 2022-10-21 PROCEDURE — 3074F PR MOST RECENT SYSTOLIC BLOOD PRESSURE < 130 MM HG: ICD-10-PCS | Mod: CPTII,S$GLB,, | Performed by: INTERNAL MEDICINE

## 2022-10-21 PROCEDURE — 99396 PR PREVENTIVE VISIT,EST,40-64: ICD-10-PCS | Mod: S$GLB,,, | Performed by: INTERNAL MEDICINE

## 2022-10-21 PROCEDURE — 1160F RVW MEDS BY RX/DR IN RCRD: CPT | Mod: CPTII,S$GLB,, | Performed by: INTERNAL MEDICINE

## 2022-10-21 PROCEDURE — 3078F DIAST BP <80 MM HG: CPT | Mod: CPTII,S$GLB,, | Performed by: INTERNAL MEDICINE

## 2022-10-21 PROCEDURE — 3061F NEG MICROALBUMINURIA REV: CPT | Mod: CPTII,S$GLB,, | Performed by: INTERNAL MEDICINE

## 2022-10-21 PROCEDURE — 1160F PR REVIEW ALL MEDS BY PRESCRIBER/CLIN PHARMACIST DOCUMENTED: ICD-10-PCS | Mod: CPTII,S$GLB,, | Performed by: INTERNAL MEDICINE

## 2022-10-21 PROCEDURE — 99396 PREV VISIT EST AGE 40-64: CPT | Mod: S$GLB,,, | Performed by: INTERNAL MEDICINE

## 2022-10-21 PROCEDURE — 3061F PR NEG MICROALBUMINURIA RESULT DOCUMENTED/REVIEW: ICD-10-PCS | Mod: CPTII,S$GLB,, | Performed by: INTERNAL MEDICINE

## 2022-10-21 RX ORDER — INSULIN ASPART 100 [IU]/ML
INJECTION, SOLUTION INTRAVENOUS; SUBCUTANEOUS
Qty: 45 EACH | Refills: 3 | Status: SHIPPED | OUTPATIENT
Start: 2022-10-21 | End: 2023-11-30 | Stop reason: SDUPTHER

## 2022-10-21 RX ORDER — AMLODIPINE BESYLATE 10 MG/1
10 TABLET ORAL DAILY
Qty: 90 TABLET | Refills: 3 | Status: SHIPPED | OUTPATIENT
Start: 2022-10-21 | End: 2023-10-01 | Stop reason: SDUPTHER
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
## 2022-10-21 NOTE — PATIENT INSTRUCTIONS
If you're able to fill the Jardiance, try to lower the Novolog to 10 units three times a day.    All of your core healthy metrics are met.      Anson Figueroa,     If you are due for any health screening(s) below please notify me so we can arrange them to be ordered and scheduled to maintain your health. Most healthy patients complete it. Don't lose out on improving your health.     All of your core healthy metrics are met.

## 2022-11-06 DIAGNOSIS — E11.8 TYPE 2 DIABETES MELLITUS WITH COMPLICATION, WITH LONG-TERM CURRENT USE OF INSULIN: ICD-10-CM

## 2022-11-06 DIAGNOSIS — Z79.4 TYPE 2 DIABETES MELLITUS WITH COMPLICATION, WITH LONG-TERM CURRENT USE OF INSULIN: ICD-10-CM

## 2022-11-06 RX ORDER — BLOOD SUGAR DIAGNOSTIC
STRIP MISCELLANEOUS
Qty: 400 STRIP | Refills: 3 | Status: SHIPPED | OUTPATIENT
Start: 2022-11-06 | End: 2023-09-15 | Stop reason: SDUPTHER

## 2022-11-06 NOTE — TELEPHONE ENCOUNTER
No new care gaps identified.  [facility] Embedded Care Gaps. Reference number: [id number]. 11/06/2022   12:23:28 AM KIERSTENT

## 2022-11-06 NOTE — TELEPHONE ENCOUNTER
Refill Decision Note   Henry Zacarias  is requesting a refill authorization.  Brief Assessment and Rationale for Refill:  Approve     Medication Therapy Plan:       Medication Reconciliation Completed: No   Comments:     No Care Gaps recommended.     Note composed:3:26 PM 11/06/2022

## 2022-11-30 ENCOUNTER — PATIENT OUTREACH (OUTPATIENT)
Dept: ADMINISTRATIVE | Facility: HOSPITAL | Age: 62
End: 2022-11-30
Payer: COMMERCIAL

## 2022-12-12 ENCOUNTER — PATIENT OUTREACH (OUTPATIENT)
Dept: ADMINISTRATIVE | Facility: HOSPITAL | Age: 62
End: 2022-12-12
Payer: COMMERCIAL

## 2023-01-11 ENCOUNTER — PATIENT MESSAGE (OUTPATIENT)
Dept: INTERNAL MEDICINE | Facility: CLINIC | Age: 63
End: 2023-01-11
Payer: COMMERCIAL

## 2023-02-26 ENCOUNTER — PATIENT MESSAGE (OUTPATIENT)
Dept: INTERNAL MEDICINE | Facility: CLINIC | Age: 63
End: 2023-02-26
Payer: COMMERCIAL

## 2023-03-18 DIAGNOSIS — E11.8 TYPE 2 DIABETES MELLITUS WITH COMPLICATION, WITH LONG-TERM CURRENT USE OF INSULIN: ICD-10-CM

## 2023-03-18 DIAGNOSIS — Z79.4 TYPE 2 DIABETES MELLITUS WITH COMPLICATION, WITH LONG-TERM CURRENT USE OF INSULIN: ICD-10-CM

## 2023-03-18 NOTE — TELEPHONE ENCOUNTER
Care Due:                  Date            Visit Type   Department     Provider  --------------------------------------------------------------------------------                                EP -                              PRIMARY      [facility] INTERNAL  Last Visit: 10-      CARE ([facility])   KRISHNA Coombs                              EP -                              PRIMARY      [facility] INTERNAL  Next Visit: 04-      CARE ([facility])   MEDICINE       Aimee Coombs                                                            Last  Test          Frequency    Reason                     Performed    Due Date  --------------------------------------------------------------------------------    HBA1C.......  6 months...  dulaglutide,               10-   04-                             empagliflozin, insulin,                             metFORMIN................    [employer] Embedded Care Gaps. Reference number: [id number]. 3/18/2023   7:53:13 AM CDT

## 2023-03-20 RX ORDER — LANCETS
EACH MISCELLANEOUS
Qty: 400 EACH | Refills: 3 | Status: SHIPPED | OUTPATIENT
Start: 2023-03-20 | End: 2024-03-27 | Stop reason: SDUPTHER

## 2023-03-20 NOTE — TELEPHONE ENCOUNTER
Refill Decision Note   Henry Zacarias  is requesting a refill authorization.  Brief Assessment and Rationale for Refill:  Approve     Medication Therapy Plan:       Medication Reconciliation Completed: No   Comments:     Provider Staff:     Action is required for this patient.   Please see care gap opportunities below in Care Due Message.     Thanks!  [facility]     Appointments      Date Provider   Last Visit   10/21/2022 Aimee Coombs MD   Next Visit   4/20/2023 Aimee Coombs MD     Note composed:10:18 AM 03/20/2023           Note composed:10:17 AM 03/20/2023

## 2023-03-21 ENCOUNTER — LAB VISIT (OUTPATIENT)
Dept: LAB | Facility: OTHER | Age: 63
End: 2023-03-21
Attending: INTERNAL MEDICINE
Payer: COMMERCIAL

## 2023-03-21 DIAGNOSIS — Z79.4 TYPE 2 DIABETES MELLITUS WITH COMPLICATION, WITH LONG-TERM CURRENT USE OF INSULIN: ICD-10-CM

## 2023-03-21 DIAGNOSIS — Z00.00 ANNUAL PHYSICAL EXAM: ICD-10-CM

## 2023-03-21 DIAGNOSIS — E11.8 TYPE 2 DIABETES MELLITUS WITH COMPLICATION, WITH LONG-TERM CURRENT USE OF INSULIN: ICD-10-CM

## 2023-03-21 LAB
ESTIMATED AVG GLUCOSE: 126 MG/DL (ref 68–131)
HBA1C MFR BLD: 6 % (ref 4–5.6)

## 2023-03-21 PROCEDURE — 36415 COLL VENOUS BLD VENIPUNCTURE: CPT | Performed by: INTERNAL MEDICINE

## 2023-03-21 PROCEDURE — 83036 HEMOGLOBIN GLYCOSYLATED A1C: CPT | Performed by: INTERNAL MEDICINE

## 2023-04-17 ENCOUNTER — PATIENT MESSAGE (OUTPATIENT)
Dept: INTERNAL MEDICINE | Facility: CLINIC | Age: 63
End: 2023-04-17
Payer: COMMERCIAL

## 2023-04-20 ENCOUNTER — OFFICE VISIT (OUTPATIENT)
Dept: INTERNAL MEDICINE | Facility: CLINIC | Age: 63
End: 2023-04-20
Payer: COMMERCIAL

## 2023-04-20 VITALS
SYSTOLIC BLOOD PRESSURE: 124 MMHG | HEART RATE: 84 BPM | HEIGHT: 70 IN | OXYGEN SATURATION: 95 % | DIASTOLIC BLOOD PRESSURE: 70 MMHG | WEIGHT: 251.75 LBS | BODY MASS INDEX: 36.04 KG/M2

## 2023-04-20 DIAGNOSIS — R60.0 BILATERAL LEG EDEMA: ICD-10-CM

## 2023-04-20 DIAGNOSIS — Z00.00 ANNUAL PHYSICAL EXAM: ICD-10-CM

## 2023-04-20 DIAGNOSIS — I10 ESSENTIAL HYPERTENSION: ICD-10-CM

## 2023-04-20 DIAGNOSIS — Z79.4 TYPE 2 DIABETES MELLITUS WITH MICROALBUMINURIA, WITH LONG-TERM CURRENT USE OF INSULIN: Primary | ICD-10-CM

## 2023-04-20 DIAGNOSIS — E78.2 MIXED HYPERLIPIDEMIA: ICD-10-CM

## 2023-04-20 DIAGNOSIS — E66.01 SEVERE OBESITY (BMI 35.0-39.9) WITH COMORBIDITY: ICD-10-CM

## 2023-04-20 DIAGNOSIS — D64.9 NORMOCYTIC ANEMIA: ICD-10-CM

## 2023-04-20 DIAGNOSIS — E11.29 TYPE 2 DIABETES MELLITUS WITH MICROALBUMINURIA, WITH LONG-TERM CURRENT USE OF INSULIN: Primary | ICD-10-CM

## 2023-04-20 DIAGNOSIS — R80.9 TYPE 2 DIABETES MELLITUS WITH MICROALBUMINURIA, WITH LONG-TERM CURRENT USE OF INSULIN: Primary | ICD-10-CM

## 2023-04-20 PROCEDURE — 3008F PR BODY MASS INDEX (BMI) DOCUMENTED: ICD-10-PCS | Mod: CPTII,S$GLB,, | Performed by: INTERNAL MEDICINE

## 2023-04-20 PROCEDURE — 4010F PR ACE/ARB THEARPY RXD/TAKEN: ICD-10-PCS | Mod: CPTII,S$GLB,, | Performed by: INTERNAL MEDICINE

## 2023-04-20 PROCEDURE — 1160F RVW MEDS BY RX/DR IN RCRD: CPT | Mod: CPTII,S$GLB,, | Performed by: INTERNAL MEDICINE

## 2023-04-20 PROCEDURE — 3008F BODY MASS INDEX DOCD: CPT | Mod: CPTII,S$GLB,, | Performed by: INTERNAL MEDICINE

## 2023-04-20 PROCEDURE — 99215 OFFICE O/P EST HI 40 MIN: CPT | Mod: S$GLB,,, | Performed by: INTERNAL MEDICINE

## 2023-04-20 PROCEDURE — 3066F PR DOCUMENTATION OF TREATMENT FOR NEPHROPATHY: ICD-10-PCS | Mod: CPTII,S$GLB,, | Performed by: INTERNAL MEDICINE

## 2023-04-20 PROCEDURE — 3078F DIAST BP <80 MM HG: CPT | Mod: CPTII,S$GLB,, | Performed by: INTERNAL MEDICINE

## 2023-04-20 PROCEDURE — 1159F MED LIST DOCD IN RCRD: CPT | Mod: CPTII,S$GLB,, | Performed by: INTERNAL MEDICINE

## 2023-04-20 PROCEDURE — 3061F PR NEG MICROALBUMINURIA RESULT DOCUMENTED/REVIEW: ICD-10-PCS | Mod: CPTII,S$GLB,, | Performed by: INTERNAL MEDICINE

## 2023-04-20 PROCEDURE — 3074F PR MOST RECENT SYSTOLIC BLOOD PRESSURE < 130 MM HG: ICD-10-PCS | Mod: CPTII,S$GLB,, | Performed by: INTERNAL MEDICINE

## 2023-04-20 PROCEDURE — 3074F SYST BP LT 130 MM HG: CPT | Mod: CPTII,S$GLB,, | Performed by: INTERNAL MEDICINE

## 2023-04-20 PROCEDURE — 3044F PR MOST RECENT HEMOGLOBIN A1C LEVEL <7.0%: ICD-10-PCS | Mod: CPTII,S$GLB,, | Performed by: INTERNAL MEDICINE

## 2023-04-20 PROCEDURE — 3044F HG A1C LEVEL LT 7.0%: CPT | Mod: CPTII,S$GLB,, | Performed by: INTERNAL MEDICINE

## 2023-04-20 PROCEDURE — 1160F PR REVIEW ALL MEDS BY PRESCRIBER/CLIN PHARMACIST DOCUMENTED: ICD-10-PCS | Mod: CPTII,S$GLB,, | Performed by: INTERNAL MEDICINE

## 2023-04-20 PROCEDURE — 99215 PR OFFICE/OUTPT VISIT, EST, LEVL V, 40-54 MIN: ICD-10-PCS | Mod: S$GLB,,, | Performed by: INTERNAL MEDICINE

## 2023-04-20 PROCEDURE — 3061F NEG MICROALBUMINURIA REV: CPT | Mod: CPTII,S$GLB,, | Performed by: INTERNAL MEDICINE

## 2023-04-20 PROCEDURE — 3066F NEPHROPATHY DOC TX: CPT | Mod: CPTII,S$GLB,, | Performed by: INTERNAL MEDICINE

## 2023-04-20 PROCEDURE — 1159F PR MEDICATION LIST DOCUMENTED IN MEDICAL RECORD: ICD-10-PCS | Mod: CPTII,S$GLB,, | Performed by: INTERNAL MEDICINE

## 2023-04-20 PROCEDURE — 3078F PR MOST RECENT DIASTOLIC BLOOD PRESSURE < 80 MM HG: ICD-10-PCS | Mod: CPTII,S$GLB,, | Performed by: INTERNAL MEDICINE

## 2023-04-20 PROCEDURE — 4010F ACE/ARB THERAPY RXD/TAKEN: CPT | Mod: CPTII,S$GLB,, | Performed by: INTERNAL MEDICINE

## 2023-04-20 PROCEDURE — 99999 PR PBB SHADOW E&M-EST. PATIENT-LVL IV: CPT | Mod: PBBFAC,,, | Performed by: INTERNAL MEDICINE

## 2023-04-20 PROCEDURE — 99999 PR PBB SHADOW E&M-EST. PATIENT-LVL IV: ICD-10-PCS | Mod: PBBFAC,,, | Performed by: INTERNAL MEDICINE

## 2023-04-20 RX ORDER — METFORMIN HYDROCHLORIDE 1000 MG/1
1000 TABLET ORAL 2 TIMES DAILY WITH MEALS
Qty: 180 TABLET | Refills: 3 | Status: SHIPPED | OUTPATIENT
Start: 2023-04-20 | End: 2024-04-19

## 2023-04-20 RX ORDER — CARVEDILOL 12.5 MG/1
12.5 TABLET ORAL 2 TIMES DAILY WITH MEALS
Qty: 180 TABLET | Refills: 3 | Status: SHIPPED | OUTPATIENT
Start: 2023-04-20 | End: 2024-04-19

## 2023-04-20 RX ORDER — ATORVASTATIN CALCIUM 40 MG/1
40 TABLET, FILM COATED ORAL DAILY
Qty: 90 TABLET | Refills: 3 | Status: SHIPPED | OUTPATIENT
Start: 2023-04-20

## 2023-04-20 RX ORDER — INSULIN ASPART 100 [IU]/ML
INJECTION, SOLUTION INTRAVENOUS; SUBCUTANEOUS
Qty: 45 EACH | Refills: 3 | Status: CANCELLED | OUTPATIENT
Start: 2023-04-20
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
## 2023-05-01 NOTE — PROGRESS NOTES
5/1/2023      RE: Puja Baez  [address]  [facility] [address]     Dear Colleague,    Thank you for the opportunity to participate in the care of your patient, Puja Baez, at the [facility] at [facility]. Please see a copy of my visit note below.        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    In June 2020, seen in the [facility] ED with right 5th finger pain x 3 months after she jammed it.  XR showed pathological fracture through lytic lesion of the right 5th finger middle phalanx.  In July 2020 a follow-up MRI with and without contrast showed an aggressive, enhancing lytic lesion with pathologic fracture and surrounding soft tissue mass.  She underwent open biopsy in December 2020 by Dr. Silvestre Pedro with pinning/fixation.  Pathology consistent with Street Sarcoma.  Established care with Suresh aGrcia and Gurwinder.  PET-CT and bilateral bone marrow biopsy negative and completed chemotherapy with vincristine, doxorubicin, cysplatin, ifosfamide and etoposide.  Tamara underwent local control surgery with right 5th digit amputation April 2021 and subsequent re-resection August 2021 for question positive margin, which was ultimately negative.     August 2022 she developed new onset back pain associated with a new sacral S2-3 lesion with periostitis and question of erosion.   [facility] Hospital Medicine  Progress Note    Patient Name: Henry Zacarias  MRN: [id number]  Patient Class: IP- Inpatient   Admission Date: 5/28/2019  Length of Stay: 4 days  Attending Physician: Felton Bush MD  Primary Care Provider: Aimee Coombs MD        Subjective:     Principal Problem:Cellulitis of back except buttock    HPI:  Mr. Zacarias is a 59 year old man with history of hypertension, diabetes, hyperlipidemia and obstructive sleep apnea who came in for evaluation of a painful draining infection on his left shoulder.  He states he started noticing some tenderness on his left shoulder about 6 days ago.  Since then, he has developed a large painful area of erythema at the postero-basilar area of his neck and and left shoulder.  It is very tender to touch and has been draining green/brown fluid for the last two days.  He denies any fever, chills, nausea, vomiting and diarrhea.  He states he had no abrasion, cut or insect bite to the area that he is aware of.  He has never had skin infections like this before.  He does note that he has been off all medications for the last 6 years.      Hospital Course:  No notes on file    Interval History: No acute events overnight.  Sugars remain rather elevated and Bp elevated at times as well.  Feels much better.  Still with drainage from his wound     Review of Systems   Constitutional: Negative for activity change and appetite change.   HENT: Negative for congestion and dental problem.    Eyes: Negative for discharge and itching.   Respiratory: Negative for apnea and chest tightness.    Cardiovascular: Negative for chest pain and leg swelling.   Gastrointestinal: Negative for abdominal distention and abdominal pain.   Endocrine: Negative for cold intolerance and heat intolerance.   Genitourinary: Negative for difficulty urinating and dysuria.   Musculoskeletal: Negative for arthralgias and back pain.   Allergic/Immunologic: Negative for environmental  Erosive-like defect further characterized by CT. Bone scan was obtained by Dr. Garcia which showed sacral lesion uptake and also question bilateral metatarsophalangeal arthritis.  She was started on celecoxib twice daily.      September 2022 in rheumatology clinic she had right elbow warmth and pain, left wrist effusion/guarding and guarding, both hands question fullness of MCPs2-4, right hand PIPs 2-4 with limited flexion but no effusions, bilateral knee and ankle warmth only, right midfoot arthritis with right 1st toe arthritis, right 2nd toe PIP, and sacral tenderness.  Continued celecoxib.     October 2022 Dr. Garcia re-evaluated imaging studies and decided the lesion of most interest was actually S4 and performed biopsy for cultures and pathology.  Culture grew staph epi on day 3 in broth only -- a suspected contaminant.  Pathology showed no evidence of malignancy, nor did it show inflammation which may suggest some resolution with NSAID therapy.      Last imaging 3/6/2023     IMPRESSION: Near complete resolution of the area of abnormal T2 signal  and contrast enhancement in S3-S4. Contrast enhancement within the  neural foramen is also resolved. Increased T2 signal in S5 and the  first coccygeal segment is unchanged. Bone marrow signal is otherwise  normal. No myositis. Pelvic organs are normal.     IMPRESSION: Resolved inflammatory changes in S3-S4. No significant  change in abnormal signal in S5/coccyx.      Ophthalmology History:   Iritis/Uveitis Comorbidity: Unknown   Referred to pediatric ophthalmology May 2019, but not yet seen   New referral ordered 11/07/2022         Medications:   As of completion of this visit:  Current Outpatient Medications   Medication Sig Dispense Refill    amitriptyline (ELAVIL) 10 MG tablet Take 1 tablet (10 mg) by mouth At Bedtime 30 tablet 1    celecoxib (CELEBREX) 100 MG capsule Take 1 capsule (100 mg) by mouth 2 times daily 180 capsule 1    acetaminophen (TYLENOL) 325 MG  allergies and food allergies.   Neurological: Negative for dizziness and facial asymmetry.   Hematological: Negative for adenopathy. Does not bruise/bleed easily.   Psychiatric/Behavioral: Negative for agitation and behavioral problems.     Objective:     Vital Signs (Most Recent):  Temp: 97.8 °F (36.6 °C) (06/01/19 1130)  Pulse: 75 (06/01/19 1130)  Resp: 20 (06/01/19 1130)  BP: (!) 141/76 (06/01/19 1130)  SpO2: 97 % (06/01/19 1130) Vital Signs (24h Range):  Temp:  [96 °F (35.6 °C)-98 °F (36.7 °C)] 97.8 °F (36.6 °C)  Pulse:  [75-85] 75  Resp:  [18-22] 20  SpO2:  [94 %-99 %] 97 %  BP: (141-170)/(76-85) 141/76     Weight: 96.2 kg (212 lb 1.3 oz)  Body mass index is 30.43 kg/m².    Intake/Output Summary (Last 24 hours) at 6/1/2019 1419  Last data filed at 6/1/2019 0734  Gross per 24 hour   Intake 980 ml   Output 2300 ml   Net -1320 ml      Physical Exam   Constitutional: He is oriented to person, place, and time. He appears well-developed and well-nourished.   HENT:   Head: Normocephalic and atraumatic.   Eyes: Pupils are equal, round, and reactive to light. EOM are normal.   Neck: Normal range of motion. Neck supple.   Cardiovascular: Normal rate, regular rhythm and normal heart sounds.   Pulmonary/Chest: Effort normal and breath sounds normal. No respiratory distress.   Abdominal: Soft. Bowel sounds are normal. He exhibits no distension. There is no tenderness.   Musculoskeletal: Normal range of motion. He exhibits no edema.   Weakness with abduction at right shoulder which is chronic   Neurological: He is oriented to person, place, and time. No cranial nerve deficit. Coordination normal.   Skin: Skin is warm and dry.   Large left shoulder/back abscess with well demarcated border and severe purple hue to the interior affected area.  Drainage seems increased today.  Swelling is definitely improving.  There are 2-3 small fluctuant areas within this.   Psychiatric: He has a normal mood and affect. His behavior is  normal.   Vitals reviewed.      Significant Labs: All pertinent labs within the past 24 hours have been reviewed.    Significant Imaging: I have reviewed and interpreted all pertinent imaging results/findings within the past 24 hours.    Assessment/Plan:      * Cellulitis of back except buttock  -Mr. Zacarias was admitted to inpatient status  -On admit he was tachycardic with leukocytosis but afebrile with normal lactic acid.    -He had a large swollen area that was purple and draining puss.  It almost seems like a tumor that has central infection.  -Blood and wound cultures have been obtained.  Blood cultures negative.  Wound culture growing MSSA  -CT of chest did not show a large fluid collection, but there were frankly fluctuant areas and puss is draining.  General surgery was consulted for incision and drainage.  -Taken to OR by Dr. Correa for I/D of abscess.  Puss drained but no large focal abscess identified.  -Based on cultures antibiotics were tailored to IV ancef on 5/30.  Continues to be afebrile and wbc now normal.  However, the area still has profound swelling and erythema.  Swelling is a bit improved today.  -Likely needs a few more days IV antibiotics and will require outpatient wound care.    Cellulitis  -Treatment as above      DM (diabetes mellitus)  -Off treatment x 6 years  -A1c is 12.  Sugars still rather elevated.  -Continue diabetic diet.    -Increase detemir to 40 units daily.  -Increase aspart to insulin 7 units TID w meals.  -Continue sliding scale insulin qac and qhs  -He has received diabetes education.      HTN (hypertension)  -Off treatment x 6 years  -BP improved but still not at goal.  -Increased dose of valsartan on 5/32  -Will increase metoprolol today.  -Provide hydralazine PRN      Obesity  -Noted      Sleep apnea  -Continue cpap with pressure setting 10 for when sleeping        VTE Risk Mitigation (From admission, onward)        Ordered     enoxaparin injection 40 mg  Daily       tablet Take 1 tablet (325 mg) by mouth every 6 hours as needed for mild pain or fever (Patient not taking: Reported on 3/17/2023) 60 tablet 3    famotidine (PEPCID) 20 MG tablet Take 1 tablet (20 mg) by mouth 2 times daily While on prednisone. (Patient not taking: Reported on 5/1/2023) 60 tablet 0    loratadine (CLARITIN) 10 MG tablet Take 10 mg by mouth daily as needed for allergies (Patient not taking: Reported on 5/1/2023)      oxyCODONE (ROXICODONE) 5 MG tablet Take 1 tablet (5 mg) by mouth every 6 hours as needed for pain (Patient not taking: Reported on 5/1/2023) 16 tablet 0    polyethylene glycol (MIRALAX) 17 GM/Dose powder Take 17 g (1 capful) by mouth 3 times daily as needed for constipation (Patient not taking: Reported on 5/1/2023)      senna-docusate (SENNA S) 8.6-50 MG tablet Take 1 tablet by mouth daily as needed for constipation (Patient not taking: Reported on 5/1/2023) 20 tablet 0    sennosides (SENOKOT) 8.6 MG tablet Take 1 tablet by mouth daily (Patient not taking: Reported on 5/1/2023) 30 tablet 3     Date of last TB Screen:  5/2/2019         Allergies:   No Known Allergies        Problem list:     Patient Active Problem List    Diagnosis Date Noted    Vitamin D deficiency 11/30/2022    Bone lesion of sacrum  08/19/2022     Abnormal signal on MRI within the left lateral aspect of S2-S3 with periostitis, question erosion, and inflammatory change in the adjacent neuro foramen/nerve root.      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  Puja was last seen in our clinic on 11/7/2022 and returns today accompanied by mom and dad (the latter for the first 1/2 of the visit).  The primary encounter diagnosis was Bone lesion of sacrum . 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      At my last visit with Tamara, she had been on celecoxib for her known sacral osteitis.  She had additional MRIs done 11/28/2022 of her right hand, left wrist, and right foot with the question of - did she have active arthritis in these locations? Please see my telephone note 11/29/2022 - in summary, there was no clearly active arthritis or osteitis in these locations. She had been on scheduled NSAIDs up until that time, however.    Regarding her sacral osteitis, Mom/Tamara did not think celecoxib was helping her enough so we tried switching to naproxen at the time of my call in November.  Per our MyChart conversation 12/20/2022 Tamara really was not feeling better.  I recommended continuing - to keep controlling inflammation.  On 2/28/2023, per MyChart, Tamara was off celecoxib.        On 3/6, we repeated MRI of the sacrum +/- contrast.  On the same day, her right hand MRI was repeated per the oncology team.  She had partial improvement in her sacral lesion per my review of that MRI.  The lesion of the left lateral aspect of S3/S4 has resolved but she continues to have contrast enhancement at her coccyx.  Because of this, I encouraged mom to restart celecoxib (SpinPunchhart 3/7/2023).  Of note, her right hand MRI at that time did not show recurrence of oncologic or inflammatory disease.    Today, family and Tamara report that she still has as much trouble with pain.  This remains mostly  05/28/19 0944     IP VTE HIGH RISK PATIENT  Once      05/28/19 0944              Felton Bush MD  Department of Hospital Medicine   [facility]   "musculoskeletal - coccyx, knees, both hands.      Regarding her coccyx pain, really nothing has helped. She is back on celecoxib and taking it twice daily since early March.  She tolerates it ok.  She still needs Tylenol occasionally but is not taking oxycodone.  Really none of it seems to help her.  Mom wonders about a nerve block in the sacral area - could this help her?  Tamara is taking amtriptyline prescribed by Dr. Hill and is doing some PT.  They feel disappointed that really nothing has made her feel better.  She missed 2 days of school last week due to pains in her sacrum/coccyx.    Regarding the pains in other locations - she has knee warmth and stiffness in the morning.  She has pain in the hands and fingers that is particularly worse in the morning - she has trouble opening jars and straightening her fingers when this happens.  The pain she feels is like a \"general soreness.\"  She still has \"phantom\" pains in the right pinky.    School is going ok.  She looks forward to cabin trips this summer.    She has night sweats.  She is also either always too hot or too cold, per mom.  Comprehensive Review of Systems is otherwise negative.         Examination:   Blood pressure 106/73, pulse 98, temperature 98.7  F (37.1  C), temperature source Oral, height 1.53 m (5' 0.24\"), weight 44.8 kg (98 lb 12.3 oz), SpO2 98 %.  50 %ile (Z= -0.01) based on [facility] (Girls, 2-20 Years) weight-for-age data using vitals from 5/1/2023.  Blood pressure %oswaldo are 54 % systolic and 86 % diastolic based on the 2017 AAP Clinical Practice Guideline. This reading is in the normal blood pressure range.  Body surface area is 1.38 meters squared.     GENERAL: Alert, well developed, and well appearing.  HEENT: Head: Normocephalic, atraumatic. Eyes: PERRL, EOMI, conjunctivae and sclerae clear. Ears: Externally normal. Nose: Nares unobstructed and without ulcerations or mucosal changes.  Mouth/Throat: Membranes moist, no oral lesions, pharynx " clear without erythema or exudate, normal dentition.   NECK: Supple, no abnormal masses.   LYMPHATIC: No lymphadenopathy in cervical or supraclavicular chains.  PULMONARY: Normal effort and rate, lungs are clear to auscultation bilaterally.  CARDIOVASCULAR: RRR, normal S1/S2, no murmurs, normal pulses, brisk cap refill.  ABDOMINAL: Soft, nontender, nondistended, without organomegaly.   NEUROLOGIC: Strength, tone, and coordination normal, CN II-XII grossly intact.  PSYCHIATRIC: Alert and oriented, age appropriate behavior, bright affect.   MUSCULOSKELETAL: Abnormal findings: right hand pinky surgically amputated.  Bilateral finger MCPs and PIPs with a promiment but unchanged appearance, along with some tightness to flexion but no clear effusions or guarding.  Sacrum was obviously tender.  Apart from that no other evidence of synovitis, enthesitis, or tenosynovitis in the  upper extremities, lower extremities, spine, SI joints, or TMJ.  DERMATOLOGIC: No significant rash, discoloration, or lesions.      Total active joints:   0   Total limited joints:   0         Last Lab Results:     Results for orders placed or performed in visit on 05/01/23   Creatinine     Status: Abnormal   Result Value Ref Range    Creatinine 0.35 (L) 0.44 - 0.68 mg/dL    GFR Estimate     UA with Microscopic reflex to Culture     Status: Abnormal    Specimen: Urine, Midstream   Result Value Ref Range    Color Urine Yellow Colorless, Straw, Light Yellow, Yellow    Appearance Urine Clear Clear    Glucose Urine Negative Negative mg/dL    Bilirubin Urine Negative Negative    Ketones Urine Negative Negative mg/dL    Specific Gravity Urine 1.025 1.003 - 1.035    Blood Urine Negative Negative    pH Urine 7.5 (H) 5.0 - 7.0    Protein Albumin Urine Negative Negative mg/dL    Urobilinogen Urine 0.2 0.2, 1.0 mg/dL    Nitrite Urine Negative Negative    Leukocyte Esterase Urine Negative Negative    RBC Urine 0 <=2 /HPF    WBC Urine 0 <=5 /HPF    Narrative     Urine Culture not indicated   ALT     Status: Normal   Result Value Ref Range    ALT 14 10 - 35 U/L   CBC with platelets and differential     Status: None   Result Value Ref Range    WBC Count 6.3 4.0 - 11.0 10e3/uL    RBC Count 4.51 3.70 - 5.30 10e6/uL    Hemoglobin 14.1 11.7 - 15.7 g/dL    Hematocrit 39.1 35.0 - 47.0 %    MCV 87 77 - 100 fL    MCH 31.3 26.5 - 33.0 pg    MCHC 36.1 31.5 - 36.5 g/dL    RDW 12.6 10.0 - 15.0 %    Platelet Count 180 150 - 450 10e3/uL    % Neutrophils 50 %    % Lymphocytes 40 %    % Monocytes 7 %    % Eosinophils 2 %    % Basophils 1 %    % Immature Granulocytes 0 %    NRBCs per 100 WBC 0 <1 /100    Absolute Neutrophils 3.1 1.3 - 7.0 10e3/uL    Absolute Lymphocytes 2.5 1.0 - 5.8 10e3/uL    Absolute Monocytes 0.4 0.0 - 1.3 10e3/uL    Absolute Eosinophils 0.2 0.0 - 0.7 10e3/uL    Absolute Basophils 0.0 0.0 - 0.2 10e3/uL    Absolute Immature Granulocytes 0.0 <=0.4 10e3/uL    Absolute NRBCs 0.0 10e3/uL   CBC with Platelets & Differential     Status: None    Narrative    The following orders were created for panel order CBC with Platelets & Differential.  Procedure                               Abnormality         Status                     ---------                               -----------         ------                     CBC with platelets and d...[[id number]]                      Final result                 Please view results for these tests on the individual orders.            Assessment:   Puja aBez is a 14 year old female who presents today for 6 month follow-up regarding:  Encounter Diagnoses   Name Primary?    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  However, I tried to emphasize the positive finding that there are  objective improvements on her MRI in spite of less than perfect adherence to NSAIDs.  I believe if she continues treating with scheduled NSAIDs it is likely this may resolve from an inflammatory standpoint.  We should re-image it again when convenient - at the time of her next oncologic imaging studies.  If not improved, I discussed methotrexate or adalimumab as next reasonable steps.    I see no evidence of active AMBROCIO on exam today.     I continue to think Tamara has a substantial component of non-inflammatory pain, characteristic of amplified musculoskeletal pain syndrome, including school absenteeism due to musculoskeletal pain.  I think continuing to work with Dr. Rachele Hill from our Pediatric Pain and Advanced/Complex Care Team is excellent.  I wonder if amitriptyline could be increased in dose.  I am not familiar with sacral nerve blocks and the risks/benefits in this scenario but would defer to her expertise and/or colleagues in anesthesiology.  I recommend continuing with integrative medicine and also physical therapy as much as mom and Tamara can fit in their schedules.  I tried to emphasize the importance of striving to get good, regular sleep, exercise, and nutrition in an effort to restore Tamara's pain processing.    Based on a review of her labs from today, I have no concerns with continuing celecoxib.    Provider assessment of disease activity: 1  (This is measured 0 = inactive 10 = highly active)         Plan:   Laboratory testing every 3 months, to monitor medications and/or disease activity.    Imaging currently recommended: repeat sacral MRI with and without contrast at time of next right hand MRI - 6/19/2023 (ordered)  Medications: As listed. Changes made today: None - continue celecoxib.  Consider methotrexate or adalimumab if inflammatory disease persists on next MRI.  Precautions: Do not take additional NSAIDs (ibuprofen, naproxen, Aleve, etc)   Continue eye exam monitoring every as listed  above in problem list.  Return in about 4 months (around 9/1/2023).     It is a pleasure to continue to participate in Puja's care.  If there are any new questions or concerns, I would be glad to help and can be reached through our main office at [phone] or our paging  at [phone].    Harvey Bright M.D., Ph.D.   of Pediatrics  Pediatric Rheumatology    55 minutes spent on the date of the encounter in chart review, patient visit, review of tests, documentation and/or discussion with other providers about the issues documented above.       CC  Patient Care Team:  Yuridia Purdy MD as PCP - General  Maryann Mendez MD as MD (Pediatric Rheumatology)  Maia Hernandez MSW as  ( - Clinical)  Heidi Merritt, PhD LP as Assigned Behavioral Health Provider  Micah Valle MD as Assigned PCP  Harvey Bright MD PhD as MD (Pediatric Rheumatology)  Vida Roland OD as Assigned Surgical Provider  Elo Moura PA-C as Assigned Musculoskeletal Provider  Alejandrina Guzman APRN CNP as Assigned Pediatric Specialist Provider  Rachele Hill DO as MD (Pediatrics)  PROVIDER NOT IN SYSTEM    Copy to patient  Parent(s) of Puja Baez  [address] [facility] [address]

## 2023-06-13 ENCOUNTER — OFFICE VISIT (OUTPATIENT)
Dept: PODIATRY | Facility: CLINIC | Age: 63
End: 2023-06-13
Payer: COMMERCIAL

## 2023-06-13 VITALS
HEIGHT: 70 IN | DIASTOLIC BLOOD PRESSURE: 75 MMHG | SYSTOLIC BLOOD PRESSURE: 153 MMHG | BODY MASS INDEX: 35.93 KG/M2 | HEART RATE: 89 BPM | WEIGHT: 251 LBS

## 2023-06-13 DIAGNOSIS — Z79.4 TYPE 2 DIABETES MELLITUS WITHOUT COMPLICATION, WITH LONG-TERM CURRENT USE OF INSULIN: ICD-10-CM

## 2023-06-13 DIAGNOSIS — E11.9 ENCOUNTER FOR DIABETIC FOOT EXAM: Primary | ICD-10-CM

## 2023-06-13 DIAGNOSIS — E11.9 TYPE 2 DIABETES MELLITUS WITHOUT COMPLICATION, WITH LONG-TERM CURRENT USE OF INSULIN: ICD-10-CM

## 2023-06-13 PROCEDURE — 3066F PR DOCUMENTATION OF TREATMENT FOR NEPHROPATHY: ICD-10-PCS | Mod: CPTII,S$GLB,, | Performed by: PODIATRIST

## 2023-06-13 PROCEDURE — 1160F PR REVIEW ALL MEDS BY PRESCRIBER/CLIN PHARMACIST DOCUMENTED: ICD-10-PCS | Mod: CPTII,S$GLB,, | Performed by: PODIATRIST

## 2023-06-13 PROCEDURE — 3008F BODY MASS INDEX DOCD: CPT | Mod: CPTII,S$GLB,, | Performed by: PODIATRIST

## 2023-06-13 PROCEDURE — 3008F PR BODY MASS INDEX (BMI) DOCUMENTED: ICD-10-PCS | Mod: CPTII,S$GLB,, | Performed by: PODIATRIST

## 2023-06-13 PROCEDURE — 99999 PR PBB SHADOW E&M-EST. PATIENT-LVL IV: CPT | Mod: PBBFAC,,, | Performed by: PODIATRIST

## 2023-06-13 PROCEDURE — 3061F NEG MICROALBUMINURIA REV: CPT | Mod: CPTII,S$GLB,, | Performed by: PODIATRIST

## 2023-06-13 PROCEDURE — 3078F PR MOST RECENT DIASTOLIC BLOOD PRESSURE < 80 MM HG: ICD-10-PCS | Mod: CPTII,S$GLB,, | Performed by: PODIATRIST

## 2023-06-13 PROCEDURE — 4010F PR ACE/ARB THEARPY RXD/TAKEN: ICD-10-PCS | Mod: CPTII,S$GLB,, | Performed by: PODIATRIST

## 2023-06-13 PROCEDURE — 1160F RVW MEDS BY RX/DR IN RCRD: CPT | Mod: CPTII,S$GLB,, | Performed by: PODIATRIST

## 2023-06-13 PROCEDURE — 3061F PR NEG MICROALBUMINURIA RESULT DOCUMENTED/REVIEW: ICD-10-PCS | Mod: CPTII,S$GLB,, | Performed by: PODIATRIST

## 2023-06-13 PROCEDURE — 99213 PR OFFICE/OUTPT VISIT, EST, LEVL III, 20-29 MIN: ICD-10-PCS | Mod: S$GLB,,, | Performed by: PODIATRIST

## 2023-06-13 PROCEDURE — 1159F PR MEDICATION LIST DOCUMENTED IN MEDICAL RECORD: ICD-10-PCS | Mod: CPTII,S$GLB,, | Performed by: PODIATRIST

## 2023-06-13 PROCEDURE — 3077F PR MOST RECENT SYSTOLIC BLOOD PRESSURE >= 140 MM HG: ICD-10-PCS | Mod: CPTII,S$GLB,, | Performed by: PODIATRIST

## 2023-06-13 PROCEDURE — 3078F DIAST BP <80 MM HG: CPT | Mod: CPTII,S$GLB,, | Performed by: PODIATRIST

## 2023-06-13 PROCEDURE — 1159F MED LIST DOCD IN RCRD: CPT | Mod: CPTII,S$GLB,, | Performed by: PODIATRIST

## 2023-06-13 PROCEDURE — 3044F PR MOST RECENT HEMOGLOBIN A1C LEVEL <7.0%: ICD-10-PCS | Mod: CPTII,S$GLB,, | Performed by: PODIATRIST

## 2023-06-13 PROCEDURE — 3044F HG A1C LEVEL LT 7.0%: CPT | Mod: CPTII,S$GLB,, | Performed by: PODIATRIST

## 2023-06-13 PROCEDURE — 99213 OFFICE O/P EST LOW 20 MIN: CPT | Mod: S$GLB,,, | Performed by: PODIATRIST

## 2023-06-13 PROCEDURE — 4010F ACE/ARB THERAPY RXD/TAKEN: CPT | Mod: CPTII,S$GLB,, | Performed by: PODIATRIST

## 2023-06-13 PROCEDURE — 3066F NEPHROPATHY DOC TX: CPT | Mod: CPTII,S$GLB,, | Performed by: PODIATRIST

## 2023-06-13 PROCEDURE — 3077F SYST BP >= 140 MM HG: CPT | Mod: CPTII,S$GLB,, | Performed by: PODIATRIST

## 2023-06-13 PROCEDURE — 99999 PR PBB SHADOW E&M-EST. PATIENT-LVL IV: ICD-10-PCS | Mod: PBBFAC,,, | Performed by: PODIATRIST

## 2023-06-13 NOTE — PROGRESS NOTES
Chief Complaint   Patient presents with    Diabetic Foot Exam     Foot Exam/PCP Aimee Coombs MD  04/20/23              HPI:   The patient is a 63 y.o.  male  who presents for a diabetic foot exam.     Patient reports occasional presence of abnormal sensation to the feet .    No tingling or burning sensation or pain to the feet when standing or walking.  No symptoms of claudication evident.   History of diabetic foot ulcers: none   History of foot surgery: none.     Shoes worn today: leather dress shoes         Primary care doctor is: Aimee Coombs MD  Chief Complaint   Patient presents with    Diabetic Foot Exam     Foot Exam/PCP Aimee Coombs MD  04/20/23              Patient Active Problem List   Diagnosis    KEVIN (obstructive sleep apnea)    Obesity (BMI 35.0-39.9 without comorbidity)    Mixed hyperlipidemia    Essential hypertension    Type 2 diabetes mellitus with microalbuminuria, with long-term current use of insulin    Right rotator cuff tear    Chronic right shoulder pain    Decreased range of motion with decreased strength    Insomnia    Neck pain    Decreased range of motion of intervertebral discs of cervical spine    Bilateral leg edema    Normocytic anemia    Severe obesity (BMI 35.0-39.9) with comorbidity           Current Outpatient Medications on File Prior to Visit   Medication Sig Dispense Refill    amLODIPine (NORVASC) 10 MG tablet Take 1 tablet (10 mg total) by mouth once daily. 90 tablet 3    aspirin 81 MG Chew TAKE 1 TABLET BY MOUTH EVERY DAY 90 tablet 3    atorvastatin (LIPITOR) 40 MG tablet Take 1 tablet (40 mg total) by mouth once daily. 90 tablet 3    blood-glucose meter Misc Use as instructed 1 each 0    carvediloL (COREG) 12.5 MG tablet Take 1 tablet (12.5 mg total) by mouth 2 (two) times daily with meals. 180 tablet 3    clotrimazole (LOTRIMIN) 1 % Soln Apply topically once daily. To affected toenails. 30 mL 6    CONTOUR NEXT TEST STRIPS Strp 1 EACH BY [facility].(NON-DRUG COMBO ROUTE)  "ROUTE 4 (FOUR) TIMES DAILY BEFORE MEALS AND NIGHTLY. 400 strip 3    dulaglutide (TRULICITY) 0.75 mg/0.5 mL pen injector Inject 0.75 mg into the skin once a week. 12 pen 1    empagliflozin (JARDIANCE) 25 mg tablet Take 1 tablet (25 mg total) by mouth once daily. 30 tablet 11    insulin aspart U-100 (NOVOLOG FLEXPEN U-100 INSULIN) 100 unit/mL (3 mL) InPn pen 14 units in the morning, lunch time, and 12 units with dinner. (Patient taking differently: Inject 12 Units into the skin 3 (three) times daily with meals.) 45 each 3    insulin detemir U-100 (LEVEMIR FLEXTOUCH) 100 unit/mL (3 mL) SubQ InPn pen Inject 45 Units into the skin once daily. 40.5 mL 3    lancets (MICROLET LANCET) Misc 1 EACH BY MISC.(NON-DRUG COMBO ROUTE) ROUTE 4 (FOUR) TIMES DAILY BEFORE MEALS AND NIGHTLY. 400 each 3    metFORMIN (GLUCOPHAGE) 1000 MG tablet Take 1 tablet (1,000 mg total) by mouth 2 (two) times daily with meals. 180 tablet 3    pen needle, diabetic (BD ULTRA-FINE JEANNETTE PEN NEEDLE) 32 gauge x 5/32" Ndle USE 4 TIMES A  each 11    valsartan (DIOVAN) 320 MG tablet Take 1 tablet (320 mg total) by mouth once daily. 90 tablet 1     No current facility-administered medications on file prior to visit.           Review of patient's allergies indicates:  No Known Allergies                ROS:  General ROS: negative  Respiratory ROS: no cough, shortness of breath, or wheezing  Cardiovascular ROS: no chest pain or dyspnea on exertion  Musculoskeletal ROS: negative  Neurological ROS:   negative for - impaired coordination/balance or numbness/tingling  Dermatological ROS: negative          LAST HbA1c:   Lab Results   Component Value Date    HGBA1C 6.0 (H) 03/21/2023           EXAM:   Vitals:    06/13/23 1410   BP: (!) 153/75   Pulse: 89   Weight: 113.9 kg (251 lb)   Height: 5' 10" (1.778 m)       General: alert, no distress, cooperative      Vascular:   Dorsalis Pedis:  Palpable and dopplerable.   Posterior Tibial:  present; palpable  Capillary " refill time:  5 seconds  Temperature of toes cool to touch  Edema:   Mild; pitting      Neurological:     Sharp touch:  normal  Light touch: normal  Tinels Sign:  Absent  Mulders Click:   Absent  Tumtum:  intact x 10      Dermatological:   Skin: Warm and dry. no hyperpigmentation, vitiligo, or suspicious lesions    Wounds/Ulcers:  Absent  Bruising:  Absent  Erythema:  Absent  Toenails:  Elongated by 1-2mm, thickened by 1mm and Yellowish in color,  With subungual debris.   Absent paronychia      Musculoskeletal:   Metatarsophalangeal range of motion:   full range of motion  Subtalar joint range of motion: full range of motion  Ankle joint range of motion:  full range of motion  Bunions:  Absent  Hammertoes: present 2-4 bilateral;  bilateral adductovarus                ASSESSMENT/PLAN:          Problem List Items Addressed This Visit    None  Visit Diagnoses       Encounter for diabetic foot exam    -  Primary    Type 2 diabetes mellitus without complication, with long-term current use of insulin                    I counseled the patient on the patient's conditions, their implications and medical management.   Shoe inspection.     Continue good nutrition and blood sugar control to help prevent podiatric complications of diabetes.   Maintain proper foot hygiene.   Continue wearing proper shoe gear, daily foot inspections, never walking without protective shoe gear, never putting sharp instruments to feet.  Annual diabetes foot exam, or sooner if concerned. Patient is amenable to plan.                  Shazia Davis DPM

## 2023-08-19 ENCOUNTER — PATIENT MESSAGE (OUTPATIENT)
Dept: INTERNAL MEDICINE | Facility: CLINIC | Age: 63
End: 2023-08-19
Payer: COMMERCIAL

## 2023-08-19 DIAGNOSIS — E11.8 TYPE 2 DIABETES MELLITUS WITH COMPLICATION, WITH LONG-TERM CURRENT USE OF INSULIN: ICD-10-CM

## 2023-08-19 DIAGNOSIS — Z79.4 TYPE 2 DIABETES MELLITUS WITH COMPLICATION, WITH LONG-TERM CURRENT USE OF INSULIN: ICD-10-CM

## 2023-08-21 RX ORDER — PEN NEEDLE, DIABETIC 30 GX3/16"
NEEDLE, DISPOSABLE MISCELLANEOUS
Qty: 400 EACH | Refills: 11 | Status: SHIPPED | OUTPATIENT
Start: 2023-08-21

## 2023-09-15 DIAGNOSIS — Z79.4 TYPE 2 DIABETES MELLITUS WITH COMPLICATION, WITH LONG-TERM CURRENT USE OF INSULIN: ICD-10-CM

## 2023-09-15 DIAGNOSIS — E11.8 TYPE 2 DIABETES MELLITUS WITH COMPLICATION, WITH LONG-TERM CURRENT USE OF INSULIN: ICD-10-CM

## 2023-10-01 DIAGNOSIS — E78.2 MIXED HYPERLIPIDEMIA: ICD-10-CM

## 2023-10-02 ENCOUNTER — PATIENT MESSAGE (OUTPATIENT)
Dept: INTERNAL MEDICINE | Facility: CLINIC | Age: 63
End: 2023-10-02
Payer: COMMERCIAL

## 2023-10-02 RX ORDER — NAPROXEN SODIUM 220 MG/1
81 TABLET, FILM COATED ORAL DAILY
Qty: 90 TABLET | Refills: 0 | Status: SHIPPED | OUTPATIENT
Start: 2023-10-02 | End: 2023-10-20 | Stop reason: SDUPTHER

## 2023-10-05 ENCOUNTER — PATIENT OUTREACH (OUTPATIENT)
Dept: ADMINISTRATIVE | Facility: HOSPITAL | Age: 63
End: 2023-10-05
Payer: COMMERCIAL

## 2023-10-05 NOTE — PROGRESS NOTES
Population Health Chart Review & Patient Outreach Details:     Reason for Outreach Encounter:     []  Non-Compliant Report   [x]  Payor Report ([employer], PHN, BCBS, MSSP, MCIP, UHC, etc.)   []  Pre-Visit Chart Review     Updates Requested / Reviewed:     []  Care Everywhere    []     []  External Sources ([facility], [facility], DIS, etc.)   [x]  Care Team Updated    Patient Outreach Method:    []  Telephone Outreach Completed   [] Successful   [] Left Voicemail   [] Unable to Contact (wrong number, no voicemail)  []  [employer] Portal Outreach Sent  []  Letter Outreach Mailed  []  Fax Sent for External Records  []  External Records Upload    Health Maintenance Topics Addressed and Outreach Outcomes / Actions Taken:        []      Breast Cancer Screening []  Mammo Scheduled      []  External Records Requested     []  Added Reminder to Complete to Upcoming Primary Care Appt Notes     []  Patient Declined     []  Patient Will Call Back to Schedule     []  Patient Will Schedule with External Provider / Order Routed if Applicable             []       Cervical Cancer Screening []  Pap Scheduled      []  External Records Requested     []  Added Reminder to Complete to Upcoming Primary Care Appt Notes     []  Patient Declined     []  Patient Will Call Back to Schedule     []  Patient Will Schedule with External Provider               []          Colorectal Cancer Screening []  Colonoscopy Case Request or Referral Placed     []  External Records Requested     []  Added Reminder to Complete to Upcoming Primary Care Appt Notes     []  Patient Declined     []  Patient Will Call Back to Schedule     []  Patient Will Schedule with External Provider     []  Fit Kit Mailed (add the SmartPhrase under additional notes)     []  Reminded Patient to Complete Home Test             []      Diabetic Eye Exam []  Eye Camera Scheduled or Optometry Referral Placed     []  External Records Requested     []  Added Reminder to Complete to  Upcoming Primary Care Appt Notes     []  Patient Declined     []  Patient Will Call Back to Schedule     []  Patient Will Schedule with External Provider             [x]      Blood Pressure Control []  Primary Care Follow Up Visit Scheduled     []  Remote Blood Pressure Reading Captured     []  Added Reminder to Complete to Upcoming Primary Care Appt Notes     []  Patient Declined     []  Patient Will Call Back / Patient Will Send Portal Message with Reading     []  Patient Will Call Back to Schedule Provider Visit             []       HbA1c & Other Labs []  Lab Appt Scheduled for Due Labs     []  Primary Care Follow Up Visit Scheduled      []  Reminded Patient to Complete Home Test     []  Added Reminder to Complete to Upcoming Primary Care Appt Notes     []  Patient Declined     []  Patient Will Call Back to Schedule     []  Patient Will Schedule with External Provider / Order Routed if Applicable           [x]    Schedule Primary Care Appt []  Primary Care Appt Scheduled     []  Patient Declined     []  Patient Will Call Back to Schedule     []  Pt Established with External Provider & Updated Care Team             []      Medication Adherence []  Primary Care Appointment Scheduled     []  Added Reminder to Upcoming Primary Care Appt Notes     []  Patient Reminded to  Prescription     []  Patient Declined, Provider Notified if Needed     []  Sent Provider Message to Review and/or Add Exclusion to Problem List             []      Osteoporosis Screening []  DXA Appointment Scheduled     []  External Records Requested     []  Added Reminder to Complete to Upcoming Primary Care Appt Notes     []  Patient Declined     []  Patient Will Call Back to Schedule     []  Patient Will Schedule with External Provider / Order Routed if Applicable     Additional Care Coordinator Notes:    Dr. Coombs's pt establishing care with Dr. Greene on 10.26.     Further Action Needed If Patient Returns Outreach:

## 2023-10-18 ENCOUNTER — LAB VISIT (OUTPATIENT)
Dept: LAB | Facility: OTHER | Age: 63
End: 2023-10-18
Attending: INTERNAL MEDICINE
Payer: COMMERCIAL

## 2023-10-18 DIAGNOSIS — R79.89 ELEVATED SERUM CREATININE: Primary | ICD-10-CM

## 2023-10-18 DIAGNOSIS — R80.9 TYPE 2 DIABETES MELLITUS WITH MICROALBUMINURIA, WITH LONG-TERM CURRENT USE OF INSULIN: ICD-10-CM

## 2023-10-18 DIAGNOSIS — Z79.4 TYPE 2 DIABETES MELLITUS WITH MICROALBUMINURIA, WITH LONG-TERM CURRENT USE OF INSULIN: ICD-10-CM

## 2023-10-18 DIAGNOSIS — E78.2 MIXED HYPERLIPIDEMIA: ICD-10-CM

## 2023-10-18 DIAGNOSIS — Z00.00 ANNUAL PHYSICAL EXAM: ICD-10-CM

## 2023-10-18 DIAGNOSIS — I10 ESSENTIAL HYPERTENSION: ICD-10-CM

## 2023-10-18 DIAGNOSIS — E11.29 TYPE 2 DIABETES MELLITUS WITH MICROALBUMINURIA, WITH LONG-TERM CURRENT USE OF INSULIN: ICD-10-CM

## 2023-10-18 LAB
ALBUMIN SERPL BCP-MCNC: 4.1 G/DL (ref 3.5–5.2)
ALP SERPL-CCNC: 87 U/L (ref 55–135)
ALT SERPL W/O P-5'-P-CCNC: 45 U/L (ref 10–44)
ANION GAP SERPL CALC-SCNC: 10 MMOL/L (ref 8–16)
AST SERPL-CCNC: 26 U/L (ref 10–40)
BASOPHILS # BLD AUTO: 0.07 K/UL (ref 0–0.2)
BASOPHILS NFR BLD: 0.8 % (ref 0–1.9)
BILIRUB SERPL-MCNC: 0.5 MG/DL (ref 0.1–1)
BUN SERPL-MCNC: 27 MG/DL (ref 8–23)
CALCIUM SERPL-MCNC: 9.3 MG/DL (ref 8.7–10.5)
CHLORIDE SERPL-SCNC: 104 MMOL/L (ref 95–110)
CHOLEST SERPL-MCNC: 149 MG/DL (ref 120–199)
CHOLEST/HDLC SERPL: 4.3 {RATIO} (ref 2–5)
CO2 SERPL-SCNC: 21 MMOL/L (ref 23–29)
CREAT SERPL-MCNC: 1.3 MG/DL (ref 0.5–1.4)
DIFFERENTIAL METHOD: ABNORMAL
EOSINOPHIL # BLD AUTO: 0.5 K/UL (ref 0–0.5)
EOSINOPHIL NFR BLD: 5.3 % (ref 0–8)
ERYTHROCYTE [DISTWIDTH] IN BLOOD BY AUTOMATED COUNT: 13.5 % (ref 11.5–14.5)
EST. GFR  (NO RACE VARIABLE): >60 ML/MIN/1.73 M^2
ESTIMATED AVG GLUCOSE: 128 MG/DL (ref 68–131)
GLUCOSE SERPL-MCNC: 130 MG/DL (ref 70–110)
HBA1C MFR BLD: 6.1 % (ref 4–5.6)
HCT VFR BLD AUTO: 40 % (ref 40–54)
HDLC SERPL-MCNC: 35 MG/DL (ref 40–75)
HDLC SERPL: 23.5 % (ref 20–50)
HGB BLD-MCNC: 13.4 G/DL (ref 14–18)
IMM GRANULOCYTES # BLD AUTO: 0.05 K/UL (ref 0–0.04)
IMM GRANULOCYTES NFR BLD AUTO: 0.6 % (ref 0–0.5)
LDLC SERPL CALC-MCNC: 79.2 MG/DL (ref 63–159)
LYMPHOCYTES # BLD AUTO: 1.2 K/UL (ref 1–4.8)
LYMPHOCYTES NFR BLD: 13.4 % (ref 18–48)
MCH RBC QN AUTO: 32.4 PG (ref 27–31)
MCHC RBC AUTO-ENTMCNC: 33.5 G/DL (ref 32–36)
MCV RBC AUTO: 97 FL (ref 82–98)
MONOCYTES # BLD AUTO: 1 K/UL (ref 0.3–1)
MONOCYTES NFR BLD: 12 % (ref 4–15)
NEUTROPHILS # BLD AUTO: 5.9 K/UL (ref 1.8–7.7)
NEUTROPHILS NFR BLD: 67.9 % (ref 38–73)
NONHDLC SERPL-MCNC: 114 MG/DL
NRBC BLD-RTO: 0 /100 WBC
PLATELET # BLD AUTO: 308 K/UL (ref 150–450)
PMV BLD AUTO: 10.3 FL (ref 9.2–12.9)
POTASSIUM SERPL-SCNC: 4.6 MMOL/L (ref 3.5–5.1)
PROT SERPL-MCNC: 7.4 G/DL (ref 6–8.4)
RBC # BLD AUTO: 4.13 M/UL (ref 4.6–6.2)
SODIUM SERPL-SCNC: 135 MMOL/L (ref 136–145)
TRIGL SERPL-MCNC: 174 MG/DL (ref 30–150)
WBC # BLD AUTO: 8.68 K/UL (ref 3.9–12.7)

## 2023-10-18 PROCEDURE — 80061 LIPID PANEL: CPT | Performed by: INTERNAL MEDICINE

## 2023-10-18 PROCEDURE — 36415 COLL VENOUS BLD VENIPUNCTURE: CPT | Performed by: INTERNAL MEDICINE

## 2023-10-18 PROCEDURE — 80053 COMPREHEN METABOLIC PANEL: CPT | Performed by: INTERNAL MEDICINE

## 2023-10-18 PROCEDURE — 83036 HEMOGLOBIN GLYCOSYLATED A1C: CPT | Performed by: INTERNAL MEDICINE

## 2023-10-18 PROCEDURE — 85025 COMPLETE CBC W/AUTO DIFF WBC: CPT | Performed by: INTERNAL MEDICINE

## 2023-10-20 ENCOUNTER — PATIENT MESSAGE (OUTPATIENT)
Dept: INTERNAL MEDICINE | Facility: CLINIC | Age: 63
End: 2023-10-20
Payer: COMMERCIAL

## 2023-10-20 DIAGNOSIS — I10 ESSENTIAL HYPERTENSION: ICD-10-CM

## 2023-10-20 DIAGNOSIS — E78.2 MIXED HYPERLIPIDEMIA: ICD-10-CM

## 2023-10-20 RX ORDER — NAPROXEN SODIUM 220 MG/1
81 TABLET, FILM COATED ORAL DAILY
Qty: 90 TABLET | Refills: 0 | Status: SHIPPED | OUTPATIENT
Start: 2023-10-20 | End: 2023-11-17 | Stop reason: SDUPTHER

## 2023-10-20 RX ORDER — AMLODIPINE BESYLATE 10 MG/1
10 TABLET ORAL DAILY
Qty: 90 TABLET | Refills: 0 | Status: SHIPPED | OUTPATIENT
Start: 2023-10-20 | End: 2024-01-19

## 2023-10-25 ENCOUNTER — LAB VISIT (OUTPATIENT)
Dept: LAB | Facility: OTHER | Age: 63
End: 2023-10-25
Attending: INTERNAL MEDICINE
Payer: COMMERCIAL

## 2023-10-25 DIAGNOSIS — R79.89 ELEVATED SERUM CREATININE: ICD-10-CM

## 2023-10-25 DIAGNOSIS — Z00.00 ANNUAL PHYSICAL EXAM: ICD-10-CM

## 2023-10-25 DIAGNOSIS — I10 ESSENTIAL HYPERTENSION: ICD-10-CM

## 2023-10-25 LAB
ANION GAP SERPL CALC-SCNC: 12 MMOL/L (ref 8–16)
BUN SERPL-MCNC: 17 MG/DL (ref 8–23)
CALCIUM SERPL-MCNC: 9.5 MG/DL (ref 8.7–10.5)
CHLORIDE SERPL-SCNC: 105 MMOL/L (ref 95–110)
CO2 SERPL-SCNC: 20 MMOL/L (ref 23–29)
CREAT SERPL-MCNC: 1.2 MG/DL (ref 0.5–1.4)
EST. GFR  (NO RACE VARIABLE): >60 ML/MIN/1.73 M^2
GLUCOSE SERPL-MCNC: 119 MG/DL (ref 70–110)
POTASSIUM SERPL-SCNC: 4.3 MMOL/L (ref 3.5–5.1)
SODIUM SERPL-SCNC: 137 MMOL/L (ref 136–145)

## 2023-10-25 PROCEDURE — 80048 BASIC METABOLIC PNL TOTAL CA: CPT | Performed by: INTERNAL MEDICINE

## 2023-10-25 PROCEDURE — 36415 COLL VENOUS BLD VENIPUNCTURE: CPT | Performed by: INTERNAL MEDICINE

## 2023-10-26 ENCOUNTER — OFFICE VISIT (OUTPATIENT)
Dept: INTERNAL MEDICINE | Facility: CLINIC | Age: 63
End: 2023-10-26
Payer: COMMERCIAL

## 2023-10-26 VITALS
BODY MASS INDEX: 36.17 KG/M2 | HEIGHT: 70 IN | HEART RATE: 83 BPM | SYSTOLIC BLOOD PRESSURE: 132 MMHG | OXYGEN SATURATION: 94 % | DIASTOLIC BLOOD PRESSURE: 70 MMHG | WEIGHT: 252.63 LBS

## 2023-10-26 DIAGNOSIS — E78.2 MIXED HYPERLIPIDEMIA: ICD-10-CM

## 2023-10-26 DIAGNOSIS — R79.89 ELEVATED LFTS: ICD-10-CM

## 2023-10-26 DIAGNOSIS — I10 ESSENTIAL HYPERTENSION: ICD-10-CM

## 2023-10-26 DIAGNOSIS — E11.29 TYPE 2 DIABETES MELLITUS WITH MICROALBUMINURIA, WITH LONG-TERM CURRENT USE OF INSULIN: Primary | ICD-10-CM

## 2023-10-26 DIAGNOSIS — Z00.00 HEALTH MAINTENANCE EXAMINATION: ICD-10-CM

## 2023-10-26 DIAGNOSIS — R80.9 TYPE 2 DIABETES MELLITUS WITH MICROALBUMINURIA, WITH LONG-TERM CURRENT USE OF INSULIN: Primary | ICD-10-CM

## 2023-10-26 DIAGNOSIS — D64.9 ANEMIA, UNSPECIFIED TYPE: ICD-10-CM

## 2023-10-26 DIAGNOSIS — Z79.4 TYPE 2 DIABETES MELLITUS WITH MICROALBUMINURIA, WITH LONG-TERM CURRENT USE OF INSULIN: Primary | ICD-10-CM

## 2023-10-26 PROCEDURE — 1160F PR REVIEW ALL MEDS BY PRESCRIBER/CLIN PHARMACIST DOCUMENTED: ICD-10-PCS | Mod: CPTII,S$GLB,, | Performed by: STUDENT IN AN ORGANIZED HEALTH CARE EDUCATION/TRAINING PROGRAM

## 2023-10-26 PROCEDURE — 1160F RVW MEDS BY RX/DR IN RCRD: CPT | Mod: CPTII,S$GLB,, | Performed by: STUDENT IN AN ORGANIZED HEALTH CARE EDUCATION/TRAINING PROGRAM

## 2023-10-26 PROCEDURE — 99999 PR PBB SHADOW E&M-EST. PATIENT-LVL V: CPT | Mod: PBBFAC,,, | Performed by: STUDENT IN AN ORGANIZED HEALTH CARE EDUCATION/TRAINING PROGRAM

## 2023-10-26 PROCEDURE — 3061F NEG MICROALBUMINURIA REV: CPT | Mod: CPTII,S$GLB,, | Performed by: STUDENT IN AN ORGANIZED HEALTH CARE EDUCATION/TRAINING PROGRAM

## 2023-10-26 PROCEDURE — 3044F PR MOST RECENT HEMOGLOBIN A1C LEVEL <7.0%: ICD-10-PCS | Mod: CPTII,S$GLB,, | Performed by: STUDENT IN AN ORGANIZED HEALTH CARE EDUCATION/TRAINING PROGRAM

## 2023-10-26 PROCEDURE — 3066F PR DOCUMENTATION OF TREATMENT FOR NEPHROPATHY: ICD-10-PCS | Mod: CPTII,S$GLB,, | Performed by: STUDENT IN AN ORGANIZED HEALTH CARE EDUCATION/TRAINING PROGRAM

## 2023-10-26 PROCEDURE — 3075F PR MOST RECENT SYSTOLIC BLOOD PRESS GE 130-139MM HG: ICD-10-PCS | Mod: CPTII,S$GLB,, | Performed by: STUDENT IN AN ORGANIZED HEALTH CARE EDUCATION/TRAINING PROGRAM

## 2023-10-26 PROCEDURE — 4010F PR ACE/ARB THEARPY RXD/TAKEN: ICD-10-PCS | Mod: CPTII,S$GLB,, | Performed by: STUDENT IN AN ORGANIZED HEALTH CARE EDUCATION/TRAINING PROGRAM

## 2023-10-26 PROCEDURE — 3075F SYST BP GE 130 - 139MM HG: CPT | Mod: CPTII,S$GLB,, | Performed by: STUDENT IN AN ORGANIZED HEALTH CARE EDUCATION/TRAINING PROGRAM

## 2023-10-26 PROCEDURE — 3008F BODY MASS INDEX DOCD: CPT | Mod: CPTII,S$GLB,, | Performed by: STUDENT IN AN ORGANIZED HEALTH CARE EDUCATION/TRAINING PROGRAM

## 2023-10-26 PROCEDURE — 99214 OFFICE O/P EST MOD 30 MIN: CPT | Mod: S$GLB,,, | Performed by: STUDENT IN AN ORGANIZED HEALTH CARE EDUCATION/TRAINING PROGRAM

## 2023-10-26 PROCEDURE — 3008F PR BODY MASS INDEX (BMI) DOCUMENTED: ICD-10-PCS | Mod: CPTII,S$GLB,, | Performed by: STUDENT IN AN ORGANIZED HEALTH CARE EDUCATION/TRAINING PROGRAM

## 2023-10-26 PROCEDURE — 99999 PR PBB SHADOW E&M-EST. PATIENT-LVL V: ICD-10-PCS | Mod: PBBFAC,,, | Performed by: STUDENT IN AN ORGANIZED HEALTH CARE EDUCATION/TRAINING PROGRAM

## 2023-10-26 PROCEDURE — 4010F ACE/ARB THERAPY RXD/TAKEN: CPT | Mod: CPTII,S$GLB,, | Performed by: STUDENT IN AN ORGANIZED HEALTH CARE EDUCATION/TRAINING PROGRAM

## 2023-10-26 PROCEDURE — 3044F HG A1C LEVEL LT 7.0%: CPT | Mod: CPTII,S$GLB,, | Performed by: STUDENT IN AN ORGANIZED HEALTH CARE EDUCATION/TRAINING PROGRAM

## 2023-10-26 PROCEDURE — 1159F MED LIST DOCD IN RCRD: CPT | Mod: CPTII,S$GLB,, | Performed by: STUDENT IN AN ORGANIZED HEALTH CARE EDUCATION/TRAINING PROGRAM

## 2023-10-26 PROCEDURE — 3061F PR NEG MICROALBUMINURIA RESULT DOCUMENTED/REVIEW: ICD-10-PCS | Mod: CPTII,S$GLB,, | Performed by: STUDENT IN AN ORGANIZED HEALTH CARE EDUCATION/TRAINING PROGRAM

## 2023-10-26 PROCEDURE — 3066F NEPHROPATHY DOC TX: CPT | Mod: CPTII,S$GLB,, | Performed by: STUDENT IN AN ORGANIZED HEALTH CARE EDUCATION/TRAINING PROGRAM

## 2023-10-26 PROCEDURE — 3078F DIAST BP <80 MM HG: CPT | Mod: CPTII,S$GLB,, | Performed by: STUDENT IN AN ORGANIZED HEALTH CARE EDUCATION/TRAINING PROGRAM

## 2023-10-26 PROCEDURE — 1159F PR MEDICATION LIST DOCUMENTED IN MEDICAL RECORD: ICD-10-PCS | Mod: CPTII,S$GLB,, | Performed by: STUDENT IN AN ORGANIZED HEALTH CARE EDUCATION/TRAINING PROGRAM

## 2023-10-26 PROCEDURE — 99214 PR OFFICE/OUTPT VISIT, EST, LEVL IV, 30-39 MIN: ICD-10-PCS | Mod: S$GLB,,, | Performed by: STUDENT IN AN ORGANIZED HEALTH CARE EDUCATION/TRAINING PROGRAM

## 2023-10-26 PROCEDURE — 3078F PR MOST RECENT DIASTOLIC BLOOD PRESSURE < 80 MM HG: ICD-10-PCS | Mod: CPTII,S$GLB,, | Performed by: STUDENT IN AN ORGANIZED HEALTH CARE EDUCATION/TRAINING PROGRAM

## 2023-10-26 RX ORDER — DULAGLUTIDE 1.5 MG/.5ML
1.5 INJECTION, SOLUTION SUBCUTANEOUS
Qty: 12 PEN | Refills: 3 | Status: SHIPPED | OUTPATIENT
Start: 2023-10-26 | End: 2024-02-15

## 2023-10-26 NOTE — PROGRESS NOTES
Subjective:       Patient ID: Henry Zacarias is a 63 y.o. male.    Chief Complaint: Type 2 diabetes mellitus with microalbuminuria, with long-term current use of insulin [E11.29, R80.9, Z79.4]    Patient is new to me, here to establish care.    Health maintenance -   Colonoscopy performed OCT2019. Told to repeat in 10 years.  Denies family history of colorectal cancer.  Family history of cardiac disease.  Denies family history of prostate cancer.  UTD on influenza, Tdap, COVID, PPSV23, PCV20, shingles vaccinations.  Denies cigarette use, intermittent cigar use for 2-3 years.  Drinks alcohol 2-3 times monthly, 3-4 drinks per sitting.  Denies drug use.  Completed HIV and hepatitis C screening.         Endorses could use dietary improvements.  Endorses active lifestyle.  Walking around the neighborhood 1-2 times weekly.    T2DM -   Currently taking:   - Trucility 0.75 mg weekly  - Jardiance 25 mg  - Metformin 1000 mg BID  - Levemir 45 units qAM  - Novolog 12 units qAC  Checking blood glucose 3 times per day  Home blood glucose range typically per patient 120's-137.  Denies blood sugars < 70 mg/dL  Taking ASA 81 mg daily  UTD on foot exam.  UTD on eye exam.  Lab Results       Component                Value               Date                       HGBA1C                   6.1 (H)             10/18/2023                 HGBA1C                   6.0 (H)             03/21/2023                 HGBA1C                   6.1 (H)             10/17/2022            Lab Results       Component                Value               Date                       MICALBCREAT              9.0                 10/18/2023              HLD -   Endorses taking atorvastatin as directed  Denies side effects or concerns while taking medication  Lab Results       Component                Value               Date                       CHOL                     149                 10/18/2023            Lab Results       Component                Value                Date                       TRIG                     174 (H)             10/18/2023            Lab Results       Component                Value               Date                       LDLCALC                  79.2                10/18/2023            Lab Results       Component                Value               Date                       HDL                      35 (L)              10/18/2023              HTN -   Currently prescribed amlodipine, Coreg, valsartan.  Patient endorses taking medication as directed.  Denies side effects or concerns while taking medication.  Lab Results       Component                Value               Date                       MICALBCREAT              9.0                 10/18/2023            BP Readings from Last 5 Encounters:  10/26/23 : 132/70  06/13/23 : (!) 153/75  04/20/23 : 124/70  10/21/22 : 124/64  06/23/22 : 123/71    Anemia -  Lab Results       Component                Value               Date                       HGB                      13.4 (L)            10/18/2023                 HCT                      40.0                10/18/2023                 MCV                      97                  10/18/2023                 RDW                      13.5                10/18/2023            Lab Results       Component                Value               Date                       IRON                     105                 06/10/2019                 TRANSFERRIN              276                 06/10/2019                 TIBC                     408                 06/10/2019                 FESATURATED              26                  06/10/2019             Lab Results       Component                Value               Date                       FERRITIN                 920 (H)             06/10/2019            Lab Results       Component                Value               Date                       HQHFCWCQ10               296                 10/17/2022           "    Noted with elevated LFT's on prior labs        Review of Systems   Constitutional:  Negative for chills, fatigue, fever and unexpected weight change.   Respiratory:  Negative for cough and shortness of breath.    Cardiovascular:  Negative for chest pain, palpitations and leg swelling.   Neurological:  Negative for dizziness, syncope and headaches.         Current Outpatient Medications   Medication Instructions    amLODIPine (NORVASC) 10 mg, Oral, Daily    aspirin 81 mg, Oral, Daily    atorvastatin (LIPITOR) 40 mg, Oral, Daily    blood sugar diagnostic (CONTOUR NEXT TEST STRIPS) Strp 1 EACH BY MISC.(NON-DRUG COMBO ROUTE) ROUTE 4 (FOUR) TIMES DAILY BEFORE MEALS AND NIGHTLY.    blood-glucose meter Misc Use as instructed    carvediloL (COREG) 12.5 mg, Oral, 2 times daily with meals    clotrimazole (LOTRIMIN) 1 % Soln Topical (Top), Daily, To affected toenails.    empagliflozin (JARDIANCE) 25 mg, Oral, Daily    insulin aspart U-100 (NOVOLOG FLEXPEN U-100 INSULIN) 100 unit/mL (3 mL) InPn pen 14 units in the morning, lunch time, and 12 units with dinner.    insulin detemir U-100 (Levemir) 45 Units, Subcutaneous, Daily    lancets (MICROLET LANCET) Misc 1 EACH BY MISC.(NON-DRUG COMBO ROUTE) ROUTE 4 (FOUR) TIMES DAILY BEFORE MEALS AND NIGHTLY.    metFORMIN (GLUCOPHAGE) 1,000 mg, Oral, 2 times daily with meals    pen needle, diabetic (BD ULTRA-FINE JEANNETTE PEN NEEDLE) 32 gauge x 5/32" Ndle USE 4 TIMES A DAY    TRULICITY 0.75 mg, Subcutaneous, Weekly    valsartan (DIOVAN) 320 mg, Oral, Daily     Objective:      Vitals:    10/26/23 0810   BP: 132/70   Pulse: 83   SpO2: (!) 94%   Weight: 114.6 kg (252 lb 10.4 oz)   Height: 5' 10" (1.778 m)   PainSc: 0-No pain     Body mass index is 36.25 kg/m².    Physical Exam  Vitals reviewed.   Constitutional:       General: He is not in acute distress.     Appearance: Normal appearance. He is not ill-appearing or diaphoretic.   HENT:      Head: Normocephalic and atraumatic.      Right Ear: " Tympanic membrane, ear canal and external ear normal. There is no impacted cerumen.      Left Ear: Tympanic membrane, ear canal and external ear normal. There is no impacted cerumen.      Nose: Nose normal. No rhinorrhea.      Mouth/Throat:      Mouth: Mucous membranes are moist.      Pharynx: Oropharynx is clear. No oropharyngeal exudate or posterior oropharyngeal erythema.   Eyes:      General: No scleral icterus.        Right eye: No discharge.         Left eye: No discharge.      Conjunctiva/sclera: Conjunctivae normal.   Neck:      Thyroid: No thyromegaly or thyroid tenderness.      Trachea: Trachea normal.   Cardiovascular:      Rate and Rhythm: Normal rate and regular rhythm.      Heart sounds: Normal heart sounds. No murmur heard.     No friction rub. No gallop.   Pulmonary:      Effort: Pulmonary effort is normal. No respiratory distress.      Breath sounds: Normal breath sounds. No stridor. No wheezing, rhonchi or rales.   Abdominal:      General: Bowel sounds are normal. There is no distension.      Palpations: Abdomen is soft.      Tenderness: There is no abdominal tenderness. There is no guarding or rebound.   Musculoskeletal:         General: No swelling or deformity.      Cervical back: Neck supple.   Lymphadenopathy:      Head:      Right side of head: No submandibular or posterior auricular adenopathy.      Left side of head: No submandibular or posterior auricular adenopathy.      Cervical: No cervical adenopathy.      Right cervical: No superficial, deep or posterior cervical adenopathy.     Left cervical: No superficial, deep or posterior cervical adenopathy.      Upper Body:      Right upper body: No supraclavicular adenopathy.      Left upper body: No supraclavicular adenopathy.   Skin:     General: Skin is warm and dry.   Neurological:      General: No focal deficit present.      Mental Status: He is alert. Mental status is at baseline.      Gait: Gait normal.   Psychiatric:         Mood and  Affect: Mood normal.         Behavior: Behavior normal.         Assessment:       1. Type 2 diabetes mellitus with microalbuminuria, with long-term current use of insulin    2. Mixed hyperlipidemia    3. Essential hypertension    4. Anemia, unspecified type    5. Elevated LFTs    6. Health maintenance examination        Plan:       Type 2 diabetes mellitus with microalbuminuria, with long-term current use of insulin  Continue current medications.  RTC in 3 months for follow up.  -     dulaglutide (TRULICITY) 1.5 mg/0.5 mL pen injector; Inject 1.5 mg into the skin every 7 days.  -     Hemoglobin A1C; Future    Mixed hyperlipidemia  Continue current medications.  RTC in 3 months for follow up.    Essential hypertension  Continue current medications.  RTC in 3 months for follow up.    Anemia, unspecified type  -     Pathologist Interpretation Differential; Future  -     Vitamin B12; Future  -     Folate; Future  -     Iron and TIBC; Future  -     Ferritin; Future  -     CBC Auto Differential; Future    Elevated LFTs  -     COMPREHENSIVE METABOLIC PANEL; Future    Health maintenance examination  Reviewed and discussed age appropriate screenings and immunizations.      Diann Greene MD  10/26/2023

## 2023-10-26 NOTE — PATIENT INSTRUCTIONS
"If your fasting blood sugar in the morning is <100 for 2 consecutive days, then decrease Levemir insulin by 2 units.     The Diabetes Code - Dr. Samm Spears    Here are some general recommendations for dietary and lifestyle modifications to help improve your cholesterol.   - Focus fat intake on "healthy fats" (monounsaturated and polyunsaturated) such as avocados, oils (olive, peanut, sesame, safflower), nuts, and cold-water fish (herring, salmon, cod, lake trout, mackerel, sardines).   - Limit your saturated fats that come from things like meats (even lean like chicken and turkey), butter, and tropical oils (coconut, palm).   - Definitely avoid hydrogenated oils and trans fats.  - Decrease your red meat consumption such as beef and pork to no more than once weekly.   - Set a goal of at least 150 minutes per week of aerobic exercise such as jogging, biking, or swimming. Try to exercise at least 4-5 days weekly.   - Avoid simple sugars such as sweets and sodas. Chose carbohydrate sources that are whole grains such as quinoa, farro, brown rice, whole wheat pasta, etc.   - Avoid or limit alcohol consumption as much as possible.      "

## 2023-11-17 ENCOUNTER — PATIENT MESSAGE (OUTPATIENT)
Dept: INTERNAL MEDICINE | Facility: CLINIC | Age: 63
End: 2023-11-17
Payer: COMMERCIAL

## 2023-11-17 DIAGNOSIS — E78.2 MIXED HYPERLIPIDEMIA: ICD-10-CM

## 2023-11-17 DIAGNOSIS — I10 ESSENTIAL HYPERTENSION: Primary | ICD-10-CM

## 2023-11-17 RX ORDER — VALSARTAN 320 MG/1
320 TABLET ORAL DAILY
Qty: 90 TABLET | Refills: 0 | Status: SHIPPED | OUTPATIENT
Start: 2023-11-17 | End: 2024-02-08 | Stop reason: SDUPTHER

## 2023-11-17 RX ORDER — NAPROXEN SODIUM 220 MG/1
81 TABLET, FILM COATED ORAL DAILY
Qty: 90 TABLET | Refills: 0 | Status: SHIPPED | OUTPATIENT
Start: 2023-11-17 | End: 2024-02-08 | Stop reason: SDUPTHER

## 2023-11-17 NOTE — TELEPHONE ENCOUNTER
No care due was identified.  Health [facility] Embedded Care Due Messages. Reference number: [id number].   11/17/2023 8:58:19 AM CST

## 2023-11-29 ENCOUNTER — PATIENT MESSAGE (OUTPATIENT)
Dept: INTERNAL MEDICINE | Facility: CLINIC | Age: 63
End: 2023-11-29
Payer: COMMERCIAL

## 2023-11-29 DIAGNOSIS — E11.8 TYPE 2 DIABETES MELLITUS WITH COMPLICATION, WITH LONG-TERM CURRENT USE OF INSULIN: ICD-10-CM

## 2023-11-29 DIAGNOSIS — Z79.4 TYPE 2 DIABETES MELLITUS WITH COMPLICATION, WITH LONG-TERM CURRENT USE OF INSULIN: ICD-10-CM

## 2023-11-30 RX ORDER — INSULIN ASPART 100 [IU]/ML
INJECTION, SOLUTION INTRAVENOUS; SUBCUTANEOUS
Qty: 45 EACH | Refills: 3 | Status: SHIPPED | OUTPATIENT
Start: 2023-11-30

## 2023-11-30 NOTE — TELEPHONE ENCOUNTER
No care due was identified.  Health [facility] Embedded Care Due Messages. Reference number: [id number].   11/30/2023 10:25:56 AM CST

## 2024-01-02 ENCOUNTER — PATIENT MESSAGE (OUTPATIENT)
Dept: INTERNAL MEDICINE | Facility: CLINIC | Age: 64
End: 2024-01-02
Payer: COMMERCIAL

## 2024-01-02 DIAGNOSIS — E11.8 TYPE 2 DIABETES MELLITUS WITH COMPLICATION, WITH LONG-TERM CURRENT USE OF INSULIN: ICD-10-CM

## 2024-01-02 DIAGNOSIS — Z79.4 TYPE 2 DIABETES MELLITUS WITH COMPLICATION, WITH LONG-TERM CURRENT USE OF INSULIN: ICD-10-CM

## 2024-01-02 NOTE — TELEPHONE ENCOUNTER
No care due was identified.  Health [facility] Embedded Care Due Messages. Reference number: [id number].   1/02/2024 11:00:41 AM CST

## 2024-01-19 DIAGNOSIS — I10 ESSENTIAL HYPERTENSION: ICD-10-CM

## 2024-01-19 RX ORDER — AMLODIPINE BESYLATE 10 MG/1
10 TABLET ORAL
Qty: 90 TABLET | Refills: 3 | Status: SHIPPED | OUTPATIENT
Start: 2024-01-19

## 2024-01-19 NOTE — TELEPHONE ENCOUNTER
Henry Zacarias  is requesting a refill authorization.  Brief Assessment and Rationale for Refill:  Approve     Medication Therapy Plan:  FLOS      Comments:     Note composed:9:56 AM 01/19/2024

## 2024-01-19 NOTE — TELEPHONE ENCOUNTER
Care Due:                  Date            Visit Type   Department     Provider  --------------------------------------------------------------------------------                                EP -                              PRIMARY      [facility] INTERNAL  Last Visit: 10-      CARE ([facility])   KRISHNA Greene                              [facility]                              PRIMARY      [facility] INTERNAL  Next Visit: 02-      CARE ([facility])   KRISHNA Greene                                                            Last  Test          Frequency    Reason                     Performed    Due Date  --------------------------------------------------------------------------------    HBA1C.......  6 months...  dulaglutide, insulin.....  10-   04-    [facility] Embedded Care Due Messages. Reference number: [id number].   1/19/2024 12:13:40 AM CST

## 2024-02-01 ENCOUNTER — LAB VISIT (OUTPATIENT)
Dept: LAB | Facility: OTHER | Age: 64
End: 2024-02-01
Attending: STUDENT IN AN ORGANIZED HEALTH CARE EDUCATION/TRAINING PROGRAM
Payer: COMMERCIAL

## 2024-02-01 DIAGNOSIS — E11.29 TYPE 2 DIABETES MELLITUS WITH MICROALBUMINURIA, WITH LONG-TERM CURRENT USE OF INSULIN: ICD-10-CM

## 2024-02-01 DIAGNOSIS — Z79.4 TYPE 2 DIABETES MELLITUS WITH MICROALBUMINURIA, WITH LONG-TERM CURRENT USE OF INSULIN: ICD-10-CM

## 2024-02-01 DIAGNOSIS — D64.9 ANEMIA, UNSPECIFIED TYPE: ICD-10-CM

## 2024-02-01 DIAGNOSIS — R80.9 TYPE 2 DIABETES MELLITUS WITH MICROALBUMINURIA, WITH LONG-TERM CURRENT USE OF INSULIN: ICD-10-CM

## 2024-02-01 DIAGNOSIS — R79.89 ELEVATED LFTS: ICD-10-CM

## 2024-02-01 LAB
ALBUMIN SERPL BCP-MCNC: 4.3 G/DL (ref 3.5–5.2)
ALP SERPL-CCNC: 92 U/L (ref 55–135)
ALT SERPL W/O P-5'-P-CCNC: 39 U/L (ref 10–44)
ANION GAP SERPL CALC-SCNC: 11 MMOL/L (ref 8–16)
AST SERPL-CCNC: 25 U/L (ref 10–40)
BASOPHILS # BLD AUTO: 0.08 K/UL (ref 0–0.2)
BASOPHILS NFR BLD: 1 % (ref 0–1.9)
BILIRUB SERPL-MCNC: 0.3 MG/DL (ref 0.1–1)
BUN SERPL-MCNC: 18 MG/DL (ref 8–23)
CALCIUM SERPL-MCNC: 10.2 MG/DL (ref 8.7–10.5)
CHLORIDE SERPL-SCNC: 102 MMOL/L (ref 95–110)
CO2 SERPL-SCNC: 25 MMOL/L (ref 23–29)
CREAT SERPL-MCNC: 1.3 MG/DL (ref 0.5–1.4)
DIFFERENTIAL METHOD BLD: ABNORMAL
EOSINOPHIL # BLD AUTO: 0.4 K/UL (ref 0–0.5)
EOSINOPHIL NFR BLD: 4.6 % (ref 0–8)
ERYTHROCYTE [DISTWIDTH] IN BLOOD BY AUTOMATED COUNT: 13 % (ref 11.5–14.5)
EST. GFR  (NO RACE VARIABLE): >60 ML/MIN/1.73 M^2
ESTIMATED AVG GLUCOSE: 114 MG/DL (ref 68–131)
FERRITIN SERPL-MCNC: 98 NG/ML (ref 20–300)
FOLATE SERPL-MCNC: 7.7 NG/ML (ref 4–24)
GLUCOSE SERPL-MCNC: 129 MG/DL (ref 70–110)
HBA1C MFR BLD: 5.6 % (ref 4–5.6)
HCT VFR BLD AUTO: 42.2 % (ref 40–54)
HGB BLD-MCNC: 14.2 G/DL (ref 14–18)
IMM GRANULOCYTES # BLD AUTO: 0.02 K/UL (ref 0–0.04)
IMM GRANULOCYTES NFR BLD AUTO: 0.2 % (ref 0–0.5)
IRON SERPL-MCNC: 75 UG/DL (ref 45–160)
LYMPHOCYTES # BLD AUTO: 1.1 K/UL (ref 1–4.8)
LYMPHOCYTES NFR BLD: 13 % (ref 18–48)
MCH RBC QN AUTO: 32.7 PG (ref 27–31)
MCHC RBC AUTO-ENTMCNC: 33.6 G/DL (ref 32–36)
MCV RBC AUTO: 97 FL (ref 82–98)
MONOCYTES # BLD AUTO: 0.8 K/UL (ref 0.3–1)
MONOCYTES NFR BLD: 9.9 % (ref 4–15)
NEUTROPHILS # BLD AUTO: 5.8 K/UL (ref 1.8–7.7)
NEUTROPHILS NFR BLD: 71.3 % (ref 38–73)
NRBC BLD-RTO: 0 /100 WBC
PATH REV BLD -IMP: NORMAL
PLATELET # BLD AUTO: 360 K/UL (ref 150–450)
PMV BLD AUTO: 10.3 FL (ref 9.2–12.9)
POTASSIUM SERPL-SCNC: 4.5 MMOL/L (ref 3.5–5.1)
PROT SERPL-MCNC: 8 G/DL (ref 6–8.4)
RBC # BLD AUTO: 4.34 M/UL (ref 4.6–6.2)
SATURATED IRON: 18 % (ref 20–50)
SODIUM SERPL-SCNC: 138 MMOL/L (ref 136–145)
TOTAL IRON BINDING CAPACITY: 416 UG/DL (ref 250–450)
TRANSFERRIN SERPL-MCNC: 281 MG/DL (ref 200–375)
VIT B12 SERPL-MCNC: 374 PG/ML (ref 210–950)
WBC # BLD AUTO: 8.07 K/UL (ref 3.9–12.7)

## 2024-02-01 PROCEDURE — 83036 HEMOGLOBIN GLYCOSYLATED A1C: CPT | Performed by: STUDENT IN AN ORGANIZED HEALTH CARE EDUCATION/TRAINING PROGRAM

## 2024-02-01 PROCEDURE — 82728 ASSAY OF FERRITIN: CPT | Performed by: STUDENT IN AN ORGANIZED HEALTH CARE EDUCATION/TRAINING PROGRAM

## 2024-02-01 PROCEDURE — 83540 ASSAY OF IRON: CPT | Performed by: STUDENT IN AN ORGANIZED HEALTH CARE EDUCATION/TRAINING PROGRAM

## 2024-02-01 PROCEDURE — 82746 ASSAY OF FOLIC ACID SERUM: CPT | Performed by: STUDENT IN AN ORGANIZED HEALTH CARE EDUCATION/TRAINING PROGRAM

## 2024-02-01 PROCEDURE — 85025 COMPLETE CBC W/AUTO DIFF WBC: CPT | Performed by: STUDENT IN AN ORGANIZED HEALTH CARE EDUCATION/TRAINING PROGRAM

## 2024-02-01 PROCEDURE — 82607 VITAMIN B-12: CPT | Performed by: STUDENT IN AN ORGANIZED HEALTH CARE EDUCATION/TRAINING PROGRAM

## 2024-02-01 PROCEDURE — 80053 COMPREHEN METABOLIC PANEL: CPT | Performed by: STUDENT IN AN ORGANIZED HEALTH CARE EDUCATION/TRAINING PROGRAM

## 2024-02-01 PROCEDURE — 85060 BLOOD SMEAR INTERPRETATION: CPT | Mod: ,,, | Performed by: PATHOLOGY

## 2024-02-01 PROCEDURE — 36415 COLL VENOUS BLD VENIPUNCTURE: CPT | Performed by: STUDENT IN AN ORGANIZED HEALTH CARE EDUCATION/TRAINING PROGRAM

## 2024-02-02 LAB — PATH REV BLD -IMP: NORMAL

## 2024-02-08 DIAGNOSIS — E78.2 MIXED HYPERLIPIDEMIA: ICD-10-CM

## 2024-02-08 DIAGNOSIS — I10 ESSENTIAL HYPERTENSION: ICD-10-CM

## 2024-02-08 RX ORDER — NAPROXEN SODIUM 220 MG/1
81 TABLET, FILM COATED ORAL DAILY
Qty: 90 TABLET | Refills: 0 | Status: SHIPPED | OUTPATIENT
Start: 2024-02-08 | End: 2024-02-15 | Stop reason: SDUPTHER

## 2024-02-08 RX ORDER — VALSARTAN 320 MG/1
320 TABLET ORAL DAILY
Qty: 90 TABLET | Refills: 0 | Status: SHIPPED | OUTPATIENT
Start: 2024-02-08

## 2024-02-08 NOTE — TELEPHONE ENCOUNTER
No care due was identified.  Health [facility] Embedded Care Due Messages. Reference number: [id number].   2/08/2024 7:59:45 AM CST

## 2024-02-08 NOTE — TELEPHONE ENCOUNTER
Refill Routing Note   Medication(s) are not appropriate for processing by [facility] for the following reason(s):        No active prescription written by provider  Outside of protocol: Aspirin    [facility] action(s):  Defer  Route        Medication Therapy Plan: Last ordered: 2 months ago (11/17/2023) by Isauro Roa MD      Appointments  past 12m or future 3m with PCP    Date Provider   Last Visit   10/26/2023 Diann Greene MD   Next Visit   2/8/2024 Diann Greene MD   ED visits in past 90 days: 0        Note composed:10:48 AM 02/08/2024

## 2024-02-15 ENCOUNTER — OFFICE VISIT (OUTPATIENT)
Dept: INTERNAL MEDICINE | Facility: CLINIC | Age: 64
End: 2024-02-15
Payer: COMMERCIAL

## 2024-02-15 VITALS
WEIGHT: 244.06 LBS | HEART RATE: 74 BPM | DIASTOLIC BLOOD PRESSURE: 80 MMHG | SYSTOLIC BLOOD PRESSURE: 126 MMHG | BODY MASS INDEX: 35.02 KG/M2 | OXYGEN SATURATION: 98 %

## 2024-02-15 DIAGNOSIS — Z79.4 TYPE 2 DIABETES MELLITUS WITH COMPLICATION, WITH LONG-TERM CURRENT USE OF INSULIN: Primary | ICD-10-CM

## 2024-02-15 DIAGNOSIS — R79.89 ELEVATED LFTS: ICD-10-CM

## 2024-02-15 DIAGNOSIS — E53.8 VITAMIN B12 DEFICIENCY: ICD-10-CM

## 2024-02-15 DIAGNOSIS — D64.9 ANEMIA, UNSPECIFIED TYPE: ICD-10-CM

## 2024-02-15 DIAGNOSIS — E11.8 TYPE 2 DIABETES MELLITUS WITH COMPLICATION, WITH LONG-TERM CURRENT USE OF INSULIN: Primary | ICD-10-CM

## 2024-02-15 DIAGNOSIS — I10 ESSENTIAL HYPERTENSION: ICD-10-CM

## 2024-02-15 DIAGNOSIS — E78.2 MIXED HYPERLIPIDEMIA: ICD-10-CM

## 2024-02-15 DIAGNOSIS — Z00.00 HEALTH MAINTENANCE EXAMINATION: ICD-10-CM

## 2024-02-15 PROCEDURE — 99214 OFFICE O/P EST MOD 30 MIN: CPT | Mod: S$GLB,,, | Performed by: STUDENT IN AN ORGANIZED HEALTH CARE EDUCATION/TRAINING PROGRAM

## 2024-02-15 PROCEDURE — 1159F MED LIST DOCD IN RCRD: CPT | Mod: CPTII,S$GLB,, | Performed by: STUDENT IN AN ORGANIZED HEALTH CARE EDUCATION/TRAINING PROGRAM

## 2024-02-15 PROCEDURE — 3079F DIAST BP 80-89 MM HG: CPT | Mod: CPTII,S$GLB,, | Performed by: STUDENT IN AN ORGANIZED HEALTH CARE EDUCATION/TRAINING PROGRAM

## 2024-02-15 PROCEDURE — 3074F SYST BP LT 130 MM HG: CPT | Mod: CPTII,S$GLB,, | Performed by: STUDENT IN AN ORGANIZED HEALTH CARE EDUCATION/TRAINING PROGRAM

## 2024-02-15 PROCEDURE — 99999 PR PBB SHADOW E&M-EST. PATIENT-LVL IV: CPT | Mod: PBBFAC,,, | Performed by: STUDENT IN AN ORGANIZED HEALTH CARE EDUCATION/TRAINING PROGRAM

## 2024-02-15 PROCEDURE — 4010F ACE/ARB THERAPY RXD/TAKEN: CPT | Mod: CPTII,S$GLB,, | Performed by: STUDENT IN AN ORGANIZED HEALTH CARE EDUCATION/TRAINING PROGRAM

## 2024-02-15 PROCEDURE — 3008F BODY MASS INDEX DOCD: CPT | Mod: CPTII,S$GLB,, | Performed by: STUDENT IN AN ORGANIZED HEALTH CARE EDUCATION/TRAINING PROGRAM

## 2024-02-15 PROCEDURE — 3044F HG A1C LEVEL LT 7.0%: CPT | Mod: CPTII,S$GLB,, | Performed by: STUDENT IN AN ORGANIZED HEALTH CARE EDUCATION/TRAINING PROGRAM

## 2024-02-15 RX ORDER — NAPROXEN SODIUM 220 MG/1
81 TABLET, FILM COATED ORAL DAILY
Qty: 90 TABLET | Refills: 3 | Status: SHIPPED | OUTPATIENT
Start: 2024-02-15 | End: 2024-05-06 | Stop reason: SDUPTHER

## 2024-02-15 RX ORDER — DULAGLUTIDE 3 MG/.5ML
3 INJECTION, SOLUTION SUBCUTANEOUS
Qty: 12 PEN | Refills: 3 | Status: SHIPPED | OUTPATIENT
Start: 2024-02-15

## 2024-02-15 NOTE — PROGRESS NOTES
Subjective:       Patient ID: Henry Zacarias is a 64 y.o. male.    Chief Complaint: Type 2 diabetes mellitus with complication, with long-term current use of insulin [E11.8, Z79.4]    Patient is established with me, here today for the following:    T2DM -   Currently taking:   - Trucility 1.5 mg weekly  - Jardiance 25 mg  - Metformin 1000 mg BID  - Levemir 45 units qAM  - Novolog 12 units qAC  Checking blood glucose 3 times per day  Home blood glucose range per patient 110-130.  Denies blood sugars < 70 mg/dL  Taking ASA 81 mg daily  UTD on foot exam.  UTD on eye exam.  Walking around the neighborhood 1-2 times weekly.  Lab Results       Component                Value               Date                       HGBA1C                   5.6                 02/01/2024                 HGBA1C                   6.1 (H)             10/18/2023                 HGBA1C                   6.0 (H)             03/21/2023            Lab Results       Component                Value               Date                       MICALBCREAT              9.0                 10/18/2023                    HLD -   Endorses taking atorvastatin as directed  Denies side effects or concerns while taking medication  Lab Results       Component                Value               Date                       CHOL                     149                 10/18/2023            Lab Results       Component                Value               Date                       TRIG                     174 (H)             10/18/2023            Lab Results       Component                Value               Date                       LDLCALC                  79.2                10/18/2023            Lab Results       Component                Value               Date                       HDL                      35 (L)              10/18/2023                   HTN -   Currently prescribed amlodipine, Coreg, valsartan.  Patient endorses taking medication as  directed.  Denies side effects or concerns while taking medication.  Lab Results       Component                Value               Date                       MICALBCREAT              9.0                 10/18/2023            BP Readings from Last 5 Encounters:  10/26/23 : 132/70  06/13/23 : (!) 153/75  04/20/23 : 124/70  10/21/22 : 124/64  06/23/22 : 123/71     Anemia -  Intermittently taking vitamin B12  Lab Results       Component                Value               Date                       HGB                      14.2                02/01/2024                 HCT                      42.2                02/01/2024                 MCV                      97                  02/01/2024                 RDW                      13.0                02/01/2024            Lab Results       Component                Value               Date                       IRON                     75                  02/01/2024                 TRANSFERRIN              281                 02/01/2024                 TIBC                     416                 02/01/2024                 FESATURATED              18 (L)              02/01/2024             Lab Results       Component                Value               Date                       FERRITIN                 98                  02/01/2024            Lab Results       Component                Value               Date                       [id number]               374                 02/01/2024            Lab Results       Component                Value               Date                       FOLATE                   7.7                 02/01/2024                  Elevated LFT's -   Lab Results       Component                Value               Date                       ALT                      39                  02/01/2024                 AST                      25                  02/01/2024                 ALKPHOS                  92                  02/01/2024         "    Lab Results       Component                Value               Date                       FERRITIN                 98                  02/01/2024                Review of Systems   Constitutional:  Negative for chills, fatigue, fever and unexpected weight change.   Respiratory:  Negative for cough and shortness of breath.    Cardiovascular:  Negative for chest pain, palpitations and leg swelling.   Neurological:  Negative for dizziness, syncope and headaches.         Current Outpatient Medications   Medication Instructions    amLODIPine (NORVASC) 10 mg, Oral    aspirin 81 mg, Oral, Daily    atorvastatin (LIPITOR) 40 mg, Oral, Daily    blood sugar diagnostic (CONTOUR NEXT TEST STRIPS) [place] 1 E[place] [facility] [place] ROUTE 4 (FOUR) TIMES DAILY BEFORE MEALS AND NIGHTLY.    blood-glucose meter Misc Use as instructed    carvediloL (COREG) 12.5 mg, Oral, 2 times daily with meals    empagliflozin (JARDIANCE) 25 mg, Oral, Daily    insulin aspart U-100 (NOVOLOG FLEXPEN U-100 INSULIN) 100 unit/mL (3 mL) InPn pen 14 units in the morning, lunch time, and 12 units with dinner.    insulin detemir U-100 (Levemir) 45 Units, Subcutaneous, Daily    lancets (MICROLET LANCET) Misc 1 EACH BY MISC.(NON-DRUG COMBO ROUTE) ROUTE 4 (FOUR) TIMES DAILY BEFORE MEALS AND NIGHTLY.    metFORMIN (GLUCOPHAGE) 1,000 mg, Oral, 2 times daily with meals    pen needle, diabetic (BD ULTRA-FINE JEANNETTE PEN NEEDLE) 32 gauge x 5/32" Ndle USE 4 TIMES A DAY    TRULICITY 3 mg, Subcutaneous, Every 7 days    valsartan (DIOVAN) 320 mg, Oral, Daily     Objective:      Vitals:    02/15/24 0809   BP: 126/80   Pulse: 74   SpO2: 98%   Weight: 110.7 kg (244 lb 0.8 oz)     Body mass index is 35.02 kg/m².    Physical Exam  Vitals reviewed.   Constitutional:       General: He is not in acute distress.     Appearance: He is not ill-appearing or diaphoretic.   Cardiovascular:      Rate and Rhythm: Normal rate and regular rhythm.      Heart sounds: Normal heart " sounds. No murmur heard.     No friction rub. No gallop.   Pulmonary:      Effort: Pulmonary effort is normal. No respiratory distress.      Breath sounds: Normal breath sounds. No stridor. No wheezing, rhonchi or rales.   Skin:     General: Skin is warm and dry.      Comments: Color WNL   Neurological:      Mental Status: He is alert. Mental status is at baseline.   Psychiatric:         Mood and Affect: Mood normal.         Behavior: Behavior normal.         Assessment:       1. Type 2 diabetes mellitus with complication, with long-term current use of insulin    2. Mixed hyperlipidemia    3. Essential hypertension    4. Anemia, unspecified type    5. Vitamin B12 deficiency    6. Elevated LFTs    7. Health maintenance examination        Plan:       Type 2 diabetes mellitus with complication, with long-term current use of insulin  Decrease Novolog with meals to 10 units  If pre-meal blood sugars are consistently <100, ok to decrease Novolog to 8 units.  Increase Trulicity to 3 mg  Continue Jardiance and Levemir  RTC in 6 months for follow up.  -     dulaglutide (TRULICITY) 3 mg/0.5 mL pen injector; Inject 3 mg into the skin every 7 days.  -     Comprehensive Metabolic Panel; Future  -     Hemoglobin A1C; Future  -     Microalbumin/Creatinine Ratio, Urine; Future    Mixed hyperlipidemia  Continue current medications.  RTC in 6 months for follow up.  -     aspirin 81 MG Chew; Take 1 tablet (81 mg total) by mouth once daily.  -     Lipid Panel; Future    Essential hypertension  Continue current medications.  RTC in 6 months for follow up.  -     Comprehensive Metabolic Panel; Future  -     TSH; Future    Anemia, unspecified type  Vitamin B12 deficiency  Continue supplementation.  RTC in 6 months for follow up.  -     CBC Auto Differential; Future  -     Vitamin B12; Future    Elevated LFTs  Improved    Health maintenance examination  RTC in 6 months with labs for  appt  -     Comprehensive Metabolic Panel; Future  -      TSH; Future  -     Lipid Panel; Future  -     Hemoglobin A1C; Future  -     CBC Auto Differential; Future  -     Microalbumin/Creatinine Ratio, Urine; Future  -     Vitamin B12; Future        Diann Greene MD  2/15/2024

## 2024-02-15 NOTE — PATIENT INSTRUCTIONS
Decrease Novolog with meals to 10 units  If pre-meal blood sugars are consistently <100, ok to decrease Novolog to 8 units.  Increase Trulicity to 3 mg

## 2024-03-27 ENCOUNTER — PATIENT MESSAGE (OUTPATIENT)
Dept: INTERNAL MEDICINE | Facility: CLINIC | Age: 64
End: 2024-03-27
Payer: COMMERCIAL

## 2024-03-27 DIAGNOSIS — E11.8 TYPE 2 DIABETES MELLITUS WITH COMPLICATION, WITH LONG-TERM CURRENT USE OF INSULIN: ICD-10-CM

## 2024-03-27 DIAGNOSIS — Z79.4 TYPE 2 DIABETES MELLITUS WITH COMPLICATION, WITH LONG-TERM CURRENT USE OF INSULIN: ICD-10-CM

## 2024-03-27 NOTE — TELEPHONE ENCOUNTER
No care due was identified.  [facility] Embedded Care Due Messages. Reference number: [id number].   3/27/2024 11:21:54 AM CDT

## 2024-03-28 RX ORDER — LANCETS
EACH MISCELLANEOUS
Qty: 400 EACH | Refills: 3 | Status: SHIPPED | OUTPATIENT
Start: 2024-03-28

## 2024-04-03 ENCOUNTER — PATIENT MESSAGE (OUTPATIENT)
Dept: INTERNAL MEDICINE | Facility: CLINIC | Age: 64
End: 2024-04-03
Payer: COMMERCIAL

## 2024-04-03 DIAGNOSIS — Z79.4 TYPE 2 DIABETES MELLITUS WITH MICROALBUMINURIA, WITH LONG-TERM CURRENT USE OF INSULIN: ICD-10-CM

## 2024-04-03 DIAGNOSIS — E66.01 SEVERE OBESITY (BMI 35.0-39.9) WITH COMORBIDITY: ICD-10-CM

## 2024-04-03 DIAGNOSIS — R80.9 TYPE 2 DIABETES MELLITUS WITH MICROALBUMINURIA, WITH LONG-TERM CURRENT USE OF INSULIN: ICD-10-CM

## 2024-04-03 DIAGNOSIS — E11.8 TYPE 2 DIABETES MELLITUS WITH COMPLICATION, WITH LONG-TERM CURRENT USE OF INSULIN: ICD-10-CM

## 2024-04-03 DIAGNOSIS — Z79.4 TYPE 2 DIABETES MELLITUS WITH COMPLICATION, WITH LONG-TERM CURRENT USE OF INSULIN: ICD-10-CM

## 2024-04-03 DIAGNOSIS — E11.29 TYPE 2 DIABETES MELLITUS WITH MICROALBUMINURIA, WITH LONG-TERM CURRENT USE OF INSULIN: ICD-10-CM

## 2024-04-03 NOTE — TELEPHONE ENCOUNTER
No care due was identified.  [facility] Embedded Care Due Messages. Reference number: [id number].   4/03/2024 12:48:06 AM CDT

## 2024-04-03 NOTE — TELEPHONE ENCOUNTER
Patient was informed medication was sent to pharmacy   lancets (MICROLET LANCET) Misc              Summary: 1 EACH BY MISC.(NON-DRUG COMBO ROUTE) ROUTE 4 (FOUR) TIMES DAILY BEFORE MEALS AND NIGHTLY., Normal  Start: 03/28/2024Ord/Sold: 03/28/2024 (O)Ordered On: 03/28/2024Pharmacy: [id number] -

## 2024-04-04 RX ORDER — INSULIN DEGLUDEC 100 U/ML
45 INJECTION, SOLUTION SUBCUTANEOUS DAILY
Qty: 40.5 ML | Refills: 3 | Status: SHIPPED | OUTPATIENT
Start: 2024-04-04 | End: 2025-04-04

## 2024-04-04 RX ORDER — METFORMIN HYDROCHLORIDE 1000 MG/1
1000 TABLET ORAL 2 TIMES DAILY WITH MEALS
Qty: 180 TABLET | Refills: 1 | Status: SHIPPED | OUTPATIENT
Start: 2024-04-04

## 2024-04-04 NOTE — TELEPHONE ENCOUNTER
No care due was identified.  [facility] Embedded Care Due Messages. Reference number: [id number].   4/04/2024 8:30:42 AM CDT

## 2024-04-04 NOTE — TELEPHONE ENCOUNTER
Refill Routing Note   Refill Routing Note   Medication(s) are not appropriate for processing by [facility] for the following reason(s):        No active prescription written by provider    [facility] action(s):  Defer             Appointments  past 12m or future 3m with PCP    Date Provider   Last Visit   2/15/2024 Diann Greene MD   Next Visit   8/15/2024 Diann Greene MD   ED visits in past 90 days: 0        Note composed:11:20 PM 04/03/2024

## 2024-04-05 ENCOUNTER — TELEPHONE (OUTPATIENT)
Dept: INTERNAL MEDICINE | Facility: CLINIC | Age: 64
End: 2024-04-05
Payer: COMMERCIAL

## 2024-04-25 ENCOUNTER — PATIENT MESSAGE (OUTPATIENT)
Dept: INTERNAL MEDICINE | Facility: CLINIC | Age: 64
End: 2024-04-25
Payer: COMMERCIAL

## 2024-04-25 DIAGNOSIS — R80.9 TYPE 2 DIABETES MELLITUS WITH MICROALBUMINURIA, WITH LONG-TERM CURRENT USE OF INSULIN: ICD-10-CM

## 2024-04-25 DIAGNOSIS — R60.0 BILATERAL LEG EDEMA: ICD-10-CM

## 2024-04-25 DIAGNOSIS — Z79.4 TYPE 2 DIABETES MELLITUS WITH MICROALBUMINURIA, WITH LONG-TERM CURRENT USE OF INSULIN: ICD-10-CM

## 2024-04-25 DIAGNOSIS — E11.29 TYPE 2 DIABETES MELLITUS WITH MICROALBUMINURIA, WITH LONG-TERM CURRENT USE OF INSULIN: ICD-10-CM

## 2024-04-25 NOTE — TELEPHONE ENCOUNTER
Refill Routing Note   Medication(s) are not appropriate for processing by [facility] for the following reason(s):        No active prescription written by provider    [facility] action(s):  Defer        Medication Therapy Plan: Last ordered: 1 year ago (4/20/2023) by Aimee Olmos MD      Appointments  past 12m or future 3m with PCP    Date Provider   Last Visit   2/15/2024 Diann Greene MD   Next Visit   8/15/2024 Diann Greene MD   ED visits in past 90 days: 0        Note composed:5:48 PM 04/25/2024

## 2024-04-25 NOTE — TELEPHONE ENCOUNTER
No care due was identified.  [facility] Embedded Care Due Messages. Reference number: [id number].   4/25/2024 8:40:26 AM CDT

## 2024-05-05 ENCOUNTER — PATIENT MESSAGE (OUTPATIENT)
Dept: INTERNAL MEDICINE | Facility: CLINIC | Age: 64
End: 2024-05-05
Payer: COMMERCIAL

## 2024-05-05 DIAGNOSIS — I10 ESSENTIAL HYPERTENSION: ICD-10-CM

## 2024-05-05 DIAGNOSIS — E78.2 MIXED HYPERLIPIDEMIA: ICD-10-CM

## 2024-05-06 RX ORDER — CARVEDILOL 12.5 MG/1
12.5 TABLET ORAL 2 TIMES DAILY WITH MEALS
COMMUNITY

## 2024-05-06 RX ORDER — CARVEDILOL 12.5 MG/1
12.5 TABLET ORAL 2 TIMES DAILY WITH MEALS
Qty: 180 TABLET | Refills: 3 | Status: SHIPPED | OUTPATIENT
Start: 2024-05-06 | End: 2025-05-06

## 2024-05-06 RX ORDER — ATORVASTATIN CALCIUM 40 MG/1
40 TABLET, FILM COATED ORAL DAILY
Qty: 90 TABLET | Refills: 1 | Status: SHIPPED | OUTPATIENT
Start: 2024-05-06 | End: 2024-05-06 | Stop reason: CLARIF

## 2024-05-06 RX ORDER — ATORVASTATIN CALCIUM 40 MG/1
40 TABLET, FILM COATED ORAL DAILY
Qty: 90 TABLET | Refills: 3 | Status: SHIPPED | OUTPATIENT
Start: 2024-05-06 | End: 2025-05-06

## 2024-05-06 RX ORDER — ATORVASTATIN CALCIUM 40 MG/1
40 TABLET, FILM COATED ORAL DAILY
COMMUNITY

## 2024-05-06 RX ORDER — CARVEDILOL 12.5 MG/1
12.5 TABLET ORAL 2 TIMES DAILY WITH MEALS
Qty: 180 TABLET | Refills: 3 | Status: SHIPPED | OUTPATIENT
Start: 2024-05-06 | End: 2024-05-06 | Stop reason: CLARIF

## 2024-05-06 RX ORDER — NAPROXEN SODIUM 220 MG/1
81 TABLET, FILM COATED ORAL DAILY
Qty: 90 TABLET | Refills: 3 | Status: SHIPPED | OUTPATIENT
Start: 2024-05-06

## 2024-05-06 NOTE — TELEPHONE ENCOUNTER
Refill Routing Note   Medication(s) are not appropriate for processing by [facility] for the following reason(s):        No active prescription written by provider  Outside of protocol    [facility] action(s):  Defer  Route               Appointments  past 12m or future 3m with PCP    Date Provider   Last Visit   2/15/2024 Diann Greene MD   Next Visit   8/15/2024 Diann Greene MD   ED visits in past 90 days: 0        Note composed:11:41 AM 05/06/2024

## 2024-05-06 NOTE — TELEPHONE ENCOUNTER
Care Due:                  Date            Visit Type   Department     Provider  --------------------------------------------------------------------------------                                EP -                              PRIMARY      [facility] INTERNAL  Last Visit: 02-      CARE ([facility])   KRISHNA Greene                              [facility]                              PRIMARY      [facility] INTERNAL  Next Visit: 08-      CARE ([facility])   KRISHNA Greene                                                            Last  Test          Frequency    Reason                     Performed    Due Date  --------------------------------------------------------------------------------    HBA1C.......  6 months...  dulaglutide,               02- 07-                             empagliflozin, insulin,                             metFORMIN................    [employer] Embedded Care Due Messages. Reference number: [id number].   5/06/2024 11:05:45 AM CDT

## 2024-06-19 ENCOUNTER — OFFICE VISIT (OUTPATIENT)
Dept: PODIATRY | Facility: CLINIC | Age: 64
End: 2024-06-19
Payer: COMMERCIAL

## 2024-06-19 VITALS
BODY MASS INDEX: 34.94 KG/M2 | HEART RATE: 93 BPM | HEIGHT: 70 IN | SYSTOLIC BLOOD PRESSURE: 132 MMHG | WEIGHT: 244.06 LBS | DIASTOLIC BLOOD PRESSURE: 72 MMHG

## 2024-06-19 DIAGNOSIS — E11.9 ENCOUNTER FOR DIABETIC FOOT EXAM: Primary | ICD-10-CM

## 2024-06-19 DIAGNOSIS — B35.1 DERMATOPHYTOSIS, NAIL: ICD-10-CM

## 2024-06-19 DIAGNOSIS — E11.9 TYPE 2 DIABETES MELLITUS WITHOUT COMPLICATION, WITH LONG-TERM CURRENT USE OF INSULIN: ICD-10-CM

## 2024-06-19 DIAGNOSIS — Z79.4 TYPE 2 DIABETES MELLITUS WITHOUT COMPLICATION, WITH LONG-TERM CURRENT USE OF INSULIN: ICD-10-CM

## 2024-06-19 LAB — HM FOOT EXAM: NORMAL

## 2024-06-19 PROCEDURE — 1159F MED LIST DOCD IN RCRD: CPT | Mod: CPTII,S$GLB,, | Performed by: PODIATRIST

## 2024-06-19 PROCEDURE — 1160F RVW MEDS BY RX/DR IN RCRD: CPT | Mod: CPTII,S$GLB,, | Performed by: PODIATRIST

## 2024-06-19 PROCEDURE — 3075F SYST BP GE 130 - 139MM HG: CPT | Mod: CPTII,S$GLB,, | Performed by: PODIATRIST

## 2024-06-19 PROCEDURE — 3008F BODY MASS INDEX DOCD: CPT | Mod: CPTII,S$GLB,, | Performed by: PODIATRIST

## 2024-06-19 PROCEDURE — 99213 OFFICE O/P EST LOW 20 MIN: CPT | Mod: S$GLB,,, | Performed by: PODIATRIST

## 2024-06-19 PROCEDURE — 4010F ACE/ARB THERAPY RXD/TAKEN: CPT | Mod: CPTII,S$GLB,, | Performed by: PODIATRIST

## 2024-06-19 PROCEDURE — 99999 PR PBB SHADOW E&M-EST. PATIENT-LVL IV: CPT | Mod: PBBFAC,,, | Performed by: PODIATRIST

## 2024-06-19 PROCEDURE — 3044F HG A1C LEVEL LT 7.0%: CPT | Mod: CPTII,S$GLB,, | Performed by: PODIATRIST

## 2024-06-19 PROCEDURE — 3078F DIAST BP <80 MM HG: CPT | Mod: CPTII,S$GLB,, | Performed by: PODIATRIST

## 2024-06-19 NOTE — PROGRESS NOTES
Chief Complaint   Patient presents with    Diabetic Foot Exam     Annual checkup/PCP Diann Greene MD  02/15/24              HPI:   The patient is a 64 y.o.  male  who presents for a diabetic foot exam.     Patient reports occasional presence of abnormal sensation to the feet .    No tingling or burning sensation or pain to the feet when standing or walking.  No symptoms of claudication evident.   History of diabetic foot ulcers: none   History of foot surgery: none.     Shoes worn today: leather dress shoes, black socks.     Primary care doctor is: Diann Greene MD        Patient Active Problem List   Diagnosis    KEVIN (obstructive sleep apnea)    Obesity (BMI 35.0-39.9 without comorbidity)    Mixed hyperlipidemia    Essential hypertension    Type 2 diabetes mellitus with microalbuminuria, with long-term current use of insulin    Right rotator cuff tear    Chronic right shoulder pain    Decreased range of motion with decreased strength    Insomnia    Neck pain    Decreased range of motion of intervertebral discs of cervical spine    Bilateral leg edema    Normocytic anemia    Severe obesity (BMI 35.0-39.9) with comorbidity           Current Outpatient Medications on File Prior to Visit   Medication Sig Dispense Refill    amLODIPine (NORVASC) 10 MG tablet TAKE 1 TABLET BY MOUTH EVERY DAY 90 tablet 3    aspirin 81 MG Chew Take 1 tablet (81 mg total) by mouth once daily. 90 tablet 3    atorvastatin (LIPITOR) 40 MG tablet Take 1 tablet (40 mg total) by mouth once daily. 90 tablet 3    atorvastatin (LIPITOR) 40 MG tablet Take 40 mg by mouth once daily.      blood sugar diagnostic (CONTOUR NEXT TEST STRIPS) Strp 1 EACH BY [facility]NON-DRUG COMBO ROUTE) ROUTE 4 (FOUR) TIMES DAILY BEFORE MEALS AND NIGHTLY. 400 strip 3    blood-glucose meter Misc Use as instructed 1 each 0    carvediloL (COREG) 12.5 MG tablet Take 1 tablet (12.5 mg total) by mouth 2 (two) times daily with meals. 180 tablet 3    carvediloL (COREG) 12.5  "MG tablet Take 12.5 mg by mouth 2 (two) times daily with meals.      dulaglutide (TRULICITY) 3 mg/0.5 mL pen injector Inject 3 mg into the skin every 7 days. 12 pen 3    empagliflozin (JARDIANCE) 25 mg tablet Take 1 tablet (25 mg total) by mouth once daily. 90 tablet 3    insulin aspart U-100 (NOVOLOG FLEXPEN U-100 INSULIN) 100 unit/mL (3 mL) InPn pen 14 units in the morning, lunch time, and 12 units with dinner. 45 each 3    insulin degludec (TRESIBA FLEXTOUCH U-100) 100 unit/mL (3 mL) insulin pen Inject 45 Units into the skin once daily. 40.5 mL 3    lancets (MICROLET LANCET) Misc 1 EACH BY MISC.(NON-DRUG COMBO ROUTE) ROUTE 4 (FOUR) TIMES DAILY BEFORE MEALS AND NIGHTLY. 400 each 3    metFORMIN (GLUCOPHAGE) 1000 MG tablet TAKE 1 TABLET BY MOUTH TWICE A DAY WITH MEALS 180 tablet 1    pen needle, diabetic (BD ULTRA-FINE JEANNETTE PEN NEEDLE) 32 gauge x 5/32" Ndle USE 4 TIMES A  each 11    valsartan (DIOVAN) 320 MG tablet Take 1 tablet (320 mg total) by mouth once daily. 90 tablet 0     No current facility-administered medications on file prior to visit.           Review of patient's allergies indicates:  No Known Allergies                ROS:  General ROS: negative  Respiratory ROS: no cough, shortness of breath, or wheezing  Cardiovascular ROS: no chest pain or dyspnea on exertion  Musculoskeletal ROS: negative  Neurological ROS:   negative for - impaired coordination/balance or numbness/tingling  Dermatological ROS: negative          LAST HbA1c:   Lab Results   Component Value Date    HGBA1C 5.6 02/01/2024           EXAM:   Vitals:    06/19/24 0845   BP: 132/72   Pulse: 93   Weight: 110.7 kg (244 lb 0.8 oz)   Height: 5' 10" (1.778 m)       General: alert, no distress, cooperative      Vascular:   Dorsalis Pedis:  Palpable and dopplerable.   Posterior Tibial:  present; palpable  Capillary refill time:  5 seconds  Temperature of toes cool to touch  Edema:   minimal; pitting      Neurological:     Sharp touch:  " normal  Light touch: normal  Tinels Sign:  Absent  [place]ul[place] Click:   Absent  [place]:  intact x 10      Dermatological:   Skin: Warm and dry. no hyperpigmentation, vitiligo, or suspicious lesions    Wounds/Ulcers:  Absent  Bruising:  Absent  Erythema:  Absent  Toenails:  Elongated by 1-2mm, thickened by 1mm and Yellowish in color,  With subungual debris.   Absent paronychia      Musculoskeletal:   Metatarsophalangeal range of motion:   full range of motion  Subtalar joint range of motion: full range of motion  Ankle joint range of motion:  full range of motion  Bunions:  Absent  Hammertoes: present 2-4 bilateral;  bilateral adductovarus                ASSESSMENT/PLAN:          Problem List Items Addressed This Visit    None  Visit Diagnoses       Encounter for diabetic foot exam    -  Primary    Type 2 diabetes mellitus without complication, with long-term current use of insulin        Dermatophytosis, nail                I counseled the patient on the patient's conditions, their implications and medical management.   Shoe inspection.     Consider Spenco Orthotic Arch insoles to replace liner in current athletic shoes.  Continue good nutrition and blood sugar control to help prevent podiatric complications of diabetes.   Maintain proper foot hygiene.   Continue wearing proper shoe gear, daily foot inspections, never walking without protective shoe gear, never putting sharp instruments to feet.  Continue [place] at home.    Annual diabetes foot exam, or sooner if concerned. Patient is amenable to plan.        I spent a total of 20 minutes on the day of the visit.  This includes face to face time and non-face to face time preparing to see the patient (eg, review of tests), obtaining and/or reviewing separately obtained history, documenting clinical information in the electronic or other health record, independently interpreting results and communicating results to the patient/family/caregiver, or care coordinator.

## 2024-07-14 ENCOUNTER — PATIENT MESSAGE (OUTPATIENT)
Dept: INTERNAL MEDICINE | Facility: CLINIC | Age: 64
End: 2024-07-14
Payer: COMMERCIAL

## 2024-07-15 ENCOUNTER — PATIENT OUTREACH (OUTPATIENT)
Dept: ADMINISTRATIVE | Facility: HOSPITAL | Age: 64
End: 2024-07-15
Payer: COMMERCIAL

## 2024-07-23 ENCOUNTER — PATIENT MESSAGE (OUTPATIENT)
Dept: INTERNAL MEDICINE | Facility: CLINIC | Age: 64
End: 2024-07-23
Payer: COMMERCIAL

## 2024-08-07 ENCOUNTER — ON-DEMAND VIRTUAL (OUTPATIENT)
Dept: URGENT CARE | Facility: CLINIC | Age: 64
End: 2024-08-07
Payer: COMMERCIAL

## 2024-08-07 DIAGNOSIS — T14.8XXA PUNCTURE WOUND: Primary | ICD-10-CM

## 2024-08-07 PROCEDURE — 99212 OFFICE O/P EST SF 10 MIN: CPT | Mod: 95,,, | Performed by: FAMILY MEDICINE

## 2024-08-08 ENCOUNTER — LAB VISIT (OUTPATIENT)
Dept: LAB | Facility: OTHER | Age: 64
End: 2024-08-08
Attending: STUDENT IN AN ORGANIZED HEALTH CARE EDUCATION/TRAINING PROGRAM
Payer: COMMERCIAL

## 2024-08-08 DIAGNOSIS — E11.8 TYPE 2 DIABETES MELLITUS WITH COMPLICATION, WITH LONG-TERM CURRENT USE OF INSULIN: ICD-10-CM

## 2024-08-08 DIAGNOSIS — Z00.00 HEALTH MAINTENANCE EXAMINATION: ICD-10-CM

## 2024-08-08 DIAGNOSIS — D64.9 ANEMIA, UNSPECIFIED TYPE: Primary | ICD-10-CM

## 2024-08-08 DIAGNOSIS — Z79.4 TYPE 2 DIABETES MELLITUS WITH COMPLICATION, WITH LONG-TERM CURRENT USE OF INSULIN: ICD-10-CM

## 2024-08-08 DIAGNOSIS — E53.8 VITAMIN B12 DEFICIENCY: ICD-10-CM

## 2024-08-08 DIAGNOSIS — I10 ESSENTIAL HYPERTENSION: ICD-10-CM

## 2024-08-08 DIAGNOSIS — E78.2 MIXED HYPERLIPIDEMIA: ICD-10-CM

## 2024-08-08 LAB
ALBUMIN SERPL BCP-MCNC: 4.2 G/DL (ref 3.5–5.2)
ALBUMIN/CREAT UR: 10.9 UG/MG (ref 0–30)
ALP SERPL-CCNC: 88 U/L (ref 55–135)
ALT SERPL W/O P-5'-P-CCNC: 24 U/L (ref 10–44)
ANION GAP SERPL CALC-SCNC: 9 MMOL/L (ref 8–16)
AST SERPL-CCNC: 17 U/L (ref 10–40)
BASOPHILS # BLD AUTO: 0.07 K/UL (ref 0–0.2)
BASOPHILS NFR BLD: 0.8 % (ref 0–1.9)
BILIRUB SERPL-MCNC: 0.5 MG/DL (ref 0.1–1)
BUN SERPL-MCNC: 17 MG/DL (ref 8–23)
CALCIUM SERPL-MCNC: 9.7 MG/DL (ref 8.7–10.5)
CHLORIDE SERPL-SCNC: 105 MMOL/L (ref 95–110)
CHOLEST SERPL-MCNC: 117 MG/DL (ref 120–199)
CHOLEST/HDLC SERPL: 3.4 {RATIO} (ref 2–5)
CO2 SERPL-SCNC: 23 MMOL/L (ref 23–29)
CREAT SERPL-MCNC: 1.3 MG/DL (ref 0.5–1.4)
CREAT UR-MCNC: 54.8 MG/DL (ref 23–375)
DIFFERENTIAL METHOD BLD: ABNORMAL
EOSINOPHIL # BLD AUTO: 0.4 K/UL (ref 0–0.5)
EOSINOPHIL NFR BLD: 4.1 % (ref 0–8)
ERYTHROCYTE [DISTWIDTH] IN BLOOD BY AUTOMATED COUNT: 13.6 % (ref 11.5–14.5)
EST. GFR  (NO RACE VARIABLE): >60 ML/MIN/1.73 M^2
ESTIMATED AVG GLUCOSE: 105 MG/DL (ref 68–131)
GLUCOSE SERPL-MCNC: 87 MG/DL (ref 70–110)
HBA1C MFR BLD: 5.3 % (ref 4–5.6)
HCT VFR BLD AUTO: 40.3 % (ref 40–54)
HDLC SERPL-MCNC: 34 MG/DL (ref 40–75)
HDLC SERPL: 29.1 % (ref 20–50)
HGB BLD-MCNC: 13.7 G/DL (ref 14–18)
IMM GRANULOCYTES # BLD AUTO: 0.04 K/UL (ref 0–0.04)
IMM GRANULOCYTES NFR BLD AUTO: 0.4 % (ref 0–0.5)
LDLC SERPL CALC-MCNC: 52.6 MG/DL (ref 63–159)
LYMPHOCYTES # BLD AUTO: 1.1 K/UL (ref 1–4.8)
LYMPHOCYTES NFR BLD: 11.8 % (ref 18–48)
MCH RBC QN AUTO: 33 PG (ref 27–31)
MCHC RBC AUTO-ENTMCNC: 34 G/DL (ref 32–36)
MCV RBC AUTO: 97 FL (ref 82–98)
MICROALBUMIN UR DL<=1MG/L-MCNC: 6 UG/ML
MONOCYTES # BLD AUTO: 0.9 K/UL (ref 0.3–1)
MONOCYTES NFR BLD: 10 % (ref 4–15)
NEUTROPHILS # BLD AUTO: 6.7 K/UL (ref 1.8–7.7)
NEUTROPHILS NFR BLD: 72.9 % (ref 38–73)
NONHDLC SERPL-MCNC: 83 MG/DL
NRBC BLD-RTO: 0 /100 WBC
PLATELET # BLD AUTO: 355 K/UL (ref 150–450)
PMV BLD AUTO: 10 FL (ref 9.2–12.9)
POTASSIUM SERPL-SCNC: 4.8 MMOL/L (ref 3.5–5.1)
PROT SERPL-MCNC: 7.7 G/DL (ref 6–8.4)
RBC # BLD AUTO: 4.15 M/UL (ref 4.6–6.2)
SODIUM SERPL-SCNC: 137 MMOL/L (ref 136–145)
TRIGL SERPL-MCNC: 152 MG/DL (ref 30–150)
TSH SERPL DL<=0.005 MIU/L-ACNC: 2.17 UIU/ML (ref 0.4–4)
VIT B12 SERPL-MCNC: 1303 PG/ML (ref 210–950)
WBC # BLD AUTO: 9.23 K/UL (ref 3.9–12.7)

## 2024-08-08 PROCEDURE — 80061 LIPID PANEL: CPT | Performed by: STUDENT IN AN ORGANIZED HEALTH CARE EDUCATION/TRAINING PROGRAM

## 2024-08-08 PROCEDURE — 84443 ASSAY THYROID STIM HORMONE: CPT | Performed by: STUDENT IN AN ORGANIZED HEALTH CARE EDUCATION/TRAINING PROGRAM

## 2024-08-08 PROCEDURE — 80053 COMPREHEN METABOLIC PANEL: CPT | Performed by: STUDENT IN AN ORGANIZED HEALTH CARE EDUCATION/TRAINING PROGRAM

## 2024-08-08 PROCEDURE — 36415 COLL VENOUS BLD VENIPUNCTURE: CPT | Performed by: STUDENT IN AN ORGANIZED HEALTH CARE EDUCATION/TRAINING PROGRAM

## 2024-08-08 PROCEDURE — 85025 COMPLETE CBC W/AUTO DIFF WBC: CPT | Performed by: STUDENT IN AN ORGANIZED HEALTH CARE EDUCATION/TRAINING PROGRAM

## 2024-08-08 PROCEDURE — 82607 VITAMIN B-12: CPT | Performed by: STUDENT IN AN ORGANIZED HEALTH CARE EDUCATION/TRAINING PROGRAM

## 2024-08-08 PROCEDURE — 82570 ASSAY OF URINE CREATININE: CPT | Performed by: STUDENT IN AN ORGANIZED HEALTH CARE EDUCATION/TRAINING PROGRAM

## 2024-08-08 PROCEDURE — 83036 HEMOGLOBIN GLYCOSYLATED A1C: CPT | Performed by: STUDENT IN AN ORGANIZED HEALTH CARE EDUCATION/TRAINING PROGRAM

## 2024-08-08 PROCEDURE — 82043 UR ALBUMIN QUANTITATIVE: CPT | Performed by: STUDENT IN AN ORGANIZED HEALTH CARE EDUCATION/TRAINING PROGRAM

## 2024-08-11 ENCOUNTER — PATIENT MESSAGE (OUTPATIENT)
Dept: INTERNAL MEDICINE | Facility: CLINIC | Age: 64
End: 2024-08-11
Payer: COMMERCIAL

## 2024-08-11 DIAGNOSIS — I10 ESSENTIAL HYPERTENSION: ICD-10-CM

## 2024-08-12 ENCOUNTER — LAB VISIT (OUTPATIENT)
Dept: LAB | Facility: OTHER | Age: 64
End: 2024-08-12
Attending: STUDENT IN AN ORGANIZED HEALTH CARE EDUCATION/TRAINING PROGRAM
Payer: COMMERCIAL

## 2024-08-12 DIAGNOSIS — D64.9 ANEMIA, UNSPECIFIED TYPE: ICD-10-CM

## 2024-08-12 LAB
BASOPHILS # BLD AUTO: 0.05 K/UL (ref 0–0.2)
BASOPHILS NFR BLD: 0.6 % (ref 0–1.9)
DIFFERENTIAL METHOD BLD: ABNORMAL
EOSINOPHIL # BLD AUTO: 0.2 K/UL (ref 0–0.5)
EOSINOPHIL NFR BLD: 2.5 % (ref 0–8)
ERYTHROCYTE [DISTWIDTH] IN BLOOD BY AUTOMATED COUNT: 13.6 % (ref 11.5–14.5)
HAPTOGLOB SERPL-MCNC: 294 MG/DL (ref 30–250)
HCT VFR BLD AUTO: 39.5 % (ref 40–54)
HGB BLD-MCNC: 13.2 G/DL (ref 14–18)
IGA SERPL-MCNC: 228 MG/DL (ref 40–350)
IGG SERPL-MCNC: 1042 MG/DL (ref 650–1600)
IGM SERPL-MCNC: 21 MG/DL (ref 50–300)
IMM GRANULOCYTES # BLD AUTO: 0.03 K/UL (ref 0–0.04)
IMM GRANULOCYTES NFR BLD AUTO: 0.3 % (ref 0–0.5)
LDH SERPL L TO P-CCNC: 130 U/L (ref 110–260)
LYMPHOCYTES # BLD AUTO: 0.9 K/UL (ref 1–4.8)
LYMPHOCYTES NFR BLD: 9.8 % (ref 18–48)
MCH RBC QN AUTO: 32.2 PG (ref 27–31)
MCHC RBC AUTO-ENTMCNC: 33.4 G/DL (ref 32–36)
MCV RBC AUTO: 96 FL (ref 82–98)
MONOCYTES # BLD AUTO: 0.9 K/UL (ref 0.3–1)
MONOCYTES NFR BLD: 10.4 % (ref 4–15)
NEUTROPHILS # BLD AUTO: 6.6 K/UL (ref 1.8–7.7)
NEUTROPHILS NFR BLD: 76.4 % (ref 38–73)
NRBC BLD-RTO: 0 /100 WBC
PLATELET # BLD AUTO: 349 K/UL (ref 150–450)
PMV BLD AUTO: 10 FL (ref 9.2–12.9)
RBC # BLD AUTO: 4.1 M/UL (ref 4.6–6.2)
RETICS/RBC NFR AUTO: 1.4 % (ref 0.4–2)
WBC # BLD AUTO: 8.67 K/UL (ref 3.9–12.7)

## 2024-08-12 PROCEDURE — 82784 ASSAY IGA/IGD/IGG/IGM EACH: CPT | Mod: 59 | Performed by: STUDENT IN AN ORGANIZED HEALTH CARE EDUCATION/TRAINING PROGRAM

## 2024-08-12 PROCEDURE — 84165 PROTEIN E-PHORESIS SERUM: CPT | Performed by: STUDENT IN AN ORGANIZED HEALTH CARE EDUCATION/TRAINING PROGRAM

## 2024-08-12 PROCEDURE — 83521 IG LIGHT CHAINS FREE EACH: CPT | Mod: 59 | Performed by: STUDENT IN AN ORGANIZED HEALTH CARE EDUCATION/TRAINING PROGRAM

## 2024-08-12 PROCEDURE — 36415 COLL VENOUS BLD VENIPUNCTURE: CPT | Performed by: STUDENT IN AN ORGANIZED HEALTH CARE EDUCATION/TRAINING PROGRAM

## 2024-08-12 PROCEDURE — 85025 COMPLETE CBC W/AUTO DIFF WBC: CPT | Performed by: STUDENT IN AN ORGANIZED HEALTH CARE EDUCATION/TRAINING PROGRAM

## 2024-08-12 PROCEDURE — 85045 AUTOMATED RETICULOCYTE COUNT: CPT | Performed by: STUDENT IN AN ORGANIZED HEALTH CARE EDUCATION/TRAINING PROGRAM

## 2024-08-12 PROCEDURE — 83010 ASSAY OF HAPTOGLOBIN QUANT: CPT | Performed by: STUDENT IN AN ORGANIZED HEALTH CARE EDUCATION/TRAINING PROGRAM

## 2024-08-12 PROCEDURE — 84165 PROTEIN E-PHORESIS SERUM: CPT | Mod: 26,,, | Performed by: PATHOLOGY

## 2024-08-12 PROCEDURE — 83615 LACTATE (LD) (LDH) ENZYME: CPT | Performed by: STUDENT IN AN ORGANIZED HEALTH CARE EDUCATION/TRAINING PROGRAM

## 2024-08-12 RX ORDER — VALSARTAN 320 MG/1
320 TABLET ORAL DAILY
Qty: 90 TABLET | Refills: 3 | Status: SHIPPED | OUTPATIENT
Start: 2024-08-12

## 2024-08-12 NOTE — TELEPHONE ENCOUNTER
No care due was identified.  [facility] Embedded Care Due Messages. Reference number: [id number].   8/12/2024 10:09:45 AM CDT

## 2024-08-13 ENCOUNTER — OFFICE VISIT (OUTPATIENT)
Dept: PODIATRY | Facility: CLINIC | Age: 64
End: 2024-08-13
Payer: COMMERCIAL

## 2024-08-13 VITALS
HEIGHT: 70 IN | WEIGHT: 244.06 LBS | SYSTOLIC BLOOD PRESSURE: 128 MMHG | DIASTOLIC BLOOD PRESSURE: 78 MMHG | HEART RATE: 84 BPM | BODY MASS INDEX: 34.94 KG/M2

## 2024-08-13 DIAGNOSIS — E11.9 TYPE 2 DIABETES MELLITUS WITHOUT COMPLICATION, WITH LONG-TERM CURRENT USE OF INSULIN: ICD-10-CM

## 2024-08-13 DIAGNOSIS — Z79.4 TYPE 2 DIABETES MELLITUS WITHOUT COMPLICATION, WITH LONG-TERM CURRENT USE OF INSULIN: ICD-10-CM

## 2024-08-13 DIAGNOSIS — L97.522 SKIN ULCER OF TOE OF LEFT FOOT WITH FAT LAYER EXPOSED: Primary | ICD-10-CM

## 2024-08-13 LAB
ALBUMIN SERPL ELPH-MCNC: 4.09 G/DL (ref 3.35–5.55)
ALPHA1 GLOB SERPL ELPH-MCNC: 0.27 G/DL (ref 0.17–0.41)
ALPHA2 GLOB SERPL ELPH-MCNC: 0.88 G/DL (ref 0.43–0.99)
B-GLOBULIN SERPL ELPH-MCNC: 0.85 G/DL (ref 0.5–1.1)
GAMMA GLOB SERPL ELPH-MCNC: 0.92 G/DL (ref 0.67–1.58)
KAPPA LC SER QL IA: 3.08 MG/DL (ref 0.33–1.94)
KAPPA LC/LAMBDA SER IA: 1.43 (ref 0.26–1.65)
LAMBDA LC SER QL IA: 2.16 MG/DL (ref 0.57–2.63)
PROT SERPL-MCNC: 7 G/DL (ref 6–8.4)

## 2024-08-13 PROCEDURE — 1160F RVW MEDS BY RX/DR IN RCRD: CPT | Mod: CPTII,S$GLB,, | Performed by: PODIATRIST

## 2024-08-13 PROCEDURE — 1159F MED LIST DOCD IN RCRD: CPT | Mod: CPTII,S$GLB,, | Performed by: PODIATRIST

## 2024-08-13 PROCEDURE — 3074F SYST BP LT 130 MM HG: CPT | Mod: CPTII,S$GLB,, | Performed by: PODIATRIST

## 2024-08-13 PROCEDURE — 3066F NEPHROPATHY DOC TX: CPT | Mod: CPTII,S$GLB,, | Performed by: PODIATRIST

## 2024-08-13 PROCEDURE — 3008F BODY MASS INDEX DOCD: CPT | Mod: CPTII,S$GLB,, | Performed by: PODIATRIST

## 2024-08-13 PROCEDURE — 4010F ACE/ARB THERAPY RXD/TAKEN: CPT | Mod: CPTII,S$GLB,, | Performed by: PODIATRIST

## 2024-08-13 PROCEDURE — 3061F NEG MICROALBUMINURIA REV: CPT | Mod: CPTII,S$GLB,, | Performed by: PODIATRIST

## 2024-08-13 PROCEDURE — 99999 PR PBB SHADOW E&M-EST. PATIENT-LVL IV: CPT | Mod: PBBFAC,,, | Performed by: PODIATRIST

## 2024-08-13 PROCEDURE — 99213 OFFICE O/P EST LOW 20 MIN: CPT | Mod: 25,S$GLB,, | Performed by: PODIATRIST

## 2024-08-13 PROCEDURE — 11042 DBRDMT SUBQ TIS 1ST 20SQCM/<: CPT | Mod: S$GLB,,, | Performed by: PODIATRIST

## 2024-08-13 PROCEDURE — 3044F HG A1C LEVEL LT 7.0%: CPT | Mod: CPTII,S$GLB,, | Performed by: PODIATRIST

## 2024-08-13 PROCEDURE — 3078F DIAST BP <80 MM HG: CPT | Mod: CPTII,S$GLB,, | Performed by: PODIATRIST

## 2024-08-13 RX ORDER — MUPIROCIN 20 MG/G
OINTMENT TOPICAL DAILY
Qty: 15 G | Refills: 1 | Status: SHIPPED | OUTPATIENT
Start: 2024-08-13

## 2024-08-13 NOTE — PROGRESS NOTES
Chief Complaint   Patient presents with    Foot Problem     Had a nail that went through my shoe and pierced my foot between my big toe and the next one on my left foot.              HPI:   The patient is a 64 y.o.  male  who presents for concerns of Foot Problem (Had a nail that went through my shoe and pierced my foot between my big toe and the next one on my left foot.)  About a week ago.  He has been cleaning the area with hydrogen peroxide.   He is up to date with tetanus shot.   No fevers or chills reported.     Primary care doctor is: Diann Greene MD        Patient Active Problem List   Diagnosis    KEVIN (obstructive sleep apnea)    Obesity (BMI 35.0-39.9 without comorbidity)    Mixed hyperlipidemia    Essential hypertension    Type 2 diabetes mellitus with microalbuminuria, with long-term current use of insulin    Right rotator cuff tear    Chronic right shoulder pain    Decreased range of motion with decreased strength    Insomnia    Neck pain    Decreased range of motion of intervertebral discs of cervical spine    Bilateral leg edema    Normocytic anemia    Severe obesity (BMI 35.0-39.9) with comorbidity           Current Outpatient Medications on File Prior to Visit   Medication Sig Dispense Refill    amLODIPine (NORVASC) 10 MG tablet TAKE 1 TABLET BY MOUTH EVERY DAY 90 tablet 3    aspirin 81 MG Chew Take 1 tablet (81 mg total) by mouth once daily. 90 tablet 3    atorvastatin (LIPITOR) 40 MG tablet Take 1 tablet (40 mg total) by mouth once daily. 90 tablet 3    atorvastatin (LIPITOR) 40 MG tablet Take 40 mg by mouth once daily.      blood sugar diagnostic (CONTOUR NEXT TEST STRIPS) Strp 1 EACH BY [facility] [place].(NON-DRUG COMBO ROUTE) ROUTE 4 (FOUR) TIMES DAILY BEFORE MEALS AND NIGHTLY. 400 strip 3    blood-glucose meter Misc Use as instructed 1 each 0    carvediloL (COREG) 12.5 MG tablet Take 1 tablet (12.5 mg total) by mouth 2 (two) times daily with meals. 180 tablet 3    carvediloL (COREG) 12.5 MG tablet  "Take 12.5 mg by mouth 2 (two) times daily with meals.      dulaglutide (TRULICITY) 3 mg/0.5 mL pen injector Inject 3 mg into the skin every 7 days. 12 pen 3    empagliflozin (JARDIANCE) 25 mg tablet Take 1 tablet (25 mg total) by mouth once daily. 90 tablet 3    insulin aspart U-100 (NOVOLOG FLEXPEN U-100 INSULIN) 100 unit/mL (3 mL) InPn pen 14 units in the morning, lunch time, and 12 units with dinner. 45 each 3    insulin degludec (TRESIBA FLEXTOUCH U-100) 100 unit/mL (3 mL) insulin pen Inject 45 Units into the skin once daily. 40.5 mL 3    lancets (MICROLET LANCET) Misc 1 EACH BY MISC.(NON-DRUG COMBO ROUTE) ROUTE 4 (FOUR) TIMES DAILY BEFORE MEALS AND NIGHTLY. 400 each 3    metFORMIN (GLUCOPHAGE) 1000 MG tablet TAKE 1 TABLET BY MOUTH TWICE A DAY WITH MEALS 180 tablet 1    pen needle, diabetic (BD ULTRA-FINE JEANNETTE PEN NEEDLE) 32 gauge x 5/32" Ndle USE 4 TIMES A  each 11    valsartan (DIOVAN) 320 MG tablet Take 1 tablet (320 mg total) by mouth once daily. 90 tablet 3     No current facility-administered medications on file prior to visit.           Review of patient's allergies indicates:  No Known Allergies                ROS:  General ROS: negative  Respiratory ROS: no cough, shortness of breath, or wheezing  Cardiovascular ROS: no chest pain or dyspnea on exertion  Musculoskeletal ROS: negative  Neurological ROS:   negative for - impaired coordination/balance or numbness/tingling  Dermatological ROS: negative          LAST HbA1c:   Lab Results   Component Value Date    HGBA1C 5.3 08/08/2024           EXAM:   Vitals:    08/13/24 1435   BP: 128/78   Pulse: 84   Weight: 110.7 kg (244 lb 0.8 oz)   Height: 5' 10" (1.778 m)       General: alert, no distress, cooperative      Vascular:   Dorsalis Pedis:  Palpable and dopplerable.   Posterior Tibial:  present; palpable  Capillary refill time:  5 seconds  Temperature of toes cool to touch  Edema:   minimal; pitting      Neurological:     Sharp touch:  normal  Light " "touch: normal  Tinels Sign:  Absent  Mulders Click:   Absent  [place]:  intact x 10      Dermatological:   Skin: Warm and dry. no hyperpigmentation, vitiligo, or suspicious lesions    Bruising:  Absent  Erythema:  Absent      Ulcer Location: sulcus LEFT hallux  Measurements:   post debride:  0.6cm x 0.5cm x 0.2cm  Periwound: (--) hyperkeratosis;  (--) necrosis  Exudate: bloody  Edema:  none  Malodor:  Absent  Base:  90% granular; 10% fibrin.  0%  Eschar  Erythema:  none  Probe to bone: no          Musculoskeletal:   Metatarsophalangeal range of motion:   full range of motion  Subtalar joint range of motion: full range of motion  Ankle joint range of motion:  full range of motion  Bunions:  Absent  Hammertoes: present 2-4 bilateral;  bilateral adductovarus                ASSESSMENT/PLAN:          Problem List Items Addressed This Visit    None  Visit Diagnoses       Skin ulcer of toe of left foot with fat layer exposed    -  Primary    Relevant Medications    mupirocin (BACTROBAN) 2 % ointment    Type 2 diabetes mellitus without complication, with long-term current use of insulin                I counseled the patient on the patient's conditions, their implications and medical management.   New wound evaluation and management.    Prescription topical antibiotic.   Cover with gauze.  Change daily.   Wound debridement.    Follow up one week or sooner if concerned.      Wound Debridement    Date/Time: 8/13/2024 3:00 PM    Performed by: Shazia Davis DPM  Authorized by: Shazia Davis DPM    Time out: Immediately prior to procedure a "time out" was called to verify the correct patient, procedure, equipment, support staff and site/side marked as required.    Consent Done?:  Yes (Verbal)    Preparation: Patient was prepped and draped in usual sterile fashion    Local anesthesia used?: No      Wound Details:    Location:  Left foot    Location:  Left 1st Toe    Type of Debridement:  Excisional       Length (cm):  0.6       " Area (sq cm):  0.3       Width (cm):  0.5       Percent Debrided (%):  100       Depth (cm):  0.2       Total Area Debrided (sq cm):  0.3    Depth of debridement:  Subcutaneous tissue    Tissue debrided:  Dermis, Subcutaneous and Epidermis    Devitalized tissue debrided:  Slough, Fibrin, Callus and Biofilm    Instruments:  Nippers  Bleeding:  Moderate  Hemostasis Achieved: Yes  Method Used:  Other (surgicel)  Patient tolerance:  Patient tolerated the procedure well with no immediate complications

## 2024-08-14 LAB — PATHOLOGIST INTERPRETATION SPE: NORMAL

## 2024-08-15 ENCOUNTER — OFFICE VISIT (OUTPATIENT)
Dept: INTERNAL MEDICINE | Facility: CLINIC | Age: 64
End: 2024-08-15
Payer: COMMERCIAL

## 2024-08-15 VITALS
BODY MASS INDEX: 34.64 KG/M2 | WEIGHT: 241.38 LBS | OXYGEN SATURATION: 98 % | DIASTOLIC BLOOD PRESSURE: 80 MMHG | HEART RATE: 84 BPM | SYSTOLIC BLOOD PRESSURE: 134 MMHG

## 2024-08-15 DIAGNOSIS — D64.9 ANEMIA, UNSPECIFIED TYPE: ICD-10-CM

## 2024-08-15 DIAGNOSIS — I10 ESSENTIAL HYPERTENSION: ICD-10-CM

## 2024-08-15 DIAGNOSIS — E11.8 TYPE 2 DIABETES MELLITUS WITH COMPLICATION, WITH LONG-TERM CURRENT USE OF INSULIN: Primary | ICD-10-CM

## 2024-08-15 DIAGNOSIS — Z00.00 HEALTH MAINTENANCE EXAMINATION: ICD-10-CM

## 2024-08-15 DIAGNOSIS — Z79.4 TYPE 2 DIABETES MELLITUS WITH COMPLICATION, WITH LONG-TERM CURRENT USE OF INSULIN: Primary | ICD-10-CM

## 2024-08-15 DIAGNOSIS — E78.2 MIXED HYPERLIPIDEMIA: ICD-10-CM

## 2024-08-15 DIAGNOSIS — G47.33 OSA (OBSTRUCTIVE SLEEP APNEA): ICD-10-CM

## 2024-08-15 PROCEDURE — 3061F NEG MICROALBUMINURIA REV: CPT | Mod: CPTII,S$GLB,, | Performed by: STUDENT IN AN ORGANIZED HEALTH CARE EDUCATION/TRAINING PROGRAM

## 2024-08-15 PROCEDURE — 3008F BODY MASS INDEX DOCD: CPT | Mod: CPTII,S$GLB,, | Performed by: STUDENT IN AN ORGANIZED HEALTH CARE EDUCATION/TRAINING PROGRAM

## 2024-08-15 PROCEDURE — 3044F HG A1C LEVEL LT 7.0%: CPT | Mod: CPTII,S$GLB,, | Performed by: STUDENT IN AN ORGANIZED HEALTH CARE EDUCATION/TRAINING PROGRAM

## 2024-08-15 PROCEDURE — 3066F NEPHROPATHY DOC TX: CPT | Mod: CPTII,S$GLB,, | Performed by: STUDENT IN AN ORGANIZED HEALTH CARE EDUCATION/TRAINING PROGRAM

## 2024-08-15 PROCEDURE — 4010F ACE/ARB THERAPY RXD/TAKEN: CPT | Mod: CPTII,S$GLB,, | Performed by: STUDENT IN AN ORGANIZED HEALTH CARE EDUCATION/TRAINING PROGRAM

## 2024-08-15 PROCEDURE — 3075F SYST BP GE 130 - 139MM HG: CPT | Mod: CPTII,S$GLB,, | Performed by: STUDENT IN AN ORGANIZED HEALTH CARE EDUCATION/TRAINING PROGRAM

## 2024-08-15 PROCEDURE — 99999 PR PBB SHADOW E&M-EST. PATIENT-LVL V: CPT | Mod: PBBFAC,,, | Performed by: STUDENT IN AN ORGANIZED HEALTH CARE EDUCATION/TRAINING PROGRAM

## 2024-08-15 PROCEDURE — 3079F DIAST BP 80-89 MM HG: CPT | Mod: CPTII,S$GLB,, | Performed by: STUDENT IN AN ORGANIZED HEALTH CARE EDUCATION/TRAINING PROGRAM

## 2024-08-15 PROCEDURE — 99396 PREV VISIT EST AGE 40-64: CPT | Mod: S$GLB,,, | Performed by: STUDENT IN AN ORGANIZED HEALTH CARE EDUCATION/TRAINING PROGRAM

## 2024-08-15 RX ORDER — DULAGLUTIDE 4.5 MG/.5ML
4.5 INJECTION, SOLUTION SUBCUTANEOUS
Qty: 12 PEN | Refills: 3 | Status: SHIPPED | OUTPATIENT
Start: 2024-08-15 | End: 2024-10-14 | Stop reason: SDUPTHER

## 2024-08-15 NOTE — PATIENT INSTRUCTIONS
For morning, fasting glucose:  - If fasting glucose is <100 for two consecutive mornings, decrease Tresiba by 2 units daily.   - If fasting glucose is >140 for two consecutive mornings, increase Tresiba by 2 units daily.     For 2 hour post-meal glucose:  - If 2 hour post-meal glucose is <140 for two consecutive days, decrease Novolog by 1 unit at meal time.   - If 2 hour post-meal glucose is >180 for two consecutive days, increase Novolog by 1 unit at meal time.

## 2024-08-15 NOTE — PROGRESS NOTES
Subjective:       Patient ID: Henry Zacarias is a 64 y.o. male.    Chief Complaint: Type 2 diabetes mellitus with complication, with long-term current use of insulin [E11.8, Z79.4]    Patient is established with me, here today for the following:     Health maintenance -   Colonoscopy performed OCT2019. Told to repeat in 10 years.  Denies family history of colorectal cancer.  Family history of cardiac disease.  Denies family history of prostate cancer.  Declines PSA  UTD on Tdap, COVID, PPSV23, PCV20, shingles, RSV vaccinations.  Denies cigarette use, intermittent cigar use for 2-3 years.  Drinks alcohol 2-3 times monthly, 3-4 drinks per sitting.  Denies drug use.  Completed HIV and hepatitis C screening.               Endorses overall healthy diet.   Eating plenty of fresh vegetables, fruits.  Eating mostly lean proteins.   Not walking as routinely due to heat.    T2DM -   Currently taking:   - Trucility 3 mg weekly  - Jardiance 25 mg  - Metformin 1000 mg BID  - Degludec 45 units qAM  - Novolog 12 units qAC  Checking blood glucose 3 times per day  Home blood glucose range per patient 's.  Denies blood sugars < 70 mg/dL  Taking ASA 81 mg daily  UTD on foot exam.  UTD on eye exam.  Lab Results       Component                Value               Date                       HGBA1C                   5.3                 08/08/2024                 HGBA1C                   5.6                 02/01/2024                 HGBA1C                   6.1 (H)             10/18/2023            Lab Results       Component                Value               Date                       MICALBCREAT              10.9                08/08/2024                        HLD -   Endorses taking atorvastatin as directed  Denies side effects or concerns while taking medication  Lab Results       Component                Value               Date                       CHOL                     117 (L)             08/08/2024            Lab  Results       Component                Value               Date                       TRIG                     152 (H)             08/08/2024            Lab Results       Component                Value               Date                       LDLCALC                  52.6 (L)            08/08/2024            Lab Results       Component                Value               Date                       HDL                      34 (L)              08/08/2024                 HTN -   Currently prescribed amlodipine, Coreg, valsartan.  Patient endorses taking medication as directed.  Denies side effects or concerns while taking medication.  Lab Results       Component                Value               Date                       MICALBCREAT              10.9                08/08/2024            BP Readings from Last 5 Encounters:  08/13/24 : 128/78  06/19/24 : 132/72  02/15/24 : 126/80  10/26/23 : 132/70  06/13/23 : (!) 153/75     Anemia -  Currently taking vitamin B12 daily  Haptoglobin with mild elevation  Kappa FLC elevated, normal ratio   Lab Results       Component                Value               Date                       HGB                      13.2 (L)            08/12/2024                 HCT                      39.5 (L)            08/12/2024                 MCV                      96                  08/12/2024                 RDW                      13.6                08/12/2024            Lab Results       Component                Value               Date                       IRON                     75                  02/01/2024                 TRANSFERRIN              281                 02/01/2024                 TIBC                     416                 02/01/2024                 FESATURATED              18 (L)              02/01/2024             Lab Results       Component                Value               Date                       FERRITIN                 98                  02/01/2024          "   Lab Results       Component                Value               Date                       SNHGTTGT86               1303 (H)            08/08/2024            Lab Results       Component                Value               Date                       FOLATE                   7.7                 02/01/2024              KEVIN -  Using CPAP nightly with good effect  Needs to establish with new sleep specialist, prior one left        Review of Systems   Constitutional:  Negative for chills, fatigue, fever and unexpected weight change.   Respiratory:  Negative for cough and shortness of breath.    Cardiovascular:  Negative for chest pain, palpitations and leg swelling.   Neurological:  Negative for dizziness, syncope and headaches.       Current Outpatient Medications   Medication Instructions    amLODIPine (NORVASC) 10 mg, Oral    aspirin 81 mg, Oral, Daily    atorvastatin (LIPITOR) 40 mg, Oral, Daily    atorvastatin (LIPITOR) 40 mg, Oral, Daily    blood sugar diagnostic (CONTOUR NEXT TEST STRIPS) Strp 1 EACH BY [facility].(NON-DRUG COMBO ROUTE) ROUTE 4 (FOUR) TIMES DAILY BEFORE MEALS AND NIGHTLY.    blood-glucose meter Misc Use as instructed    carvediloL (COREG) 12.5 mg, Oral, 2 times daily with meals    carvediloL (COREG) 12.5 mg, Oral, 2 times daily with meals    empagliflozin (JARDIANCE) 25 mg, Oral, Daily    insulin aspart U-100 (NOVOLOG FLEXPEN U-100 INSULIN) 100 unit/mL (3 mL) InPn pen 14 units in the morning, lunch time, and 12 units with dinner.    insulin degludec (TRESIBA FLEXTOUCH U-100) 45 Units, Subcutaneous, Daily    lancets (MICROLET LANCET) Misc 1 EACH BY [facility].(NON-DRUG COMBO ROUTE) ROUTE 4 (FOUR) TIMES DAILY BEFORE MEALS AND NIGHTLY.    metFORMIN (GLUCOPHAGE) 1,000 mg, Oral, 2 times daily with meals    mupirocin (BACTROBAN) 2 % ointment Topical (Top), Daily, To LEFT great toe    pen needle, diabetic (BD ULTRA-FINE JEANNETTE PEN NEEDLE) 32 gauge x 5/32" Ndle USE 4 TIMES A DAY    TRULICITY 3 mg, " Subcutaneous, Every 7 days    valsartan (DIOVAN) 320 mg, Oral, Daily     Objective:      Vitals:    08/15/24 0812   BP: 134/80   Pulse: 84   SpO2: 98%   Weight: 109.5 kg (241 lb 6.5 oz)   PainSc: 0-No pain     Body mass index is 34.64 kg/m².    Physical Exam  Vitals reviewed.   Constitutional:       General: He is not in acute distress.     Appearance: Normal appearance. He is not ill-appearing or diaphoretic.   HENT:      Head: Normocephalic and atraumatic.      Right Ear: Tympanic membrane, ear canal and external ear normal. There is no impacted cerumen.      Left Ear: Tympanic membrane, ear canal and external ear normal. There is no impacted cerumen.      Nose: Nose normal. No rhinorrhea.      Mouth/Throat:      Mouth: Mucous membranes are moist.      Pharynx: Oropharynx is clear. No oropharyngeal exudate or posterior oropharyngeal erythema.   Eyes:      General: No scleral icterus.        Right eye: No discharge.         Left eye: No discharge.      Conjunctiva/sclera: Conjunctivae normal.   Neck:      Thyroid: No thyromegaly or thyroid tenderness.      Trachea: Trachea normal.   Cardiovascular:      Rate and Rhythm: Normal rate and regular rhythm.      Heart sounds: Normal heart sounds. No murmur heard.     No friction rub. No gallop.   Pulmonary:      Effort: Pulmonary effort is normal. No respiratory distress.      Breath sounds: Normal breath sounds. No stridor. No wheezing, rhonchi or rales.   Abdominal:      General: Bowel sounds are normal. There is no distension.      Palpations: Abdomen is soft.      Tenderness: There is no abdominal tenderness. There is no guarding or rebound.   Musculoskeletal:         General: No swelling or deformity.      Cervical back: Neck supple.   Lymphadenopathy:      Head:      Right side of head: No submandibular or posterior auricular adenopathy.      Left side of head: No submandibular or posterior auricular adenopathy.      Cervical: No cervical adenopathy.      Right  cervical: No superficial, deep or posterior cervical adenopathy.     Left cervical: No superficial, deep or posterior cervical adenopathy.      Upper Body:      Right upper body: No supraclavicular adenopathy.      Left upper body: No supraclavicular adenopathy.   Skin:     General: Skin is warm and dry.   Neurological:      General: No focal deficit present.      Mental Status: He is alert. Mental status is at baseline.      Gait: Gait normal.   Psychiatric:         Mood and Affect: Mood normal.         Behavior: Behavior normal.       Assessment:       1. Type 2 diabetes mellitus with complication, with long-term current use of insulin    2. Mixed hyperlipidemia    3. Essential hypertension    4. Anemia, unspecified type    5. Health maintenance examination    6. KEVIN (obstructive sleep apnea)        Plan:       Type 2 diabetes mellitus with complication, with long-term current use of insulin  Continue medications  Increase Trulicity   RTC in 6 months for follow up.  -     dulaglutide (TRULICITY) 4.5 mg/0.5 mL pen injector; Inject 4.5 mg into the skin every 7 days.    Mixed hyperlipidemia  Continue current medications.  RTC in 6 months for follow up.    Essential hypertension  Continue current medications.  RTC in 6 months for follow up.    Anemia, unspecified type  Consider hematology consult  RTC in 6 months for follow up.    Health maintenance examination  Reviewed and discussed age appropriate screenings and immunizations.    KEVIN (obstructive sleep apnea)  -     Ambulatory referral/consult to Sleep Disorders; Future      Diann Greene MD  8/15/2024

## 2024-08-22 ENCOUNTER — OFFICE VISIT (OUTPATIENT)
Dept: PODIATRY | Facility: CLINIC | Age: 64
End: 2024-08-22
Payer: COMMERCIAL

## 2024-08-22 VITALS
HEART RATE: 96 BPM | SYSTOLIC BLOOD PRESSURE: 151 MMHG | WEIGHT: 241.38 LBS | DIASTOLIC BLOOD PRESSURE: 82 MMHG | HEIGHT: 70 IN | BODY MASS INDEX: 34.56 KG/M2

## 2024-08-22 DIAGNOSIS — Z79.4 TYPE 2 DIABETES MELLITUS WITHOUT COMPLICATION, WITH LONG-TERM CURRENT USE OF INSULIN: ICD-10-CM

## 2024-08-22 DIAGNOSIS — L97.522 SKIN ULCER OF TOE OF LEFT FOOT WITH FAT LAYER EXPOSED: Primary | ICD-10-CM

## 2024-08-22 DIAGNOSIS — E11.9 TYPE 2 DIABETES MELLITUS WITHOUT COMPLICATION, WITH LONG-TERM CURRENT USE OF INSULIN: ICD-10-CM

## 2024-08-22 PROCEDURE — 99499 UNLISTED E&M SERVICE: CPT | Mod: S$GLB,,, | Performed by: PODIATRIST

## 2024-08-22 PROCEDURE — 11042 DBRDMT SUBQ TIS 1ST 20SQCM/<: CPT | Mod: S$GLB,,, | Performed by: PODIATRIST

## 2024-08-22 PROCEDURE — 99999 PR PBB SHADOW E&M-EST. PATIENT-LVL IV: CPT | Mod: PBBFAC,,, | Performed by: PODIATRIST

## 2024-08-22 NOTE — PROGRESS NOTES
Chief Complaint   Patient presents with    Foot Problem     Visit to see how toe is healing              HPI:   The patient is a 64 y.o.  male  who presents for concerns of Foot Problem (Visit to see how toe is healing)  LEFT hallux.  He has been applying mupirocin and keeping the toe covered.  Wearing [place] shoes.   No fevers or chills reported.  No toe pain reported.        Primary care doctor is: Diann Greene MD      Patient Active Problem List   Diagnosis    KEVIN (obstructive sleep apnea)    Obesity (BMI 35.0-39.9 without comorbidity)    Mixed hyperlipidemia    Essential hypertension    Type 2 diabetes mellitus with microalbuminuria, with long-term current use of insulin    Right rotator cuff tear    Chronic right shoulder pain    Decreased range of motion with decreased strength    Insomnia    Neck pain    Decreased range of motion of intervertebral discs of cervical spine    Bilateral leg edema    Normocytic anemia    Severe obesity (BMI 35.0-39.9) with comorbidity           Current Outpatient Medications on File Prior to Visit   Medication Sig Dispense Refill    amLODIPine (NORVASC) 10 MG tablet TAKE 1 TABLET BY MOUTH EVERY DAY 90 tablet 3    aspirin 81 MG Chew Take 1 tablet (81 mg total) by mouth once daily. 90 tablet 3    atorvastatin (LIPITOR) 40 MG tablet Take 1 tablet (40 mg total) by mouth once daily. 90 tablet 3    atorvastatin (LIPITOR) 40 MG tablet Take 40 mg by mouth once daily.      blood sugar diagnostic (CONTOUR NEXT TEST STRIPS) Strp 1 EACH BY [facility]NON-DRUG COMBO ROUTE) ROUTE 4 (FOUR) TIMES DAILY BEFORE MEALS AND NIGHTLY. 400 strip 3    blood-glucose meter Misc Use as instructed 1 each 0    carvediloL (COREG) 12.5 MG tablet Take 1 tablet (12.5 mg total) by mouth 2 (two) times daily with meals. 180 tablet 3    carvediloL (COREG) 12.5 MG tablet Take 12.5 mg by mouth 2 (two) times daily with meals.      dulaglutide (TRULICITY) 4.5 mg/0.5 mL pen injector Inject 4.5 mg into the skin every 7 days.  "12 pen 3    empagliflozin (JARDIANCE) 25 mg tablet Take 1 tablet (25 mg total) by mouth once daily. 90 tablet 3    insulin aspart U-100 (NOVOLOG FLEXPEN U-100 INSULIN) 100 unit/mL (3 mL) InPn pen 14 units in the morning, lunch time, and 12 units with dinner. 45 each 3    insulin degludec (TRESIBA FLEXTOUCH U-100) 100 unit/mL (3 mL) insulin pen Inject 45 Units into the skin once daily. 40.5 mL 3    lancets (MICROLET LANCET) Misc 1 EACH BY [facility].(NON-DRUG COMBO ROUTE) ROUTE 4 (FOUR) TIMES DAILY BEFORE MEALS AND NIGHTLY. 400 each 3    metFORMIN (GLUCOPHAGE) 1000 MG tablet TAKE 1 TABLET BY MOUTH TWICE A DAY WITH MEALS 180 tablet 1    mupirocin (BACTROBAN) 2 % ointment Apply topically once daily. To LEFT great toe 15 g 1    pen needle, diabetic (BD ULTRA-FINE JEANNETTE PEN NEEDLE) 32 gauge x 5/32" Ndle USE 4 TIMES A  each 11    valsartan (DIOVAN) 320 MG tablet Take 1 tablet (320 mg total) by mouth once daily. 90 tablet 3     No current facility-administered medications on file prior to visit.           Review of patient's allergies indicates:  No Known Allergies                ROS:  General ROS: negative  Respiratory ROS: no cough, shortness of breath, or wheezing  Cardiovascular ROS: no chest pain or dyspnea on exertion  Musculoskeletal ROS: negative  Neurological ROS:   negative for - impaired coordination/balance or numbness/tingling  Dermatological ROS: negative          LAST HbA1c:   Lab Results   Component Value Date    HGBA1C 5.3 08/08/2024           EXAM:   Vitals:    08/22/24 1206   BP: (!) 151/82   Pulse: 96   Weight: 109.5 kg (241 lb 6.5 oz)   Height: 5' 10" (1.778 m)         General: alert, no distress, cooperative      Vascular:   Dorsalis Pedis:  Palpable and dopplerable.   Posterior Tibial:  present; palpable  Capillary refill time:  5 seconds  Temperature of toes cool to touch  Edema:   minimal; pitting      Neurological:     Sharp touch:  normal  Light touch: normal  Tinels Sign:  Absent  Mulders Click: " "  Absent  [place]:  intact x 10      Dermatological:   Skin: Warm and dry. no hyperpigmentation, vitiligo, or suspicious lesions    Bruising:  Absent  Erythema:  Absent      Ulcer Location: sulcus LEFT hallux  Measurements:   post debride:  0.4cm x 0.2cm x 0.2cm  Periwound: (--) hyperkeratosis;  (--) necrosis  Exudate: none   Edema:  none  Malodor:  Absent  Base:  90% granular; 10% fibrin.  0%  Eschar  Erythema:  none  Probe to bone: no      Musculoskeletal:   Metatarsophalangeal range of motion:   full range of motion  Subtalar joint range of motion: full range of motion  Ankle joint range of motion:  full range of motion  Bunions:  Absent  Hammertoes: present 2-4 bilateral;  bilateral adductovarus                ASSESSMENT/PLAN:      Problem List Items Addressed This Visit    None  Visit Diagnoses       Skin ulcer of toe of left foot with fat layer exposed    -  Primary    Type 2 diabetes mellitus without complication, with long-term current use of insulin                  I counseled the patient on the patient's conditions, their implications and medical management.   Wound debridement.   Raquel and padded dressing applied.  Continue daily.  He defers football dressing.   Follow up 2 weeks or sooner if concerned.      Wound Debridement    Date/Time: 8/22/2024 12:30 PM    Performed by: Shazia Davis DPM  Authorized by: Shazia Davis DPM    Time out: Immediately prior to procedure a "time out" was called to verify the correct patient, procedure, equipment, support staff and site/side marked as required.      Preparation: Patient was prepped and draped in usual sterile fashion    Local anesthesia used?: No      Wound Details:    Location:  Left foot    Location:  Left 1st Toe    Type of Debridement:  Excisional       Length (cm):  0.4       Area (sq cm):  0.08       Width (cm):  0.2       Percent Debrided (%):  100       Depth (cm):  0.2       Total Area Debrided (sq cm):  0.08    Depth of debridement:  " Subcutaneous tissue    Tissue debrided:  Epidermis, Dermis and Subcutaneous    Devitalized tissue debrided:  Slough, Fibrin, Callus and Biofilm    Instruments:  Curette  Bleeding:  Minimal  Method Used:  Pressure  Patient tolerance:  Patient tolerated the procedure well with no immediate complications

## 2024-09-06 ENCOUNTER — OFFICE VISIT (OUTPATIENT)
Dept: PODIATRY | Facility: CLINIC | Age: 64
End: 2024-09-06
Payer: COMMERCIAL

## 2024-09-06 VITALS
HEART RATE: 93 BPM | WEIGHT: 241.38 LBS | DIASTOLIC BLOOD PRESSURE: 74 MMHG | HEIGHT: 70 IN | BODY MASS INDEX: 34.56 KG/M2 | SYSTOLIC BLOOD PRESSURE: 122 MMHG

## 2024-09-06 DIAGNOSIS — E11.9 TYPE 2 DIABETES MELLITUS WITHOUT COMPLICATION, WITH LONG-TERM CURRENT USE OF INSULIN: ICD-10-CM

## 2024-09-06 DIAGNOSIS — Z79.4 TYPE 2 DIABETES MELLITUS WITHOUT COMPLICATION, WITH LONG-TERM CURRENT USE OF INSULIN: ICD-10-CM

## 2024-09-06 DIAGNOSIS — L97.522 SKIN ULCER OF TOE OF LEFT FOOT WITH FAT LAYER EXPOSED: Primary | ICD-10-CM

## 2024-09-06 PROCEDURE — 99999 PR PBB SHADOW E&M-EST. PATIENT-LVL IV: CPT | Mod: PBBFAC,,, | Performed by: PODIATRIST

## 2024-09-06 PROCEDURE — 11042 DBRDMT SUBQ TIS 1ST 20SQCM/<: CPT | Mod: S$GLB,,, | Performed by: PODIATRIST

## 2024-09-06 PROCEDURE — 99499 UNLISTED E&M SERVICE: CPT | Mod: S$GLB,,, | Performed by: PODIATRIST

## 2024-09-06 NOTE — PROGRESS NOTES
Chief Complaint   Patient presents with    Follow-up     Wound              HPI:   The patient is a 64 y.o.  male  who presents for concerns of Follow-up (Wound)  LEFT hallux.  He has been applying mupirocin and keeping the toe covered.  Wearing [place]oka shoes.    No fevers or chills reported.  No toe pain reported.  He has been moving and on his feet a lot.        Primary care doctor is: Diann Greene MD      Patient Active Problem List   Diagnosis    KEVIN (obstructive sleep apnea)    Obesity (BMI 35.0-39.9 without comorbidity)    Mixed hyperlipidemia    Essential hypertension    Type 2 diabetes mellitus with microalbuminuria, with long-term current use of insulin    Right rotator cuff tear    Chronic right shoulder pain    Decreased range of motion with decreased strength    Insomnia    Neck pain    Decreased range of motion of intervertebral discs of cervical spine    Bilateral leg edema    Normocytic anemia    Severe obesity (BMI 35.0-39.9) with comorbidity           Current Outpatient Medications on File Prior to Visit   Medication Sig Dispense Refill    amLODIPine (NORVASC) 10 MG tablet TAKE 1 TABLET BY MOUTH EVERY DAY 90 tablet 3    aspirin 81 MG Chew Take 1 tablet (81 mg total) by mouth once daily. 90 tablet 3    atorvastatin (LIPITOR) 40 MG tablet Take 1 tablet (40 mg total) by mouth once daily. 90 tablet 3    atorvastatin (LIPITOR) 40 MG tablet Take 40 mg by mouth once daily.      blood sugar diagnostic (CONTOUR NEXT TEST STRIPS) Strp 1 EACH BY [facility] [place].(NON-DRUG COMBO ROUTE) ROUTE 4 (FOUR) TIMES DAILY BEFORE MEALS AND NIGHTLY. 400 strip 3    blood-glucose meter Misc Use as instructed 1 each 0    carvediloL (COREG) 12.5 MG tablet Take 1 tablet (12.5 mg total) by mouth 2 (two) times daily with meals. 180 tablet 3    carvediloL (COREG) 12.5 MG tablet Take 12.5 mg by mouth 2 (two) times daily with meals.      dulaglutide (TRULICITY) 4.5 mg/0.5 mL pen injector Inject 4.5 mg into the skin every 7 days. 12 pen 3     "empagliflozin (JARDIANCE) 25 mg tablet Take 1 tablet (25 mg total) by mouth once daily. 90 tablet 3    insulin aspart U-100 (NOVOLOG FLEXPEN U-100 INSULIN) 100 unit/mL (3 mL) InPn pen 14 units in the morning, lunch time, and 12 units with dinner. 45 each 3    insulin degludec (TRESIBA FLEXTOUCH U-100) 100 unit/mL (3 mL) insulin pen Inject 45 Units into the skin once daily. 40.5 mL 3    lancets (MICROLET LANCET) Misc 1 EACH BY [facility].(NON-DRUG COMBO ROUTE) ROUTE 4 (FOUR) TIMES DAILY BEFORE MEALS AND NIGHTLY. 400 each 3    metFORMIN (GLUCOPHAGE) 1000 MG tablet TAKE 1 TABLET BY MOUTH TWICE A DAY WITH MEALS 180 tablet 1    mupirocin (BACTROBAN) 2 % ointment Apply topically once daily. To LEFT great toe 15 g 1    pen needle, diabetic (BD ULTRA-FINE JEANNETTE PEN NEEDLE) 32 gauge x 5/32" Ndle USE 4 TIMES A  each 11    valsartan (DIOVAN) 320 MG tablet Take 1 tablet (320 mg total) by mouth once daily. 90 tablet 3     No current facility-administered medications on file prior to visit.           Review of patient's allergies indicates:  No Known Allergies                ROS:  General ROS: negative  Respiratory ROS: no cough, shortness of breath, or wheezing  Cardiovascular ROS: no chest pain or dyspnea on exertion  Musculoskeletal ROS: negative  Neurological ROS:   negative for - impaired coordination/balance or numbness/tingling  Dermatological ROS: negative          LAST HbA1c:   Lab Results   Component Value Date    HGBA1C 5.3 08/08/2024           EXAM:   Vitals:    09/06/24 0909   BP: 122/74   Pulse: 93   Weight: 109.5 kg (241 lb 6.5 oz)   Height: 5' 10" (1.778 m)         General: alert, no distress, cooperative      Vascular:   Dorsalis Pedis:  Palpable and dopplerable.   Posterior Tibial:  present; palpable  Capillary refill time:  5 seconds  Temperature of toes cool to touch  Edema:   minimal; pitting      Neurological:     Sharp touch:  normal  Light touch: normal  Tinels Sign:  Absent  Mulders Click:   " "Absent  [place]:  intact x 10      Dermatological:   Skin: Warm and dry. no hyperpigmentation, vitiligo, or suspicious lesions    Bruising:  Absent  Erythema:  Absent      Ulcer Location: sulcus LEFT hallux  Measurements:   post debride:  0.5cm x 0.3cm x 0.2cm  Periwound: (--) hyperkeratosis;  (--) necrosis  Exudate: none   Edema:  none  Malodor:  Absent  Base:  90% granular; 10% fibrin.  0%  Eschar  Erythema:  none  Probe to bone: no      Musculoskeletal:   Metatarsophalangeal range of motion:   full range of motion  Subtalar joint range of motion: full range of motion  Ankle joint range of motion:  full range of motion  Bunions:  Absent  Hammertoes: present 2-4 bilateral;  bilateral adductovarus            ASSESSMENT/PLAN:      Problem List Items Addressed This Visit    None  Visit Diagnoses       Skin ulcer of toe of left foot with fat layer exposed    -  Primary    Type 2 diabetes mellitus without complication, with long-term current use of insulin                  I counseled the patient on the patient's conditions, their implications and medical management.   Wound debridement.   Raquel and padded dressing applied.  Continue daily.   He has mupirocin at home.    He defers football dressing.   Follow up 2-3 weeks or sooner if concerned.      Wound Debridement    Date/Time: 9/6/2024 9:30 AM    Performed by: Shazia Davis DPM  Authorized by: Shazia Davis DPM    Time out: Immediately prior to procedure a "time out" was called to verify the correct patient, procedure, equipment, support staff and site/side marked as required.    Consent Done?:  Yes (Verbal)    Preparation: Patient was prepped and draped in usual sterile fashion    Local anesthesia used?: No      Wound Details:    Location:  Left foot    Location:  Left 1st Toe    Type of Debridement:  Excisional       Length (cm):  0.5       Area (sq cm):  0.15       Width (cm):  0.3       Percent Debrided (%):  100       Depth (cm):  0.2       Total Area " Debrided (sq cm):  0.15    Depth of debridement:  Subcutaneous tissue    Tissue debrided:  Epidermis, Dermis and Subcutaneous    Devitalized tissue debrided:  Fibrin, Slough, Callus and Biofilm    Instruments:  Curette  Bleeding:  Minimal  Hemostasis Achieved: Yes  Method Used:  Pressure  Patient tolerance:  Patient tolerated the procedure well with no immediate complications

## 2024-09-20 ENCOUNTER — OFFICE VISIT (OUTPATIENT)
Dept: PODIATRY | Facility: CLINIC | Age: 64
End: 2024-09-20
Payer: COMMERCIAL

## 2024-09-20 VITALS
HEART RATE: 94 BPM | DIASTOLIC BLOOD PRESSURE: 73 MMHG | WEIGHT: 241.38 LBS | HEIGHT: 70 IN | BODY MASS INDEX: 34.56 KG/M2 | SYSTOLIC BLOOD PRESSURE: 139 MMHG

## 2024-09-20 DIAGNOSIS — E11.9 TYPE 2 DIABETES MELLITUS WITHOUT COMPLICATION, WITH LONG-TERM CURRENT USE OF INSULIN: ICD-10-CM

## 2024-09-20 DIAGNOSIS — Z79.4 TYPE 2 DIABETES MELLITUS WITHOUT COMPLICATION, WITH LONG-TERM CURRENT USE OF INSULIN: ICD-10-CM

## 2024-09-20 DIAGNOSIS — L97.522 SKIN ULCER OF TOE OF LEFT FOOT WITH FAT LAYER EXPOSED: Primary | ICD-10-CM

## 2024-09-20 PROCEDURE — 99499 UNLISTED E&M SERVICE: CPT | Mod: S$GLB,,, | Performed by: PODIATRIST

## 2024-09-20 PROCEDURE — 11042 DBRDMT SUBQ TIS 1ST 20SQCM/<: CPT | Mod: S$GLB,,, | Performed by: PODIATRIST

## 2024-09-20 PROCEDURE — 99999 PR PBB SHADOW E&M-EST. PATIENT-LVL IV: CPT | Mod: PBBFAC,,, | Performed by: PODIATRIST

## 2024-09-25 NOTE — PROGRESS NOTES
Chief Complaint   Patient presents with    Follow-up     Wound               HPI:   The patient is a 64 y.o.  male  who presents for concerns of Follow-up (Wound )  LEFT hallux.  He has been applying mupirocin and keeping the toe covered.  Wearing [place] shoes.    No fevers or chills reported.  No toe pain reported.    Defers football dressing.      Primary care doctor is: Diann Greene MD      Patient Active Problem List   Diagnosis    KEVIN (obstructive sleep apnea)    Obesity (BMI 35.0-39.9 without comorbidity)    Mixed hyperlipidemia    Essential hypertension    Type 2 diabetes mellitus with microalbuminuria, with long-term current use of insulin    Right rotator cuff tear    Chronic right shoulder pain    Decreased range of motion with decreased strength    Insomnia    Neck pain    Decreased range of motion of intervertebral discs of cervical spine    Bilateral leg edema    Normocytic anemia    Severe obesity (BMI 35.0-39.9) with comorbidity           Current Outpatient Medications on File Prior to Visit   Medication Sig Dispense Refill    amLODIPine (NORVASC) 10 MG tablet TAKE 1 TABLET BY MOUTH EVERY DAY 90 tablet 3    aspirin 81 MG Chew Take 1 tablet (81 mg total) by mouth once daily. 90 tablet 3    atorvastatin (LIPITOR) 40 MG tablet Take 1 tablet (40 mg total) by mouth once daily. 90 tablet 3    atorvastatin (LIPITOR) 40 MG tablet Take 40 mg by mouth once daily.      blood sugar diagnostic (CONTOUR NEXT TEST STRIPS) Strp 1 EACH BY [facility].(NON-DRUG COMBO ROUTE) ROUTE 4 (FOUR) TIMES DAILY BEFORE MEALS AND NIGHTLY. 400 strip 3    blood-glucose meter Misc Use as instructed 1 each 0    carvediloL (COREG) 12.5 MG tablet Take 1 tablet (12.5 mg total) by mouth 2 (two) times daily with meals. 180 tablet 3    carvediloL (COREG) 12.5 MG tablet Take 12.5 mg by mouth 2 (two) times daily with meals.      dulaglutide (TRULICITY) 4.5 mg/0.5 mL pen injector Inject 4.5 mg into the skin every 7 days. 12 pen 3    empagliflozin  "(JARDIANCE) 25 mg tablet Take 1 tablet (25 mg total) by mouth once daily. 90 tablet 3    insulin aspart U-100 (NOVOLOG FLEXPEN U-100 INSULIN) 100 unit/mL (3 mL) InPn pen 14 units in the morning, lunch time, and 12 units with dinner. 45 each 3    insulin degludec (TRESIBA FLEXTOUCH U-100) 100 unit/mL (3 mL) insulin pen Inject 45 Units into the skin once daily. 40.5 mL 3    lancets (MICROLET LANCET) Misc 1 EACH BY [facility].(NON-DRUG COMBO ROUTE) ROUTE 4 (FOUR) TIMES DAILY BEFORE MEALS AND NIGHTLY. 400 each 3    metFORMIN (GLUCOPHAGE) 1000 MG tablet TAKE 1 TABLET BY MOUTH TWICE A DAY WITH MEALS 180 tablet 1    mupirocin (BACTROBAN) 2 % ointment Apply topically once daily. To LEFT great toe 15 g 1    pen needle, diabetic (BD ULTRA-FINE JEANNETTE PEN NEEDLE) 32 gauge x 5/32" Ndle USE 4 TIMES A  each 11    valsartan (DIOVAN) 320 MG tablet Take 1 tablet (320 mg total) by mouth once daily. 90 tablet 3     No current facility-administered medications on file prior to visit.           Review of patient's allergies indicates:  No Known Allergies                ROS:  General ROS: negative  Respiratory ROS: no cough, shortness of breath, or wheezing  Cardiovascular ROS: no chest pain or dyspnea on exertion  Musculoskeletal ROS: negative  Neurological ROS:   negative for - impaired coordination/balance or numbness/tingling  Dermatological ROS: negative          LAST HbA1c:   Lab Results   Component Value Date    HGBA1C 5.3 08/08/2024           EXAM:   Vitals:    09/20/24 0908   BP: 139/73   Pulse: 94   Weight: 109.5 kg (241 lb 6.5 oz)   Height: 5' 10" (1.778 m)         General: alert, no distress, cooperative      Vascular:   Dorsalis Pedis:  Palpable and dopplerable.   Posterior Tibial:  present; palpable  Capillary refill time:  5 seconds  Temperature of toes cool to touch  Edema:   minimal; pitting      Neurological:     Sharp touch:  normal  Light touch: normal  Tinels Sign:  Absent  Mulders Click:   Absent  [place]:  intact x " "10      Dermatological:   Skin: Warm and dry. no hyperpigmentation, vitiligo, or suspicious lesions    Bruising:  Absent  Erythema:  Absent      Ulcer Location: sulcus LEFT hallux  Measurements:   post debride:  0.5cm x 0.2cm x 0.2cm  Periwound: (--) hyperkeratosis;  (--) necrosis  Exudate: none   Edema:  none  Malodor:  Absent  Base:  100% granular;   0%  Eschar  Erythema:  none  Probe to bone: no      Musculoskeletal:   Metatarsophalangeal range of motion:   full range of motion  Subtalar joint range of motion: full range of motion  Ankle joint range of motion:  full range of motion  Bunions:  Absent  Hammertoes: present 2-4 bilateral;  bilateral adductovarus            ASSESSMENT/PLAN:      Problem List Items Addressed This Visit    None  Visit Diagnoses       Skin ulcer of toe of left foot with fat layer exposed    -  Primary    Relevant Orders    Wound Debridement    Type 2 diabetes mellitus without complication, with long-term current use of insulin        Relevant Orders    Wound Debridement              I counseled the patient on the patient's conditions, their implications and medical management.   Wound debridement.   Topical antibiotics  and padded dressing applied.  Continue daily.   He has mupirocin at home.    He defers football dressing.   Follow up 2-3 weeks or sooner if concerned.      Wound Debridement    Date/Time: 9/20/2024 9:30 AM    Performed by: Shazia Davis DPM  Authorized by: Shazia Davis DPM    Time out: Immediately prior to procedure a "time out" was called to verify the correct patient, procedure, equipment, support staff and site/side marked as required.    Consent Done?:  Yes (Verbal)    Preparation: Patient was prepped and draped in usual sterile fashion    Local anesthesia used?: No      Wound Details:    Location:  Left foot    Location:  Left 1st Toe    Type of Debridement:  Excisional       Length (cm):  0.5       Area (sq cm):  0.1       Width (cm):  0.2       Percent Debrided " (%):  100       Depth (cm):  0.2       Total Area Debrided (sq cm):  0.1    Depth of debridement:  Subcutaneous tissue    Tissue debrided:  Dermis, Epidermis and Subcutaneous    Devitalized tissue debrided:  Slough, Fibrin, Callus and Biofilm    Instruments:  Nippers  Bleeding:  Minimal  Hemostasis Achieved: Yes  Method Used:  Pressure  Patient tolerance:  Patient tolerated the procedure well with no immediate complications

## 2024-10-07 ENCOUNTER — OFFICE VISIT (OUTPATIENT)
Dept: PODIATRY | Facility: CLINIC | Age: 64
End: 2024-10-07
Payer: COMMERCIAL

## 2024-10-07 VITALS
HEIGHT: 70 IN | BODY MASS INDEX: 34.56 KG/M2 | HEART RATE: 83 BPM | DIASTOLIC BLOOD PRESSURE: 77 MMHG | SYSTOLIC BLOOD PRESSURE: 130 MMHG | WEIGHT: 241.38 LBS

## 2024-10-07 DIAGNOSIS — E11.9 TYPE 2 DIABETES MELLITUS WITHOUT COMPLICATION, WITH LONG-TERM CURRENT USE OF INSULIN: ICD-10-CM

## 2024-10-07 DIAGNOSIS — L97.522 SKIN ULCER OF TOE OF LEFT FOOT WITH FAT LAYER EXPOSED: Primary | ICD-10-CM

## 2024-10-07 DIAGNOSIS — Z79.4 TYPE 2 DIABETES MELLITUS WITHOUT COMPLICATION, WITH LONG-TERM CURRENT USE OF INSULIN: ICD-10-CM

## 2024-10-07 PROCEDURE — 99499 UNLISTED E&M SERVICE: CPT | Mod: S$GLB,,, | Performed by: PODIATRIST

## 2024-10-07 PROCEDURE — 99999 PR PBB SHADOW E&M-EST. PATIENT-LVL IV: CPT | Mod: PBBFAC,,, | Performed by: PODIATRIST

## 2024-10-07 PROCEDURE — 11042 DBRDMT SUBQ TIS 1ST 20SQCM/<: CPT | Mod: S$GLB,,, | Performed by: PODIATRIST

## 2024-10-07 NOTE — PROGRESS NOTES
Chief Complaint   Patient presents with    Follow-up     Wound              HPI:   The patient is a 64 y.o.  male  who presents for concerns of Follow-up (Wound)  LEFT hallux.  He has been applying mupirocin and keeping the toe covered.  Wearing dress shoes today.   Does get a chance to wear [employer]ok[employer] at work.   No fevers or chills reported.  No toe pain reported.    Defers football dressing.      Primary care doctor is: Diann Greene MD        Patient Active Problem List   Diagnosis    KEVIN (obstructive sleep apnea)    Obesity (BMI 35.0-39.9 without comorbidity)    Mixed hyperlipidemia    Essential hypertension    Type 2 diabetes mellitus with microalbuminuria, with long-term current use of insulin    Right rotator cuff tear    Chronic right shoulder pain    Decreased range of motion with decreased strength    Insomnia    Neck pain    Decreased range of motion of intervertebral discs of cervical spine    Bilateral leg edema    Normocytic anemia    Severe obesity (BMI 35.0-39.9) with comorbidity           Current Outpatient Medications on File Prior to Visit   Medication Sig Dispense Refill    amLODIPine (NORVASC) 10 MG tablet TAKE 1 TABLET BY MOUTH EVERY DAY 90 tablet 3    aspirin 81 MG Chew Take 1 tablet (81 mg total) by mouth once daily. 90 tablet 3    atorvastatin (LIPITOR) 40 MG tablet Take 1 tablet (40 mg total) by mouth once daily. 90 tablet 3    atorvastatin (LIPITOR) 40 MG tablet Take 40 mg by mouth once daily.      blood sugar diagnostic (CONTOUR NEXT TEST STRIPS) Strp 1 EACH BY [facility]NON-DRUG COMBO ROUTE) ROUTE 4 (FOUR) TIMES DAILY BEFORE MEALS AND NIGHTLY. 400 strip 3    blood-glucose meter Misc Use as instructed 1 each 0    carvediloL (COREG) 12.5 MG tablet Take 1 tablet (12.5 mg total) by mouth 2 (two) times daily with meals. 180 tablet 3    carvediloL (COREG) 12.5 MG tablet Take 12.5 mg by mouth 2 (two) times daily with meals.      dulaglutide (TRULICITY) 4.5 mg/0.5 mL pen injector Inject 4.5 mg into  "the skin every 7 days. 12 pen 3    empagliflozin (JARDIANCE) 25 mg tablet Take 1 tablet (25 mg total) by mouth once daily. 90 tablet 3    insulin aspart U-100 (NOVOLOG FLEXPEN U-100 INSULIN) 100 unit/mL (3 mL) InPn pen 14 units in the morning, lunch time, and 12 units with dinner. 45 each 3    insulin degludec (TRESIBA FLEXTOUCH U-100) 100 unit/mL (3 mL) insulin pen Inject 45 Units into the skin once daily. 40.5 mL 3    lancets (MICROLET LANCET) Misc 1 EACH BY [facility].(NON-DRUG COMBO ROUTE) ROUTE 4 (FOUR) TIMES DAILY BEFORE MEALS AND NIGHTLY. 400 each 3    metFORMIN (GLUCOPHAGE) 1000 MG tablet TAKE 1 TABLET BY MOUTH TWICE A DAY WITH MEALS 180 tablet 1    mupirocin (BACTROBAN) 2 % ointment Apply topically once daily. To LEFT great toe 15 g 1    pen needle, diabetic (BD ULTRA-FINE JEANNETTE PEN NEEDLE) 32 gauge x 5/32" Ndle USE 4 TIMES A  each 11    valsartan (DIOVAN) 320 MG tablet Take 1 tablet (320 mg total) by mouth once daily. 90 tablet 3     No current facility-administered medications on file prior to visit.           Review of patient's allergies indicates:  No Known Allergies                ROS:  General ROS: negative  Respiratory ROS: no cough, shortness of breath, or wheezing  Cardiovascular ROS: no chest pain or dyspnea on exertion  Musculoskeletal ROS: negative  Neurological ROS:   negative for - impaired coordination/balance or numbness/tingling  Dermatological ROS: negative          LAST HbA1c:   Lab Results   Component Value Date    HGBA1C 5.3 08/08/2024           EXAM:   Vitals:    10/07/24 0922   BP: 130/77   Pulse: 83   Weight: 109.5 kg (241 lb 6.5 oz)   Height: 5' 10" (1.778 m)         General: alert, no distress, cooperative      Vascular:   Dorsalis Pedis:  Palpable and dopplerable.   Posterior Tibial:  present; palpable  Capillary refill time:  5 seconds  Temperature of toes cool to touch  Edema:   minimal; pitting      Neurological:     Sharp touch:  normal  Light touch: normal  Tinels Sign:  " "Absent  Mulders Click:   Absent  [place]:  intact x 10      Dermatological:   Skin: Warm and dry. no hyperpigmentation, vitiligo, or suspicious lesions    Bruising:  Absent  Erythema:  Absent      Ulcer Location: sulcus LEFT hallux  Measurements:   post debride:  0.3cm x 0.2cm x 0.1cm  Periwound: (--) hyperkeratosis;  (--) necrosis  Exudate: none   Edema:  none  Malodor:  Absent  Base:  100% granular;   0%  Eschar  Erythema:  none  Probe to bone: no      Musculoskeletal:   Metatarsophalangeal range of motion:   full range of motion  Subtalar joint range of motion: full range of motion  Ankle joint range of motion:  full range of motion  Bunions:  Absent  Hammertoes: present 2-4 bilateral;  bilateral adductovarus            ASSESSMENT/PLAN:      Problem List Items Addressed This Visit    None  Visit Diagnoses       Skin ulcer of toe of left foot with fat layer exposed    -  Primary    Type 2 diabetes mellitus without complication, with long-term current use of insulin                I counseled the patient on the patient's conditions, their implications and medical management.   Wound debridement. Raquel  and padded dressing applied.  Continue daily at home with Mupirocin.   He has mupirocin at home.    He defers football dressing.   Follow up 3 weeks or sooner if concerned.      Wound Debridement    Date/Time: 10/7/2024 9:30 AM    Performed by: Shazia Davis DPM  Authorized by: Shazia Davis DPM    Time out: Immediately prior to procedure a "time out" was called to verify the correct patient, procedure, equipment, support staff and site/side marked as required.    Consent Done?:  Yes (Verbal)    Preparation: Patient was prepped and draped in usual sterile fashion    Local anesthesia used?: No      Wound Details:    Location:  Left foot    Location:  Left 1st Toe    Type of Debridement:  Excisional       Length (cm):  0.3       Area (sq cm):  0.06       Width (cm):  0.2       Percent Debrided (%):  100       Depth " (cm):  0.1       Total Area Debrided (sq cm):  0.06    Depth of debridement:  Subcutaneous tissue    Tissue debrided:  Dermis, Epidermis and Subcutaneous    Devitalized tissue debrided:  Slough, Fibrin, Callus and Biofilm    Instruments:  Nippers  Bleeding:  Minimal  Patient tolerance:  Patient tolerated the procedure well with no immediate complications

## 2024-10-12 ENCOUNTER — PATIENT MESSAGE (OUTPATIENT)
Dept: INTERNAL MEDICINE | Facility: CLINIC | Age: 64
End: 2024-10-12
Payer: COMMERCIAL

## 2024-10-12 DIAGNOSIS — Z79.4 TYPE 2 DIABETES MELLITUS WITH MICROALBUMINURIA, WITH LONG-TERM CURRENT USE OF INSULIN: ICD-10-CM

## 2024-10-12 DIAGNOSIS — E11.29 TYPE 2 DIABETES MELLITUS WITH MICROALBUMINURIA, WITH LONG-TERM CURRENT USE OF INSULIN: ICD-10-CM

## 2024-10-12 DIAGNOSIS — R80.9 TYPE 2 DIABETES MELLITUS WITH MICROALBUMINURIA, WITH LONG-TERM CURRENT USE OF INSULIN: ICD-10-CM

## 2024-10-12 DIAGNOSIS — E66.01 SEVERE OBESITY (BMI 35.0-39.9) WITH COMORBIDITY: ICD-10-CM

## 2024-10-12 DIAGNOSIS — E11.8 TYPE 2 DIABETES MELLITUS WITH COMPLICATION, WITH LONG-TERM CURRENT USE OF INSULIN: ICD-10-CM

## 2024-10-12 DIAGNOSIS — Z79.4 TYPE 2 DIABETES MELLITUS WITH COMPLICATION, WITH LONG-TERM CURRENT USE OF INSULIN: ICD-10-CM

## 2024-10-14 NOTE — TELEPHONE ENCOUNTER
No care due was identified.  [facility] Embedded Care Due Messages. Reference number: [id number].   10/14/2024 9:38:54 AM CDT

## 2024-10-15 RX ORDER — DULAGLUTIDE 4.5 MG/.5ML
4.5 INJECTION, SOLUTION SUBCUTANEOUS
Qty: 12 PEN | Refills: 3 | Status: SHIPPED | OUTPATIENT
Start: 2024-10-15 | End: 2024-10-16 | Stop reason: SDUPTHER

## 2024-10-15 RX ORDER — METFORMIN HYDROCHLORIDE 1000 MG/1
1000 TABLET ORAL 2 TIMES DAILY WITH MEALS
Qty: 180 TABLET | Refills: 3 | Status: SHIPPED | OUTPATIENT
Start: 2024-10-15

## 2024-10-16 DIAGNOSIS — Z79.4 TYPE 2 DIABETES MELLITUS WITH COMPLICATION, WITH LONG-TERM CURRENT USE OF INSULIN: ICD-10-CM

## 2024-10-16 DIAGNOSIS — E11.8 TYPE 2 DIABETES MELLITUS WITH COMPLICATION, WITH LONG-TERM CURRENT USE OF INSULIN: ICD-10-CM

## 2024-10-16 RX ORDER — PEN NEEDLE, DIABETIC 32GX 5/32"
NEEDLE, DISPOSABLE MISCELLANEOUS
Qty: 400 EACH | Refills: 3 | Status: SHIPPED | OUTPATIENT
Start: 2024-10-16

## 2024-10-16 RX ORDER — DULAGLUTIDE 4.5 MG/.5ML
4.5 INJECTION, SOLUTION SUBCUTANEOUS
Qty: 12 PEN | Refills: 3 | Status: SHIPPED | OUTPATIENT
Start: 2024-10-16

## 2024-10-16 NOTE — TELEPHONE ENCOUNTER
No care due was identified.  [facility] Embedded Care Due Messages. Reference number: [id number].   10/16/2024 1:13:21 PM CDT

## 2024-10-16 NOTE — TELEPHONE ENCOUNTER
No care due was identified.  [facility] Embedded Care Due Messages. Reference number: [id number].   10/16/2024 3:58:24 PM CDT

## 2024-10-17 NOTE — TELEPHONE ENCOUNTER
Refill Decision Note   Henry Zacarias  is requesting a refill authorization.  Brief Assessment and Rationale for Refill:  Approve     Medication Therapy Plan:         Comments:     Note composed:7:00 PM 10/16/2024

## 2024-10-28 ENCOUNTER — OFFICE VISIT (OUTPATIENT)
Dept: SLEEP MEDICINE | Facility: CLINIC | Age: 64
End: 2024-10-28
Payer: COMMERCIAL

## 2024-10-28 DIAGNOSIS — G47.33 OSA (OBSTRUCTIVE SLEEP APNEA): Primary | ICD-10-CM

## 2024-10-28 PROCEDURE — 1160F RVW MEDS BY RX/DR IN RCRD: CPT | Mod: CPTII,95,, | Performed by: PHYSICIAN ASSISTANT

## 2024-10-28 PROCEDURE — 99213 OFFICE O/P EST LOW 20 MIN: CPT | Mod: 95,,, | Performed by: PHYSICIAN ASSISTANT

## 2024-10-28 PROCEDURE — 4010F ACE/ARB THERAPY RXD/TAKEN: CPT | Mod: CPTII,95,, | Performed by: PHYSICIAN ASSISTANT

## 2024-10-28 PROCEDURE — 3066F NEPHROPATHY DOC TX: CPT | Mod: CPTII,95,, | Performed by: PHYSICIAN ASSISTANT

## 2024-10-28 PROCEDURE — 3061F NEG MICROALBUMINURIA REV: CPT | Mod: CPTII,95,, | Performed by: PHYSICIAN ASSISTANT

## 2024-10-28 PROCEDURE — 1159F MED LIST DOCD IN RCRD: CPT | Mod: CPTII,95,, | Performed by: PHYSICIAN ASSISTANT

## 2024-10-28 PROCEDURE — 3044F HG A1C LEVEL LT 7.0%: CPT | Mod: CPTII,95,, | Performed by: PHYSICIAN ASSISTANT

## 2024-11-04 ENCOUNTER — PATIENT MESSAGE (OUTPATIENT)
Dept: INTERNAL MEDICINE | Facility: CLINIC | Age: 64
End: 2024-11-04
Payer: COMMERCIAL

## 2024-11-04 DIAGNOSIS — M75.101 TEAR OF RIGHT ROTATOR CUFF, UNSPECIFIED TEAR EXTENT, UNSPECIFIED WHETHER TRAUMATIC: Primary | ICD-10-CM

## 2024-11-05 ENCOUNTER — PATIENT MESSAGE (OUTPATIENT)
Dept: INTERNAL MEDICINE | Facility: CLINIC | Age: 64
End: 2024-11-05
Payer: COMMERCIAL

## 2024-11-08 NOTE — PROGRESS NOTES
CC:  Right shoulder pain    Henry Zacarias, presents today for follow up appointment of his right shoulder. He was last seen in 2019.  Diagnosis of a chronic massive, irreparable rotator cuff tear.  Was doing fairly well up until an episode a few weeks ago when he felt acute onset pain in his right shoulder with some difficulty on overhead range of motion.  Symptoms started when he was in bed.  No specific antecedent injury mechanism.  Now much better.  Denies any significant subjective weakness but we would like to get the shoulder stronger.  No recent oral medication and no injections.  Here today to discuss treatment options.     No neck or radicular symptoms.    Prior Hx 9/10/2019:   59 y.o. RHD Male who returns today for follow up. MRI was discussed last visit. Diagnosis is acute on chronic rotator cuff tear with significant degenerative changes and clinical signs of irreparability. He was seen back in June but was only 2 wk s/p an I&D of cellulitis on his left upper trap. This has since resolved. No longer taking antibiotics. Has also gotten his A1C under control as well, A1C in August was 5.9. Complaint today is some mild positional R shoulder pain which he describes as achy. Stiffness in the AM. He had some functional limitations with the shoulder but no significant pain on a daily basis. No night pain. Denies neck pain or radicular symptoms. Here today to discuss possible treatment options.     Negative for tobacco.   Positive for diabetes. Last A1C; 5.9 in 8/2019.     PAST MEDICAL HISTORY:   Past Medical History:   Diagnosis Date    Cellulitis of back except buttock 5/28/2019    DM2 (diabetes mellitus, type 2)     Essential hypertension     HLD (hyperlipidemia)     mixed    Obesity (BMI 30.0-34.9)     Sleep apnea      PAST SURGICAL HISTORY:  Past Surgical History:   Procedure Laterality Date    INCISION AND DRAINAGE OF BACK Left 5/29/2019    Procedure: INCISION AND DRAINAGE, BACK UPPER;  Surgeon: Sam ENNIS  "MD Sunny;  Location: [facility] OR;  Service: General;  Laterality: Left;    TONSILLECTOMY       FAMILY HISTORY:  Family History   Problem Relation Name Age of Onset    COPD Mother 62     Hyperlipidemia Father 73     Hypertension Father 73     Heart disease Father 73     Dementia Father 73     Diabetes Father 73     Colon cancer Neg Hx      Prostate cancer Neg Hx       MEDICATIONS:    Current Outpatient Medications:     amLODIPine (NORVASC) 10 MG tablet, TAKE 1 TABLET BY MOUTH EVERY DAY, Disp: 90 tablet, Rfl: 3    aspirin 81 MG Chew, Take 1 tablet (81 mg total) by mouth once daily., Disp: 90 tablet, Rfl: 3    atorvastatin (LIPITOR) 40 MG tablet, Take 1 tablet (40 mg total) by mouth once daily., Disp: 90 tablet, Rfl: 3    atorvastatin (LIPITOR) 40 MG tablet, Take 40 mg by mouth once daily., Disp: , Rfl:     BD JEANNETTE 2ND GEN PEN NEEDLE 32 gauge x 5/32" Ndle, USE AS DIRECTED 4 TIMES A DAY, Disp: 400 each, Rfl: 3    blood sugar diagnostic (CONTOUR NEXT TEST STRIPS) Strp, 1 EACH BY MISC.(NON-DRUG COMBO ROUTE) ROUTE 4 (FOUR) TIMES DAILY BEFORE MEALS AND NIGHTLY., Disp: 400 strip, Rfl: 3    blood-glucose meter Misc, Use as instructed, Disp: 1 each, Rfl: 0    carvediloL (COREG) 12.5 MG tablet, Take 1 tablet (12.5 mg total) by mouth 2 (two) times daily with meals., Disp: 180 tablet, Rfl: 3    carvediloL (COREG) 12.5 MG tablet, Take 12.5 mg by mouth 2 (two) times daily with meals., Disp: , Rfl:     dulaglutide (TRULICITY) 4.5 mg/0.5 mL pen injector, Inject 4.5 mg into the skin every 7 days., Disp: 12 pen , Rfl: 3    empagliflozin (JARDIANCE) 25 mg tablet, Take 1 tablet (25 mg total) by mouth once daily., Disp: 90 tablet, Rfl: 3    insulin aspart U-100 (NOVOLOG FLEXPEN U-100 INSULIN) 100 unit/mL (3 mL) InPn pen, 14 units in the morning, lunch time, and 12 units with dinner., Disp: 45 each, Rfl: 3    insulin degludec (TRESIBA FLEXTOUCH U-100) 100 unit/mL (3 mL) insulin pen, Inject 45 Units into the skin once daily., Disp: 40.5 " "mL, Rfl: 3    lancets (MICROLET LANCET) Misc, 1 EACH BY MISC.(NON-DRUG COMBO ROUTE) ROUTE 4 (FOUR) TIMES DAILY BEFORE MEALS AND NIGHTLY., Disp: 400 each, Rfl: 3    metFORMIN (GLUCOPHAGE) 1000 MG tablet, Take 1 tablet (1,000 mg total) by mouth 2 (two) times daily with meals., Disp: 180 tablet, Rfl: 3    mupirocin (BACTROBAN) 2 % ointment, Apply topically once daily. To LEFT great toe, Disp: 15 g, Rfl: 1    valsartan (DIOVAN) 320 MG tablet, Take 1 tablet (320 mg total) by mouth once daily., Disp: 90 tablet, Rfl: 3    ALLERGIES:  Review of patient's allergies indicates:  No Known Allergies     REVIEW OF SYSTEMS:  Constitution: Negative. Negative for chills, fever and night sweats.    Hematologic/Lymphatic: Negative for bleeding problem. Does not bruise/bleed easily.   Skin: Negative for dry skin, itching and rash.   Musculoskeletal: Negative for falls. Positive for right shoulder pain and muscle weakness.     All other review of symptoms were reviewed and found to be noncontributory.     PHYSICAL EXAMINATION:  Vitals:  /78   Pulse 96   Ht 5' 10" (1.778 m)   Wt 106.5 kg (234 lb 12.6 oz)   BMI 33.69 kg/m²    General: Well-developed well-nourished 64 y.o. malein no acute distress   Cardiovascular: Regular rhythm by palpation of distal pulse, normal color and temperature, no concerning varicosities on symptomatic side   Lungs: No labored breathing or wheezing appreciated   Neuro: Alert and oriented ×3   Psychiatric: well oriented to person, place and time, demonstrates normal mood and affect   Skin: No rashes, lesions or ulcers, normal temperature, turgor, and texture on uninvolved extremity    Ortho/SPM Exam  Examination of the right shoulder again demonstrates negative biceps and AC joint. Active FE to 160 with mild shoulder hiking, ER at side to 10, IR to T10. +ER lag to his belly. Negative belly press. Negative lift off.  4-/5 resisted supraspinatus strength testing. 2+/5 ER.  Positive glenohumeral grind " test.  Mild pain to passive external rotation stretch.  Stable shoulder. No midline neck tenderness. Neg Spurling.     IMAGING:  Xrays including AP, Outlet and Axillary Lateral of RIGHT shoulder are ordered / images reviewed by me:   Glenohumeral degenerative changes    Prior MRI 06/17/19 of RIGHT shoulder reviewed by me and discussed with patient. Study shows:    Massive RC tear involving primarily the SS and IS with sig tissue retraction to the level of the glenoid with evidence of intrasubstance degeneration. Goutallier 2 of IS. Neg [place]. FI of teres minor.     ASSESSMENT:      ICD-10-CM ICD-9-CM   1. Rotator cuff tear arthropathy of right shoulder  M75.101 716.81    M12.811        PLAN:      The patient has a symptomatic chronic massive rotator cuff tear and underlying glenohumeral degenerative changes.  Feeling better now with overall fairly well-maintained overhead range of motion and function.  Previously discussed treatment options to include both operative and nonoperative options.  Reverse shoulder arthroplasty would be his best operative option in my opinion but I would not recommend that at this time given his current presentation.  We have decided to pursue conservative care.  Referral to [facility] PT for deltoid and periscapular strengthening exercises.  Prescription also given for Celebrex.  Can consider subacromial steroid injections in the future.  Return to clinic as needed.    Procedures

## 2024-11-11 ENCOUNTER — HOSPITAL ENCOUNTER (OUTPATIENT)
Dept: RADIOLOGY | Facility: HOSPITAL | Age: 64
Discharge: HOME | End: 2024-11-11
Attending: ORTHOPAEDIC SURGERY
Payer: COMMERCIAL

## 2024-11-11 ENCOUNTER — OFFICE VISIT (OUTPATIENT)
Dept: SPORTS MEDICINE | Facility: CLINIC | Age: 64
End: 2024-11-11
Payer: COMMERCIAL

## 2024-11-11 VITALS
WEIGHT: 234.81 LBS | DIASTOLIC BLOOD PRESSURE: 78 MMHG | SYSTOLIC BLOOD PRESSURE: 133 MMHG | HEART RATE: 96 BPM | BODY MASS INDEX: 33.62 KG/M2 | HEIGHT: 70 IN

## 2024-11-11 DIAGNOSIS — M12.811 ROTATOR CUFF TEAR ARTHROPATHY OF RIGHT SHOULDER: Primary | ICD-10-CM

## 2024-11-11 DIAGNOSIS — M75.101 ROTATOR CUFF TEAR ARTHROPATHY OF RIGHT SHOULDER: Primary | ICD-10-CM

## 2024-11-11 DIAGNOSIS — M75.101 TEAR OF RIGHT ROTATOR CUFF, UNSPECIFIED TEAR EXTENT, UNSPECIFIED WHETHER TRAUMATIC: ICD-10-CM

## 2024-11-11 PROCEDURE — 3008F BODY MASS INDEX DOCD: CPT | Mod: CPTII,S$GLB,, | Performed by: ORTHOPAEDIC SURGERY

## 2024-11-11 PROCEDURE — 3066F NEPHROPATHY DOC TX: CPT | Mod: CPTII,S$GLB,, | Performed by: ORTHOPAEDIC SURGERY

## 2024-11-11 PROCEDURE — 3061F NEG MICROALBUMINURIA REV: CPT | Mod: CPTII,S$GLB,, | Performed by: ORTHOPAEDIC SURGERY

## 2024-11-11 PROCEDURE — 1159F MED LIST DOCD IN RCRD: CPT | Mod: CPTII,S$GLB,, | Performed by: ORTHOPAEDIC SURGERY

## 2024-11-11 PROCEDURE — 3078F DIAST BP <80 MM HG: CPT | Mod: CPTII,S$GLB,, | Performed by: ORTHOPAEDIC SURGERY

## 2024-11-11 PROCEDURE — 99999 PR PBB SHADOW E&M-EST. PATIENT-LVL V: CPT | Mod: PBBFAC,,, | Performed by: ORTHOPAEDIC SURGERY

## 2024-11-11 PROCEDURE — 73030 X-RAY EXAM OF SHOULDER: CPT | Mod: 26,RT,, | Performed by: RADIOLOGY

## 2024-11-11 PROCEDURE — 3044F HG A1C LEVEL LT 7.0%: CPT | Mod: CPTII,S$GLB,, | Performed by: ORTHOPAEDIC SURGERY

## 2024-11-11 PROCEDURE — 3075F SYST BP GE 130 - 139MM HG: CPT | Mod: CPTII,S$GLB,, | Performed by: ORTHOPAEDIC SURGERY

## 2024-11-11 PROCEDURE — 4010F ACE/ARB THERAPY RXD/TAKEN: CPT | Mod: CPTII,S$GLB,, | Performed by: ORTHOPAEDIC SURGERY

## 2024-11-11 PROCEDURE — 73030 X-RAY EXAM OF SHOULDER: CPT | Mod: TC,RT

## 2024-11-11 PROCEDURE — 99214 OFFICE O/P EST MOD 30 MIN: CPT | Mod: S$GLB,,, | Performed by: ORTHOPAEDIC SURGERY

## 2024-11-11 RX ORDER — CELECOXIB 200 MG/1
200 CAPSULE ORAL DAILY
Qty: 30 CAPSULE | Refills: 1 | Status: SHIPPED | OUTPATIENT
Start: 2024-11-11

## 2024-11-13 ENCOUNTER — PATIENT MESSAGE (OUTPATIENT)
Dept: SPORTS MEDICINE | Facility: CLINIC | Age: 64
End: 2024-11-13
Payer: COMMERCIAL

## 2024-11-13 RX ORDER — MELOXICAM 15 MG/1
15 TABLET ORAL DAILY
Qty: 28 TABLET | Refills: 0 | Status: SHIPPED | OUTPATIENT
Start: 2024-11-13 | End: 2024-12-11

## 2024-11-15 ENCOUNTER — PATIENT MESSAGE (OUTPATIENT)
Dept: SLEEP MEDICINE | Facility: CLINIC | Age: 64
End: 2024-11-15
Payer: COMMERCIAL

## 2024-11-18 ENCOUNTER — OFFICE VISIT (OUTPATIENT)
Dept: PODIATRY | Facility: CLINIC | Age: 64
End: 2024-11-18
Payer: COMMERCIAL

## 2024-11-18 VITALS
HEIGHT: 70 IN | WEIGHT: 234.81 LBS | HEART RATE: 93 BPM | DIASTOLIC BLOOD PRESSURE: 73 MMHG | SYSTOLIC BLOOD PRESSURE: 130 MMHG | BODY MASS INDEX: 33.62 KG/M2

## 2024-11-18 DIAGNOSIS — E11.9 TYPE 2 DIABETES MELLITUS WITHOUT COMPLICATION, WITH LONG-TERM CURRENT USE OF INSULIN: ICD-10-CM

## 2024-11-18 DIAGNOSIS — Z79.4 TYPE 2 DIABETES MELLITUS WITHOUT COMPLICATION, WITH LONG-TERM CURRENT USE OF INSULIN: ICD-10-CM

## 2024-11-18 DIAGNOSIS — Z87.2 HEALED ULCER OF LEFT FOOT ON EXAMINATION: Primary | ICD-10-CM

## 2024-11-18 PROCEDURE — 99999 PR PBB SHADOW E&M-EST. PATIENT-LVL IV: CPT | Mod: PBBFAC,,, | Performed by: PODIATRIST

## 2024-11-18 NOTE — PROGRESS NOTES
"Chief Complaint   Patient presents with    Follow-up     Left great toe sulcus wound              HPI:   The patient is a 64 y.o.  male  who presents for concerns of Follow-up (Left great toe sulcus wound)  LEFT hallux.  He has been applying mupirocin and keeping the toe covered on and off.  He is wearing [place] shoes today.     No fevers or chills reported.  No toe pain reported.       Primary care doctor is: Diann Greene MD      Patient Active Problem List   Diagnosis    KEVIN (obstructive sleep apnea)    Obesity (BMI 35.0-39.9 without comorbidity)    Mixed hyperlipidemia    Essential hypertension    Type 2 diabetes mellitus with microalbuminuria, with long-term current use of insulin    Right rotator cuff tear    Chronic right shoulder pain    Decreased range of motion with decreased strength    Insomnia    Neck pain    Decreased range of motion of intervertebral discs of cervical spine    Bilateral leg edema    Normocytic anemia    Severe obesity (BMI 35.0-39.9) with comorbidity           Current Outpatient Medications on File Prior to Visit   Medication Sig Dispense Refill    amLODIPine (NORVASC) 10 MG tablet TAKE 1 TABLET BY MOUTH EVERY DAY 90 tablet 3    aspirin 81 MG Chew Take 1 tablet (81 mg total) by mouth once daily. 90 tablet 3    atorvastatin (LIPITOR) 40 MG tablet Take 1 tablet (40 mg total) by mouth once daily. 90 tablet 3    atorvastatin (LIPITOR) 40 MG tablet Take 40 mg by mouth once daily.      BD JEANNETTE 2ND GEN PEN NEEDLE 32 gauge x 5/32" Ndle USE AS DIRECTED 4 TIMES A  each 3    blood sugar diagnostic (CONTOUR NEXT TEST STRIPS) Strp 1 EACH BY [facility] [place].(NON-DRUG COMBO ROUTE) ROUTE 4 (FOUR) TIMES DAILY BEFORE MEALS AND NIGHTLY. 400 strip 3    blood-glucose meter Misc Use as instructed 1 each 0    carvediloL (COREG) 12.5 MG tablet Take 1 tablet (12.5 mg total) by mouth 2 (two) times daily with meals. 180 tablet 3    carvediloL (COREG) 12.5 MG tablet Take 12.5 mg by mouth 2 (two) times daily with " "meals.      celecoxib (CELEBREX) 200 MG capsule Take 1 capsule (200 mg total) by mouth once daily. 30 capsule 1    dulaglutide (TRULICITY) 4.5 mg/0.5 mL pen injector Inject 4.5 mg into the skin every 7 days. 12 pen 3    empagliflozin (JARDIANCE) 25 mg tablet Take 1 tablet (25 mg total) by mouth once daily. 90 tablet 3    insulin aspart U-100 (NOVOLOG FLEXPEN U-100 INSULIN) 100 unit/mL (3 mL) InPn pen 14 units in the morning, lunch time, and 12 units with dinner. 45 each 3    insulin degludec (TRESIBA FLEXTOUCH U-100) 100 unit/mL (3 mL) insulin pen Inject 45 Units into the skin once daily. 40.5 mL 3    lancets (MICROLET LANCET) Misc 1 EACH BY MISC.(NON-DRUG COMBO ROUTE) ROUTE 4 (FOUR) TIMES DAILY BEFORE MEALS AND NIGHTLY. 400 each 3    meloxicam (MOBIC) 15 MG tablet Take 1 tablet (15 mg total) by mouth once daily. 28 tablet 0    metFORMIN (GLUCOPHAGE) 1000 MG tablet Take 1 tablet (1,000 mg total) by mouth 2 (two) times daily with meals. 180 tablet 3    mupirocin (BACTROBAN) 2 % ointment Apply topically once daily. To LEFT great toe 15 g 1    valsartan (DIOVAN) 320 MG tablet Take 1 tablet (320 mg total) by mouth once daily. 90 tablet 3     No current facility-administered medications on file prior to visit.           Review of patient's allergies indicates:  No Known Allergies                ROS:  General ROS: negative  Respiratory ROS: no cough, shortness of breath, or wheezing  Cardiovascular ROS: no chest pain or dyspnea on exertion  Musculoskeletal ROS: negative  Neurological ROS:   negative for - impaired coordination/balance or numbness/tingling  Dermatological ROS: negative          LAST HbA1c:   Lab Results   Component Value Date    HGBA1C 5.3 08/08/2024           EXAM:   Vitals:    11/18/24 0906   BP: 130/73   Pulse: 93   Weight: 106.5 kg (234 lb 12.6 oz)   Height: 5' 10" (1.778 m)         General: alert, no distress, cooperative      Vascular:   Dorsalis Pedis:  Palpable and dopplerable.   Posterior Tibial:  " present; palpable  Capillary refill time:  5 seconds  Temperature of toes cool to touch  Edema:   minimal; pitting      Neurological:     Sharp touch:  normal  Light touch: normal  Tinels Sign:  Absent  [place] Click:   Absent  [place]:  intact x 10      Dermatological:   Skin: Warm and dry. no hyperpigmentation, vitiligo, or suspicious lesions    Mycotic LEFT hallux toenail.   Bruising:  Absent  Erythema:  Absent    Ulcer Location: sulcus LEFT hallux  Measurements:  epithelialized, no redness or swelling or pain with palpation.       Musculoskeletal:   Metatarsophalangeal range of motion:   full range of motion  Subtalar joint range of motion: full range of motion  Ankle joint range of motion:  full range of motion  Bunions:  Absent  Hammertoes: present 2-4 bilateral;  bilateral adductovarus            ASSESSMENT/PLAN:      Problem List Items Addressed This Visit    None  Visit Diagnoses       Healed ulcer of left foot on examination    -  Primary    Type 2 diabetes mellitus without complication, with long-term current use of insulin                  I counseled the patient on the patient's conditions, their implications and medical management.   Wound epithelialized  Shoe and activity modification as needed for relief.   Shoe inspection.     Continue good nutrition and blood sugar control to help prevent podiatric complications of diabetes.   Maintain proper foot hygiene.   Continue wearing proper shoe gear, daily foot inspections, never walking without protective shoe gear, never putting sharp instruments to feet.  Follow up annual diabetes foot exam.

## 2024-11-20 ENCOUNTER — CLINICAL SUPPORT (OUTPATIENT)
Dept: REHABILITATION | Facility: OTHER | Age: 64
End: 2024-11-20
Attending: ORTHOPAEDIC SURGERY
Payer: COMMERCIAL

## 2024-11-20 DIAGNOSIS — S46.911S RIGHT SHOULDER STRAIN, SEQUELA: Primary | ICD-10-CM

## 2024-11-20 PROCEDURE — 97161 PT EVAL LOW COMPLEX 20 MIN: CPT | Mod: PN

## 2024-11-20 PROCEDURE — 97112 NEUROMUSCULAR REEDUCATION: CPT | Mod: PN

## 2024-11-20 PROCEDURE — 97530 THERAPEUTIC ACTIVITIES: CPT | Mod: PN

## 2024-11-20 NOTE — PLAN OF CARE
[facility]  Physical Therapy Initial Evaluation    Name: Henry Zacarias  Clinic Number: [id number]    Therapy Diagnosis:   Encounter Diagnosis   Name Primary?    Right shoulder strain, sequela Yes     Physician: NUSRAT Mccoy MD    Physician Orders: Physical Therapy Evaluate and Treat  Medical Diagnosis from Referral: Rotator cuff tear arthropathy of right shoulder [M75.101, M12.811]   Evaluation Date: 11/20/2024  Authorization Period Expiration: 12/31/24- 2/20/24  Plan of Care Expiration: 11/20/2024 to 2/20/24  Visit # / Visits authorized: 1/1 (pending additional authorization following initial evaluation)     Time In: 0230pm  Time Out: 0330pm  Total Billable Time: 60 minutes    Precautions: Standard    Subjective     Date of onset: Two months ago (chronic in nature)     History of current condition - Henry reports that he has experienced shoulder pain for several years. Pt states that he woke up two months ago with shoulder pain. Pt states that he went to see his MD who would like for him to strengthen his shoulder. Pt was given medication in the event that his shoulder starts to bother him again. Pt attended therapy for his shoulder 5 years ago and it was helpful.      Medical History:   Past Medical History:   Diagnosis Date    Cellulitis of back except buttock 5/28/2019    DM2 (diabetes mellitus, type 2)     Essential hypertension     HLD (hyperlipidemia)     mixed    Obesity (BMI 30.0-34.9)     Sleep apnea        Surgical History:   Henry Zacarias  has a past surgical history that includes Incision and drainage of back (Left, 5/29/2019) and Tonsillectomy.    Medications:   Henry has a current medication list which includes the following prescription(s): amlodipine, aspirin, atorvastatin, atorvastatin, bd gallito 2nd gen pen needle, blood sugar diagnostic, blood-glucose meter, carvedilol, carvedilol, celecoxib, trulicity, empagliflozin, insulin aspart u-100, insulin degludec,  lancets, meloxicam, metformin, mupirocin, and valsartan.    Allergies:   Review of patient's allergies indicates:  No Known Allergies     Imaging: FINDINGS:  Mild DJD.  No fracture or dislocation.  No bone destruction identified.  No soft tissue calcifications    Prior Therapy: Yes, at our location.  Social History:  [place]  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   Current Level of Function: Pt has full function of his shoulder he just feels weak.     Pain:  Current 0/10, worst 4/10, best 1/10   Location: [place] R shoulder   Description: Dull ache  Aggravating Factors: reaching overhead  Easing Factors: rest/changing positions    Pts goals: Pt would like to return to working and going to the gym with no increase in right shoulder pain.    Objective     WNL=within normal limits  WFL=within functional limits  NT=not tested  *=pain    Posture: Forward head, rounded shoulders   Palpation: pain/tenderness to anterior lateral right shoulder  Sensation: WNL  Deep tendon reflexes: NT        Cervical ROM:  Cervical    Flexion 90   Extension 80   Right rotation 75   Left rotation 73     Cervical Cluster:  Spurling's (--) (--)   Distraction (--) (--)   ULTT A (--) (--)   Ipsilateral Rotation <60 degrees (--) (--)       Active Range of Motion:   Shoulder Right Left   Flexion 175 180   Abduction 115 180               Strength:  Shoulder Right Left   Flexion 3/5 4+/5   [place] 3/5 5/5   ER 3+/5 5/5   IR 3/5 4+/5   Upper trap 3/5 5/5   Middle Trapezius 3+/5 4/5   Lower Trapezius 3+/5 4/5   Serratus Anterior 3+/5 4/5   Rhomboids 5/5 5/5       Special Tests:   Right Left   AC Joint Compression Test (--) (--)   Painful arc (+) (--)   Drop Arm test (+) (--)   Subscaputlaris Lift Off (+) (--)   Clunk test (--) (--)   O'caden's test (--) (--)   Hawkin's [place] (+) (--)   Speed's test (--) (--)   Anterior  Apprehension test (--) (--)   Relocation test (--) (--)   Posterior Apprehension test (--) (--)   Sulcus Sign (--) (--)         CMS Impairment/Limitation/Restriction for FOTO Survey    Therapist reviewed FOTO scores for Henry Zacarias on 11/20/2024.   FOTO documents entered into [employer] - see Media section.    Limitation Score: TBa%  Predicted Goal: tba%    Category: Mobility     TREATMENT     Treatment Time In: 1000AM  Treatment Time Out: 1100AM   Total Treatment time separate from Evaluation: 30 minutes    Neuromuscular re education for 8 minutes for improved balance and proprioception including:   Shoudler flexion 30x  Shoulder alphabet 3x    Therapeutic Activities were provided for 22 minutes to improve tolerance to functional activities including:   Explanation on HEP/POC      Home Exercises and Patient Education Provided:    Education provided:   - Findings; prognosis and plan of care (POC)  - Home exercise program (HEP)  - Modality options  - Therapist contact information    Written Home Exercises Provided: Yes  Exercises were reviewed and Henry was able to demonstrate them prior to the end of the session.  Henry demonstrated good understanding of the education provided.     See EMR under Patient Instructions for exercises provided today.    Assessment     Henry is a 64 y.o. male referred to outpatient Physical Therapy with a medical diagnosis of shoulder pain. Pt presents to PT with pain, decreased shoulder ROM, decreased strength and flexibility, poor posture, and functional deficits with work. These deficits are negatively impacting this patient's ability to complete their work duties and activities of daily living.     Pt prognosis is Good.   Pt will benefit from skilled outpatient Physical Therapy to address the deficits stated above and in the chart below, provide pt/family education, and to maximize pt's level of independence.     Plan of care discussed with patient: Yes  Pt's spiritual, cultural and  educational needs considered and pt agreeable to plan of care and goals as stated below:     Anticipated Barriers for therapy: None    Medical Necessity is demonstrated by the following  History  Co-morbidities and personal factors that may impact the plan of care Co-morbidities:   advanced age    Personal Factors:   age     low   Examination  Body Structures and Functions, activity limitations and participation restrictions that may impact the plan of care Body Regions:   upper extremities    Body Systems:    gross symmetry  ROM  strength    Participation Restrictions:   Walking    Activity limitations:   Learning and applying knowledge  no deficits    General Tasks and Commands  No Deficits    Communication  No Deficits    Mobility  no deficits    Self care  no deficits    Domestic Life  No Deficits    Interactions/Relationships  No Deficits    Life Areas  No Deficits    Community and Social Life  No Deficits         moderate   Clinical Presentation stable and uncomplicated low   Decision Making/ Complexity Score: low     GOALS:  Short Term Goals:    1.) Pt will improve their FOTO score by 5% to return to PLOF. (Progressing, not met)  2.) Pt will decrease their shoulder pain to 2/10 for improved QOL. (Progressing, not met)  3.) Pt will improve their right shoulder abduction AROM to 120 degrees for improved reaching. (Progressing, not met)  4.) Pt will improve their right shoulder abduction strength to 5/5 to return to their PLOF. (Progressing, not met)  5.) Pt will become independent with their HEP to improve strength and tolerance to functional activities. (Progressing, not met)    Long Term Goals:  1.) Pt will improve their FOTO score by 10% to return to PLOF. (Progressing, not met)  2.) Pt will decrease their right shoulder pain to 3/10 for improved QOL. (Progressing, not met)  3.) Pt will improve their right shoulder flexion AROM to 180 degrees for improved reaching overhead. (Progressing, not met)  4.) Pt  will improve their right shoulder flexion strength to 5/5 to return to their PLOF. (Progressing, not met)  5.) Pt will tolerate lifting 10 pounds with no increase in right shoulder pain to return to PLOF. (Progressing, not met)       Plan     Plan of care Certification: 11/20/2024 to 2/20/24    Outpatient Physical Therapy 2 times weekly for 10 weeks to include the following interventions: Therapeutic Exercises, Manual Therapeutic Technique, Neuromuscular Re Education, Therapeutic Activities. Modalities, Kinesiotape prn, and Functional Dry Needling as needed.    Meredith Whalen, PT,  DPT, OCS

## 2024-12-02 ENCOUNTER — CLINICAL SUPPORT (OUTPATIENT)
Dept: REHABILITATION | Facility: OTHER | Age: 64
End: 2024-12-02
Payer: COMMERCIAL

## 2024-12-02 DIAGNOSIS — S46.911S RIGHT SHOULDER STRAIN, SEQUELA: Primary | ICD-10-CM

## 2024-12-02 DIAGNOSIS — M25.60 DECREASED RANGE OF MOTION WITH DECREASED STRENGTH: ICD-10-CM

## 2024-12-02 DIAGNOSIS — M25.511 CHRONIC RIGHT SHOULDER PAIN: ICD-10-CM

## 2024-12-02 DIAGNOSIS — R53.1 DECREASED RANGE OF MOTION WITH DECREASED STRENGTH: ICD-10-CM

## 2024-12-02 DIAGNOSIS — G89.29 CHRONIC RIGHT SHOULDER PAIN: ICD-10-CM

## 2024-12-02 PROCEDURE — 97112 NEUROMUSCULAR REEDUCATION: CPT | Mod: PN

## 2024-12-02 PROCEDURE — 97530 THERAPEUTIC ACTIVITIES: CPT | Mod: PN

## 2024-12-02 NOTE — PROGRESS NOTES
"[facility]   Physical Therapy Treatment Note     Name: Henry Zacarias  Clinic Number: [id number]    Therapy Diagnosis:   Encounter Diagnosis   Name Primary?    Right shoulder strain, sequela Yes     Physician: NUSRAT Mccoy MD    Visit Date: 12/2/2024       Physician Orders: Physical Therapy Evaluate and Treat  Medical Diagnosis from Referral: Rotator cuff tear arthropathy of right shoulder [M75.101, M12.811]   Evaluation Date: 11/20/2024  Authorization Period Expiration: 12/31/24- 2/20/24  Plan of Care Expiration: 11/20/2024 to 2/20/24  Visit # / Visits authorized: 1/1 (pending additional authorization following initial evaluation)      Time In: 0230pm  Time Out: 0330pm  Total Billable Time: 60 minutes     Precautions: Standard      SUBJECTIVE     Pt reports: that he is doing great today. Pt states that his shoulder feels stronger.   He was compliant with home exercise program.  Response to previous treatment: decreased right shoulder pain  Functional change: improved ability to reach overhead    Pain: 2/10  Location: right shoulder      OBJECTIVE     Objective Measures updated at progress report unless specified.       TREATMENT     Total Treatment time (time-based codes) separate from Evaluation: 60 minutes     Henry received the treatments listed below:            Neuromuscular re education for 40 minutes for improved balance and proprioception including:   B Shoulder flexion 30x with 1# bar while supine on mat  R Supine Shoulder alphabet 3x   Sidelying R shoulder abduction vs 1# 30x  Rows vs RTB 30x5" hold  Seated shoulder squeezes 30x5" hold  Standing shoulder extension vs GTB 30x5" hold       Therapeutic Activities were provided for 20 minutes to improve tolerance to functional activities including:   R Shoulder circles with ball on wall 30x CW/Ccw  R Shoulder flexion with towel on wall 30x  UBE 3 min/3min  Serratus wall slide vs pillowcase 30x  R shoulder ER vs GTB " 30x      PATIENT EDUCATION AND HOME EXERCISES     Home Exercises Provided and Patient Education Provided     Education provided:   PT educated pt on importance of compliance with their HEP this visit.     Written Home Exercises Provided: yes. Exercises were reviewed and Henry was able to demonstrate them prior to the end of the session.  Henry demonstrated good  understanding of the education provided. See EMR under Patient Instructions for exercises provided during therapy sessions    ASSESSMENT   Pt tolerated tx session well today and completed all therapeutic exercises with minimal/no increase in shoulder pain. Progressed shoulder strengthening exercises this visit.     Henry Is progressing well towards his goals.   Pt prognosis is Good.     Pt will continue to benefit from skilled outpatient physical therapy to address the deficits listed in the problem list box on initial evaluation, provide pt/family education and to maximize pt's level of independence in the home and community environment.     Pt's spiritual, cultural and educational needs considered and pt agreeable to plan of care and goals.     Anticipated barriers to physical therapy: None    GOALS:  Short Term Goals:     1.) Pt will improve their FOTO score by 5% to return to PLOF. (Progressing, not met)  2.) Pt will decrease their shoulder pain to 2/10 for improved QOL. (Progressing, not met)  3.) Pt will improve their right shoulder abduction AROM to 120 degrees for improved reaching. (Progressing, not met)  4.) Pt will improve their right shoulder abduction strength to 5/5 to return to their PLOF. (Progressing, not met)  5.) Pt will become independent with their HEP to improve strength and tolerance to functional activities. (Progressing, not met)     Long Term Goals:  1.) Pt will improve their FOTO score by 10% to return to PLOF. (Progressing, not met)  2.) Pt will decrease their right shoulder pain to 3/10 for improved QOL. (Progressing, not  met)  3.) Pt will improve their right shoulder flexion AROM to 180 degrees for improved reaching overhead. (Progressing, not met)  4.) Pt will improve their right shoulder flexion strength to 5/5 to return to their PLOF. (Progressing, not met)  5.) Pt will tolerate lifting 10 pounds with no increase in right shoulder pain to return to PLOF. (Progressing, not met)         Plan      Plan of care Certification: 11/20/2024 to 2/20/24     Outpatient Physical Therapy 2 times weekly for 10 weeks to include the following interventions: Therapeutic Exercises, Manual Therapeutic Technique, Neuromuscular Re Education, Therapeutic Activities. Modalities, Kinesiotape prn, and Functional Dry Needling as needed.      Meredith Whalen, PT

## 2024-12-09 ENCOUNTER — CLINICAL SUPPORT (OUTPATIENT)
Dept: REHABILITATION | Facility: OTHER | Age: 64
End: 2024-12-09
Payer: COMMERCIAL

## 2024-12-09 DIAGNOSIS — S46.911S RIGHT SHOULDER STRAIN, SEQUELA: Primary | ICD-10-CM

## 2024-12-09 PROCEDURE — 97140 MANUAL THERAPY 1/> REGIONS: CPT | Mod: PN

## 2024-12-09 PROCEDURE — 97112 NEUROMUSCULAR REEDUCATION: CPT | Mod: PN

## 2024-12-09 PROCEDURE — 97530 THERAPEUTIC ACTIVITIES: CPT | Mod: PN

## 2024-12-09 NOTE — PROGRESS NOTES
"[facility]   Physical Therapy Treatment Note     Name: Henry Zacarias  Clinic Number: [id number]    Therapy Diagnosis:   Encounter Diagnosis   Name Primary?    Right shoulder strain, sequela Yes     Physician: NUSRAT Mccoy MD    Visit Date: 12/9/2024       Physician Orders: Physical Therapy Evaluate and Treat  Medical Diagnosis from Referral: Rotator cuff tear arthropathy of right shoulder [M75.101, M12.811]   Evaluation Date: 11/20/2024  Authorization Period Expiration: 12/31/24- 2/20/24  Plan of Care Expiration: 11/20/2024 to 2/20/24  Visit # / Visits authorized: 3/20     Time In: 0200pm  Time Out: 0300pm  Total Billable Time: 60 minutes     Precautions: Standard      SUBJECTIVE     Pt reports: that he is doing great today. Pt states that he was "a little sore" after his last session  He was compliant with home exercise program.  Response to previous treatment: decreased right shoulder pain  Functional change: improved ability to reach overhead    Pain: 2/10  Location: right shoulder      OBJECTIVE     Objective Measures updated at progress report unless specified.       TREATMENT     Total Treatment time (time-based codes) separate from Evaluation: 60 minutes     Henry received the treatments listed below:        manual therapy techniques: Myofacial release and Soft tissue Mobilization were applied for 10 minutes, including:   R Shoulder Inf Glide with gentle Oscillations Gr I/II      Neuromuscular re education for 8 minutes for improved balance and proprioception including:   B Shoulder flexion 30x with 1# bar while supine on mat  R Supine Shoulder alphabet 2x vs 3#   Sidelying R shoulder abduction vs 1# 30x  Rows vs BTB 30x5" hold  Seated shoulder squeezes 30x5" hold  Standing shoulder extension vs GTB 30x5" hold       Therapeutic Activities were provided for 44 minutes to improve tolerance to functional activities including:   R Shoulder circles with ball on wall 30x CW/Ccw  R " Shoulder flexion with towel on wall 30x  +R shoulder abduction with towel on wall 30x  UBE 3 min/3min  Serratus wall slide vs pillowcase 30x  R shoulder ER vs GTB 30x          PATIENT EDUCATION AND HOME EXERCISES     Home Exercises Provided and Patient Education Provided     Education provided:   PT educated pt on importance of compliance with their HEP this visit.     Written Home Exercises Provided: yes. Exercises were reviewed and Henry was able to demonstrate them prior to the end of the session.  Henry demonstrated good  understanding of the education provided. See EMR under Patient Instructions for exercises provided during therapy sessions    ASSESSMENT   Pt tolerated treatment session well today with noted improvements in scapular upward rotation during standing shoulder flexion and abduction exercises. Continue PT POC.    Henry Is progressing well towards his goals.   Pt prognosis is Good.     Pt will continue to benefit from skilled outpatient physical therapy to address the deficits listed in the problem list box on initial evaluation, provide pt/family education and to maximize pt's level of independence in the home and community environment.     Pt's spiritual, cultural and educational needs considered and pt agreeable to plan of care and goals.     Anticipated barriers to physical therapy: None    GOALS:  Short Term Goals:     1.) Pt will improve their FOTO score by 5% to return to PLOF. (Progressing, not met)  2.) Pt will decrease their shoulder pain to 2/10 for improved QOL. (Progressing, not met)  3.) Pt will improve their right shoulder abduction AROM to 120 degrees for improved reaching. (Progressing, not met)  4.) Pt will improve their right shoulder abduction strength to 5/5 to return to their PLOF. (Progressing, not met)  5.) Pt will become independent with their HEP to improve strength and tolerance to functional activities. (Progressing, not met)     Long Term Goals:  1.) Pt will improve  their FOTO score by 10% to return to PLOF. (Progressing, not met)  2.) Pt will decrease their right shoulder pain to 3/10 for improved QOL. (Progressing, not met)  3.) Pt will improve their right shoulder flexion AROM to 180 degrees for improved reaching overhead. (Progressing, not met)  4.) Pt will improve their right shoulder flexion strength to 5/5 to return to their PLOF. (Progressing, not met)  5.) Pt will tolerate lifting 10 pounds with no increase in right shoulder pain to return to PLOF. (Progressing, not met)         Plan      Plan of care Certification: 11/20/2024 to 2/20/24     Outpatient Physical Therapy 2 times weekly for 10 weeks to include the following interventions: Therapeutic Exercises, Manual Therapeutic Technique, Neuromuscular Re Education, Therapeutic Activities. Modalities, Kinesiotape prn, and Functional Dry Needling as needed.      Meredith Valentine, PT

## 2024-12-11 ENCOUNTER — CLINICAL SUPPORT (OUTPATIENT)
Dept: REHABILITATION | Facility: OTHER | Age: 64
End: 2024-12-11
Payer: COMMERCIAL

## 2024-12-11 DIAGNOSIS — S46.911S RIGHT SHOULDER STRAIN, SEQUELA: Primary | ICD-10-CM

## 2024-12-11 PROCEDURE — 97112 NEUROMUSCULAR REEDUCATION: CPT | Mod: PN,CQ

## 2024-12-11 PROCEDURE — 97140 MANUAL THERAPY 1/> REGIONS: CPT | Mod: PN,CQ

## 2024-12-11 PROCEDURE — 97530 THERAPEUTIC ACTIVITIES: CPT | Mod: PN,CQ

## 2024-12-11 NOTE — PROGRESS NOTES
"[facility]   Physical Therapy Treatment Note     Name: Henry Zacarias  Clinic Number: [id number]    Therapy Diagnosis:   Encounter Diagnosis   Name Primary?    Right shoulder strain, sequela Yes       Physician: NUSRAT Mccoy MD    Visit Date: 12/11/2024       Physician Orders: Physical Therapy Evaluate and Treat  Medical Diagnosis from Referral: Rotator cuff tear arthropathy of right shoulder [M75.101, M12.811]   Evaluation Date: 11/20/2024  Authorization Period Expiration: 12/31/24- 2/20/24  Plan of Care Expiration: 11/20/2024 to 2/20/24  Visit # / Visits authorized: 4/20     Time In: 1411  Time Out: 1515  Total Billable Time: 60 minutes     Precautions: Standard      SUBJECTIVE     Pt reports: that he/his shoulder is feeling great today. He had slight difficulty last session with shoulder flexion/abduction exercises, but he felt much better as time went on that evening.   He was compliant with home exercise program.  Response to previous treatment: decreased right shoulder pain  Functional change: improved ability to reach overhead    Pain: 2/10  Location: right shoulder      OBJECTIVE     Objective Measures updated at progress report unless specified.       TREATMENT       Henry received the treatments listed below:        manual therapy techniques: Myofacial release and Soft tissue Mobilization were applied for 8 minutes, including:   R Shoulder Inf Glide with gentle Oscillations Gr I/II      Neuromuscular re education for 25 minutes for improved balance and proprioception including:     B Shoulder flexion 30x with 1# bar while supine on mat  R Supine Shoulder alphabet 2x vs 3#   Sidelying R shoulder abduction vs 1# 30x  Rows vs GTB 30x5" hold  Seated shoulder squeezes 30x5" hold  Standing shoulder extension vs GTB 30x5" hold       Therapeutic Activities were provided for 27 minutes to improve tolerance to functional activities including:   R Shoulder circles with ball on wall 30x " CW/Ccw  R Shoulder flexion with towel on wall 30x  R shoulder abduction with towel on wall 30x  UBE 3 min/3min  Serratus wall slide vs pillowcase 30x  R shoulder ER vs GTB 30x          PATIENT EDUCATION AND HOME EXERCISES     Home Exercises Provided and Patient Education Provided     Education provided:   PT educated pt on importance of compliance with their HEP this visit.     Written Home Exercises Provided: Patient instructed to cont prior HEP. Exercises were reviewed and Henry was able to demonstrate them prior to the end of the session.  Henry demonstrated good  understanding of the education provided. See EMR under Patient Instructions for exercises provided during therapy sessions    ASSESSMENT   Pt tolerated session well today. Emphasis was focused on improving endurance and strength with pt demonstrating good training effect at challenge-point. We will continue to progress as tolerated towards goals set forth in POC as functional/strength deficits continue to remain notable at this time.     Henry Is progressing well towards his goals.   Pt prognosis is Good.     Pt will continue to benefit from skilled outpatient physical therapy to address the deficits listed in the problem list box on initial evaluation, provide pt/family education and to maximize pt's level of independence in the home and community environment.     Pt's spiritual, cultural and educational needs considered and pt agreeable to plan of care and goals.     Anticipated barriers to physical therapy: None    GOALS:  Short Term Goals:     1.) Pt will improve their FOTO score by 5% to return to PLOF. (Progressing, not met)  2.) Pt will decrease their shoulder pain to 2/10 for improved QOL. (Progressing, not met)  3.) Pt will improve their right shoulder abduction AROM to 120 degrees for improved reaching. (Progressing, not met)  4.) Pt will improve their right shoulder abduction strength to 5/5 to return to their PLOF. (Progressing, not  met)  5.) Pt will become independent with their HEP to improve strength and tolerance to functional activities. (Progressing, not met)     Long Term Goals:  1.) Pt will improve their FOTO score by 10% to return to PLOF. (Progressing, not met)  2.) Pt will decrease their right shoulder pain to 3/10 for improved QOL. (Progressing, not met)  3.) Pt will improve their right shoulder flexion AROM to 180 degrees for improved reaching overhead. (Progressing, not met)  4.) Pt will improve their right shoulder flexion strength to 5/5 to return to their PLOF. (Progressing, not met)  5.) Pt will tolerate lifting 10 pounds with no increase in right shoulder pain to return to PLOF. (Progressing, not met)         Plan      Plan of care Certification: 11/20/2024 to 2/20/24    Focus on shoulder ROM and strength with emphasis on ADL/OH performance     Outpatient Physical Therapy 2 times weekly for 10 weeks to include the following interventions: Therapeutic Exercises, Manual Therapeutic Technique, Neuromuscular Re Education, Therapeutic Activities. Modalities, Kinesiotape prn, and Functional Dry Needling as needed.      Carlos Granados, PTA

## 2024-12-16 ENCOUNTER — CLINICAL SUPPORT (OUTPATIENT)
Dept: REHABILITATION | Facility: OTHER | Age: 64
End: 2024-12-16
Payer: COMMERCIAL

## 2024-12-16 DIAGNOSIS — S46.911S RIGHT SHOULDER STRAIN, SEQUELA: Primary | ICD-10-CM

## 2024-12-16 PROCEDURE — 97530 THERAPEUTIC ACTIVITIES: CPT | Mod: PN

## 2024-12-16 PROCEDURE — 97140 MANUAL THERAPY 1/> REGIONS: CPT | Mod: PN

## 2024-12-16 PROCEDURE — 97112 NEUROMUSCULAR REEDUCATION: CPT | Mod: PN

## 2024-12-16 NOTE — PROGRESS NOTES
"[facility]   Physical Therapy Treatment Note     Name: Henry Zacarias  Clinic Number: [id number]    Therapy Diagnosis:   Encounter Diagnosis   Name Primary?    Right shoulder strain, sequela Yes       Physician: NUSRAT Mccoy MD    Visit Date: 12/16/2024       Physician Orders: Physical Therapy Evaluate and Treat  Medical Diagnosis from Referral: Rotator cuff tear arthropathy of right shoulder [M75.101, M12.811]   Evaluation Date: 11/20/2024  Authorization Period Expiration: 12/31/24- 2/20/24  Plan of Care Expiration: 11/20/2024 to 2/20/24  Visit # / Visits authorized: 4/20     Time In:0245pm  Time Out: 0345pm  Total Billable Time: 60 minutes     Precautions: Standard      SUBJECTIVE     Pt reports: that he is doing great today. Pt states that his shoulder feels stronger.     He was compliant with home exercise program.  Response to previous treatment: decreased right shoulder pain  Functional change: improved ability to reach overhead    Pain: 2/10  Location: right shoulder      OBJECTIVE     Objective Measures updated at progress report unless specified.       TREATMENT       Henry received the treatments listed below:        manual therapy techniques: Myofacial release and Soft tissue Mobilization were applied for 8 minutes, including:   R Shoulder Inf Glide with gentle Oscillations Gr I/II      Neuromuscular re education for 22 minutes for improved balance and proprioception including:     B Shoulder flexion 30x with 1# bar while supine on mat  R Supine Shoulder alphabet 2x vs 3#   Sidelying R shoulder abduction vs 1# 30x  Rows vs GTB 30x5" hold  Seated shoulder squeezes 30x5" hold  Standing shoulder extension vs GTB 30x5" hold       Therapeutic Activities were provided for 30 minutes to improve tolerance to functional activities including:   R Shoulder circles with ball on wall 30x CW/Ccw  R Shoulder flexion with towel on wall 30x  R shoulder abduction with towel on wall 30x  UBE " 3 min/3min  Serratus wall slide vs pillowcase 30x  R shoulder ER vs GTB 30x  +Banded walkout vs RTB 30x ER  +Banded walkout vs RTB 30x IR        PATIENT EDUCATION AND HOME EXERCISES     Home Exercises Provided and Patient Education Provided     Education provided:   PT educated pt on importance of compliance with their HEP this visit.     Written Home Exercises Provided: Patient instructed to cont prior HEP. Exercises were reviewed and Henry was able to demonstrate them prior to the end of the session.  Henry demonstrated good  understanding of the education provided. See EMR under Patient Instructions for exercises provided during therapy sessions    ASSESSMENT   Pt tolerated session well today. Progressed isometric exercises to include banded walkouts with good response from pt. Pt demonstrates improved scaption AROM as well as strength. Continue PT POC.    Henry Is progressing well towards his goals.   Pt prognosis is Good.     Pt will continue to benefit from skilled outpatient physical therapy to address the deficits listed in the problem list box on initial evaluation, provide pt/family education and to maximize pt's level of independence in the home and community environment.     Pt's spiritual, cultural and educational needs considered and pt agreeable to plan of care and goals.     Anticipated barriers to physical therapy: None    GOALS:  Short Term Goals:     1.) Pt will improve their FOTO score by 5% to return to PLOF. (Progressing, not met)  2.) Pt will decrease their shoulder pain to 2/10 for improved QOL. (Progressing, not met)  3.) Pt will improve their right shoulder abduction AROM to 120 degrees for improved reaching. (Progressing, not met)  4.) Pt will improve their right shoulder abduction strength to 5/5 to return to their PLOF. (Progressing, not met)  5.) Pt will become independent with their HEP to improve strength and tolerance to functional activities. (Progressing, not met)     Long Term  Goals:  1.) Pt will improve their FOTO score by 10% to return to PLOF. (Progressing, not met)  2.) Pt will decrease their right shoulder pain to 3/10 for improved QOL. (Progressing, not met)  3.) Pt will improve their right shoulder flexion AROM to 180 degrees for improved reaching overhead. (Progressing, not met)  4.) Pt will improve their right shoulder flexion strength to 5/5 to return to their PLOF. (Progressing, not met)  5.) Pt will tolerate lifting 10 pounds with no increase in right shoulder pain to return to PLOF. (Progressing, not met)         Plan      Plan of care Certification: 11/20/2024 to 2/20/24    Focus on shoulder ROM and strength with emphasis on ADL/OH performance     Outpatient Physical Therapy 2 times weekly for 10 weeks to include the following interventions: Therapeutic Exercises, Manual Therapeutic Technique, Neuromuscular Re Education, Therapeutic Activities. Modalities, Kinesiotape prn, and Functional Dry Needling as needed.      Meredith Valentine, PT

## 2024-12-19 ENCOUNTER — CLINICAL SUPPORT (OUTPATIENT)
Dept: REHABILITATION | Facility: OTHER | Age: 64
End: 2024-12-19
Payer: COMMERCIAL

## 2024-12-19 ENCOUNTER — PATIENT MESSAGE (OUTPATIENT)
Dept: INTERNAL MEDICINE | Facility: CLINIC | Age: 64
End: 2024-12-19
Payer: COMMERCIAL

## 2024-12-19 DIAGNOSIS — E11.8 TYPE 2 DIABETES MELLITUS WITH COMPLICATION, WITH LONG-TERM CURRENT USE OF INSULIN: ICD-10-CM

## 2024-12-19 DIAGNOSIS — S46.911S RIGHT SHOULDER STRAIN, SEQUELA: Primary | ICD-10-CM

## 2024-12-19 DIAGNOSIS — Z79.4 TYPE 2 DIABETES MELLITUS WITH COMPLICATION, WITH LONG-TERM CURRENT USE OF INSULIN: ICD-10-CM

## 2024-12-19 PROCEDURE — 97530 THERAPEUTIC ACTIVITIES: CPT | Mod: PN

## 2024-12-19 PROCEDURE — 97112 NEUROMUSCULAR REEDUCATION: CPT | Mod: PN

## 2024-12-19 PROCEDURE — 97140 MANUAL THERAPY 1/> REGIONS: CPT | Mod: PN

## 2024-12-19 RX ORDER — INSULIN ASPART 100 [IU]/ML
INJECTION, SOLUTION INTRAVENOUS; SUBCUTANEOUS
Qty: 45 EACH | Refills: 0 | Status: SHIPPED | OUTPATIENT
Start: 2024-12-19

## 2024-12-19 NOTE — TELEPHONE ENCOUNTER
Care Due:                  Date            Visit Type   Department     Provider  --------------------------------------------------------------------------------                                EP -                              PRIMARY      [facility] INTERNAL  Last Visit: 08-      CARE ([facility])   KRISHNA Greene                              [facility]                              PRIMARY      [facility] INTERNAL  Next Visit: 02-      CARE ([facility])   KRISHNA Greene                                                            Last  Test          Frequency    Reason                     Performed    Due Date  --------------------------------------------------------------------------------    HBA1C.......  6 months...  dulaglutide,               08- 02-                             empagliflozin, insulin,                             metFORMIN................    [employer] Embedded Care Due Messages. Reference number: [id number].   12/19/2024 10:46:31 AM CST

## 2024-12-19 NOTE — PROGRESS NOTES
"[facility]   Physical Therapy Treatment Note     Name: Henry Zacarias  Clinic Number: [id number]    Therapy Diagnosis:   Encounter Diagnosis   Name Primary?    Right shoulder strain, sequela Yes       Physician: NUSRAT Mccoy MD    Visit Date: 12/19/2024       Physician Orders: Physical Therapy Evaluate and Treat  Medical Diagnosis from Referral: Rotator cuff tear arthropathy of right shoulder [M75.101, M12.811]   Evaluation Date: 11/20/2024  Authorization Period Expiration: 12/31/24- 2/20/24  Plan of Care Expiration: 11/20/2024 to 2/20/24  Visit # / Visits authorized: 4/20     Time In:0245pm  Time Out: 0345pm  Total Billable Time: 60 minutes     Precautions: Standard      SUBJECTIVE     Pt reports: that he is doing great today. Pt states that his shoulder feels stronger.     He was compliant with home exercise program.  Response to previous treatment: decreased right shoulder pain  Functional change: improved ability to reach overhead    Pain: 2/10  Location: right shoulder      OBJECTIVE     Objective Measures updated at progress report unless specified.       TREATMENT       Henry received the treatments listed below:        manual therapy techniques: Myofacial release and Soft tissue Mobilization were applied for 8 minutes, including:   R Shoulder Inf Glide with gentle Oscillations Gr I/II  SL upward rotation mobilization of scapula       Neuromuscular re education for 22 minutes for improved balance and proprioception including:     B Shoulder flexion 30x with 1# bar while supine on mat  R Supine Shoulder alphabet 2x vs 3#   Sidelying R shoulder abduction 30x  Rows vs GTB 30x5" hold  Seated shoulder squeezes 30x5" hold  Standing shoulder extension vs GTB 30x5" hold       Therapeutic Activities were provided for 30 minutes to improve tolerance to functional activities including:   R Shoulder circles with ball on wall 30x CW/Ccw  R Shoulder flexion with towel on wall 30x  R shoulder " abduction with towel on wall 30x  UBE 3 min/3min  Serratus wall slide vs pillowcase 30x  R shoulder ER vs GTB 30x  Banded walkout vs RTB 30x ER  Banded walkout vs RTB 30x IR  +Landmine press vs 4# ankle weight on wrist with yellow dowel 30x  +Serratus punches vs 7# 3x10 repetitions        PATIENT EDUCATION AND HOME EXERCISES     Home Exercises Provided and Patient Education Provided     Education provided:   PT educated pt on importance of compliance with their HEP this visit.     Written Home Exercises Provided: Patient instructed to cont prior HEP. Exercises were reviewed and Henry was able to demonstrate them prior to the end of the session.  Henry demonstrated good  understanding of the education provided. See EMR under Patient Instructions for exercises provided during therapy sessions    ASSESSMENT   Pt tolerated treatment session well today and demonstrated improved upward rotation of scapula following manual therapy intervention provided by PT. Pt is progressing well as evidenced by improved AROM reaching overhead and pt met his FOTO outcome score today. Continue PT POC.    Henry Is progressing well towards his goals.   Pt prognosis is Good.     Pt will continue to benefit from skilled outpatient physical therapy to address the deficits listed in the problem list box on initial evaluation, provide pt/family education and to maximize pt's level of independence in the home and community environment.     Pt's spiritual, cultural and educational needs considered and pt agreeable to plan of care and goals.     Anticipated barriers to physical therapy: None    GOALS:  Short Term Goals:     1.) Pt will improve their FOTO score by 5% to return to PLOF. (Progressing, not met)  2.) Pt will decrease their shoulder pain to 2/10 for improved QOL. (Progressing, not met)  3.) Pt will improve their right shoulder abduction AROM to 120 degrees for improved reaching. (Progressing, not met)  4.) Pt will improve their right  shoulder abduction strength to 5/5 to return to their PLOF. (Progressing, not met)  5.) Pt will become independent with their HEP to improve strength and tolerance to functional activities. (Progressing, not met)     Long Term Goals:  1.) Pt will improve their FOTO score by 10% to return to PLOF. (Progressing, not met)  2.) Pt will decrease their right shoulder pain to 3/10 for improved QOL. (Progressing, not met)  3.) Pt will improve their right shoulder flexion AROM to 180 degrees for improved reaching overhead. (Progressing, not met)  4.) Pt will improve their right shoulder flexion strength to 5/5 to return to their PLOF. (Progressing, not met)  5.) Pt will tolerate lifting 10 pounds with no increase in right shoulder pain to return to PLOF. (Progressing, not met)         Plan      Plan of care Certification: 11/20/2024 to 2/20/24    Focus on shoulder ROM and strength with emphasis on ADL/OH performance     Outpatient Physical Therapy 2 times weekly for 10 weeks to include the following interventions: Therapeutic Exercises, Manual Therapeutic Technique, Neuromuscular Re Education, Therapeutic Activities. Modalities, Kinesiotape prn, and Functional Dry Needling as needed.      Meredith Valentine, PT

## 2024-12-19 NOTE — TELEPHONE ENCOUNTER
Refill Routing Note   Medication(s) are not appropriate for processing by [facility] for the following reason(s):        No active prescription written by provider    [facility] action(s):  Defer     Requires labs : Yes             Appointments  past 12m or future 3m with PCP    Date Provider   Last Visit   8/15/2024 Diann Greene MD   Next Visit   2/6/2025 Diann Greene MD   ED visits in past 90 days: 0        Note composed:3:11 PM 12/19/2024

## 2024-12-26 ENCOUNTER — CLINICAL SUPPORT (OUTPATIENT)
Dept: REHABILITATION | Facility: OTHER | Age: 64
End: 2024-12-26
Payer: COMMERCIAL

## 2024-12-26 DIAGNOSIS — S46.911S RIGHT SHOULDER STRAIN, SEQUELA: Primary | ICD-10-CM

## 2024-12-26 PROCEDURE — 97112 NEUROMUSCULAR REEDUCATION: CPT | Mod: PN

## 2024-12-26 PROCEDURE — 97140 MANUAL THERAPY 1/> REGIONS: CPT | Mod: PN

## 2024-12-26 PROCEDURE — 97530 THERAPEUTIC ACTIVITIES: CPT | Mod: PN

## 2024-12-26 NOTE — PROGRESS NOTES
"[facility]   Physical Therapy Treatment Note     Name: Henry Zacarias  Clinic Number: [id number]    Therapy Diagnosis:   Encounter Diagnosis   Name Primary?    Right shoulder strain, sequela Yes       Physician: NUSRAT Mccoy MD    Visit Date: 12/26/2024       Physician Orders: Physical Therapy Evaluate and Treat  Medical Diagnosis from Referral: Rotator cuff tear arthropathy of right shoulder [M75.101, M12.811]   Evaluation Date: 11/20/2024  Authorization Period Expiration: 12/31/24- 2/20/24  Plan of Care Expiration: 11/20/2024 to 2/20/24  Visit # / Visits authorized: 7/20     Time In: 1038am  Time Out: 1139am  Total Billable Time: 61 minutes     Precautions: Standard      SUBJECTIVE     Pt reports: that he is doing great today. Pt states that he is getting stronger slowly.     He was compliant with home exercise program.  Response to previous treatment: decreased right shoulder pain  Functional change: improved ability to reach overhead    Pain: 2/10  Location: right shoulder      OBJECTIVE     Objective Measures updated at progress report unless specified.       TREATMENT       Henry received the treatments listed below:        manual therapy techniques: Myofacial release and Soft tissue Mobilization were applied for 8 minutes, including:   R Shoulder Inf Glide with gentle Oscillations Gr I/II  SL upward rotation mobilization of scapula       Neuromuscular re education for 22 minutes for improved balance and proprioception including:     B Shoulder flexion 30x with 1# bar while supine on mat  R Supine Shoulder alphabet 2x vs 3#   Sidelying R shoulder abduction 30x  Rows vs GTB 30x5" hold  Seated shoulder squeezes 30x5" hold  Standing shoulder extension vs GTB 30x5" hold       Therapeutic Activities were provided for 30 minutes to improve tolerance to functional activities including:   R Shoulder circles with ball on wall 30x CW/Ccw  R Shoulder flexion with towel on wall 30x  R " shoulder abduction with towel on wall 30x  UBE 3 min/3min Level 2.0  Serratus wall slide vs pillowcase 30x  R shoulder ER vs GTB 30x  Banded walkout vs RTB 30x ER  Banded walkout vs RTB 30x IR  Landmine press vs 4# ankle weight on wrist with yellow dowel 30x  Serratus punches vs 7# 3x10 repetitions        PATIENT EDUCATION AND HOME EXERCISES     Home Exercises Provided and Patient Education Provided     Education provided:   PT educated pt on importance of compliance with their HEP this visit.     Written Home Exercises Provided: Patient instructed to cont prior HEP. Exercises were reviewed and Henry was able to demonstrate them prior to the end of the session.  Henry demonstrated good  understanding of the education provided. See EMR under Patient Instructions for exercises provided during therapy sessions    ASSESSMENT   Pt tolerated treatment session well today. Continue PT POC with emphasis on improving pt's overhead AROM and tolerance to functional activities. Pt very motivated to participate in PT.    Henry Is progressing well towards his goals.   Pt prognosis is Good.     Pt will continue to benefit from skilled outpatient physical therapy to address the deficits listed in the problem list box on initial evaluation, provide pt/family education and to maximize pt's level of independence in the home and community environment.     Pt's spiritual, cultural and educational needs considered and pt agreeable to plan of care and goals.     Anticipated barriers to physical therapy: None    GOALS:  Short Term Goals:     1.) Pt will improve their FOTO score by 5% to return to PLOF. (Progressing, not met)  2.) Pt will decrease their shoulder pain to 2/10 for improved QOL. (Progressing, not met)  3.) Pt will improve their right shoulder abduction AROM to 120 degrees for improved reaching. (Progressing, not met)  4.) Pt will improve their right shoulder abduction strength to 5/5 to return to their PLOF. (Progressing,  not met)  5.) Pt will become independent with their HEP to improve strength and tolerance to functional activities. (Progressing, not met)     Long Term Goals:  1.) Pt will improve their FOTO score by 10% to return to PLOF. (Progressing, not met)  2.) Pt will decrease their right shoulder pain to 3/10 for improved QOL. (Progressing, not met)  3.) Pt will improve their right shoulder flexion AROM to 180 degrees for improved reaching overhead. (Progressing, not met)  4.) Pt will improve their right shoulder flexion strength to 5/5 to return to their PLOF. (Progressing, not met)  5.) Pt will tolerate lifting 10 pounds with no increase in right shoulder pain to return to PLOF. (Progressing, not met)         Plan      Plan of care Certification: 11/20/2024 to 2/20/24    Focus on shoulder ROM and strength with emphasis on ADL/OH performance     Outpatient Physical Therapy 2 times weekly for 10 weeks to include the following interventions: Therapeutic Exercises, Manual Therapeutic Technique, Neuromuscular Re Education, Therapeutic Activities. Modalities, Kinesiotape prn, and Functional Dry Needling as needed.      Meredith Valentine, PT

## 2024-12-29 ENCOUNTER — PATIENT MESSAGE (OUTPATIENT)
Dept: INTERNAL MEDICINE | Facility: CLINIC | Age: 64
End: 2024-12-29
Payer: COMMERCIAL

## 2024-12-29 DIAGNOSIS — Z79.4 TYPE 2 DIABETES MELLITUS WITH MICROALBUMINURIA, WITH LONG-TERM CURRENT USE OF INSULIN: ICD-10-CM

## 2024-12-29 DIAGNOSIS — R80.9 TYPE 2 DIABETES MELLITUS WITH MICROALBUMINURIA, WITH LONG-TERM CURRENT USE OF INSULIN: ICD-10-CM

## 2024-12-29 DIAGNOSIS — R60.0 BILATERAL LEG EDEMA: ICD-10-CM

## 2024-12-29 DIAGNOSIS — E11.29 TYPE 2 DIABETES MELLITUS WITH MICROALBUMINURIA, WITH LONG-TERM CURRENT USE OF INSULIN: ICD-10-CM

## 2024-12-30 ENCOUNTER — CLINICAL SUPPORT (OUTPATIENT)
Dept: REHABILITATION | Facility: OTHER | Age: 64
End: 2024-12-30
Payer: COMMERCIAL

## 2024-12-30 DIAGNOSIS — S46.911S RIGHT SHOULDER STRAIN, SEQUELA: Primary | ICD-10-CM

## 2024-12-30 PROCEDURE — 97112 NEUROMUSCULAR REEDUCATION: CPT | Mod: PN

## 2024-12-30 PROCEDURE — 97530 THERAPEUTIC ACTIVITIES: CPT | Mod: PN

## 2024-12-30 NOTE — TELEPHONE ENCOUNTER
Refill Encounter    PCP Visits: Recent Visits  Date Type Provider Dept   08/15/24 Office Visit Diann Greene MD [facility]   02/15/24 Office Visit Diann Greene MD [facility]   Showing recent visits within past 360 days and meeting all other requirements  Future Appointments  Date Type Provider Dept   02/06/25 Appointment Diann Greene MD [facility]   Showing future appointments within next 720 days and meeting all other requirements     Last 3 Blood Pressure:   BP Readings from Last 3 Encounters:   11/18/24 130/73   11/11/24 133/78   10/07/24 130/77     Preferred Pharmacy:   [facility]/pharmacy #[id number] - [place], [place] - [address]  [address] [address]  Phone: [phone] Fax: [phone]    Requested RX:  Requested Prescriptions     Pending Prescriptions Disp Refills    empagliflozin (JARDIANCE) 25 mg tablet 90 tablet 3     Sig: Take 1 tablet (25 mg total) by mouth once daily.      RX Route: Normal

## 2024-12-30 NOTE — PROGRESS NOTES
"[facility]   Physical Therapy Treatment Note     Name: Henry Zacarias  Clinic Number: [id number]    Therapy Diagnosis:   Encounter Diagnosis   Name Primary?    Right shoulder strain, sequela Yes       Physician: NUSRAT Mccoy MD    Visit Date: 12/30/2024       Physician Orders: Physical Therapy Evaluate and Treat  Medical Diagnosis from Referral: Rotator cuff tear arthropathy of right shoulder [M75.101, M12.811]   Evaluation Date: 11/20/2024  Authorization Period Expiration: 12/31/24- 2/20/24  Plan of Care Expiration: 11/20/2024 to 2/20/24  Visit # / Visits authorized: 8/20     Time In: 1115am  Time Out: 1220pm  Total Billable Time: 65 minutes     Precautions: Standard      SUBJECTIVE     Pt reports: that his shoulder feels much stronger.     He was compliant with home exercise program.  Response to previous treatment: decreased right shoulder pain  Functional change: improved ability to reach overhead    Pain: 2/10  Location: right shoulder      OBJECTIVE     Objective Measures updated at progress report unless specified.       TREATMENT       Henry received the treatments listed below:        manual therapy techniques: Myofacial release and Soft tissue Mobilization were applied for 0 minutes, including:   R Shoulder Inf Glide with gentle Oscillations Gr I/II  SL upward rotation mobilization of scapula       Neuromuscular re education for 25 minutes for improved balance and proprioception including:     B Shoulder flexion 30x with 1# bar while supine on mat  R Supine Shoulder alphabet 2x vs 3#   Sidelying R shoulder abduction 30x  Rows vs GTB 30x5" hold  Seated shoulder squeezes 30x5" hold  Standing shoulder extension vs GTB 30x5" hold       Therapeutic Activities were provided for 40 minutes to improve tolerance to functional activities including:   R Shoulder circles with ball on wall 30x CW/Ccw  R Shoulder flexion with towel on wall 30x  R shoulder abduction with towel on wall " 30x  UBE 3 min/3min Level 2.0  Serratus wall slide vs pillowcase 30x  R shoulder ER vs GTB 30x  Banded walkout vs RTB 30x ER  Banded walkout vs RTB 30x IR  Landmine press vs 4# ankle weight on wrist with yellow dowel 30x  Serratus punches vs 7# 3x10 repetitions        PATIENT EDUCATION AND HOME EXERCISES     Home Exercises Provided and Patient Education Provided     Education provided:   PT educated pt on importance of compliance with their HEP this visit.     Written Home Exercises Provided: Patient instructed to cont prior HEP. Exercises were reviewed and Henry was able to demonstrate them prior to the end of the session.  Henry demonstrated good  understanding of the education provided. See EMR under Patient Instructions for exercises provided during therapy sessions    ASSESSMENT   Pt tolerated treatment session well today. Pt now experiences less shoulder pain at end range abduction. Continue PT POC.    Henry Is progressing well towards his goals.   Pt prognosis is Good.     Pt will continue to benefit from skilled outpatient physical therapy to address the deficits listed in the problem list box on initial evaluation, provide pt/family education and to maximize pt's level of independence in the home and community environment.     Pt's spiritual, cultural and educational needs considered and pt agreeable to plan of care and goals.     Anticipated barriers to physical therapy: None    GOALS:  Short Term Goals:     1.) Pt will improve their FOTO score by 5% to return to PLOF. (Progressing, not met)  2.) Pt will decrease their shoulder pain to 2/10 for improved QOL. (Progressing, not met)  3.) Pt will improve their right shoulder abduction AROM to 120 degrees for improved reaching. (Progressing, not met)  4.) Pt will improve their right shoulder abduction strength to 5/5 to return to their PLOF. (Progressing, not met)  5.) Pt will become independent with their HEP to improve strength and tolerance to functional  activities. (Progressing, not met)     Long Term Goals:  1.) Pt will improve their FOTO score by 10% to return to PLOF. (Progressing, not met)  2.) Pt will decrease their right shoulder pain to 3/10 for improved QOL. (Progressing, not met)  3.) Pt will improve their right shoulder flexion AROM to 180 degrees for improved reaching overhead. (Progressing, not met)  4.) Pt will improve their right shoulder flexion strength to 5/5 to return to their PLOF. (Progressing, not met)  5.) Pt will tolerate lifting 10 pounds with no increase in right shoulder pain to return to PLOF. (Progressing, not met)         Plan      Plan of care Certification: 11/20/2024 to 2/20/24    Focus on shoulder ROM and strength with emphasis on ADL/OH performance     Outpatient Physical Therapy 2 times weekly for 10 weeks to include the following interventions: Therapeutic Exercises, Manual Therapeutic Technique, Neuromuscular Re Education, Therapeutic Activities. Modalities, Kinesiotape prn, and Functional Dry Needling as needed.      Meredith Valentine, PT

## 2024-12-30 NOTE — TELEPHONE ENCOUNTER
No care due was identified.  [facility] Embedded Care Due Messages. Reference number: [id number].   12/30/2024 10:37:37 AM CST

## 2025-01-04 ENCOUNTER — PATIENT MESSAGE (OUTPATIENT)
Dept: INTERNAL MEDICINE | Facility: CLINIC | Age: 65
End: 2025-01-04
Payer: COMMERCIAL

## 2025-01-04 DIAGNOSIS — Z00.00 HEALTH MAINTENANCE EXAMINATION: ICD-10-CM

## 2025-01-04 DIAGNOSIS — Z79.4 TYPE 2 DIABETES MELLITUS WITH COMPLICATION, WITH LONG-TERM CURRENT USE OF INSULIN: ICD-10-CM

## 2025-01-04 DIAGNOSIS — Z00.00 ANNUAL PHYSICAL EXAM: Primary | ICD-10-CM

## 2025-01-04 DIAGNOSIS — E11.8 TYPE 2 DIABETES MELLITUS WITH COMPLICATION, WITH LONG-TERM CURRENT USE OF INSULIN: ICD-10-CM

## 2025-01-04 DIAGNOSIS — I10 ESSENTIAL HYPERTENSION: ICD-10-CM

## 2025-01-04 DIAGNOSIS — D64.9 ANEMIA, UNSPECIFIED TYPE: ICD-10-CM

## 2025-01-06 ENCOUNTER — CLINICAL SUPPORT (OUTPATIENT)
Dept: REHABILITATION | Facility: OTHER | Age: 65
End: 2025-01-06
Payer: COMMERCIAL

## 2025-01-06 DIAGNOSIS — S46.911S RIGHT SHOULDER STRAIN, SEQUELA: Primary | ICD-10-CM

## 2025-01-06 PROCEDURE — 97112 NEUROMUSCULAR REEDUCATION: CPT | Mod: PN

## 2025-01-06 PROCEDURE — 97530 THERAPEUTIC ACTIVITIES: CPT | Mod: PN

## 2025-01-09 ENCOUNTER — CLINICAL SUPPORT (OUTPATIENT)
Dept: REHABILITATION | Facility: OTHER | Age: 65
End: 2025-01-09
Payer: COMMERCIAL

## 2025-01-09 DIAGNOSIS — S46.911S RIGHT SHOULDER STRAIN, SEQUELA: Primary | ICD-10-CM

## 2025-01-09 PROCEDURE — 97530 THERAPEUTIC ACTIVITIES: CPT | Mod: PN

## 2025-01-09 PROCEDURE — 97140 MANUAL THERAPY 1/> REGIONS: CPT | Mod: PN

## 2025-01-09 NOTE — PROGRESS NOTES
"[facility]   Physical Therapy Treatment Note     Name: Henry Zacarias  Clinic Number: [id number]    Therapy Diagnosis:   Encounter Diagnosis   Name Primary?    Right shoulder strain, sequela Yes         Physician: NUSRAT Mccoy MD    Visit Date: 1/9/2025       Physician Orders: Physical Therapy Evaluate and Treat  Medical Diagnosis from Referral: Rotator cuff tear arthropathy of right shoulder [M75.101, M12.811]   Evaluation Date: 11/20/2024  Authorization Period Expiration: 12/31/24- 2/20/24  Plan of Care Expiration: 11/20/2024 to 2/20/24  Visit # / Visits authorized: 2/20     Time In: 0741am  Time Out: 0842am  Total Billable Time: 61 minutes     Precautions: Standard      SUBJECTIVE     Pt reports: that he is doing well today. Pt was sore in his anterior/lateral right shoulder the past two days. However, this has subsided. Pt reports that he is reaching overhead more easily.     He was compliant with home exercise program.  Response to previous treatment: decreased right shoulder pain  Functional change: improved ability to reach overhead    Pain: 2/10  Location: right shoulder      OBJECTIVE     Objective Measures updated at progress report unless specified.       TREATMENT       Henry received the treatments listed below:        manual therapy techniques: Myofacial release and Soft tissue Mobilization were applied for 3 minutes, including:   R Shoulder Inf Glide with gentle Oscillations Gr III/IV  SL upward rotation mobilization of scapula   +R shoulder PROM into flexion/abduction as tolerated      Neuromuscular re education for 14 minutes for improved balance and proprioception including:     B Shoulder flexion 30x with 1# bar while supine on mat  R Supine Shoulder alphabet 2x vs 3#   Sidelying R shoulder abduction 30x  Rows vs GTB 30x5" hold  Seated shoulder squeezes 30x5" hold  Standing shoulder extension vs GTB 30x5" hold       Therapeutic Activities were provided for 45 " minutes to improve tolerance to functional activities including:   R Shoulder circles with ball on wall 30x CW/Ccw  R Shoulder flexion with towel on wall 30x  R shoulder abduction with towel on wall 30x  UBE 3 min/3min Level 2.0  Serratus wall slide vs pillowcase 30x  R shoulder ER vs GTB 30x  Banded walkout vs RTB 30x ER  Banded walkout vs RTB 30x IR  Landmine press vs 4# ankle weight on wrist with yellow dowel 30x  Serratus punches vs 7# 3x10 repetitions        PATIENT EDUCATION AND HOME EXERCISES     Home Exercises Provided and Patient Education Provided     Education provided:   PT educated pt on importance of compliance with their HEP this visit.     Written Home Exercises Provided: Patient instructed to cont prior HEP. Exercises were reviewed and Henry was able to demonstrate them prior to the end of the session.  Henry demonstrated good  understanding of the education provided. See EMR under Patient Instructions for exercises provided during therapy sessions    ASSESSMENT   Pt tolerated treatment session well today. Pt experienced relief in his right shoulder pain following manual therapy intervention provided by PT. Pt demonstrates decreased instance of shoulder shrugging with right shoulder abduction. Continue PT POC.    Henry Is progressing well towards his goals.   Pt prognosis is Good.     Pt will continue to benefit from skilled outpatient physical therapy to address the deficits listed in the problem list box on initial evaluation, provide pt/family education and to maximize pt's level of independence in the home and community environment.     Pt's spiritual, cultural and educational needs considered and pt agreeable to plan of care and goals.     Anticipated barriers to physical therapy: None    GOALS:  Short Term Goals:     1.) Pt will improve their FOTO score by 5% to return to PLOF. (Progressing, not met)  2.) Pt will decrease their shoulder pain to 2/10 for improved QOL. (Progressing, not  met)  3.) Pt will improve their right shoulder abduction AROM to 120 degrees for improved reaching. (Progressing, not met)  4.) Pt will improve their right shoulder abduction strength to 5/5 to return to their PLOF. (Progressing, not met)  5.) Pt will become independent with their HEP to improve strength and tolerance to functional activities. (Progressing, not met)     Long Term Goals:  1.) Pt will improve their FOTO score by 10% to return to PLOF. (Progressing, not met)  2.) Pt will decrease their right shoulder pain to 3/10 for improved QOL. (Progressing, not met)  3.) Pt will improve their right shoulder flexion AROM to 180 degrees for improved reaching overhead. (Progressing, not met)  4.) Pt will improve their right shoulder flexion strength to 5/5 to return to their PLOF. (Progressing, not met)  5.) Pt will tolerate lifting 10 pounds with no increase in right shoulder pain to return to PLOF. (Progressing, not met)         Plan      Plan of care Certification: 11/20/2024 to 2/20/24    Focus on shoulder ROM and strength with emphasis on ADL/OH performance     Outpatient Physical Therapy 2 times weekly for 10 weeks to include the following interventions: Therapeutic Exercises, Manual Therapeutic Technique, Neuromuscular Re Education, Therapeutic Activities. Modalities, Kinesiotape prn, and Functional Dry Needling as needed.      Meredith Valentine, PT

## 2025-01-13 ENCOUNTER — CLINICAL SUPPORT (OUTPATIENT)
Dept: REHABILITATION | Facility: OTHER | Age: 65
End: 2025-01-13
Payer: COMMERCIAL

## 2025-01-13 DIAGNOSIS — S46.911S RIGHT SHOULDER STRAIN, SEQUELA: Primary | ICD-10-CM

## 2025-01-13 PROCEDURE — 97140 MANUAL THERAPY 1/> REGIONS: CPT | Mod: PN

## 2025-01-13 PROCEDURE — 97112 NEUROMUSCULAR REEDUCATION: CPT | Mod: PN

## 2025-01-13 PROCEDURE — 97530 THERAPEUTIC ACTIVITIES: CPT | Mod: PN

## 2025-01-13 NOTE — PROGRESS NOTES
"[facility]   Physical Therapy Treatment Note     Name: Henry Zacarias  Clinic Number: [id number]    Therapy Diagnosis:   Encounter Diagnosis   Name Primary?    Right shoulder strain, sequela Yes           Physician: NUSRAT Mccoy MD    Visit Date: 1/13/2025       Physician Orders: Physical Therapy Evaluate and Treat  Medical Diagnosis from Referral: Rotator cuff tear arthropathy of right shoulder [M75.101, M12.811]   Evaluation Date: 11/20/2024  Authorization Period Expiration: 12/31/24- 2/20/24  Plan of Care Expiration: 11/20/2024 to 2/20/24  Visit # / Visits authorized: 3/20     Time In: 0340pm  Time Out: 0443pm  Total Billable Time: 63 minutes     Precautions: Standard      SUBJECTIVE     Pt reports: that he is doing great today. Pt's range of motion is improving and he is now able to reach overhead with less shoulder pain.     He was compliant with home exercise program.  Response to previous treatment: decreased right shoulder pain  Functional change: improved ability to reach overhead    Pain: 2/10  Location: right shoulder      OBJECTIVE     Objective Measures updated at progress report unless specified.       TREATMENT       Henry received the treatments listed below:        manual therapy techniques: Myofacial release and Soft tissue Mobilization were applied for 9 minutes, including:   R Shoulder Inf Glide with gentle Oscillations Gr III/IV  SL upward rotation mobilization of scapula   R shoulder PROM into flexion/abduction as tolerated      Neuromuscular re education for 19 minutes for improved balance and proprioception including:     B Shoulder flexion 30x with 2# bar while supine on mat  R Supine Shoulder alphabet 2x vs 6#   Sidelying R shoulder abduction 30x vs 2#  Rows vs BTB 30x5" hold  Seated shoulder squeezes 30x5" hold  Standing shoulder extension vs BTB 30x5" hold       Therapeutic Activities were provided for 40 minutes to improve tolerance to functional " activities including:   UBE 3 min/3min Level 2.0  +R SL shoudler ER vs 2# 30x  R Shoulder circles with ball on wall 30x CW/Ccw  R Shoulder flexion with towel on wall 30x wearing one pound ankle weight on wrist  R shoulder abduction with towel on wall 30x  Serratus wall slide vs pillowcase 30x  R shoulder ER vs GTB 30x  Banded walkout vs GTB 30x ER  Banded walkout vs GTB 30x IR  Landmine press vs 4# ankle weight on wrist with yellow dowel 30x  Serratus punches vs 7# 3x10 repetitions        PATIENT EDUCATION AND HOME EXERCISES     Home Exercises Provided and Patient Education Provided     Education provided:   PT educated pt on importance of compliance with their HEP this visit.     Written Home Exercises Provided: Patient instructed to cont prior HEP. Exercises were reviewed and Henry was able to demonstrate them prior to the end of the session.  Henry demonstrated good  understanding of the education provided. See EMR under Patient Instructions for exercises provided during therapy sessions    ASSESSMENT   Pt tolerated treatment session well today. Progressed resistance with all exercises today with good response from pt. Continue PT POC.    Henry Is progressing well towards his goals.   Pt prognosis is Good.     Pt will continue to benefit from skilled outpatient physical therapy to address the deficits listed in the problem list box on initial evaluation, provide pt/family education and to maximize pt's level of independence in the home and community environment.     Pt's spiritual, cultural and educational needs considered and pt agreeable to plan of care and goals.     Anticipated barriers to physical therapy: None    GOALS:  Short Term Goals:     1.) Pt will improve their FOTO score by 5% to return to PLOF. (Progressing, not met)  2.) Pt will decrease their shoulder pain to 2/10 for improved QOL. (Progressing, not met)  3.) Pt will improve their right shoulder abduction AROM to 120 degrees for improved  reaching. (Progressing, not met)  4.) Pt will improve their right shoulder abduction strength to 5/5 to return to their PLOF. (Progressing, not met)  5.) Pt will become independent with their HEP to improve strength and tolerance to functional activities. (Progressing, not met)     Long Term Goals:  1.) Pt will improve their FOTO score by 10% to return to PLOF. (Progressing, not met)  2.) Pt will decrease their right shoulder pain to 3/10 for improved QOL. (Progressing, not met)  3.) Pt will improve their right shoulder flexion AROM to 180 degrees for improved reaching overhead. (Progressing, not met)  4.) Pt will improve their right shoulder flexion strength to 5/5 to return to their PLOF. (Progressing, not met)  5.) Pt will tolerate lifting 10 pounds with no increase in right shoulder pain to return to PLOF. (Progressing, not met)         Plan      Plan of care Certification: 11/20/2024 to 2/20/24    Focus on shoulder ROM and strength with emphasis on ADL/OH performance     Outpatient Physical Therapy 2 times weekly for 10 weeks to include the following interventions: Therapeutic Exercises, Manual Therapeutic Technique, Neuromuscular Re Education, Therapeutic Activities. Modalities, Kinesiotape prn, and Functional Dry Needling as needed.      Meredith Valentine, PT

## 2025-01-16 ENCOUNTER — CLINICAL SUPPORT (OUTPATIENT)
Dept: REHABILITATION | Facility: OTHER | Age: 65
End: 2025-01-16
Payer: COMMERCIAL

## 2025-01-16 DIAGNOSIS — S46.911S RIGHT SHOULDER STRAIN, SEQUELA: Primary | ICD-10-CM

## 2025-01-16 PROCEDURE — 97112 NEUROMUSCULAR REEDUCATION: CPT | Mod: PN

## 2025-01-16 PROCEDURE — 97530 THERAPEUTIC ACTIVITIES: CPT | Mod: PN

## 2025-01-16 PROCEDURE — 97140 MANUAL THERAPY 1/> REGIONS: CPT | Mod: PN

## 2025-01-16 NOTE — PROGRESS NOTES
"[facility]   Physical Therapy Treatment Note     Name: Henry Zacarias  Clinic Number: [id number]    Therapy Diagnosis:   Encounter Diagnosis   Name Primary?    Right shoulder strain, sequela Yes           Physician: NUSRAT Mccoy MD    Visit Date: 1/16/2025       Physician Orders: Physical Therapy Evaluate and Treat  Medical Diagnosis from Referral: Rotator cuff tear arthropathy of right shoulder [M75.101, M12.811]   Evaluation Date: 11/20/2024  Authorization Period Expiration: 12/31/24- 2/20/24  Plan of Care Expiration: 11/20/2024 to 2/20/24  Visit # / Visits authorized: 4/20     Time In: 0740am  Time Out: 0845am  Total Billable Time: 65 minutes     Precautions: Standard      SUBJECTIVE     Pt reports: that he is doing well today. Pt reports that he is having less shoulder pain despite completing additional exercises.     He was compliant with home exercise program.  Response to previous treatment: decreased right shoulder pain  Functional change: improved ability to reach overhead    Pain: 2/10  Location: right shoulder      OBJECTIVE     Objective Measures updated at progress report unless specified.       TREATMENT       Henry received the treatments listed below:        manual therapy techniques: Myofacial release and Soft tissue Mobilization were applied for 9 minutes, including:   R Shoulder Inf Glide with gentle Oscillations Gr III/IV  SL upward rotation mobilization of scapula   R shoulder PROM into flexion/abduction as tolerated      Neuromuscular re education for 19 minutes for improved balance and proprioception including:     B Shoulder flexion 30x with 2# bar while supine on mat  R Supine Shoulder alphabet 2x vs 6#   Sidelying R shoulder abduction 30x vs 2#  Rows vs BTB 30x5" hold  Seated shoulder squeezes 30x5" hold  Standing shoulder extension vs BTB 30x5" hold       Therapeutic Activities were provided for 40 minutes to improve tolerance to functional activities " "including:   UBE 3 min/3min Level 2.0  R SL shoudler ER vs 3# 30x  R Shoulder circles with ball on wall 30x CW/Ccw  R Shoulder flexion with towel on wall 30x   R shoulder abduction with towel on wall 30x  Serratus wall slide vs pillowcase 30x  R shoulder ER vs GTB 30x  Banded walkout vs GTB 30x ER  Banded walkout vs GTB 30x IR  Landmine press vs 4# ankle weight on wrist with yellow dowel 30x  Serratus punches vs 7# 3x10 repetitions  +Standing Y vs YTB 30x5" hold  +D2 flexion vs Ytb 30x          PATIENT EDUCATION AND HOME EXERCISES     Home Exercises Provided and Patient Education Provided     Education provided:   PT educated pt on importance of compliance with their HEP this visit.     Written Home Exercises Provided: Patient instructed to cont prior HEP. Exercises were reviewed and Henry was able to demonstrate them prior to the end of the session.  Henry demonstrated good  understanding of the education provided. See EMR under Patient Instructions for exercises provided during therapy sessions    ASSESSMENT   Pt tolerated treatment session well today. Pt requires continued cueing to avoid shoulder shrugging with shoulder abduction. However, he is doing well overall. Progressed functional exercises such as standing Y's and D2 flexion today. Continue PT POC.    Henry Is progressing well towards his goals.   Pt prognosis is Good.     Pt will continue to benefit from skilled outpatient physical therapy to address the deficits listed in the problem list box on initial evaluation, provide pt/family education and to maximize pt's level of independence in the home and community environment.     Pt's spiritual, cultural and educational needs considered and pt agreeable to plan of care and goals.     Anticipated barriers to physical therapy: None    GOALS:  Short Term Goals:     1.) Pt will improve their FOTO score by 5% to return to PLOF. (Progressing, not met)  2.) Pt will decrease their shoulder pain to 2/10 for " improved QOL. (Progressing, not met)  3.) Pt will improve their right shoulder abduction AROM to 120 degrees for improved reaching. (Progressing, not met)  4.) Pt will improve their right shoulder abduction strength to 5/5 to return to their PLOF. (Progressing, not met)  5.) Pt will become independent with their HEP to improve strength and tolerance to functional activities. (Progressing, not met)     Long Term Goals:  1.) Pt will improve their FOTO score by 10% to return to PLOF. (Progressing, not met)  2.) Pt will decrease their right shoulder pain to 3/10 for improved QOL. (Progressing, not met)  3.) Pt will improve their right shoulder flexion AROM to 180 degrees for improved reaching overhead. (Progressing, not met)  4.) Pt will improve their right shoulder flexion strength to 5/5 to return to their PLOF. (Progressing, not met)  5.) Pt will tolerate lifting 10 pounds with no increase in right shoulder pain to return to PLOF. (Progressing, not met)         Plan      Plan of care Certification: 11/20/2024 to 2/20/24    Focus on shoulder ROM and strength with emphasis on ADL/OH performance     Outpatient Physical Therapy 2 times weekly for 10 weeks to include the following interventions: Therapeutic Exercises, Manual Therapeutic Technique, Neuromuscular Re Education, Therapeutic Activities. Modalities, Kinesiotape prn, and Functional Dry Needling as needed.      Meredith Valentine, PT

## 2025-01-27 ENCOUNTER — LAB VISIT (OUTPATIENT)
Dept: LAB | Facility: OTHER | Age: 65
End: 2025-01-27
Attending: STUDENT IN AN ORGANIZED HEALTH CARE EDUCATION/TRAINING PROGRAM
Payer: COMMERCIAL

## 2025-01-27 DIAGNOSIS — Z00.00 HEALTH MAINTENANCE EXAMINATION: ICD-10-CM

## 2025-01-27 DIAGNOSIS — D64.9 ANEMIA, UNSPECIFIED TYPE: ICD-10-CM

## 2025-01-27 DIAGNOSIS — E11.8 TYPE 2 DIABETES MELLITUS WITH COMPLICATION, WITH LONG-TERM CURRENT USE OF INSULIN: ICD-10-CM

## 2025-01-27 DIAGNOSIS — Z79.4 TYPE 2 DIABETES MELLITUS WITH COMPLICATION, WITH LONG-TERM CURRENT USE OF INSULIN: ICD-10-CM

## 2025-01-27 DIAGNOSIS — I10 ESSENTIAL HYPERTENSION: ICD-10-CM

## 2025-01-27 LAB
ALBUMIN SERPL BCP-MCNC: 4.1 G/DL (ref 3.5–5.2)
ALP SERPL-CCNC: 99 U/L (ref 40–150)
ALT SERPL W/O P-5'-P-CCNC: 19 U/L (ref 10–44)
ANION GAP SERPL CALC-SCNC: 10 MMOL/L (ref 8–16)
AST SERPL-CCNC: 14 U/L (ref 10–40)
BASOPHILS # BLD AUTO: 0.07 K/UL (ref 0–0.2)
BASOPHILS NFR BLD: 0.9 % (ref 0–1.9)
BILIRUB SERPL-MCNC: 0.4 MG/DL (ref 0.1–1)
BUN SERPL-MCNC: 18 MG/DL (ref 8–23)
CALCIUM SERPL-MCNC: 10 MG/DL (ref 8.7–10.5)
CHLORIDE SERPL-SCNC: 105 MMOL/L (ref 95–110)
CO2 SERPL-SCNC: 24 MMOL/L (ref 23–29)
CREAT SERPL-MCNC: 1.1 MG/DL (ref 0.5–1.4)
DIFFERENTIAL METHOD BLD: ABNORMAL
EOSINOPHIL # BLD AUTO: 0.5 K/UL (ref 0–0.5)
EOSINOPHIL NFR BLD: 7 % (ref 0–8)
ERYTHROCYTE [DISTWIDTH] IN BLOOD BY AUTOMATED COUNT: 13.1 % (ref 11.5–14.5)
EST. GFR  (NO RACE VARIABLE): >60 ML/MIN/1.73 M^2
ESTIMATED AVG GLUCOSE: 97 MG/DL (ref 68–131)
GLUCOSE SERPL-MCNC: 77 MG/DL (ref 70–110)
HBA1C MFR BLD: 5 % (ref 4–5.6)
HCT VFR BLD AUTO: 37.7 % (ref 40–54)
HGB BLD-MCNC: 12.8 G/DL (ref 14–18)
IGA SERPL-MCNC: 226 MG/DL (ref 40–350)
IGG SERPL-MCNC: 997 MG/DL (ref 650–1600)
IGM SERPL-MCNC: 23 MG/DL (ref 50–300)
IMM GRANULOCYTES # BLD AUTO: 0.02 K/UL (ref 0–0.04)
IMM GRANULOCYTES NFR BLD AUTO: 0.3 % (ref 0–0.5)
LYMPHOCYTES # BLD AUTO: 0.9 K/UL (ref 1–4.8)
LYMPHOCYTES NFR BLD: 12.1 % (ref 18–48)
MCH RBC QN AUTO: 33 PG (ref 27–31)
MCHC RBC AUTO-ENTMCNC: 34 G/DL (ref 32–36)
MCV RBC AUTO: 97 FL (ref 82–98)
MONOCYTES # BLD AUTO: 0.8 K/UL (ref 0.3–1)
MONOCYTES NFR BLD: 10.6 % (ref 4–15)
NEUTROPHILS # BLD AUTO: 5.1 K/UL (ref 1.8–7.7)
NEUTROPHILS NFR BLD: 69.1 % (ref 38–73)
NRBC BLD-RTO: 0 /100 WBC
PLATELET # BLD AUTO: 357 K/UL (ref 150–450)
PMV BLD AUTO: 9.6 FL (ref 9.2–12.9)
POTASSIUM SERPL-SCNC: 4.8 MMOL/L (ref 3.5–5.1)
PROT SERPL-MCNC: 7.8 G/DL (ref 6–8.4)
RBC # BLD AUTO: 3.88 M/UL (ref 4.6–6.2)
SODIUM SERPL-SCNC: 139 MMOL/L (ref 136–145)
WBC # BLD AUTO: 7.42 K/UL (ref 3.9–12.7)

## 2025-01-27 PROCEDURE — 82784 ASSAY IGA/IGD/IGG/IGM EACH: CPT | Mod: 59 | Performed by: STUDENT IN AN ORGANIZED HEALTH CARE EDUCATION/TRAINING PROGRAM

## 2025-01-27 PROCEDURE — 83521 IG LIGHT CHAINS FREE EACH: CPT | Performed by: STUDENT IN AN ORGANIZED HEALTH CARE EDUCATION/TRAINING PROGRAM

## 2025-01-27 PROCEDURE — 83036 HEMOGLOBIN GLYCOSYLATED A1C: CPT | Performed by: STUDENT IN AN ORGANIZED HEALTH CARE EDUCATION/TRAINING PROGRAM

## 2025-01-27 PROCEDURE — 85025 COMPLETE CBC W/AUTO DIFF WBC: CPT | Performed by: STUDENT IN AN ORGANIZED HEALTH CARE EDUCATION/TRAINING PROGRAM

## 2025-01-27 PROCEDURE — 80053 COMPREHEN METABOLIC PANEL: CPT | Performed by: STUDENT IN AN ORGANIZED HEALTH CARE EDUCATION/TRAINING PROGRAM

## 2025-01-27 PROCEDURE — 36415 COLL VENOUS BLD VENIPUNCTURE: CPT | Performed by: STUDENT IN AN ORGANIZED HEALTH CARE EDUCATION/TRAINING PROGRAM

## 2025-01-28 LAB
KAPPA LC SER QL IA: 2.56 MG/DL (ref 0.33–1.94)
KAPPA LC/LAMBDA SER IA: 1.35 (ref 0.26–1.65)
LAMBDA LC SER QL IA: 1.89 MG/DL (ref 0.57–2.63)

## 2025-02-06 ENCOUNTER — PATIENT MESSAGE (OUTPATIENT)
Dept: INTERNAL MEDICINE | Facility: CLINIC | Age: 65
End: 2025-02-06

## 2025-02-06 ENCOUNTER — OFFICE VISIT (OUTPATIENT)
Dept: INTERNAL MEDICINE | Facility: CLINIC | Age: 65
End: 2025-02-06
Payer: COMMERCIAL

## 2025-02-06 VITALS
SYSTOLIC BLOOD PRESSURE: 118 MMHG | HEART RATE: 78 BPM | HEIGHT: 70 IN | DIASTOLIC BLOOD PRESSURE: 62 MMHG | OXYGEN SATURATION: 96 % | BODY MASS INDEX: 33.17 KG/M2 | WEIGHT: 231.69 LBS

## 2025-02-06 DIAGNOSIS — Z00.00 HEALTH MAINTENANCE EXAMINATION: Primary | ICD-10-CM

## 2025-02-06 DIAGNOSIS — Z79.4 TYPE 2 DIABETES MELLITUS WITH COMPLICATION, WITH LONG-TERM CURRENT USE OF INSULIN: ICD-10-CM

## 2025-02-06 DIAGNOSIS — G47.33 OSA (OBSTRUCTIVE SLEEP APNEA): ICD-10-CM

## 2025-02-06 DIAGNOSIS — M75.101 NONTRAUMATIC TEAR OF RIGHT ROTATOR CUFF, UNSPECIFIED TEAR EXTENT: ICD-10-CM

## 2025-02-06 DIAGNOSIS — E11.8 TYPE 2 DIABETES MELLITUS WITH COMPLICATION, WITH LONG-TERM CURRENT USE OF INSULIN: ICD-10-CM

## 2025-02-06 DIAGNOSIS — D64.9 ANEMIA, UNSPECIFIED TYPE: ICD-10-CM

## 2025-02-06 DIAGNOSIS — I10 ESSENTIAL HYPERTENSION: ICD-10-CM

## 2025-02-06 DIAGNOSIS — E78.2 MIXED HYPERLIPIDEMIA: ICD-10-CM

## 2025-02-06 DIAGNOSIS — E53.8 VITAMIN B12 DEFICIENCY: ICD-10-CM

## 2025-02-06 PROBLEM — E66.01 SEVERE OBESITY (BMI 35.0-39.9) WITH COMORBIDITY: Status: RESOLVED | Noted: 2023-04-20 | Resolved: 2025-02-06

## 2025-02-06 PROCEDURE — 99999 PR PBB SHADOW E&M-EST. PATIENT-LVL IV: CPT | Mod: PBBFAC,,, | Performed by: STUDENT IN AN ORGANIZED HEALTH CARE EDUCATION/TRAINING PROGRAM

## 2025-02-06 RX ORDER — BLOOD-GLUCOSE SENSOR
EACH MISCELLANEOUS
Qty: 3 EACH | Refills: 11 | Status: SHIPPED | OUTPATIENT
Start: 2025-02-06

## 2025-02-06 NOTE — PATIENT INSTRUCTIONS
Decrease Tresiba to 43 units every morning.  Decrease Novolog to 10 units with meals    For morning, fasting glucose:  - If fasting glucose is <100 for two consecutive mornings, decrease Tresiba by 2 units daily.   - If fasting glucose is >140 for two consecutive mornings, increase Tresiba by 2 units daily.     For 2 hour post-meal glucose:  - If 2 hour post-meal glucose is <140 for two consecutive days, decrease Novolog by 1 unit at meal time.   - If 2 hour post-meal glucose is >180 for two consecutive days, increase Novolog by 1 unit at meal time.

## 2025-02-06 NOTE — TELEPHONE ENCOUNTER
No care due was identified.  [facility] Embedded Care Due Messages. Reference number: [id number].   2/06/2025 3:40:35 PM CST

## 2025-02-06 NOTE — PROGRESS NOTES
Subjective:       Patient ID: Henry Zacarias is a 65 y.o. male.    Chief Complaint: Health maintenance examination [Z00.00]    Patient is established with me, here today for the following:    History of Present Illness    CHIEF COMPLAINT:    DIABETES MANAGEMENT:  He reports current insulin regimen of 45 units in the morning and 10-12 units with meals. He performs blood sugar monitoring 3 times daily with readings typically in the 80s and occasional hypoglycemic episodes in the high 60s. He previously attempted continuous glucose monitoring but returned to finger stick monitoring due to technical difficulties and loss of device components.    DIET:  His typical daily diet consists of small bagel and coffee for breakfast, salad for lunch, and dinner comprising sweet potato, grilled chicken breast with broccoli and cauliflower. He supplements evening meals with smoothies or protein shakes, and acknowledges occasional dining out and consuming desserts in moderation.    RIGHT SHOULDER PAIN AND MOBILITY:  He reports severe right shoulder pain and mobility issues since September/October, initially presenting with inability to lift right arm. He initially managed symptoms with Biofreeze and 4-5 Tylenol daily. After 4-5 days, he regained some movement but continued to experience soreness. MRI from 2019 showed infraspinatus and supraspinatus tears with muscle atrophy on the affected side. He has completed approximately 16 physical therapy sessions, reporting some strength improvement but continued mobility challenges, particularly during social activities. He often uses his left hand for typically right-handed tasks. He currently has pain medication for severe episodes but declines shoulder replacement surgery.    SLEEP APNEA:  He reports consistent nightly CPAP use for sleep apnea, typically 7 to 7.5 hours per night, occasionally reducing to 5.5 hours if experiencing difficulty sleeping. He reports sleeping well with the  device.      ROS:  Musculoskeletal: +joint pain  Psychiatric: -sleep difficulty         Health maintenance -   Colonoscopy performed OCT2019. Told to repeat in 10 years.  Denies family history of colorectal cancer.  Family history of cardiac disease.  Denies family history of prostate cancer.  Declines PSA  UTD on Tdap, COVID, PPSV23, PCV20, shingles, RSV, influenza vaccinations.  Denies cigarette use, intermittent cigar use for 2-3 years.  Denies drug use.  Completed HIV and hepatitis C screening.       T2DM -   Currently taking:   - Trucility 4.5 mg weekly  - Jardiance 25 mg  - Metformin 1000 mg BID  - Degludec 45 units qAM  - Novolog 10-12 units qAC  Checking blood glucose 3 times per day  Home blood glucose range per patient 's.  Denies blood sugars < 70 mg/dL  Taking ASA 81 mg daily  UTD on foot exam.  UTD on eye exam.  Lab Results   Component Value Date    HGBA1C 5.0 01/27/2025    HGBA1C 5.3 08/08/2024    HGBA1C 5.6 02/01/2024     Lab Results   Component Value Date    MICALBCREAT 10.9 08/08/2024                   Wt Readings from Last 10 Encounters:   02/06/25 105.1 kg (231 lb 11.3 oz)   11/18/24 106.5 kg (234 lb 12.6 oz)   11/11/24 106.5 kg (234 lb 12.6 oz)   10/07/24 109.5 kg (241 lb 6.5 oz)   09/20/24 109.5 kg (241 lb 6.5 oz)   09/06/24 109.5 kg (241 lb 6.5 oz)   08/22/24 109.5 kg (241 lb 6.5 oz)   08/15/24 109.5 kg (241 lb 6.5 oz)   08/13/24 110.7 kg (244 lb 0.8 oz)   06/19/24 110.7 kg (244 lb 0.8 oz)     HLD -   Endorses taking atorvastatin as directed  Lab Results   Component Value Date    CHOL 117 (L) 08/08/2024     Lab Results   Component Value Date    TRIG 152 (H) 08/08/2024     Lab Results   Component Value Date    LDLCALC 52.6 (L) 08/08/2024     Lab Results   Component Value Date    HDL 34 (L) 08/08/2024     HTN -   Currently prescribed amlodipine, Coreg, valsartan.  Lab Results   Component Value Date    MICALBCREAT 10.9 08/08/2024     BP Readings from Last 5 Encounters:   11/18/24 130/73  "  11/11/24 133/78   10/07/24 130/77   09/20/24 139/73   09/06/24 122/74      Anemia -  Currently taking vitamin B12 daily  Haptoglobin with mild elevation  Kappa FLC elevated, normal ratio  Lab Results   Component Value Date    HGB 12.8 (L) 01/27/2025    HCT 37.7 (L) 01/27/2025    MCV 97 01/27/2025    RDW 13.1 01/27/2025     Lab Results   Component Value Date    IRON 75 02/01/2024    TRANSFERRIN 281 02/01/2024    TIBC 416 02/01/2024    FESATURATED 18 (L) 02/01/2024      Lab Results   Component Value Date    FERRITIN 98 02/01/2024     Lab Results   Component Value Date    ODEYLYSM77 1303 (H) 08/08/2024     Lab Results   Component Value Date    FOLATE 7.7 02/01/2024            KEVIN -  Using CPAP nightly with good effect      Current Outpatient Medications   Medication Instructions    amLODIPine (NORVASC) 10 mg, Oral    aspirin 81 mg, Oral, Daily    atorvastatin (LIPITOR) 40 mg, Oral, Daily    atorvastatin (LIPITOR) 40 mg, Daily    BD JEANNETTE 2ND GEN PEN NEEDLE 32 gauge x 5/32" Ndle USE AS DIRECTED 4 TIMES A DAY    blood sugar diagnostic (CONTOUR NEXT TEST STRIPS) Strp 1 EACH BY [facility]NON-DRUG COMBO ROUTE) ROUTE 4 (FOUR) TIMES DAILY BEFORE MEALS AND NIGHTLY.    blood-glucose meter Misc Use as instructed    carvediloL (COREG) 12.5 mg, Oral, 2 times daily with meals    carvediloL (COREG) 12.5 mg, 2 times daily with meals    celecoxib (CELEBREX) 200 mg, Oral, Daily    empagliflozin (JARDIANCE) 25 mg, Oral, Daily    insulin aspart U-100 (NOVOLOG FLEXPEN U-100 INSULIN) 100 unit/mL (3 mL) InPn pen 14 units in the morning, lunch time, and 12 units with dinner.    insulin degludec (TRESIBA FLEXTOUCH U-100) 45 Units, Subcutaneous, Daily    lancets (MICROLET LANCET) Misc 1 EACH BY MISC.(NON-DRUG COMBO ROUTE) ROUTE 4 (FOUR) TIMES DAILY BEFORE MEALS AND NIGHTLY.    metFORMIN (GLUCOPHAGE) 1,000 mg, Oral, 2 times daily with meals    mupirocin (BACTROBAN) 2 % ointment Topical (Top), Daily, To LEFT great toe    TRULICITY 4.5 mg, " "Subcutaneous, Every 7 days    valsartan (DIOVAN) 320 mg, Oral, Daily     Objective:      Vitals:    02/06/25 0809   BP: 118/62   Pulse: 78   SpO2: 96%   Weight: 105.1 kg (231 lb 11.3 oz)   Height: 5' 10" (1.778 m)   PainSc: 0-No pain     Body mass index is 33.25 kg/m².    Physical Exam  Vitals reviewed.   Constitutional:       General: He is not in acute distress.     Appearance: Normal appearance. He is not ill-appearing or diaphoretic.   HENT:      Head: Normocephalic and atraumatic.      Right Ear: Tympanic membrane, ear canal and external ear normal. There is no impacted cerumen.      Left Ear: Tympanic membrane, ear canal and external ear normal. There is no impacted cerumen.      Nose: Nose normal. No rhinorrhea.      Mouth/Throat:      Mouth: Mucous membranes are moist.      Pharynx: Oropharynx is clear. No oropharyngeal exudate or posterior oropharyngeal erythema.   Eyes:      General: No scleral icterus.        Right eye: No discharge.         Left eye: No discharge.      Conjunctiva/sclera: Conjunctivae normal.   Neck:      Thyroid: No thyromegaly or thyroid tenderness.      Trachea: Trachea normal.   Cardiovascular:      Rate and Rhythm: Normal rate and regular rhythm.      Heart sounds: Normal heart sounds. No murmur heard.     No friction rub. No gallop.   Pulmonary:      Effort: Pulmonary effort is normal. No respiratory distress.      Breath sounds: Normal breath sounds. No stridor. No wheezing, rhonchi or rales.   Abdominal:      General: Bowel sounds are normal. There is no distension.      Palpations: Abdomen is soft.      Tenderness: There is no abdominal tenderness. There is no guarding or rebound.   Musculoskeletal:         General: No swelling or deformity.      Cervical back: Neck supple.   Lymphadenopathy:      Head:      Right side of head: No submandibular or posterior auricular adenopathy.      Left side of head: No submandibular or posterior auricular adenopathy.      Cervical: No " cervical adenopathy.      Right cervical: No superficial, deep or posterior cervical adenopathy.     Left cervical: No superficial, deep or posterior cervical adenopathy.      Upper Body:      Right upper body: No supraclavicular adenopathy.      Left upper body: No supraclavicular adenopathy.   Skin:     General: Skin is warm and dry.   Neurological:      General: No focal deficit present.      Mental Status: He is alert. Mental status is at baseline.      Gait: Gait normal.   Psychiatric:         Mood and Affect: Mood normal.         Behavior: Behavior normal.         Assessment:       1. Health maintenance examination    2. Type 2 diabetes mellitus with complication, with long-term current use of insulin    3. Essential hypertension    4. Mixed hyperlipidemia    5. Vitamin B12 deficiency    6. Anemia, unspecified type    7. KEVIN (obstructive sleep apnea)    8. Nontraumatic tear of right rotator cuff, unspecified tear extent        Plan:   Health maintenance examination  -     Microalbumin/Creatinine Ratio, Urine; Future; Expected date: 08/06/2025  -     Comprehensive Metabolic Panel; Future; Expected date: 08/06/2025  -     TSH; Future; Expected date: 08/06/2025  -     Lipid Panel; Future; Expected date: 08/06/2025  -     Hemoglobin A1C; Future; Expected date: 08/06/2025  -     CBC Auto Differential; Future; Expected date: 08/06/2025  -     Vitamin B12; Future; Expected date: 08/06/2025    Type 2 diabetes mellitus with complication, with long-term current use of insulin  -     Microalbumin/Creatinine Ratio, Urine; Future; Expected date: 08/06/2025  -     Comprehensive Metabolic Panel; Future; Expected date: 08/06/2025  -     Hemoglobin A1C; Future; Expected date: 08/06/2025  -     blood-glucose sensor (DEXCOM G7 SENSOR) [place]; Use as directed for continuous glucose monitoring.  Dispense: 3 each; Refill: 11    Essential hypertension  -     Comprehensive Metabolic Panel; Future; Expected date: 08/06/2025  -     TSH;  Future; Expected date: 08/06/2025    Mixed hyperlipidemia  -     Lipid Panel; Future; Expected date: 08/06/2025    Vitamin B12 deficiency  -     CBC Auto Differential; Future; Expected date: 08/06/2025  -     Vitamin B12; Future; Expected date: 08/06/2025    Anemia, unspecified type  -     Iron and TIBC; Future; Expected date: 08/06/2025  -     Ferritin; Future; Expected date: 08/06/2025  -     E-Consult to [facility]    KEVIN (obstructive sleep apnea)    Nontraumatic tear of right rotator cuff, unspecified tear extent      Assessment & Plan    IMPRESSION:  - Reviewed A1C results, noting improvement to 5.0 from previous 5.6 and 6.1.  - Assessed weight loss progress, noting 20 pound reduction over past few years.  - Evaluated current insulin regimen in light of improved A1C and weight loss.  - Considered anemia workup due to unexpected decrease in blood counts despite improved A1C.  - Reviewed MCV results, noting higher side of normal range.  - Assessed shoulder pain and mobility issues, noting prior MRI findings of infraspinatus and supraspinatus tear with muscle atrophy.  - Considered potential for further insulin reduction based on continued improvement in A1C and weight.  - Evaluated sleep apnea management with CPAP usage.    TYPE 2 DIABETES MELLITUS WITH OTHER DIABETIC KIDNEY COMPLICATION:  - Henry's A1C has significantly improved to 5.0, indicating excellent blood sugar control.  - Educated patient on continuous glucose monitors (CGM).  - Ordered Dexcom G7 CGM for more detailed blood sugar data.  - Adjusted medication: decreased Tresiba to 43 units and reduced Novolog to 10 units with meals.  - Instructed patient to continue monitoring blood sugar 3 times daily.  - Ordered labs prior to 6-month follow-up visit.    ANEMIA:  - Noted decrease in patient's blood counts, with prior iron testing showing good levels but low iron saturation.  - B12 and folate levels are within normal range.  - Observed slight elevation in  haptoglobin and higher MCV within normal range.  - Anemia of chronic disease considered less likely due to well-controlled blood sugars.  - Contemplating iron supplementation, pending specialist opinion.  - Considering referral to hematology/oncology for consultation on anemia workup and recommendations for further testing or monitoring.    SHOULDER ARTHRITIS:  - Noted mild arthritis in the shoulder, confirmed by previous specialist evaluation. 2019 MRI showed infraspinatus and supraspinatus tear with some muscle atrophy.  - Henry has completed 16 physical therapy sessions to build strength and improve mobility.  - Recommend using Voltaren gel instead of Biofreeze for shoulder pain due to its anti-inflammatory properties and lower systemic absorption.  - Advised to contact the office if shoulder pain worsens or physical therapy is not effective.  - Previous specialist decision against shoulder replacement surgery due to patient's age and ability to compensate with the left arm.    SLEEP APNEA:  - Henry consistently uses CPAP nightly, typically for 7 to 7.5 hours, reporting good sleep quality.  - Occasionally removes CPAP after 5.5 hours if restless.  - Discussed the importance of CPAP use in controlling blood pressure and reducing risk of heart disease.    HYPERTENSION:  - Noted excellent blood pressure readings during the visit.    WEIGHT MANAGEMENT:  - Henry has lost approximately 20 lbs over the last couple of years through diet management and healthy food choices.  - Encouraged continued moderation in diet, balancing treats with healthy choices.  - Henry to continue current diet and exercise regimen contributing to weight loss and improved A1C.           Diann Greene MD  2/6/2025

## 2025-02-10 DIAGNOSIS — Z79.4 TYPE 2 DIABETES MELLITUS WITH COMPLICATION, WITH LONG-TERM CURRENT USE OF INSULIN: ICD-10-CM

## 2025-02-10 DIAGNOSIS — E11.8 TYPE 2 DIABETES MELLITUS WITH COMPLICATION, WITH LONG-TERM CURRENT USE OF INSULIN: ICD-10-CM

## 2025-02-10 RX ORDER — INSULIN ASPART 100 [IU]/ML
INJECTION, SOLUTION INTRAVENOUS; SUBCUTANEOUS
Qty: 45 EACH | Refills: 0 | Status: SHIPPED | OUTPATIENT
Start: 2025-02-10

## 2025-02-10 RX ORDER — AMLODIPINE BESYLATE 10 MG/1
10 TABLET ORAL DAILY
Qty: 90 TABLET | Refills: 3 | Status: SHIPPED | OUTPATIENT
Start: 2025-02-10

## 2025-02-10 RX ORDER — INSULIN DEGLUDEC 100 U/ML
45 INJECTION, SOLUTION SUBCUTANEOUS DAILY
Qty: 40.5 ML | Refills: 3 | Status: SHIPPED | OUTPATIENT
Start: 2025-02-10 | End: 2025-02-12

## 2025-02-10 RX ORDER — INSULIN DEGLUDEC 100 U/ML
45 INJECTION, SOLUTION SUBCUTANEOUS DAILY
Qty: 45 ML | Refills: 0 | Status: CANCELLED | OUTPATIENT
Start: 2025-02-10

## 2025-02-10 RX ORDER — LANCETS
EACH MISCELLANEOUS
Qty: 400 EACH | Refills: 3 | Status: SHIPPED | OUTPATIENT
Start: 2025-02-10

## 2025-02-10 NOTE — TELEPHONE ENCOUNTER
No care due was identified.  Health [facility] Embedded Care Due Messages. Reference number: [id number].   2/10/2025 1:02:53 PM CST

## 2025-02-11 NOTE — TELEPHONE ENCOUNTER
Refill Routing Note   Medication(s) are not appropriate for processing by [facility] for the following reason(s):        Med affordability    [facility] action(s):  Defer        Medication Therapy Plan: Alternate request: TRESIBA FLEXTOUCH U-100; BASAGLAR KWIKPEN U-100 INSULIN    Pharmacist review requested: Yes     Appointments  past 12m or future 3m with PCP    Date Provider   Last Visit   2/6/2025 Diann Greene MD   Next Visit   8/6/2025 Diann Greene MD   ED visits in past 90 days: 0        Note composed:8:48 PM 02/10/2025

## 2025-02-12 RX ORDER — INSULIN GLARGINE 100 [IU]/ML
45 INJECTION, SOLUTION SUBCUTANEOUS DAILY
Qty: 42 ML | Refills: 3 | Status: SHIPPED | OUTPATIENT
Start: 2025-02-12

## 2025-02-14 ENCOUNTER — PATIENT MESSAGE (OUTPATIENT)
Dept: INTERNAL MEDICINE | Facility: CLINIC | Age: 65
End: 2025-02-14
Payer: COMMERCIAL

## 2025-03-23 DIAGNOSIS — I10 ESSENTIAL HYPERTENSION: ICD-10-CM

## 2025-03-23 RX ORDER — AMLODIPINE BESYLATE 10 MG/1
10 TABLET ORAL
Qty: 90 TABLET | Refills: 3 | Status: SHIPPED | OUTPATIENT
Start: 2025-03-23

## 2025-03-23 NOTE — TELEPHONE ENCOUNTER
No care due was identified.  Health [facility] Embedded Care Due Messages. Reference number: [id number].   3/23/2025 12:18:16 AM CDT

## 2025-03-23 NOTE — TELEPHONE ENCOUNTER
Refill Decision Note   Henry Zacarias  is requesting a refill authorization.  Brief Assessment and Rationale for Refill:  Approve     Medication Therapy Plan:         Comments:     Note composed:6:24 PM 03/23/2025             Appointments     Last Visit   2/6/2025 Diann Greene MD   Next Visit   8/6/2025 Diann Greene MD

## 2025-03-27 ENCOUNTER — PATIENT MESSAGE (OUTPATIENT)
Dept: INTERNAL MEDICINE | Facility: CLINIC | Age: 65
End: 2025-03-27
Payer: COMMERCIAL

## 2025-03-27 DIAGNOSIS — Z79.4 TYPE 2 DIABETES MELLITUS WITH COMPLICATION, WITH LONG-TERM CURRENT USE OF INSULIN: ICD-10-CM

## 2025-03-27 DIAGNOSIS — E11.8 TYPE 2 DIABETES MELLITUS WITH COMPLICATION, WITH LONG-TERM CURRENT USE OF INSULIN: ICD-10-CM

## 2025-03-27 RX ORDER — INSULIN GLARGINE 100 [IU]/ML
45 INJECTION, SOLUTION SUBCUTANEOUS DAILY
Qty: 50 ML | Refills: 3 | Status: SHIPPED | OUTPATIENT
Start: 2025-03-27

## 2025-05-06 ENCOUNTER — PATIENT MESSAGE (OUTPATIENT)
Dept: INTERNAL MEDICINE | Facility: CLINIC | Age: 65
End: 2025-05-06
Payer: COMMERCIAL

## 2025-05-06 ENCOUNTER — E-CONSULT (OUTPATIENT)
Dept: HEMATOLOGY/ONCOLOGY | Facility: CLINIC | Age: 65
End: 2025-05-06
Payer: COMMERCIAL

## 2025-05-06 DIAGNOSIS — R89.9 ABNORMAL LABORATORY TEST: Primary | ICD-10-CM

## 2025-05-06 NOTE — CONSULTS
[facility] - Hematology 5th Fl  Response for E-Consult     Patient Name: Henry Zacarias  MRN: [id number]  Primary Care Provider: Diann Greene MD   Requesting Provider: Diann Greene MD  Consults    Recommendation: Thank you for the e-consult. No action needed as hemoglobin of 13 or greater is considered normal for men in hematology (our reference range is incorrect) and he has been at or near hemoglobin of 13 in all of his CBCs. Normal retic count supports this normal hemoglobin. The light chain results are also considered normal with minor elevation in kappa light chain and normal ratio. Minor elevation in haptoglobin possibly an acute phase reactant, same as elevated B12 around the same time.    Additional future steps to consider: CBC and hematology labs are overall normal. Recommend routine observation.    Total time of Consultation: 5 minute    I did not speak to the requesting provider verbally about this.     *This eConsult is based on the clinical data available to me and is furnished without benefit of a physical examination. The eConsult will need to be interpreted in light of any clinical issues or changes in patient status not available to me at the time of filing this eConsults. Significant changes in patient condition or level of acuity should result in immediate formal consultation and reevaluation. Please alert me if you have further questions.    Thank you for this eConsult referral.     Gege Hughes MD  [facility] - Hematology [facility]

## 2025-06-17 DIAGNOSIS — R80.9 TYPE 2 DIABETES MELLITUS WITH MICROALBUMINURIA, WITH LONG-TERM CURRENT USE OF INSULIN: ICD-10-CM

## 2025-06-17 DIAGNOSIS — Z79.4 TYPE 2 DIABETES MELLITUS WITH MICROALBUMINURIA, WITH LONG-TERM CURRENT USE OF INSULIN: ICD-10-CM

## 2025-06-17 DIAGNOSIS — E11.29 TYPE 2 DIABETES MELLITUS WITH MICROALBUMINURIA, WITH LONG-TERM CURRENT USE OF INSULIN: ICD-10-CM

## 2025-06-17 DIAGNOSIS — R60.0 BILATERAL LEG EDEMA: ICD-10-CM

## 2025-06-17 RX ORDER — EMPAGLIFLOZIN 25 MG/1
25 TABLET, FILM COATED ORAL
Qty: 90 TABLET | Refills: 0 | Status: SHIPPED | OUTPATIENT
Start: 2025-06-17

## 2025-06-17 NOTE — TELEPHONE ENCOUNTER
Refill Encounter    PCP Visits: Recent Visits  Date Type Provider Dept   02/06/25 Office Visit Diann Greene MD [facility] Internal Medicine   08/15/24 Office Visit Diann Greene MD [facility]   Showing recent visits within past 360 days and meeting all other requirements  Future Appointments  Date Type Provider Dept   08/06/25 Appointment Diann Greene MD [facility]   Showing future appointments within next 720 days and meeting all other requirements      Last 3 Blood Pressure:   BP Readings from Last 3 Encounters:   02/06/25 118/62   11/18/24 130/73   11/11/24 133/78     Preferred Pharmacy:   [facility]/pharmacy #[id number] [place], [place] - [address]  [address] [address]  Phone: [phone] Fax: [phone]    Requested RX:  Requested Prescriptions     Pending Prescriptions Disp Refills    JARDIANCE 25 mg tablet [Pharmacy Med Name: JARDIANCE 25 MG TABLET] 90 tablet 3     Sig: TAKE 1 TABLET BY MOUTH EVERY DAY      RX Route: Normal  Allergies confirmed

## 2025-06-17 NOTE — TELEPHONE ENCOUNTER
Care Due:                  Date            Visit Type   Department     Provider  --------------------------------------------------------------------------------                                EP -                              PRIMARY      [facility] INTERNAL  Last Visit: 02-      CARE ([facility])   KRISHNA Greene                              [facility]                              PRIMARY      [facility] INTERNAL  Next Visit: 08-      CARE ([facility])   KRISHNA Greene                                                            Last  Test          Frequency    Reason                     Performed    Due Date  --------------------------------------------------------------------------------    HBA1C.......  6 months...  dulaglutide, insulin,      01- 07-                             metFORMIN................    [employer] Embedded Care Due Messages. Reference number: [id number].   6/17/2025 12:03:38 AM CDT

## 2025-06-17 NOTE — TELEPHONE ENCOUNTER
Refill Decision Note   Henry Zacarias  is requesting a refill authorization.  Brief Assessment and Rationale for Refill:  Approve     Medication Therapy Plan:  FLOS; ASHS      Comments:     Note composed:10:18 AM 06/17/2025

## 2025-06-19 ENCOUNTER — OFFICE VISIT (OUTPATIENT)
Dept: PODIATRY | Facility: CLINIC | Age: 65
End: 2025-06-19
Payer: COMMERCIAL

## 2025-06-19 VITALS
HEART RATE: 87 BPM | DIASTOLIC BLOOD PRESSURE: 74 MMHG | WEIGHT: 231.69 LBS | BODY MASS INDEX: 33.17 KG/M2 | HEIGHT: 70 IN | SYSTOLIC BLOOD PRESSURE: 122 MMHG

## 2025-06-19 DIAGNOSIS — Z79.4 TYPE 2 DIABETES MELLITUS WITHOUT COMPLICATION, WITH LONG-TERM CURRENT USE OF INSULIN: ICD-10-CM

## 2025-06-19 DIAGNOSIS — B35.1 DERMATOPHYTOSIS OF NAIL: Chronic | ICD-10-CM

## 2025-06-19 DIAGNOSIS — E11.9 TYPE 2 DIABETES MELLITUS WITHOUT COMPLICATION, WITH LONG-TERM CURRENT USE OF INSULIN: ICD-10-CM

## 2025-06-19 DIAGNOSIS — E11.9 ENCOUNTER FOR DIABETIC FOOT EXAM: Primary | ICD-10-CM

## 2025-06-19 PROCEDURE — 4010F ACE/ARB THERAPY RXD/TAKEN: CPT | Mod: CPTII,S$GLB,, | Performed by: PODIATRIST

## 2025-06-19 PROCEDURE — 1160F RVW MEDS BY RX/DR IN RCRD: CPT | Mod: CPTII,S$GLB,, | Performed by: PODIATRIST

## 2025-06-19 PROCEDURE — 3078F DIAST BP <80 MM HG: CPT | Mod: CPTII,S$GLB,, | Performed by: PODIATRIST

## 2025-06-19 PROCEDURE — 3044F HG A1C LEVEL LT 7.0%: CPT | Mod: CPTII,S$GLB,, | Performed by: PODIATRIST

## 2025-06-19 PROCEDURE — 1159F MED LIST DOCD IN RCRD: CPT | Mod: CPTII,S$GLB,, | Performed by: PODIATRIST

## 2025-06-19 PROCEDURE — 3008F BODY MASS INDEX DOCD: CPT | Mod: CPTII,S$GLB,, | Performed by: PODIATRIST

## 2025-06-19 PROCEDURE — 3074F SYST BP LT 130 MM HG: CPT | Mod: CPTII,S$GLB,, | Performed by: PODIATRIST

## 2025-06-19 PROCEDURE — 99999 PR PBB SHADOW E&M-EST. PATIENT-LVL IV: CPT | Mod: PBBFAC,,, | Performed by: PODIATRIST

## 2025-06-19 PROCEDURE — 99213 OFFICE O/P EST LOW 20 MIN: CPT | Mod: S$GLB,,, | Performed by: PODIATRIST

## 2025-06-19 RX ORDER — CICLOPIROX 7.7 MG/G
GEL TOPICAL DAILY
Qty: 45 G | Refills: 3 | Status: SHIPPED | OUTPATIENT
Start: 2025-06-19

## 2025-06-19 NOTE — PROGRESS NOTES
"Chief Complaint   Patient presents with    Diabetic Foot Exam     Annual   2/26/26 Jc PCP          HPI:   The patient is a 65 y.o.  male  who presents for a diabetic foot exam.   He has history of toe ulceration.  No new wounds noted today.   Patient reports occasional presence of abnormal sensation to the feet .    No tingling or burning sensation or pain to the feet when standing or walking.  No symptoms of claudication evident.   History of diabetic foot ulcers: none   History of foot surgery: none.     Shoes worn today: athletic shoes,  no socks.     Primary care doctor is: Diann Greene MD  Last PCP Visit: 2/6/2025        Patient Active Problem List   Diagnosis    KEVIN (obstructive sleep apnea)    Obesity (BMI 35.0-39.9 without comorbidity)    Mixed hyperlipidemia    Essential hypertension    Right rotator cuff tear    Chronic right shoulder pain    Decreased range of motion with decreased strength    Insomnia    Neck pain    Decreased range of motion of intervertebral discs of cervical spine    Bilateral leg edema    Normocytic anemia    Right shoulder strain, sequela           Current Outpatient Medications on File Prior to Visit   Medication Sig Dispense Refill    amLODIPine (NORVASC) 10 MG tablet TAKE 1 TABLET BY MOUTH EVERY DAY 90 tablet 3    aspirin 81 MG Chew Take 1 tablet (81 mg total) by mouth once daily. 90 tablet 3    atorvastatin (LIPITOR) 40 MG tablet Take 40 mg by mouth once daily.      BD JEANNETTE 2ND GEN PEN NEEDLE 32 gauge x 5/32" Ndle USE AS DIRECTED 4 TIMES A  each 3    blood sugar diagnostic (CONTOUR NEXT TEST STRIPS) Strp 1 EACH BY [facility]NON-DRUG COMBO ROUTE) ROUTE 4 (FOUR) TIMES DAILY BEFORE MEALS AND NIGHTLY. 400 strip 3    blood-glucose meter Misc Use as instructed 1 each 0    blood-glucose sensor (DEXCOM G7 SENSOR) Indy Use as directed for continuous glucose monitoring. 3 each 11    carvediloL (COREG) 12.5 MG tablet Take 12.5 mg by mouth 2 (two) times daily with meals.      " "celecoxib (CELEBREX) 200 MG capsule Take 1 capsule (200 mg total) by mouth once daily. 30 capsule 1    dulaglutide (TRULICITY) 4.5 mg/0.5 mL pen injector Inject 4.5 mg into the skin every 7 days. 12 pen 3    insulin aspart U-100 (NOVOLOG FLEXPEN U-100 INSULIN) 100 unit/mL (3 mL) InPn pen 14 units in the morning, lunch time, and 12 units with dinner. 45 each 0    insulin glargine U-100, Lantus, (LANTUS U-100 INSULIN) 100 unit/mL injection Inject 45 Units into the skin once daily. 50 mL 3    JARDIANCE 25 mg tablet TAKE 1 TABLET BY MOUTH EVERY DAY 90 tablet 0    lancets (MICROLET LANCET) Misc 1 EACH BY MISC.(NON-DRUG COMBO ROUTE) ROUTE 4 (FOUR) TIMES DAILY BEFORE MEALS AND NIGHTLY. 400 each 3    metFORMIN (GLUCOPHAGE) 1000 MG tablet Take 1 tablet (1,000 mg total) by mouth 2 (two) times daily with meals. 180 tablet 3    valsartan (DIOVAN) 320 MG tablet Take 1 tablet (320 mg total) by mouth once daily. 90 tablet 3    mupirocin (BACTROBAN) 2 % ointment Apply topically once daily. To LEFT great toe 15 g 1     No current facility-administered medications on file prior to visit.           Review of patient's allergies indicates:  No Known Allergies                ROS:  General ROS: negative  Respiratory ROS: no cough, shortness of breath, or wheezing  Cardiovascular ROS: no chest pain or dyspnea on exertion  Musculoskeletal ROS: negative  Neurological ROS:   negative for - impaired coordination/balance or numbness/tingling  Dermatological ROS: negative          LAST HbA1c:   Lab Results   Component Value Date    HGBA1C 5.0 01/27/2025           EXAM:   Vitals:    06/19/25 1125   BP: 122/74   Pulse: 87   Weight: 105.1 kg (231 lb 11.3 oz)   Height: 5' 10" (1.778 m)         General: alert, no distress, cooperative      Vascular:   Dorsalis Pedis:  Palpable and dopplerable.   Posterior Tibial:  present; palpable  Capillary refill time:  5 seconds  Temperature of toes cool to touch  Edema:   minimal; pitting      Neurological:   "   Sharp touch:  normal  Light touch: normal  Tinels Sign:  Absent  Mulders Click:   Absent  [place]:  intact x 10      Dermatological:   Skin: Warm and dry. no hyperpigmentation, vitiligo, or suspicious lesions    Wounds/Ulcers:  Absent  Bruising:  Absent  Erythema:  Absent  Toenails:  Elongated by 1-2mm, thickened by 1mm and Yellowish in color,  With subungual debris.   Absent paronychia      Musculoskeletal:   Metatarsophalangeal range of motion:   full range of motion  Subtalar joint range of motion: full range of motion  Ankle joint range of motion:  full range of motion  Bunions:  Absent  Hammertoes: present 2-4 bilateral;  bilateral adductovarus                ASSESSMENT/PLAN:          Problem List Items Addressed This Visit    None  Visit Diagnoses         Encounter for diabetic foot exam    -  Primary      Type 2 diabetes mellitus without complication, with long-term current use of insulin          Dermatophytosis of nail  (Chronic)       Relevant Medications    ciclopirox 0.77 % Gel            I counseled the patient on the patient's conditions, their implications and medical management.   Shoe inspection.     Continue good nutrition and blood sugar control to help prevent podiatric complications of diabetes.   Maintain proper foot hygiene.   Continue wearing proper shoe gear, daily foot inspections, never walking without protective shoe gear, never putting sharp instruments to feet.  Annual diabetes foot exam, or sooner if concerned. Patient is amenable to plan.    General nail care measures for abnormal nails include:  Keeping nails trimmed short, but avoid overzealous trimming  Avoiding trauma   Avoiding contact irritants   Keeping nails dry (avoiding wet work)  Avoiding all nail cosmetics  Wearing shoes that fit well at the toe box.  Avoid tight shoes.

## 2025-07-16 DIAGNOSIS — I10 ESSENTIAL HYPERTENSION: ICD-10-CM

## 2025-07-16 RX ORDER — ATORVASTATIN CALCIUM 40 MG/1
40 TABLET, FILM COATED ORAL
Qty: 90 TABLET | Refills: 3 | Status: SHIPPED | OUTPATIENT
Start: 2025-07-16

## 2025-07-16 RX ORDER — VALSARTAN 320 MG/1
320 TABLET ORAL
Qty: 90 TABLET | Refills: 1 | Status: SHIPPED | OUTPATIENT
Start: 2025-07-16

## 2025-07-16 RX ORDER — CARVEDILOL 12.5 MG/1
12.5 TABLET ORAL 2 TIMES DAILY WITH MEALS
Qty: 180 TABLET | Refills: 3 | Status: SHIPPED | OUTPATIENT
Start: 2025-07-16

## 2025-07-16 NOTE — TELEPHONE ENCOUNTER
No care due was identified.  [facility] Embedded Care Due Messages. Reference number: [id number].   7/16/2025 12:02:52 AM CDT

## 2025-07-16 NOTE — TELEPHONE ENCOUNTER
Refill Routing Note   Medication(s) are not appropriate for processing by [facility] for the following reason(s):        No active prescription written by provider    [facility] action(s):  Defer  Approve               Appointments  past 12m or future 3m with PCP    Date Provider   Last Visit   2/6/2025 Diann Greene MD   Next Visit   8/6/2025 Diann Greene MD   ED visits in past 90 days: 0        Note composed:6:27 AM 07/16/2025

## 2025-07-18 ENCOUNTER — TELEPHONE (OUTPATIENT)
Dept: INTERNAL MEDICINE | Facility: CLINIC | Age: 65
End: 2025-07-18
Payer: COMMERCIAL

## 2025-08-01 ENCOUNTER — LAB VISIT (OUTPATIENT)
Dept: LAB | Facility: OTHER | Age: 65
End: 2025-08-01
Attending: STUDENT IN AN ORGANIZED HEALTH CARE EDUCATION/TRAINING PROGRAM
Payer: COMMERCIAL

## 2025-08-01 DIAGNOSIS — E11.8 TYPE 2 DIABETES MELLITUS WITH COMPLICATION, WITH LONG-TERM CURRENT USE OF INSULIN: ICD-10-CM

## 2025-08-01 DIAGNOSIS — I10 ESSENTIAL HYPERTENSION: ICD-10-CM

## 2025-08-01 DIAGNOSIS — D64.9 ANEMIA, UNSPECIFIED TYPE: ICD-10-CM

## 2025-08-01 DIAGNOSIS — Z79.4 TYPE 2 DIABETES MELLITUS WITH COMPLICATION, WITH LONG-TERM CURRENT USE OF INSULIN: ICD-10-CM

## 2025-08-01 DIAGNOSIS — E53.8 VITAMIN B12 DEFICIENCY: ICD-10-CM

## 2025-08-01 DIAGNOSIS — Z00.00 HEALTH MAINTENANCE EXAMINATION: ICD-10-CM

## 2025-08-01 DIAGNOSIS — E78.2 MIXED HYPERLIPIDEMIA: ICD-10-CM

## 2025-08-01 LAB
ABSOLUTE EOSINOPHIL (OHS): 0.47 K/UL
ABSOLUTE MONOCYTE (OHS): 0.86 K/UL (ref 0.3–1)
ABSOLUTE NEUTROPHIL COUNT (OHS): 7 K/UL (ref 1.8–7.7)
ALBUMIN SERPL BCP-MCNC: 4.2 G/DL (ref 3.5–5.2)
ALBUMIN/CREAT UR: 14.4 UG/MG
ALP SERPL-CCNC: 88 UNIT/L (ref 40–150)
ALT SERPL W/O P-5'-P-CCNC: 20 UNIT/L (ref 10–44)
ANION GAP (OHS): 8 MMOL/L (ref 8–16)
AST SERPL-CCNC: 20 UNIT/L (ref 11–45)
BASOPHILS # BLD AUTO: 0.08 K/UL
BASOPHILS NFR BLD AUTO: 0.8 %
BILIRUB SERPL-MCNC: 0.3 MG/DL (ref 0.1–1)
BUN SERPL-MCNC: 21 MG/DL (ref 8–23)
CALCIUM SERPL-MCNC: 9.5 MG/DL (ref 8.7–10.5)
CHLORIDE SERPL-SCNC: 107 MMOL/L (ref 95–110)
CHOLEST SERPL-MCNC: 128 MG/DL (ref 120–199)
CHOLEST/HDLC SERPL: 4.1 {RATIO} (ref 2–5)
CO2 SERPL-SCNC: 22 MMOL/L (ref 23–29)
CREAT SERPL-MCNC: 1 MG/DL (ref 0.5–1.4)
CREAT UR-MCNC: 97 MG/DL (ref 23–375)
EAG (OHS): 100 MG/DL (ref 68–131)
ERYTHROCYTE [DISTWIDTH] IN BLOOD BY AUTOMATED COUNT: 13.2 % (ref 11.5–14.5)
FERRITIN SERPL-MCNC: 37 NG/ML (ref 20–300)
GFR SERPLBLD CREATININE-BSD FMLA CKD-EPI: >60 ML/MIN/1.73/M2
GLUCOSE SERPL-MCNC: 93 MG/DL (ref 70–110)
HBA1C MFR BLD: 5.1 % (ref 4–5.6)
HCT VFR BLD AUTO: 39.1 % (ref 40–54)
HDLC SERPL-MCNC: 31 MG/DL (ref 40–75)
HDLC SERPL: 24.2 % (ref 20–50)
HGB BLD-MCNC: 13.3 GM/DL (ref 14–18)
IMM GRANULOCYTES # BLD AUTO: 0.03 K/UL (ref 0–0.04)
IMM GRANULOCYTES NFR BLD AUTO: 0.3 % (ref 0–0.5)
IRON SATN MFR SERPL: 18 % (ref 20–50)
IRON SERPL-MCNC: 70 UG/DL (ref 45–160)
LDLC SERPL CALC-MCNC: 29.2 MG/DL (ref 63–159)
LYMPHOCYTES # BLD AUTO: 1.25 K/UL (ref 1–4.8)
MCH RBC QN AUTO: 33 PG (ref 27–31)
MCHC RBC AUTO-ENTMCNC: 34 G/DL (ref 32–36)
MCV RBC AUTO: 97 FL (ref 82–98)
MICROALBUMIN UR-MCNC: 14 UG/ML (ref ?–5000)
NONHDLC SERPL-MCNC: 97 MG/DL
NUCLEATED RBC (/100WBC) (OHS): 0 /100 WBC
PLATELET # BLD AUTO: 373 K/UL (ref 150–450)
PMV BLD AUTO: 10.1 FL (ref 9.2–12.9)
POTASSIUM SERPL-SCNC: 4.6 MMOL/L (ref 3.5–5.1)
PROT SERPL-MCNC: 7.4 GM/DL (ref 6–8.4)
RBC # BLD AUTO: 4.03 M/UL (ref 4.6–6.2)
RELATIVE EOSINOPHIL (OHS): 4.9 %
RELATIVE LYMPHOCYTE (OHS): 12.9 % (ref 18–48)
RELATIVE MONOCYTE (OHS): 8.9 % (ref 4–15)
RELATIVE NEUTROPHIL (OHS): 72.2 % (ref 38–73)
SODIUM SERPL-SCNC: 137 MMOL/L (ref 136–145)
TIBC SERPL-MCNC: 397 UG/DL (ref 250–450)
TRANSFERRIN SERPL-MCNC: 268 MG/DL (ref 200–375)
TRIGL SERPL-MCNC: 339 MG/DL (ref 30–150)
TSH SERPL-ACNC: 1.36 UIU/ML (ref 0.4–4)
VIT B12 SERPL-MCNC: 982 PG/ML (ref 210–950)
WBC # BLD AUTO: 9.69 K/UL (ref 3.9–12.7)

## 2025-08-01 PROCEDURE — 82607 VITAMIN B-12: CPT

## 2025-08-01 PROCEDURE — 85025 COMPLETE CBC W/AUTO DIFF WBC: CPT

## 2025-08-01 PROCEDURE — 80061 LIPID PANEL: CPT

## 2025-08-01 PROCEDURE — 82728 ASSAY OF FERRITIN: CPT

## 2025-08-01 PROCEDURE — 82570 ASSAY OF URINE CREATININE: CPT

## 2025-08-01 PROCEDURE — 84443 ASSAY THYROID STIM HORMONE: CPT

## 2025-08-01 PROCEDURE — 83036 HEMOGLOBIN GLYCOSYLATED A1C: CPT

## 2025-08-01 PROCEDURE — 84466 ASSAY OF TRANSFERRIN: CPT

## 2025-08-01 PROCEDURE — 36415 COLL VENOUS BLD VENIPUNCTURE: CPT

## 2025-08-01 PROCEDURE — 82040 ASSAY OF SERUM ALBUMIN: CPT

## 2025-08-06 ENCOUNTER — OFFICE VISIT (OUTPATIENT)
Dept: INTERNAL MEDICINE | Facility: CLINIC | Age: 65
End: 2025-08-06
Payer: COMMERCIAL

## 2025-08-06 VITALS — HEIGHT: 70 IN | WEIGHT: 230.19 LBS | HEART RATE: 90 BPM | OXYGEN SATURATION: 94 % | BODY MASS INDEX: 32.95 KG/M2

## 2025-08-06 DIAGNOSIS — Z00.00 HEALTH MAINTENANCE EXAMINATION: ICD-10-CM

## 2025-08-06 DIAGNOSIS — G47.33 OSA (OBSTRUCTIVE SLEEP APNEA): Primary | ICD-10-CM

## 2025-08-06 DIAGNOSIS — Z79.4 TYPE 2 DIABETES MELLITUS WITH COMPLICATION, WITH LONG-TERM CURRENT USE OF INSULIN: ICD-10-CM

## 2025-08-06 DIAGNOSIS — I10 ESSENTIAL HYPERTENSION: ICD-10-CM

## 2025-08-06 DIAGNOSIS — E53.8 VITAMIN B12 DEFICIENCY: ICD-10-CM

## 2025-08-06 DIAGNOSIS — D64.9 ANEMIA, UNSPECIFIED TYPE: ICD-10-CM

## 2025-08-06 DIAGNOSIS — E11.8 TYPE 2 DIABETES MELLITUS WITH COMPLICATION, WITH LONG-TERM CURRENT USE OF INSULIN: ICD-10-CM

## 2025-08-06 PROCEDURE — 99999 PR PBB SHADOW E&M-EST. PATIENT-LVL IV: CPT | Mod: PBBFAC,,, | Performed by: STUDENT IN AN ORGANIZED HEALTH CARE EDUCATION/TRAINING PROGRAM

## 2025-08-06 RX ORDER — TIRZEPATIDE 5 MG/.5ML
5 INJECTION, SOLUTION SUBCUTANEOUS
Qty: 2 ML | Refills: 0 | Status: SHIPPED | OUTPATIENT
Start: 2025-08-06

## 2025-08-06 RX ORDER — PEN NEEDLE, DIABETIC 30 GX3/16"
NEEDLE, DISPOSABLE MISCELLANEOUS
Qty: 400 EACH | Refills: 3 | Status: SHIPPED | OUTPATIENT
Start: 2025-08-06

## 2025-08-06 NOTE — PROGRESS NOTES
Subjective:       Patient ID: Henry Zacarias is a 65 y.o. male.    Chief Complaint: KEVIN (obstructive sleep apnea) [G47.33]    Patient is established with me, here today for the following:    History of Present Illness      DIABETES MANAGEMENT:  He continues long acting insulin 45 units in the morning and 10-12 units of other insulin at night, along with Trulicity 4.5 mg, Jardiance, and Metformin. His A1C is currently 5.1. He has been reducing insulin injections to two in the morning and one in the evening due to pharmacy supply issues with BD Ne pen needles at [facility], where he receives only two packs instead of the prescribed 90-day supply.    DEXCOM G7 CONTINUOUS GLUCOSE MONITOR:  He uses a Dexcom G7 continuous glucose monitor and expresses high satisfaction with the device, noting it is an improvement over the G6 due to better adhesion during activities like showering. He calibrates new sensors with finger stick glucose and finds the device highly accurate. His blood sugar consistently remain around 100-110 during typical meals. The device occasionally alerts him to nighttime low blood sugar events around 60, prompting him to consume a sugary drink. He strongly prefers the G7 over traditional finger prick testing and has placed his test strip prescription on hold while keeping some at home as backup. He has the Clarity kely set up on his phone.    DIET AND NUTRITION:  He reports a consistent daily structured meal plan. Breakfast consists of two egg bites made with eggs, egg whites, and cottage cheese, accompanied by black coffee. Lunch includes roasted sweet potatoes seasoned with salt, pepper, and smoked paprika, prepared with lean ground beef, taco seasoning, bell peppers, and onions. Dinner typically consists of a smoothie. He uses avocado oil for cooking and prepares guacamole as a side dish. He enjoys this routine without meal fatigue and demonstrates willingness to vary protein sources and vegetables. He notes  potential smoothie variations including adding banana and strawberries.    WEIGHT MANAGEMENT:  He reports significant changes in eating habits and portion control, consuming smaller meal portions and feeling full with less food intake. He describes eating only half a steak and half a potato during a recent lunch, noting he does not eat as much as he used to. He experiences feelings of potential illness when consuming larger quantities of food, which motivates him to limit his intake. He has also intentionally reduced alcohol consumption and demonstrates awareness of appropriate serving sizes.    RECENT SPECIALIST VISITS:  He had an eye exam three weeks ago which showed no retinopathy issues. He also saw Dr. Davis for neuropathy evaluation, during which his feet were assessed and found to be in good condition. All specialist evaluations were satisfactory and he is cleared for another year.    ROS:  Allergic: +seasonal allergies, +allergic reactions          Health maintenance -   Colonoscopy performed OCT2019. Told to repeat in 10 years.  Denies family history of colorectal cancer.  Family history of cardiac disease.  Denies family history of prostate cancer.  Declines PSA  UTD on Tdap, COVID, PPSV23, PCV20, shingles, RSV vaccinations.  Denies cigarette use, intermittent cigar use for 2-3 years.  Denies drug use.  Completed HIV and hepatitis C screening.       T2DM -   Currently taking:   - Trucility 4.5 mg weekly  - Jardiance 25 mg  - Metformin 1000 mg BID  - Lantus 45 units qAM  - Novolog 10-12 units qAC  Using Dexcom for continuous glucose monitoring  Taking ASA 81 mg daily  UTD on foot exam.  UTD on eye exam.  Lab Results   Component Value Date    HGBA1C 5.1 08/01/2025    HGBA1C 5.0 01/27/2025    HGBA1C 5.3 08/08/2024     Lab Results   Component Value Date    MICALBCREAT 14.4 08/01/2025                Wt Readings from Last 10 Encounters:   08/06/25 104.4 kg (230 lb 2.6 oz)   06/19/25 105.1 kg (231 lb 11.3 oz)    02/06/25 105.1 kg (231 lb 11.3 oz)   11/18/24 106.5 kg (234 lb 12.6 oz)   11/11/24 106.5 kg (234 lb 12.6 oz)   10/07/24 109.5 kg (241 lb 6.5 oz)   09/20/24 109.5 kg (241 lb 6.5 oz)   09/06/24 109.5 kg (241 lb 6.5 oz)   08/22/24 109.5 kg (241 lb 6.5 oz)   08/15/24 109.5 kg (241 lb 6.5 oz)     HLD -   Endorses taking atorvastatin as directed  Lab Results   Component Value Date    CHOL 128 08/01/2025     Lab Results   Component Value Date    TRIG 339 (H) 08/01/2025     Lab Results   Component Value Date    LDLCALC 29.2 (L) 08/01/2025     Lab Results   Component Value Date    HDL 31 (L) 08/01/2025     HTN -   Currently prescribed amlodipine, Coreg, valsartan.  Lab Results   Component Value Date    MICALBCREAT 14.4 08/01/2025     BP Readings from Last 5 Encounters:   08/06/25 (P) 108/60   06/19/25 122/74   02/06/25 118/62   11/18/24 130/73   11/11/24 133/78     Anemia -  Currently taking vitamin B12 daily  Haptoglobin with mild elevation  Kappa FLC elevated, normal ratio  Lab Results   Component Value Date    HGB 13.3 (L) 08/01/2025    HCT 39.1 (L) 08/01/2025    MCV 97 08/01/2025    RDW 13.2 08/01/2025     Lab Results   Component Value Date    IRON 70 08/01/2025    TRANSFERRIN 268 08/01/2025    TIBC 397 08/01/2025    LABIRON 18 (L) 08/01/2025    FESATURATED 18 (L) 02/01/2024      Lab Results   Component Value Date    FERRITIN 37.0 08/01/2025     Lab Results   Component Value Date    KQOGVLHZ68 982 (H) 08/01/2025     Lab Results   Component Value Date    FOLATE 7.7 02/01/2024       KEVIN -  Using CPAP nightly with good effect        Current Outpatient Medications   Medication Instructions    amLODIPine (NORVASC) 10 mg, Oral    aspirin 81 mg, Oral, Daily    atorvastatin (LIPITOR) 40 mg, Oral    blood sugar diagnostic (CONTOUR NEXT TEST STRIPS) Strp 1 EACH BY MISC.(NON-DRUG COMBO ROUTE) ROUTE 4 (FOUR) TIMES DAILY BEFORE MEALS AND NIGHTLY.    blood-glucose meter Misc Use as instructed    blood-glucose sensor (DEXCOM G7  "SENSOR) Indy Use as directed for continuous glucose monitoring.    carvediloL (COREG) 12.5 mg, Oral, 2 times daily with meals    celecoxib (CELEBREX) 200 mg, Oral, Daily    ciclopirox 0.77 % Gel Topical, Daily, To thickened discolored toenails.    insulin aspart U-100 (NOVOLOG FLEXPEN U-100 INSULIN) 100 unit/mL (3 mL) InPn pen 14 units in the morning, lunch time, and 12 units with dinner.    JARDIANCE 25 mg, Oral    lancets (MICROLET LANCET) Misc 1 EACH BY MISC.(NON-DRUG COMBO ROUTE) ROUTE 4 (FOUR) TIMES DAILY BEFORE MEALS AND NIGHTLY.    LANTUS U-100 INSULIN 45 Units, Subcutaneous, Daily    metFORMIN (GLUCOPHAGE) 1,000 mg, Oral, 2 times daily with meals    MOUNJARO 5 mg, Subcutaneous, Every 7 days    mupirocin (BACTROBAN) 2 % ointment Topical (Top), Daily, To LEFT great toe    pen needle, diabetic (BD JEANNETTE 2ND GEN PEN NEEDLE) 32 gauge x 5/32" Ndle Use as directed for insulin injection 4-6 times daily as directed.    valsartan (DIOVAN) 320 mg, Oral     Objective:      Vitals:    08/06/25 0724   BP: (P) 108/60   Pulse: 90   SpO2: (!) 94%   Weight: 104.4 kg (230 lb 2.6 oz)   Height: 5' 10" (1.778 m)   PainSc: 0-No pain     Body mass index is 33.02 kg/m².    Physical Exam  Vitals reviewed.   Constitutional:       General: He is not in acute distress.     Appearance: He is not ill-appearing or diaphoretic.   Cardiovascular:      Rate and Rhythm: Normal rate and regular rhythm.      Heart sounds: Normal heart sounds. No murmur heard.     No friction rub. No gallop.   Pulmonary:      Effort: Pulmonary effort is normal. No respiratory distress.      Breath sounds: Normal breath sounds. No stridor. No wheezing, rhonchi or rales.   Skin:     General: Skin is warm and dry.      Comments: Color WNL   Neurological:      Mental Status: He is alert. Mental status is at baseline.   Psychiatric:         Mood and Affect: Mood normal.         Behavior: Behavior normal.         Assessment:       1. KEVIN (obstructive sleep apnea)    2. " "Type 2 diabetes mellitus with complication, with long-term current use of insulin    3. Health maintenance examination    4. Essential hypertension    5. Vitamin B12 deficiency    6. Anemia, unspecified type        Plan:   KEVIN (obstructive sleep apnea)  -     tirzepatide (MOUNJARO) 5 mg/0.5 mL PnIj; Inject 5 mg into the skin every 7 days.  Dispense: 2 mL; Refill: 0    Type 2 diabetes mellitus with complication, with long-term current use of insulin  -     pen needle, diabetic (BD JEANNETTE 2ND GEN PEN NEEDLE) 32 gauge x 5/32" Ndle; Use as directed for insulin injection 4-6 times daily as directed.  Dispense: 400 each; Refill: 3  -     tirzepatide (MOUNJARO) 5 mg/0.5 mL PnIj; Inject 5 mg into the skin every 7 days.  Dispense: 2 mL; Refill: 0  -     Comprehensive Metabolic Panel; Future; Expected date: 02/06/2026  -     Hemoglobin A1C; Future; Expected date: 02/06/2026    Health maintenance examination  -     Comprehensive Metabolic Panel; Future; Expected date: 02/06/2026  -     Hemoglobin A1C; Future; Expected date: 02/06/2026  -     CBC Auto Differential; Future; Expected date: 02/06/2026  -     Vitamin B12; Future; Expected date: 02/06/2026  -     Folate; Future; Expected date: 02/06/2026    Essential hypertension  -     Comprehensive Metabolic Panel; Future; Expected date: 02/06/2026    Vitamin B12 deficiency  -     CBC Auto Differential; Future; Expected date: 02/06/2026  -     Vitamin B12; Future; Expected date: 02/06/2026    Anemia, unspecified type  -     Haptoglobin; Future; Expected date: 02/06/2026  -     Immunoglobulins (IgG, IgA, IgM) Quantitative; Future; Expected date: 02/06/2026  -     Immunoglobulin Free LT Chains Blood; Future; Expected date: 02/06/2026  -     Protein Electrophoresis, Serum; Future; Expected date: 02/06/2026  -     Ferritin; Future; Expected date: 02/06/2026  -     Iron and TIBC; Future; Expected date: 02/06/2026  -     Folate; Future; Expected date: 02/06/2026        Assessment & Plan  "     PLAN SUMMARY:  - Transition from Trulicity 4.5 mg weekly to Mounjaro 5 mg subcutaneously weekly for 30 days  - Continue Lantus 45 units in the morning and  Novolog 10-12 units with meals  - Maintain current dosages of Jardiance and Metformin  - Increase intake of healthy fats (avocado, olive oil) to improve HDL cholesterol  - Anticipate COVID and flu vaccines in September/October  - Contact office after 3 doses of Mounjaro to report glucose levels and response  - Follow-up appointment in October for medication refills if needed    TYPE 2 DIABETES MELLITUS:  - Monitored glucose levels using Dexcom G7, showing excellent control with 95% time in range over the last 14 days, glucose 100-110 mg/dL postprandially.  - A1C is 5.1.  - Modifying medication regimen to further optimize glycemic control and facilitate weight reduction.  - Prescribed Mounjaro 5 mg subcutaneously weekly for 30 days, with potential titration to 7.5 mg after evaluation.  - Continuing Lantus 45 units in the morning and Novolog 10-12 units with meals, Jardiance and Metformin at current dosages.  - Will transition from Trulicity 4.5 mg weekly to Mounjaro.  - Henry to contact office after 3 doses of Mounjaro to report glucose levels and response.  - Discussed importance of portion control for weight management and glucose control; patient to continue current diet plan.    METABOLIC HEALTH:  - Recent eye exam showed no evidence of diabetic retinopathy and neuropathy check was normal.  - BP is well-controlled with potential reduction in medication as weight loss progresses.  - Henry remains mildly anemic, possibly due to chronic disease.  - Recommend increasing healthy fats (avocado, olive oil) to improve HDL cholesterol.  - Liver function, thyroid, and electrolytes are normal.    PREVENTIVE CARE:  - COVID and flu vaccines anticipated to be available in September/October.        Diann Greene MD      8/6/2025

## 2025-08-21 ENCOUNTER — PATIENT MESSAGE (OUTPATIENT)
Dept: INTERNAL MEDICINE | Facility: CLINIC | Age: 65
End: 2025-08-21
Payer: COMMERCIAL

## 2025-08-21 DIAGNOSIS — E11.8 TYPE 2 DIABETES MELLITUS WITH COMPLICATION, WITH LONG-TERM CURRENT USE OF INSULIN: ICD-10-CM

## 2025-08-21 DIAGNOSIS — G47.33 OSA (OBSTRUCTIVE SLEEP APNEA): ICD-10-CM

## 2025-08-21 DIAGNOSIS — Z79.4 TYPE 2 DIABETES MELLITUS WITH COMPLICATION, WITH LONG-TERM CURRENT USE OF INSULIN: ICD-10-CM

## 2025-08-27 ENCOUNTER — TELEPHONE (OUTPATIENT)
Dept: DIABETES | Facility: CLINIC | Age: 65
End: 2025-08-27
Payer: COMMERCIAL

## (undated) DEVICE — NDL HYPO REG 25G X 1 1/2

## (undated) DEVICE — ELECTRODE BLD EXT INSUL 1

## (undated) DEVICE — DRAPE THYROID WITH ARMBOARD

## (undated) DEVICE — GAUZE SPONGE 4X4 12PLY

## (undated) DEVICE — GLOVE BIOGEL SKINSENSE PI 8.0

## (undated) DEVICE — PAD ABD 8X10 STERILE

## (undated) DEVICE — BANDAGE GAUZE 6PLY FLUFF 2X3

## (undated) DEVICE — APPLICATOR CHLORAPREP ORN 26ML

## (undated) DEVICE — TAPE ADH MEDIPORE 4 X 10YDS

## (undated) DEVICE — SYR B-D DISP CONTROL 10CC100/C

## (undated) DEVICE — GOWN SMART IMP BREATHABLE XXLG

## (undated) DEVICE — SEE MEDLINE ITEM 156930

## (undated) DEVICE — CLOSURE SKIN STERI STRIP 1/2X4

## (undated) DEVICE — DRESSING TRANS 4X4 TEGADERM

## (undated) DEVICE — SUT VICRYL PLUS 3-0 18IN

## (undated) DEVICE — SOL STRL WATER INJ 1000ML BG

## (undated) DEVICE — SUT VICRYL PLUS 4-0 P3 18IN

## (undated) DEVICE — ELECTRODE REM PLYHSV RETURN 9